# Patient Record
Sex: FEMALE | Race: WHITE | Employment: OTHER | URBAN - METROPOLITAN AREA
[De-identification: names, ages, dates, MRNs, and addresses within clinical notes are randomized per-mention and may not be internally consistent; named-entity substitution may affect disease eponyms.]

---

## 2017-12-14 ENCOUNTER — HOSPITAL ENCOUNTER (OUTPATIENT)
Age: 57
Discharge: HOME | End: 2017-12-14
Payer: MEDICARE

## 2017-12-14 ENCOUNTER — HOSPITAL ENCOUNTER (OUTPATIENT)
Dept: GENERAL RADIOLOGY | Age: 57
Discharge: HOME | End: 2017-12-14
Payer: MEDICARE

## 2017-12-14 DIAGNOSIS — J40 BRONCHITIS: ICD-10-CM

## 2017-12-14 DIAGNOSIS — R06.2 WHEEZING: ICD-10-CM

## 2017-12-14 PROCEDURE — 71020 XR CHEST STANDARD TWO VW: CPT

## 2018-04-02 ENCOUNTER — HOSPITAL ENCOUNTER (OUTPATIENT)
Dept: MRI IMAGING | Age: 58
Discharge: HOME | End: 2018-04-04
Payer: MEDICARE

## 2018-04-02 DIAGNOSIS — S83.281A OTHER TEAR OF LATERAL MENISCUS OF RIGHT KNEE, UNSPECIFIED WHETHER OLD OR CURRENT TEAR, INITIAL ENCOUNTER: ICD-10-CM

## 2018-04-02 PROCEDURE — 73721 MRI JNT OF LWR EXTRE W/O DYE: CPT

## 2018-04-20 ENCOUNTER — HOSPITAL ENCOUNTER (OUTPATIENT)
Dept: GENERAL RADIOLOGY | Age: 58
Discharge: HOME | End: 2018-04-22
Payer: MEDICARE

## 2018-04-20 ENCOUNTER — HOSPITAL ENCOUNTER (OUTPATIENT)
Age: 58
Discharge: HOME | End: 2018-04-22
Payer: MEDICARE

## 2018-04-20 ENCOUNTER — HOSPITAL ENCOUNTER (OUTPATIENT)
Age: 58
Discharge: HOME | End: 2018-04-20
Payer: MEDICARE

## 2018-04-20 DIAGNOSIS — M25.511 RIGHT SHOULDER PAIN, UNSPECIFIED CHRONICITY: ICD-10-CM

## 2018-04-20 LAB
ANION GAP SERPL CALCULATED.3IONS-SCNC: 12 MMOL/L (ref 9–17)
BUN BLDV-MCNC: 15 MG/DL (ref 6–20)
BUN/CREAT BLD: ABNORMAL (ref 9–20)
CALCIUM SERPL-MCNC: 9 MG/DL (ref 8.6–10.4)
CHLORIDE BLD-SCNC: 100 MMOL/L (ref 98–107)
CO2: 25 MMOL/L (ref 20–31)
CREAT SERPL-MCNC: 0.93 MG/DL (ref 0.5–0.9)
GFR AFRICAN AMERICAN: >60 ML/MIN
GFR NON-AFRICAN AMERICAN: >60 ML/MIN
GFR SERPL CREATININE-BSD FRML MDRD: ABNORMAL ML/MIN/{1.73_M2}
GFR SERPL CREATININE-BSD FRML MDRD: ABNORMAL ML/MIN/{1.73_M2}
GLUCOSE BLD-MCNC: 84 MG/DL (ref 70–99)
POTASSIUM SERPL-SCNC: 4 MMOL/L (ref 3.7–5.3)
SODIUM BLD-SCNC: 137 MMOL/L (ref 135–144)

## 2018-04-20 PROCEDURE — 36415 COLL VENOUS BLD VENIPUNCTURE: CPT

## 2018-04-20 PROCEDURE — 80048 BASIC METABOLIC PNL TOTAL CA: CPT

## 2018-04-20 PROCEDURE — 73030 X-RAY EXAM OF SHOULDER: CPT

## 2018-07-17 PROBLEM — M51.9 LUMBAR DISC DISEASE: Status: ACTIVE | Noted: 2018-07-17

## 2018-12-01 LAB
ANTIBODY: NORMAL
CHOLESTEROL, TOTAL: 225 MG/DL
CHOLESTEROL/HDL RATIO: 3.2
GLUCOSE FASTING: 100 MG/DL
HDLC SERPL-MCNC: 70 MG/DL (ref 35–70)
HIV AG/AB: NORMAL
LDL CHOLESTEROL CALCULATED: 131 MG/DL (ref 0–160)
TRIGL SERPL-MCNC: 122 MG/DL
VLDLC SERPL CALC-MCNC: 24 MG/DL

## 2018-12-03 DIAGNOSIS — Z13.220 SCREENING CHOLESTEROL LEVEL: ICD-10-CM

## 2018-12-03 DIAGNOSIS — Z13.1 DIABETES MELLITUS SCREENING: ICD-10-CM

## 2018-12-03 DIAGNOSIS — Z11.59 ENCOUNTER FOR HEPATITIS C SCREENING TEST FOR LOW RISK PATIENT: ICD-10-CM

## 2018-12-03 DIAGNOSIS — Z11.4 ENCOUNTER FOR SCREENING FOR HIV: ICD-10-CM

## 2018-12-03 DIAGNOSIS — R76.8 HEPATITIS C ANTIBODY POSITIVE IN BLOOD: Primary | ICD-10-CM

## 2018-12-04 ENCOUNTER — HOSPITAL ENCOUNTER (OUTPATIENT)
Age: 58
Discharge: HOME | End: 2018-12-04
Payer: MEDICARE

## 2018-12-04 DIAGNOSIS — Z79.01 ENCOUNTER FOR CURRENT LONG-TERM USE OF ANTICOAGULANTS: Primary | ICD-10-CM

## 2018-12-04 DIAGNOSIS — R76.8 HEPATITIS C ANTIBODY POSITIVE IN BLOOD: ICD-10-CM

## 2018-12-04 PROCEDURE — 87522 HEPATITIS C REVRS TRNSCRPJ: CPT

## 2018-12-04 PROCEDURE — 36415 COLL VENOUS BLD VENIPUNCTURE: CPT

## 2018-12-05 LAB
DIRECT EXAM: NORMAL
Lab: NORMAL
SPECIMEN DESCRIPTION: NORMAL
STATUS: NORMAL

## 2018-12-09 LAB
INR BLD: 2.2 (ref 0.92–1.08)
PROTHROMBIN TIME: 23.4 SEC (ref 9.4–11)

## 2018-12-12 DIAGNOSIS — Z12.39 BREAST CANCER SCREENING: ICD-10-CM

## 2018-12-13 ENCOUNTER — TELEPHONE (OUTPATIENT)
Dept: PRIMARY CARE CLINIC | Age: 58
End: 2018-12-13

## 2018-12-13 DIAGNOSIS — J44.9 CHRONIC OBSTRUCTIVE PULMONARY DISEASE, UNSPECIFIED COPD TYPE (HCC): ICD-10-CM

## 2018-12-13 DIAGNOSIS — R05.9 COUGH: Primary | ICD-10-CM

## 2018-12-13 RX ORDER — BENZONATATE 200 MG/1
200 CAPSULE ORAL 3 TIMES DAILY PRN
Qty: 30 CAPSULE | Refills: 0 | Status: SHIPPED | OUTPATIENT
Start: 2018-12-13 | End: 2018-12-23

## 2018-12-13 RX ORDER — PREDNISONE 20 MG/1
20 TABLET ORAL 2 TIMES DAILY
Qty: 10 TABLET | Refills: 0 | Status: SHIPPED | OUTPATIENT
Start: 2018-12-13 | End: 2019-08-16 | Stop reason: SDUPTHER

## 2018-12-13 NOTE — TELEPHONE ENCOUNTER
Pt calling stating she just got over the flu and now has this horrible cough. Clear phlegm, but at night can't sleep. Asking if you can send in a cough supp and Prednisone to [facility]?

## 2018-12-18 ENCOUNTER — TELEPHONE (OUTPATIENT)
Dept: PRIMARY CARE CLINIC | Age: 58
End: 2018-12-18

## 2018-12-18 DIAGNOSIS — R05.9 COUGH: Primary | ICD-10-CM

## 2018-12-18 RX ORDER — AMOXICILLIN AND CLAVULANATE POTASSIUM 875; 125 MG/1; MG/1
1 TABLET, FILM COATED ORAL 2 TIMES DAILY
Qty: 20 TABLET | Refills: 0 | Status: SHIPPED | OUTPATIENT
Start: 2018-12-18 | End: 2018-12-28

## 2019-01-14 ENCOUNTER — OFFICE VISIT (OUTPATIENT)
Dept: PRIMARY CARE CLINIC | Age: 59
End: 2019-01-14
Payer: MEDICARE

## 2019-01-14 VITALS
BODY MASS INDEX: 27.83 KG/M2 | OXYGEN SATURATION: 96 % | SYSTOLIC BLOOD PRESSURE: 100 MMHG | HEART RATE: 91 BPM | WEIGHT: 159.6 LBS | DIASTOLIC BLOOD PRESSURE: 60 MMHG

## 2019-01-14 DIAGNOSIS — M51.9 LUMBAR DISC DISEASE: ICD-10-CM

## 2019-01-14 DIAGNOSIS — Z23 NEEDS FLU SHOT: ICD-10-CM

## 2019-01-14 DIAGNOSIS — J45.20 MILD INTERMITTENT ASTHMA WITHOUT COMPLICATION: Primary | ICD-10-CM

## 2019-01-14 DIAGNOSIS — Z23 NEED FOR PROPHYLACTIC VACCINATION AGAINST STREPTOCOCCUS PNEUMONIAE (PNEUMOCOCCUS): ICD-10-CM

## 2019-01-14 DIAGNOSIS — J44.9 CHRONIC OBSTRUCTIVE PULMONARY DISEASE, UNSPECIFIED COPD TYPE (HCC): ICD-10-CM

## 2019-01-14 DIAGNOSIS — M72.0 DUPUYTREN CONTRACTURE: ICD-10-CM

## 2019-01-14 PROCEDURE — 90732 PPSV23 VACC 2 YRS+ SUBQ/IM: CPT | Performed by: FAMILY MEDICINE

## 2019-01-14 PROCEDURE — 3023F SPIROM DOC REV: CPT | Performed by: FAMILY MEDICINE

## 2019-01-14 PROCEDURE — 3017F COLORECTAL CA SCREEN DOC REV: CPT | Performed by: FAMILY MEDICINE

## 2019-01-14 PROCEDURE — G8417 CALC BMI ABV UP PARAM F/U: HCPCS | Performed by: FAMILY MEDICINE

## 2019-01-14 PROCEDURE — 99214 OFFICE O/P EST MOD 30 MIN: CPT | Performed by: FAMILY MEDICINE

## 2019-01-14 PROCEDURE — 90686 IIV4 VACC NO PRSV 0.5 ML IM: CPT | Performed by: FAMILY MEDICINE

## 2019-01-14 PROCEDURE — G8427 DOCREV CUR MEDS BY ELIG CLIN: HCPCS | Performed by: FAMILY MEDICINE

## 2019-01-14 PROCEDURE — 4004F PT TOBACCO SCREEN RCVD TLK: CPT | Performed by: FAMILY MEDICINE

## 2019-01-14 PROCEDURE — 90471 IMMUNIZATION ADMIN: CPT | Performed by: FAMILY MEDICINE

## 2019-01-14 PROCEDURE — G8482 FLU IMMUNIZE ORDER/ADMIN: HCPCS | Performed by: FAMILY MEDICINE

## 2019-01-14 PROCEDURE — 90472 IMMUNIZATION ADMIN EACH ADD: CPT | Performed by: FAMILY MEDICINE

## 2019-01-14 PROCEDURE — G8926 SPIRO NO PERF OR DOC: HCPCS | Performed by: FAMILY MEDICINE

## 2019-01-14 RX ORDER — HYDROCODONE BITARTRATE AND ACETAMINOPHEN 5; 325 MG/1; MG/1
1 TABLET ORAL EVERY 6 HOURS PRN
Qty: 90 TABLET | Refills: 0 | Status: SHIPPED | OUTPATIENT
Start: 2019-01-14 | End: 2019-05-31 | Stop reason: SDUPTHER

## 2019-01-14 RX ORDER — ONDANSETRON 4 MG/1
4 TABLET, FILM COATED ORAL EVERY 8 HOURS PRN
Qty: 30 TABLET | Refills: 0 | Status: SHIPPED | OUTPATIENT
Start: 2019-01-14 | End: 2019-05-15 | Stop reason: SDUPTHER

## 2019-01-14 ASSESSMENT — ENCOUNTER SYMPTOMS
SHORTNESS OF BREATH: 0
BACK PAIN: 1
COUGH: 0
WHEEZING: 1
DIFFICULTY BREATHING: 0

## 2019-01-14 ASSESSMENT — PATIENT HEALTH QUESTIONNAIRE - PHQ9
SUM OF ALL RESPONSES TO PHQ9 QUESTIONS 1 & 2: 0
SUM OF ALL RESPONSES TO PHQ QUESTIONS 1-9: 0
2. FEELING DOWN, DEPRESSED OR HOPELESS: 0
1. LITTLE INTEREST OR PLEASURE IN DOING THINGS: 0
SUM OF ALL RESPONSES TO PHQ QUESTIONS 1-9: 0

## 2019-01-14 ASSESSMENT — COPD QUESTIONNAIRES: COPD: 1

## 2019-01-17 DIAGNOSIS — F34.1 DYSTHYMIA: ICD-10-CM

## 2019-01-17 DIAGNOSIS — F39 MOOD DISORDER (HCC): ICD-10-CM

## 2019-01-17 RX ORDER — DIVALPROEX SODIUM 250 MG/1
TABLET, DELAYED RELEASE ORAL
Qty: 60 TABLET | Refills: 5 | Status: SHIPPED | OUTPATIENT
Start: 2019-01-17 | End: 2019-07-17 | Stop reason: SDUPTHER

## 2019-01-23 DIAGNOSIS — F43.9 STRESS AT HOME: ICD-10-CM

## 2019-01-23 DIAGNOSIS — F41.9 ANXIETY: ICD-10-CM

## 2019-01-23 RX ORDER — DULOXETIN HYDROCHLORIDE 60 MG/1
CAPSULE, DELAYED RELEASE ORAL
Qty: 30 CAPSULE | Refills: 5 | Status: SHIPPED | OUTPATIENT
Start: 2019-01-23 | End: 2019-07-19 | Stop reason: SDUPTHER

## 2019-02-19 RX ORDER — BUSPIRONE HYDROCHLORIDE 15 MG/1
TABLET ORAL
Qty: 60 TABLET | Refills: 5 | Status: SHIPPED | OUTPATIENT
Start: 2019-02-19 | End: 2019-08-17 | Stop reason: SDUPTHER

## 2019-02-22 RX ORDER — OMEPRAZOLE 20 MG/1
CAPSULE, DELAYED RELEASE ORAL
Qty: 30 CAPSULE | Refills: 4 | Status: SHIPPED | OUTPATIENT
Start: 2019-02-22 | End: 2019-07-19 | Stop reason: SDUPTHER

## 2019-04-01 DIAGNOSIS — J45.20 MILD INTERMITTENT ASTHMA WITHOUT COMPLICATION: ICD-10-CM

## 2019-04-02 ENCOUNTER — TELEPHONE (OUTPATIENT)
Dept: PRIMARY CARE CLINIC | Age: 59
End: 2019-04-02

## 2019-04-02 DIAGNOSIS — G45.9 TIA (TRANSIENT ISCHEMIC ATTACK): Primary | ICD-10-CM

## 2019-04-02 NOTE — TELEPHONE ENCOUNTER
Pt calling to ask that a standing order be faxed to Pathology labs at [phone]. Pt says that she went to do her INR today and could not because there was not a standing order at [facility].  Please advise     April [phone]

## 2019-04-15 ENCOUNTER — OFFICE VISIT (OUTPATIENT)
Dept: PRIMARY CARE CLINIC | Age: 59
End: 2019-04-15
Payer: MEDICARE

## 2019-04-15 VITALS
BODY MASS INDEX: 27.69 KG/M2 | HEART RATE: 80 BPM | OXYGEN SATURATION: 98 % | SYSTOLIC BLOOD PRESSURE: 124 MMHG | DIASTOLIC BLOOD PRESSURE: 86 MMHG | WEIGHT: 158.8 LBS

## 2019-04-15 DIAGNOSIS — J44.9 CHRONIC OBSTRUCTIVE PULMONARY DISEASE, UNSPECIFIED COPD TYPE (HCC): ICD-10-CM

## 2019-04-15 DIAGNOSIS — F41.9 ANXIETY: ICD-10-CM

## 2019-04-15 DIAGNOSIS — M19.90 ARTHRITIS: ICD-10-CM

## 2019-04-15 DIAGNOSIS — G45.9 TIA (TRANSIENT ISCHEMIC ATTACK): ICD-10-CM

## 2019-04-15 DIAGNOSIS — J45.20 MILD INTERMITTENT ASTHMA WITHOUT COMPLICATION: Primary | ICD-10-CM

## 2019-04-15 LAB
INTERNATIONAL NORMALIZATION RATIO, POC: 2.2
PROTHROMBIN TIME, POC: NORMAL

## 2019-04-15 PROCEDURE — 99214 OFFICE O/P EST MOD 30 MIN: CPT | Performed by: FAMILY MEDICINE

## 2019-04-15 PROCEDURE — G8926 SPIRO NO PERF OR DOC: HCPCS | Performed by: FAMILY MEDICINE

## 2019-04-15 PROCEDURE — 3023F SPIROM DOC REV: CPT | Performed by: FAMILY MEDICINE

## 2019-04-15 PROCEDURE — G8417 CALC BMI ABV UP PARAM F/U: HCPCS | Performed by: FAMILY MEDICINE

## 2019-04-15 PROCEDURE — 85610 PROTHROMBIN TIME: CPT | Performed by: FAMILY MEDICINE

## 2019-04-15 PROCEDURE — 4004F PT TOBACCO SCREEN RCVD TLK: CPT | Performed by: FAMILY MEDICINE

## 2019-04-15 PROCEDURE — 3017F COLORECTAL CA SCREEN DOC REV: CPT | Performed by: FAMILY MEDICINE

## 2019-04-15 PROCEDURE — G8427 DOCREV CUR MEDS BY ELIG CLIN: HCPCS | Performed by: FAMILY MEDICINE

## 2019-04-15 ASSESSMENT — COPD QUESTIONNAIRES: COPD: 1

## 2019-04-15 ASSESSMENT — ENCOUNTER SYMPTOMS
COUGH: 1
SHORTNESS OF BREATH: 1

## 2019-04-15 NOTE — PROGRESS NOTES
Negative. Respiratory: Positive for cough and shortness of breath. Musculoskeletal: Positive for arthralgias. Lower back pain was worse 2 weeks ago and had to take pain meds. Had to take pain pills for 4 days in a row. Has a physical job. Psychiatric/Behavioral: The patient is nervous/anxious. Stress   Son had URI 2-3 weeks ago   Sleeping 12 hrs sometimes during weekend she doesn't have her son  Has trouble sleeping otherwise: has trouble falling asleep, has tried Tylenol pm and it helped. Objective:     /86   Pulse 80   Wt 158 lb 12.8 oz (72 kg)   SpO2 98%   BMI 27.69 kg/m²   Physical Exam   Constitutional: She is oriented to person, place, and time. She appears well-developed and well-nourished. No distress. HENT:   Head: Normocephalic and atraumatic. Right Ear: External ear normal.   Left Ear: External ear normal.   Mouth/Throat: Oropharynx is clear and moist.   Eyes: Pupils are equal, round, and reactive to light. Conjunctivae are normal. Right eye exhibits no discharge. Left eye exhibits no discharge. No scleral icterus. Neck: No tracheal deviation present. No thyromegaly present. Cardiovascular: Normal rate, regular rhythm, normal heart sounds and intact distal pulses. No murmur heard. No carotid bruits   Pulmonary/Chest: Effort normal. No respiratory distress. She has no wheezes. Mild rhonchi ins and exp and this clears with coughing   Musculoskeletal: She exhibits no edema (no leg edema). Lymphadenopathy:     She has no cervical adenopathy. Neurological: She is alert and oriented to person, place, and time. Skin: Skin is warm. Capillary refill takes less than 2 seconds. No rash noted. Psychiatric: She has a normal mood and affect. Her behavior is normal. Thought content normal.   Nursing note and vitals reviewed. Assessment:       Diagnosis Orders   1. Mild intermittent asthma without complication     2.  Chronic obstructive pulmonary disease, unspecified COPD type ([facility]     3. TIA (transient ischemic attack)  MD COLLECTION CAPILLARY BLOOD SPECIMEN    POCT INR   4. Anxiety     5. Arthritis          Plan:      Return in about 3 months (around 7/15/2019) for depression, anxiety. asthma . Consider PM for steroid injections in spine. Do MRI of lumbar spine. Consider massage therapy and stretching. Stop smoking  Keep on same dose warfarin  Discussed her stress with family staying with her. Orders Placed This Encounter   Procedures    POCT INR    MD COLLECTION CAPILLARY BLOOD SPECIMEN     No orders of the defined types were placed in this encounter. Patient given educational materials - see patient instructions. Discussed use, benefit, and side effects of prescribed medications. All patient questions answered. Pt voiced understanding. Reviewed health maintenance. Instructed to continue current medications, diet. .  Patient agreed with treatment plan. Follow up as directed.      Electronicallysigned by Taylor Cristobal MD on 4/15/2019 at 1:31 PM

## 2019-05-15 RX ORDER — ONDANSETRON 4 MG/1
4 TABLET, FILM COATED ORAL EVERY 8 HOURS PRN
Qty: 30 TABLET | Refills: 0 | Status: SHIPPED | OUTPATIENT
Start: 2019-05-15 | End: 2019-07-15 | Stop reason: SDUPTHER

## 2019-05-22 DIAGNOSIS — G45.9 TIA (TRANSIENT ISCHEMIC ATTACK): ICD-10-CM

## 2019-05-22 RX ORDER — WARFARIN SODIUM 4 MG/1
TABLET ORAL
Qty: 45 TABLET | Refills: 1 | Status: SHIPPED | OUTPATIENT
Start: 2019-05-22 | End: 2019-07-15 | Stop reason: SDUPTHER

## 2019-05-22 NOTE — TELEPHONE ENCOUNTER
Last visit: 4/15/19  Last Med refill: 10/15/18  Does patient have enough medication for 72 hours: No,Pt has been out for 3 days      Next Visit Date:  Future Appointments   Date Time Provider Sarah Charmaine   7/15/2019  1:00 PM Emily Braun MD [facility] [address] Maintenance   Topic Date Due    Shingles Vaccine (1 of 2) 04/01/2010    Cervical cancer screen  10/19/2019    Breast cancer screen  11/29/2020    Lipid screen  12/01/2023    DTaP/Tdap/Td vaccine (2 - Td) 12/11/2024    Colon cancer screen colonoscopy  12/29/2026    Flu vaccine  Completed    Pneumococcal 0-64 years Vaccine  Completed    Hepatitis C screen  Completed    HIV screen  Completed       No results found for: LABA1C          ( goal A1C is < 7)   No results found for: LABMICR  LDL Calculated (mg/dL)   Date Value   12/01/2018 131       (goal LDL is <100)   BUN (mg/dL)   Date Value   04/20/2018 15     BP Readings from Last 3 Encounters:   04/15/19 124/86   01/14/19 100/60   10/15/18 112/70          (goal 120/80)    All Future Testing planned in [facility]  Lab Frequency Next Occurrence   MRI LUMBAR SPINE WO CONTRAST Once 06/08/2018   Protime-INR Once 06/21/2019   Protime-INR Once 07/11/2019               Patient Active Problem List:     TIA (transient ischemic attack)     Asthma     Pulmonary emphysema ([address]     Arthritis     Headache     Osteoporosis     Anxiety     COPD (chronic obstructive pulmonary disease) ([address]     Osteoarthritis     Stress at home     Lumbar disc disease

## 2019-05-31 DIAGNOSIS — M51.9 LUMBAR DISC DISEASE: ICD-10-CM

## 2019-05-31 RX ORDER — HYDROCODONE BITARTRATE AND ACETAMINOPHEN 5; 325 MG/1; MG/1
1 TABLET ORAL EVERY 6 HOURS PRN
Qty: 90 TABLET | Refills: 0 | Status: SHIPPED | OUTPATIENT
Start: 2019-05-31 | End: 2019-06-30

## 2019-06-04 DIAGNOSIS — M47.816 ARTHRITIS OF LUMBAR SPINE: ICD-10-CM

## 2019-06-04 DIAGNOSIS — M54.16 LUMBAR RADICULOPATHY, RIGHT: ICD-10-CM

## 2019-06-05 ENCOUNTER — TELEPHONE (OUTPATIENT)
Dept: PRIMARY CARE CLINIC | Age: 59
End: 2019-06-05

## 2019-06-06 DIAGNOSIS — R93.7 ABNORMAL MRI, LUMBAR SPINE: Primary | ICD-10-CM

## 2019-06-07 NOTE — TELEPHONE ENCOUNTER
Per [facility] PA was closed and marked as N/A. I left message for pt to return call. Please ask her to contact her pharmacy and see if it goes thru or if another PA needs done.  [facility]

## 2019-07-15 ENCOUNTER — OFFICE VISIT (OUTPATIENT)
Dept: PRIMARY CARE CLINIC | Age: 59
End: 2019-07-15
Payer: MEDICARE

## 2019-07-15 VITALS
OXYGEN SATURATION: 97 % | SYSTOLIC BLOOD PRESSURE: 112 MMHG | BODY MASS INDEX: 27.31 KG/M2 | DIASTOLIC BLOOD PRESSURE: 76 MMHG | HEART RATE: 85 BPM | WEIGHT: 156.6 LBS

## 2019-07-15 DIAGNOSIS — J43.9 PULMONARY EMPHYSEMA, UNSPECIFIED EMPHYSEMA TYPE (HCC): ICD-10-CM

## 2019-07-15 DIAGNOSIS — J45.20 MILD INTERMITTENT ASTHMA WITHOUT COMPLICATION: ICD-10-CM

## 2019-07-15 DIAGNOSIS — R11.0 NAUSEA: ICD-10-CM

## 2019-07-15 DIAGNOSIS — G45.9 TIA (TRANSIENT ISCHEMIC ATTACK): ICD-10-CM

## 2019-07-15 DIAGNOSIS — J44.9 CHRONIC OBSTRUCTIVE PULMONARY DISEASE, UNSPECIFIED COPD TYPE (HCC): Primary | ICD-10-CM

## 2019-07-15 LAB
INTERNATIONAL NORMALIZATION RATIO, POC: 2.5
PROTHROMBIN TIME, POC: NORMAL

## 2019-07-15 PROCEDURE — 3023F SPIROM DOC REV: CPT | Performed by: FAMILY MEDICINE

## 2019-07-15 PROCEDURE — 4004F PT TOBACCO SCREEN RCVD TLK: CPT | Performed by: FAMILY MEDICINE

## 2019-07-15 PROCEDURE — 99214 OFFICE O/P EST MOD 30 MIN: CPT | Performed by: FAMILY MEDICINE

## 2019-07-15 PROCEDURE — 3017F COLORECTAL CA SCREEN DOC REV: CPT | Performed by: FAMILY MEDICINE

## 2019-07-15 PROCEDURE — G8926 SPIRO NO PERF OR DOC: HCPCS | Performed by: FAMILY MEDICINE

## 2019-07-15 PROCEDURE — G8427 DOCREV CUR MEDS BY ELIG CLIN: HCPCS | Performed by: FAMILY MEDICINE

## 2019-07-15 PROCEDURE — G8417 CALC BMI ABV UP PARAM F/U: HCPCS | Performed by: FAMILY MEDICINE

## 2019-07-15 PROCEDURE — 85610 PROTHROMBIN TIME: CPT | Performed by: FAMILY MEDICINE

## 2019-07-15 RX ORDER — ONDANSETRON 4 MG/1
4 TABLET, FILM COATED ORAL EVERY 8 HOURS PRN
Qty: 30 TABLET | Refills: 0 | Status: SHIPPED | OUTPATIENT
Start: 2019-07-15 | End: 2019-09-27 | Stop reason: SDUPTHER

## 2019-07-15 RX ORDER — WARFARIN SODIUM 4 MG/1
TABLET ORAL
Qty: 45 TABLET | Refills: 5 | Status: SHIPPED | OUTPATIENT
Start: 2019-07-15 | End: 2019-12-23 | Stop reason: SDUPTHER

## 2019-07-15 ASSESSMENT — COPD QUESTIONNAIRES: COPD: 1

## 2019-07-15 ASSESSMENT — ENCOUNTER SYMPTOMS: SHORTNESS OF BREATH: 1
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
## 2019-07-17 DIAGNOSIS — F39 MOOD DISORDER (HCC): ICD-10-CM

## 2019-07-17 DIAGNOSIS — F34.1 DYSTHYMIA: ICD-10-CM

## 2019-07-17 RX ORDER — DIVALPROEX SODIUM 250 MG/1
TABLET, DELAYED RELEASE ORAL
Qty: 60 TABLET | Refills: 4 | Status: SHIPPED | OUTPATIENT
Start: 2019-07-17 | End: 2019-12-18 | Stop reason: SDUPTHER

## 2019-07-19 DIAGNOSIS — F43.9 STRESS AT HOME: ICD-10-CM

## 2019-07-19 DIAGNOSIS — F41.9 ANXIETY: ICD-10-CM

## 2019-07-19 RX ORDER — OMEPRAZOLE 20 MG/1
CAPSULE, DELAYED RELEASE ORAL
Qty: 30 CAPSULE | Refills: 3 | Status: SHIPPED | OUTPATIENT
Start: 2019-07-19 | End: 2019-11-20 | Stop reason: SDUPTHER

## 2019-07-19 RX ORDER — DULOXETIN HYDROCHLORIDE 60 MG/1
CAPSULE, DELAYED RELEASE ORAL
Qty: 30 CAPSULE | Refills: 4 | Status: SHIPPED | OUTPATIENT
Start: 2019-07-19 | End: 2019-12-27 | Stop reason: SDUPTHER

## 2019-07-30 ENCOUNTER — HOSPITAL ENCOUNTER (OUTPATIENT)
Dept: PHYSICAL THERAPY | Age: 59
Setting detail: THERAPIES SERIES
Discharge: HOME | End: 2019-07-30
Payer: MEDICARE

## 2019-07-30 PROCEDURE — 97161 PT EVAL LOW COMPLEX 20 MIN: CPT

## 2019-07-30 PROCEDURE — 97110 THERAPEUTIC EXERCISES: CPT

## 2019-07-30 NOTE — CONSULTS
6/10  Pain altered Tx:  [] Yes  [x] No  Action:  Symptoms: [x] Worsening [x] Same  Better:     [x] Other: changing position  Worse:   [x]Stand   [x] Bend  [x] Other: twisting, lifting  Sleep:     [x] Disturbed    Objective:   STRENGTH  ROM    Left Right Cervical    L1-2 Hip Flex 5 4 Flexion    Hip Abd 4+ 4+ Extension    L3-4 Knee Ext 5 5 Rotation L R   L4 Ankle DF 5 5 Sidebend L R   L5 EHL   Retraction    S1 Plant. Flex   Lumbar    Abdominals 4-  Flexion 72 degrees   Erector Spinae   Extension 15 degrees, radiation of symptoms with repetition      Rotation L wfl R 10% impairment   Hip ext 4+ 4 Sidebend L 42 R 30   Hip add 4+ 4+ UE/LE     Knee flex 4+ 4                                            TESTS (+/-) LEFT RIGHT Not Tested   SLR [] sit [x] supine - + []   Hamstring (SLR)   []   SKTC - - []   DKTC + + []   Slump/Dural - tight []   SI JT   []   ENIO tight - []   Joint Mobility hypo hypo []   Patrizia Tests ? Pain ?  Pain No Change Not Tested   RFIS [x] [] [] []   KRISTIAN [x] [] [] []   RFIL [x] [] [] []   REIL [] [x] [] []   Rep Prot [] [] [] []   Rep Retract [] [] [] []       OBSERVATION No Deficit Deficit Not Tested Comments   Posture       Forward Head [x] [] []    Rounded Shoulders [x] [] []    Kyphosis [x] [] []    Lordosis [] [x] [] decreased   Lateral Shift [x] [] []    Scoliosis [x] [] []    Iliac Crest [x] [] []    PSIS [] [x] [] Tender to palpation   ASIS [] [x] [] Tender to palpation   Genu Valgus [x] [] []    Genu Varus [x] [] []    Genu Recurvatum [x] [] []    Pronation [] [] [x]    Supination [] [] [x]    Leg Length Discrp [] [] [x]    Slumped Sitting [] [x] [] Patient tends to shift weight back and forth   Palpation [] [x] [] Patient with increased pain with mobilization to left-sided L3-4   Sensation [x] [] []    Edema [x] [] []    Neurological [] [] [x]          FUNCTION Normal Difficult Unable   Sitting [] [x] []   Standing [] [x] []   Ambulation [x] [] []   Groom/Dress [x] [] []   Lift/Carry []

## 2019-08-01 ENCOUNTER — HOSPITAL ENCOUNTER (OUTPATIENT)
Dept: PHYSICAL THERAPY | Age: 59
Setting detail: THERAPIES SERIES
Discharge: HOME | End: 2019-08-01
Payer: MEDICARE

## 2019-08-01 PROCEDURE — 97110 THERAPEUTIC EXERCISES: CPT

## 2019-08-01 PROCEDURE — 97140 MANUAL THERAPY 1/> REGIONS: CPT

## 2019-08-01 NOTE — FLOWSHEET NOTE
[address]   [address] [facility] [address]   Phone: [phone]   Fax: [phone]    Physical Therapy Daily Treatment Note      Date:  2019  Patient Name:  Heaven Montana    :  1960  MRN: [id number]  Physician: Dr. Alan Brooks MD                                 Insurance: [place] [employer]  Medical Diagnosis: M54.31 - Sciatica, right side; M54.5 - Low back pain                Rehab Codes: M54.5, M25.551, M62.81  Onset Date: 19 referral             Next 's appt. : -  Visit# / total visits:   Cancels/No Shows: 0/0    Subjective:    Pain:  [x] Yes  [] No Location: low back, right leg Pain Rating: (0-10 scale) 8/10  Pain altered Tx:  [] No  [] Yes  Action:  Comments: Patient reports she felt good following Tuesday's evaluation; however, she had increased pain yesterday which required her to take a pain pill. Pt reports she was feeling pretty good today until prolonged standing to check in. Arthritis low back pain rated at 4/10 and sciatic pain rated at 8/10.     Objective:  Modalities:   Precautions:  Exercises:  Exercise Reps/ Time Weight/ Level Comments   Manual Therapy 10 min  Grade I CPA mobilization to lumbar spine, Grade I unilateral PA mobilization to right-sided lower lumbar spine  Light soft tissue mobilization and myofascial relase to bilateral QL and piriformis         Prone on elbows 10x10\"      Prone press up 8x10\"   Started to have increased symptoms following 8   Prone hip flexor stretch 3x30\" ea      Prone hip IR/ER 10x ea     Prone hip extension 5x R, 10x L   Increased low back symptoms on the right after 5         Transverse abdominus activation 10 x 5\"      Piriformis stretch 2x30\" ea      LTR 5x5\"     Other:     Specific Instructions for next treatment: progress exercises depending on direction of preference, centralization of low back symptoms, progress core strengthening exercises, hip strengthening.        Treatment review. [] Demonstrates/verbalizes HEP/Ed previously given. Plan: [] Continue per plan of care.    [] Other:      Time In: 1630           Time Out: 7516    Electronically signed by:  Harpal Cantrell PT

## 2019-08-05 ENCOUNTER — HOSPITAL ENCOUNTER (OUTPATIENT)
Dept: PHYSICAL THERAPY | Age: 59
Setting detail: THERAPIES SERIES
Discharge: HOME | End: 2019-08-05
Payer: MEDICARE

## 2019-08-05 PROCEDURE — 97110 THERAPEUTIC EXERCISES: CPT

## 2019-08-05 NOTE — FLOWSHEET NOTE
understanding. [] Demonstrates understanding. [] Needs review. [] Demonstrates/verbalizes HEP/Ed previously given. Treatment Plan:  [x] Therapeutic Exercise             [x] Modalities: prn pain  [] Therapeutic Activity               [] Ultrasound                  [x] Electrical Stimulation  [] Gait Training                          []Massage         [x] Lumbar/Cervical Traction  [x] Neuromuscular Re-education            [x] Cold/hotpack  [] Iontophoresis: 4 mg/mL  [x] Instruction in HEP                                                                       Dexamethasone Sodium  [x] Manual Therapy                                                                          Phosphate 40-80 mAmin  [] Aquatic Therapy                                 [] Other:     []  Medication allergies reviewed for use of               Dexamethasone Sodium Phosphate 4mg/ml                with iontophoresis treatments. Pt is not allergic. Plan: [x] Continue per plan of care.    [] Other:       Time In: 3825   Time Out: [address]      Electronically signed by:  Paul Macias PTA

## 2019-08-08 ENCOUNTER — HOSPITAL ENCOUNTER (OUTPATIENT)
Dept: PHYSICAL THERAPY | Age: 59
Setting detail: THERAPIES SERIES
Discharge: HOME | End: 2019-08-08
Payer: MEDICARE

## 2019-08-08 PROCEDURE — 97110 THERAPEUTIC EXERCISES: CPT

## 2019-08-13 ENCOUNTER — HOSPITAL ENCOUNTER (OUTPATIENT)
Dept: PHYSICAL THERAPY | Age: 59
Setting detail: THERAPIES SERIES
Discharge: HOME | End: 2019-08-13
Payer: MEDICARE

## 2019-08-13 PROCEDURE — 97110 THERAPEUTIC EXERCISES: CPT

## 2019-08-13 NOTE — FLOWSHEET NOTE
2x 30\" ea   Towel under R low back/hip   Bridges 10x 5\" hold  Added 8/8/19   Single Knee to Chest 3x 30\" ea  Added 8/8/19  Left side caused groin pain   Double Knee to Chest 3x 30\" ea  Added 8/8/19   Lateral Hip Stretch 3x 30\" ea  Added 8/8/19   Transverse abdominus activation 10 x 5\"     LTR 10x 5\"           Other:     Specific Instructions for next treatment: progress exercises depending on direction of preference, centralization of low back symptoms, progress core strengthening exercises, hip strengthening.        Treatment Charges: Mins Units   []  Modalities     [x]  Ther Exercise 38 3   [x]  Manual Therapy 3 0   []  Ther Activities     []  Aquatics     []  Neuromuscular     []  Other     Total Treatment time 41 3       Assessment: [x] Progressing toward goals:    Patient is more aware of her posture during work and ADLs. She is reporting that she currently has no pain and is feeling much better overall. [] No change. [] Other:    Goals (as of 8/1/19)  STG: (to be met in 6 treatments)  1. ? Pain: Patient will report decreased low back pain to 4/10 following prolonged standing and twisting, bending, lifting activities at work. 2. ? ROM: Patient will increase lumbar AROM by 10 degrees in flexion and extension in order to increase tolerance to repetitive tasks. 3. ? Strength: Patient will increase bilateral hip strength to 4+ to 5/5 globally in order to increase tolerance to squatting activities. 4. ? Function: Patient will report no instances of right lower extremity giving way over 1 week duration. 5. Independent with Home Exercise Programs  LTG: (to be met in 12 treatments)  6. ? Pain: Patient will report decreased low back pain to 2/10 following prolonged standing and twisting, bending, lifting activities at work. 7. ? ROM: Patient will increase right rotation AROM to [facility] in order to increase tolerance to rotational activities for work.   8. ? Strength: Patient will increase core strength to 4+/5 in

## 2019-08-15 ENCOUNTER — HOSPITAL ENCOUNTER (OUTPATIENT)
Dept: PHYSICAL THERAPY | Age: 59
Setting detail: THERAPIES SERIES
Discharge: HOME | End: 2019-08-15
Payer: MEDICARE

## 2019-08-15 PROCEDURE — 97110 THERAPEUTIC EXERCISES: CPT

## 2019-08-15 NOTE — FLOWSHEET NOTE
[address] [facility]   [address] [facility] [address]   Phone: [phone]   Fax: [phone]    Physical Therapy Daily Treatment Note      Date:  8/15/2019  Patient Name:  Stephenie Wang    :  1960  MRN: [id number]  Physician: Dr. Flower Gomes MD                                 Insurance: [employer]  Medical Diagnosis: M54.31 - Sciatica, right side; M54.5 - Low back pain                Rehab Codes: M54.5, M25.551, M62.81  Onset Date: 19 referral             Next 's appt. : -  Visit# / total visits:  (count corrected 8/15)  Cancels/No Shows: 0/0    Subjective:   Patient reports that she had been feeling good for awhile and then had some increased pain yesterday. Pt reports no instances of her right leg giving way since beginning therapy.   Pain:  [x] Yes  [] No Location: low back  Pain Rating: (0-10 scale) 4/10  Pain altered Tx:  [x] No  [] Yes  Action:    Comments:   Objective:   Modalities:    Precautions:  Exercises: Bolded exercises performed today  Exercise Reps/ Time Weight/ Level Comments   Manual Therapy 5 min  Grade I CPA mobilization to lumbar spine, Grade I unilateral PA mobilization to right-sided lower lumbar spine  Light soft tissue mobilization and myofascial relase to bilateral QL and piriformis          Prone press up 10x10\"      Prone hip IR/ER 10x ea     Prone hip extension 10x ea            Kneeling hip flexor stretch 3x30\" ea  Blue foam on 4\" step   Standing 3-way hip 15x  Increased reps 19   Calf Stretch against wall 3x 30\" ea  Gastroc/Soleus  Added 19   Standing March 15x   Added 19         Piriformis stretch 2x 30\" ea   Towel under R low back/hip   Bridges 10x 5\" hold  Added 19   Single Knee to Chest 3x 30\" ea  Added 19  Left side caused groin pain   Double Knee to Chest 3x 30\" ea  Added 19   Lateral Hip Stretch 3x 30\" ea  Added 19   Transverse abdominus activation 10 x 5\"      + march 10x ea

## 2019-08-16 ENCOUNTER — TELEPHONE (OUTPATIENT)
Dept: PRIMARY CARE CLINIC | Age: 59
End: 2019-08-16

## 2019-08-16 ENCOUNTER — OFFICE VISIT (OUTPATIENT)
Dept: PRIMARY CARE CLINIC | Age: 59
End: 2019-08-16
Payer: MEDICARE

## 2019-08-16 VITALS
WEIGHT: 159.2 LBS | BODY MASS INDEX: 27.76 KG/M2 | DIASTOLIC BLOOD PRESSURE: 62 MMHG | SYSTOLIC BLOOD PRESSURE: 110 MMHG | HEART RATE: 86 BPM | OXYGEN SATURATION: 97 %

## 2019-08-16 DIAGNOSIS — R05.9 COUGH: ICD-10-CM

## 2019-08-16 DIAGNOSIS — R06.2 WHEEZING: ICD-10-CM

## 2019-08-16 DIAGNOSIS — J44.9 CHRONIC OBSTRUCTIVE PULMONARY DISEASE, UNSPECIFIED COPD TYPE (HCC): Primary | ICD-10-CM

## 2019-08-16 PROCEDURE — 4004F PT TOBACCO SCREEN RCVD TLK: CPT | Performed by: FAMILY MEDICINE

## 2019-08-16 PROCEDURE — 99213 OFFICE O/P EST LOW 20 MIN: CPT | Performed by: FAMILY MEDICINE

## 2019-08-16 PROCEDURE — 3017F COLORECTAL CA SCREEN DOC REV: CPT | Performed by: FAMILY MEDICINE

## 2019-08-16 PROCEDURE — G8926 SPIRO NO PERF OR DOC: HCPCS | Performed by: FAMILY MEDICINE

## 2019-08-16 PROCEDURE — 3023F SPIROM DOC REV: CPT | Performed by: FAMILY MEDICINE

## 2019-08-16 PROCEDURE — G8417 CALC BMI ABV UP PARAM F/U: HCPCS | Performed by: FAMILY MEDICINE

## 2019-08-16 PROCEDURE — G8427 DOCREV CUR MEDS BY ELIG CLIN: HCPCS | Performed by: FAMILY MEDICINE

## 2019-08-16 RX ORDER — FLUTICASONE FUROATE AND VILANTEROL 200; 25 UG/1; UG/1
1 POWDER RESPIRATORY (INHALATION) DAILY
Qty: 1 EACH | Refills: 0 | COMMUNITY
Start: 2019-08-16 | End: 2020-11-20 | Stop reason: SDUPTHER

## 2019-08-16 RX ORDER — PREDNISONE 20 MG/1
20 TABLET ORAL 2 TIMES DAILY
Qty: 10 TABLET | Refills: 0 | Status: SHIPPED | OUTPATIENT
Start: 2019-08-16 | End: 2019-09-05 | Stop reason: SDUPTHER

## 2019-08-16 ASSESSMENT — ENCOUNTER SYMPTOMS
COUGH: 1
WHEEZING: 1

## 2019-08-16 NOTE — PATIENT INSTRUCTIONS
directed. They may take hours to work, but they may shorten the attack and help you breathe better. ? Keep your quick-relief medicine with you at all times. · Talk to your doctor before using other medicines. Some medicines, such as aspirin, can cause asthma attacks in some people. · Check yourself for asthma symptoms to know which step to follow in your action plan. Watch for things like being short of breath, having chest tightness, coughing, and wheezing. Also notice if symptoms wake you up at night or if you get tired quickly when you exercise. · If you have a peak flow meter, use it to check how well you are breathing. This can help you predict when an asthma attack is going to occur. Then you can take medicine to prevent the asthma attack or make it less severe. · See your doctor regularly. These visits will help you learn more about asthma and what you can do to control it. Your doctor will monitor your treatment to make sure the medicine is helping you. · Keep track of your asthma attacks and your treatment. After you have had an attack, write down what triggered it, what helped end it, and any concerns you have about your asthma action plan. Take your diary when you see your doctor. You can then review your asthma action plan and decide if it is working. · Do not smoke or allow others to smoke around you. Avoid smoky places. Smoking makes asthma worse. If you need help quitting, talk to your doctor about stop-smoking programs and medicines. These can increase your chances of quitting for good. · Learn what triggers an asthma attack for you, and avoid the triggers when you can. Common triggers include colds, smoke, air pollution, dust, pollen, mold, pets, cockroaches, stress, and cold air. · Avoid colds and the flu. Get a pneumococcal vaccine shot. If you have had one before, ask your doctor whether you need a second dose. Get a flu vaccine every fall.  If you must be around people with colds or the

## 2019-08-19 RX ORDER — BUSPIRONE HYDROCHLORIDE 15 MG/1
TABLET ORAL
Qty: 60 TABLET | Refills: 4 | Status: SHIPPED | OUTPATIENT
Start: 2019-08-19 | End: 2020-05-20 | Stop reason: SDUPTHER

## 2019-08-20 ENCOUNTER — HOSPITAL ENCOUNTER (OUTPATIENT)
Dept: PHYSICAL THERAPY | Age: 59
Setting detail: THERAPIES SERIES
Discharge: HOME | End: 2019-08-20
Payer: MEDICARE

## 2019-08-20 PROCEDURE — 97110 THERAPEUTIC EXERCISES: CPT

## 2019-08-20 NOTE — FLOWSHEET NOTE
MET 8/15/19  5. Independent with Home Exercise Programs - MET 8/15/19  LTG: (to be met in 12 treatments)  6. ? Pain: Patient will report decreased low back pain to 2/10 following prolonged standing and twisting, bending, lifting activities at work. 7. ? ROM: Patient will increase right rotation AROM to [facility] in order to increase tolerance to rotational activities for work. 8. ? Strength: Patient will increase core strength to 4+/5 in order to improve stabilization and decrease strain placed on low back. 9. ? Function: Patient will demonstrate improved functional activity tolerance as evident by an improved score on the [place] to less than 45% impairment.     Patient goals: \"go away pain forever! \"    Pt. Education:  [] Yes  [x] No  [] Reviewed Prior HEP/Ed  Method of Education: [] Verbal  [] Demo  [] Written  Comprehension of Education:    [] Verbalizes understanding. [] Demonstrates understanding. [] Needs review. [] Demonstrates/verbalizes HEP/Ed previously given. Treatment Plan:  [x] Therapeutic Exercise             [x] Modalities: prn pain  [] Therapeutic Activity               [] Ultrasound                  [x] Electrical Stimulation  [] Gait Training                          []Massage         [x] Lumbar/Cervical Traction  [x] Neuromuscular Re-education            [x] Cold/hotpack  [] Iontophoresis: 4 mg/mL  [x] Instruction in HEP                                                                       Dexamethasone Sodium  [x] Manual Therapy                                                                          Phosphate 40-80 mAmin  [] Aquatic Therapy                                 [] Other:     []  Medication allergies reviewed for use of               Dexamethasone Sodium Phosphate 4mg/ml                with iontophoresis treatments. Pt is not allergic. Plan: [x] Continue per plan of care.    [] Other:       Time In: 1300  Time Out: 5          Electronically signed by:  Brionna Ross Christina, PT

## 2019-08-27 ENCOUNTER — HOSPITAL ENCOUNTER (OUTPATIENT)
Dept: PHYSICAL THERAPY | Age: 59
Setting detail: THERAPIES SERIES
Discharge: HOME | End: 2019-08-27
Payer: MEDICARE

## 2019-08-27 PROCEDURE — 97110 THERAPEUTIC EXERCISES: CPT

## 2019-08-28 ENCOUNTER — TELEPHONE (OUTPATIENT)
Dept: PRIMARY CARE CLINIC | Age: 59
End: 2019-08-28

## 2019-08-28 DIAGNOSIS — R93.7 ABNORMAL MRI, LUMBAR SPINE: ICD-10-CM

## 2019-08-28 DIAGNOSIS — M47.816 ARTHRITIS OF LUMBAR SPINE: ICD-10-CM

## 2019-08-28 DIAGNOSIS — M51.9 LUMBAR DISC DISEASE: ICD-10-CM

## 2019-08-28 DIAGNOSIS — M51.9 LUMBAR DISC DISEASE: Primary | ICD-10-CM

## 2019-08-28 DIAGNOSIS — M54.16 LUMBAR RADICULOPATHY, RIGHT: ICD-10-CM

## 2019-08-28 RX ORDER — HYDROCODONE BITARTRATE AND ACETAMINOPHEN 7.5; 325 MG/1; MG/1
1 TABLET ORAL EVERY 6 HOURS PRN
Qty: 120 TABLET | Refills: 0 | Status: SHIPPED | OUTPATIENT
Start: 2019-08-28 | End: 2020-01-06 | Stop reason: SDUPTHER

## 2019-08-29 ENCOUNTER — HOSPITAL ENCOUNTER (OUTPATIENT)
Dept: PHYSICAL THERAPY | Age: 59
Setting detail: THERAPIES SERIES
Discharge: HOME | End: 2019-08-29
Payer: MEDICARE

## 2019-09-04 ENCOUNTER — TELEPHONE (OUTPATIENT)
Dept: PRIMARY CARE CLINIC | Age: 59
End: 2019-09-04

## 2019-09-04 DIAGNOSIS — R06.2 WHEEZING: ICD-10-CM

## 2019-09-04 DIAGNOSIS — J44.9 CHRONIC OBSTRUCTIVE PULMONARY DISEASE, UNSPECIFIED COPD TYPE (HCC): ICD-10-CM

## 2019-09-04 DIAGNOSIS — R05.9 COUGH: ICD-10-CM

## 2019-09-05 ENCOUNTER — OFFICE VISIT (OUTPATIENT)
Dept: PRIMARY CARE CLINIC | Age: 59
End: 2019-09-05
Payer: MEDICARE

## 2019-09-05 VITALS
WEIGHT: 160.6 LBS | DIASTOLIC BLOOD PRESSURE: 66 MMHG | SYSTOLIC BLOOD PRESSURE: 112 MMHG | OXYGEN SATURATION: 95 % | TEMPERATURE: 98.8 F | HEART RATE: 88 BPM | BODY MASS INDEX: 28 KG/M2

## 2019-09-05 DIAGNOSIS — J44.9 CHRONIC OBSTRUCTIVE PULMONARY DISEASE, UNSPECIFIED COPD TYPE (HCC): ICD-10-CM

## 2019-09-05 DIAGNOSIS — J40 BRONCHITIS: Primary | ICD-10-CM

## 2019-09-05 PROCEDURE — 4004F PT TOBACCO SCREEN RCVD TLK: CPT | Performed by: FAMILY MEDICINE

## 2019-09-05 PROCEDURE — G8926 SPIRO NO PERF OR DOC: HCPCS | Performed by: FAMILY MEDICINE

## 2019-09-05 PROCEDURE — 3023F SPIROM DOC REV: CPT | Performed by: FAMILY MEDICINE

## 2019-09-05 PROCEDURE — 3017F COLORECTAL CA SCREEN DOC REV: CPT | Performed by: FAMILY MEDICINE

## 2019-09-05 PROCEDURE — 99213 OFFICE O/P EST LOW 20 MIN: CPT | Performed by: FAMILY MEDICINE

## 2019-09-05 PROCEDURE — G8417 CALC BMI ABV UP PARAM F/U: HCPCS | Performed by: FAMILY MEDICINE

## 2019-09-05 PROCEDURE — G8427 DOCREV CUR MEDS BY ELIG CLIN: HCPCS | Performed by: FAMILY MEDICINE

## 2019-09-05 RX ORDER — PREDNISONE 20 MG/1
20 TABLET ORAL 2 TIMES DAILY
Qty: 10 TABLET | Refills: 0 | Status: SHIPPED | OUTPATIENT
Start: 2019-09-05 | End: 2019-09-10

## 2019-09-05 RX ORDER — AZITHROMYCIN 250 MG/1
TABLET, FILM COATED ORAL
Qty: 1 PACKET | Refills: 0 | Status: SHIPPED | OUTPATIENT
Start: 2019-09-05 | End: 2019-09-15

## 2019-09-05 ASSESSMENT — ENCOUNTER SYMPTOMS
COUGH: 1
WHEEZING: 1

## 2019-09-05 NOTE — PATIENT INSTRUCTIONS
Patient Education        Bronchitis: Care Instructions  Your Care Instructions    Bronchitis is inflammation of the bronchial tubes, which carry air to the lungs. The tubes swell and produce mucus, or phlegm. The mucus and inflamed bronchial tubes make you cough. You may have trouble breathing. Most cases of bronchitis are caused by viruses like those that cause colds. Antibiotics usually do not help and they may be harmful. Bronchitis usually develops rapidly and lasts about 2 to 3 weeks in otherwise healthy people. Follow-up care is a key part of your treatment and safety. Be sure to make and go to all appointments, and call your doctor if you are having problems. It's also a good idea to know your test results and keep a list of the medicines you take. How can you care for yourself at home? · Take all medicines exactly as prescribed. Call your doctor if you think you are having a problem with your medicine. · Get some extra rest.  · Take an over-the-counter pain medicine, such as acetaminophen (Tylenol), ibuprofen (Advil, Motrin), or naproxen (Aleve) to reduce fever and relieve body aches. Read and follow all instructions on the label. · Do not take two or more pain medicines at the same time unless the doctor told you to. Many pain medicines have acetaminophen, which is Tylenol. Too much acetaminophen (Tylenol) can be harmful. · Take an over-the-counter cough medicine that contains dextromethorphan to help quiet a dry, hacking cough so that you can sleep. Avoid cough medicines that have more than one active ingredient. Read and follow all instructions on the label. · Breathe moist air from a humidifier, hot shower, or sink filled with hot water. The heat and moisture will thin mucus so you can cough it out. · Do not smoke. Smoking can make bronchitis worse. If you need help quitting, talk to your doctor about stop-smoking programs and medicines.  These can increase your chances of quitting for good.  When should you call for help? Call 911 anytime you think you may need emergency care. For example, call if:    · You have severe trouble breathing.    Call your doctor now or seek immediate medical care if:    · You have new or worse trouble breathing.     · You cough up dark brown or bloody mucus (sputum).     · You have a new or higher fever.     · You have a new rash.    Watch closely for changes in your health, and be sure to contact your doctor if:    · You cough more deeply or more often, especially if you notice more mucus or a change in the color of your mucus.     · You are not getting better as expected. Where can you learn more? Go to https://[employer]. org and sign in to your [employer] account. Enter H333 in the F[employer] box to learn more about \"Bronchitis: Care Instructions. \"     If you do not have an account, please click on the \"Sign Up Now\" link. Current as of: September 5, 2018  Content Version: 12.1  © 4969-9238 Healthwise, Incorporated. Care instructions adapted under license by [facility] ([facility]). If you have questions about a medical condition or this instruction, always ask your healthcare professional. [place] [address] any warranty or liability for your use of this information.

## 2019-09-10 ENCOUNTER — HOSPITAL ENCOUNTER (OUTPATIENT)
Dept: PHYSICAL THERAPY | Age: 59
Setting detail: THERAPIES SERIES
Discharge: HOME | End: 2019-09-10
Payer: MEDICARE

## 2019-09-10 PROCEDURE — 97110 THERAPEUTIC EXERCISES: CPT

## 2019-09-10 NOTE — PROGRESS NOTES
activities for work. 8. ? Strength: Patient will increase core strength to 4+/5 in order to improve stabilization and decrease strain placed on low back. 9. ? Function: Patient will demonstrate improved functional activity tolerance as evident by an improved score on the [place] to less than 45% impairment.     Patient goals: \"go away pain forever! \"     Pt. Education:  [x] Yes  [] No  [] Reviewed Prior HEP/Ed  Method of Education: [x] Verbal  [x] Demo  [] Written  Comprehension of Education:  Educated patient on: proper body mechanics / technique for: lifting, cleaning, reaching, and squatting. [x] Verbalizes understanding. [x] Demonstrates understanding. [x] Needs review. [] Demonstrates/verbalizes HEP/Ed previously given.      Treatment Plan:  [x] Therapeutic Exercise             [x] Modalities: prn pain  [] Therapeutic Activity               [] Ultrasound                  [x] Electrical Stimulation  [] Gait Training                          []Massage         [x] Lumbar/Cervical Traction  [x] Neuromuscular Re-education            [x] Cold/hotpack  [] Iontophoresis: 4 mg/mL  [x] Instruction in HEP                                                                       Dexamethasone Sodium  [x] Manual Therapy                                                                          Phosphate 40-80 mAmin  [] Aquatic Therapy                                 [] Other:     []  Medication allergies reviewed for use of               Dexamethasone Sodium Phosphate 4mg/ml                with iontophoresis treatments.              Pt is not allergic.     Plan:    [x] Continue per plan of care.               [] Other:                             Time In: 1005    Time Out: 1045         Electronically signed by Adriana Oden PT on 9/10/2019 at 10:10 AM

## 2019-09-12 ENCOUNTER — HOSPITAL ENCOUNTER (OUTPATIENT)
Dept: PHYSICAL THERAPY | Age: 59
Setting detail: THERAPIES SERIES
Discharge: HOME | End: 2019-09-12
Payer: MEDICARE

## 2019-09-12 PROCEDURE — 97110 THERAPEUTIC EXERCISES: CPT

## 2019-09-12 PROCEDURE — 97140 MANUAL THERAPY 1/> REGIONS: CPT

## 2019-09-12 NOTE — FLOWSHEET NOTE
Physical Therapy    [address] [facility] Outpatient Physical Therapy              [address] [place] [address]              Phone: [phone]              Fax: [phone]     Physical Therapy Daily Treatment Note        Date:  9/10/2019  Patient Name:  Yesenia Kidd                  :  1960                       MRN: [id number]  Physician: Dr. Mildred Bach MD                                 Insurance: [place] [employer]  Medical Diagnosis: M54.31 - Sciatica, right side; M54.5 - Low back pain                Rehab Codes: M54.5, M25.551, M62.81  Onset Date: 19 referral             Next 's appt. : -  Visit# / total visits: 10/12        Cancels/No Shows:      Subjective:  Patient reports that she had increased pain about 2 hours after leaving therapy on Tuesday, with radicular symptoms. Pt reports that she has \"arthritis\" pain in the whole back today and that it's been bothering her most of the day.  Pt reports pain of 10/10 today before taking her pain pill.      Pain:  [] Yes  [x] No          Location: N/A   Pain Rating: (0-10 scale) 0/10  Pain altered Tx:  [x] No  [] Yes  Action:     Comments:   Objective:   Modalities: NONE   Precautions:  Exercises: Bolded exercises performed today  Exercise Reps/ Time Weight/ Level Comments   Manual Therapy 8 min   Grade I CPA mobilization to lumbar spine, Grade I unilateral PA mobilization to right-sided lower lumbar spine  Light soft tissue mobilization and myofascial release to bilateral QL and piriformis             Prone press up 10x10\"       Prone hip IR/ER 10x ea       Prone hip extension 10x ea                 Kneeling hip flexor stretch 3x30\" ea   Blue foam on 4\" step   Standing 3-way hip 15x   Increased reps 19   Calf Stretch against wall 3x 30\" ea  Gastroc/Soleus   Added 19   Standing Marches 15x   Added 19             Piriformis stretch 3x 30\" ea   Towel under R low back/hip   Bridges 10x 5\" hold   Added 19   Single Knee in 12 treatments)  6. ? Pain: Patient will report decreased low back pain to 2/10 following prolonged standing and twisting, bending, lifting activities at work. 7. ? ROM: Patient will increase right rotation AROM to [facility] in order to increase tolerance to rotational activities for work. 8. ? Strength: Patient will increase core strength to 4+/5 in order to improve stabilization and decrease strain placed on low back. 9. ? Function: Patient will demonstrate improved functional activity tolerance as evident by an improved score on the [place] to less than 45% impairment.     Patient goals: \"go away pain forever! \"     Pt. Education:  [] Yes  [] No  [x] Reviewed Prior HEP/Ed  Method of Education: [] Verbal  [x] Demo  [] Written  Comprehension of Education:    [] Verbalizes understanding. [x] Demonstrates understanding. [] Needs review.      [x] Demonstrates/verbalizes HEP/Ed previously given.                                Time In: 1510    Time Out: 1550       Electronically signed by Nichole Badillo, PT on 9/12/2019

## 2019-09-17 ENCOUNTER — HOSPITAL ENCOUNTER (OUTPATIENT)
Dept: PHYSICAL THERAPY | Age: 59
Setting detail: THERAPIES SERIES
Discharge: HOME | End: 2019-09-17
Payer: MEDICARE

## 2019-09-17 PROCEDURE — 97110 THERAPEUTIC EXERCISES: CPT

## 2019-09-17 NOTE — FLOWSHEET NOTE
AROM to [facility] in order to increase tolerance to rotational activities for work. 8. ? Strength: Patient will increase core strength to 4+/5 in order to improve stabilization and decrease strain placed on low back. 9. ? Function: Patient will demonstrate improved functional activity tolerance as evident by an improved score on the [place] to less than 45% impairment.     Patient goals: \"go away pain forever! \"     Pt. Education:  [] Yes  [x] No  [] Reviewed Prior HEP/Ed  Method of Education: [] Verbal  [] Demo  [] Written  Comprehension of Education:    [] Verbalizes understanding. [] Demonstrates understanding. [] Needs review.      [] Demonstrates/verbalizes HEP/Ed previously given.                              Time In: 1034    Time Out: 1115    Total: 41 min    Electronically signed by Connor Siddiqui PTA on 9/17/2019

## 2019-09-18 ENCOUNTER — PROCEDURE VISIT (OUTPATIENT)
Dept: PRIMARY CARE CLINIC | Age: 59
End: 2019-09-18
Payer: MEDICARE

## 2019-09-18 VITALS
SYSTOLIC BLOOD PRESSURE: 114 MMHG | DIASTOLIC BLOOD PRESSURE: 70 MMHG | BODY MASS INDEX: 27.9 KG/M2 | WEIGHT: 160 LBS | OXYGEN SATURATION: 96 % | HEART RATE: 85 BPM

## 2019-09-18 DIAGNOSIS — I83.93 SPIDER VEINS OF BOTH LOWER EXTREMITIES: ICD-10-CM

## 2019-09-18 DIAGNOSIS — D22.5 MELANOCYTIC NEVUS OF TRUNK: ICD-10-CM

## 2019-09-18 DIAGNOSIS — L81.4 LENTIGINES: ICD-10-CM

## 2019-09-18 DIAGNOSIS — D48.5 NEOPLASM OF UNCERTAIN BEHAVIOR OF SKIN: ICD-10-CM

## 2019-09-18 DIAGNOSIS — L82.1 SEBORRHEIC KERATOSES: ICD-10-CM

## 2019-09-18 PROCEDURE — G8417 CALC BMI ABV UP PARAM F/U: HCPCS | Performed by: PHYSICIAN ASSISTANT

## 2019-09-18 PROCEDURE — G8428 CUR MEDS NOT DOCUMENT: HCPCS | Performed by: PHYSICIAN ASSISTANT

## 2019-09-18 PROCEDURE — 3017F COLORECTAL CA SCREEN DOC REV: CPT | Performed by: PHYSICIAN ASSISTANT

## 2019-09-18 PROCEDURE — 99214 OFFICE O/P EST MOD 30 MIN: CPT | Performed by: PHYSICIAN ASSISTANT

## 2019-09-18 PROCEDURE — 4004F PT TOBACCO SCREEN RCVD TLK: CPT | Performed by: PHYSICIAN ASSISTANT

## 2019-09-19 ENCOUNTER — HOSPITAL ENCOUNTER (OUTPATIENT)
Dept: PHYSICAL THERAPY | Age: 59
Setting detail: THERAPIES SERIES
Discharge: HOME | End: 2019-09-19
Payer: MEDICARE

## 2019-09-19 PROCEDURE — 97140 MANUAL THERAPY 1/> REGIONS: CPT

## 2019-09-19 PROCEDURE — 97110 THERAPEUTIC EXERCISES: CPT

## 2019-09-19 NOTE — FLOWSHEET NOTE
Physical Therapy    [address] [place] [address] Outpatient Physical Therapy              [address] [facility] [address]              Phone: [phone]              Fax: [phone]     Physical Therapy Daily Treatment Note      Date:  2019  Patient Name:  Radha Mesa                  :  1960                       MRN: [id number]  Physician: Dr. Arlette Bull MD                                 Insurance: [place] [employer]age  Medical Diagnosis: M54.31 - Sciatica, right side; M54.5 - Low back pain                Rehab Codes: M54.5, M25.551, M62.81  Onset Date: 19 referral             Next 's appt. : -  Visit# / total visits:         Cancels/No Shows:      Subjective:  Patient reports high pain levels of 10/10 causing her to have to take a pain level right before coming into today's treatment session. Pain located across full low back.  Pt currently not having pain while sleeping.      Pain:  [x] Yes  [] No          Location: N/A   Pain Rating: (0-10 scale) 10/10  Pain altered Tx:  [x] No  [] Yes  Action:     Comments:   Objective:   Modalities: NONE   Precautions:  Exercises: Bolded exercises performed today  Exercise Reps/ Time Weight/ Level Comments   Manual Therapy 10 min   Grade I CPA mobilization to lumbar spine, Grade I unilateral PA mobilization to right-sided lower lumbar spine  Light soft tissue mobilization and myofascial release to bilateral QL and piriformis             Prone press up 10x10\"       Prone hip IR/ER 10x ea       Prone hip extension 10x ea                 Kneeling hip flexor stretch 3x30\" ea   Blue foam on 4\" step   Standing 3-way hip 15x   Increased reps 19   Calf Stretch against wall 3x 30\" ea  Gastroc/Soleus   Added 19   Standing Marches 15x   Added 19             Piriformis stretch 3x 30\" ea   Towel under R low back/hip   Bridges 10x 5\" hold   Added 19   Single Knee to Chest 3x 30\" ea   Added 19  Left side caused groin pain   Double Knee to Chest 3x 30\" ea   Added 8/8/19   Lateral Hip Stretch 3x 30\" ea   Added 8/8/19   Supine hip flexor stretch 3x30\" ea   Added 8/20/19   Transverse abdominus activation 10 x 5\"       TrA + march 10x ea   Added 8/15/19   LTR 10x 5\"                 Clamshells 2x10   Added 8/20/19   Other:     Specific Instructions for next treatment:       Treatment Charges: Mins Units   []  Modalities       [x]  Ther Exercise 30 2   [x]  Manual Therapy 10 1   []  Ther Activities       []  Aquatics       []  Neuromuscular       []  Other: Proper Body Mechanics education/instruction     Total Treatment time 40 3         Assessment:  [x] Progressing toward goals:  Held standing exercises at this date due to patient having increased pain. No increased pain or radicular symptoms noted with table exercises at this date. Manual therapy performed for pain control and to focus on increased motion, pt tender with PA mobs to lower lumbar spine (L4-5). [] No change. [] Other:      Goals (as of 9/19/19)  STG: (to be met in 6 treatments)  1. ? Pain: Patient will report decreased low back pain to 4/10 following prolonged standing and twisting, bending, lifting activities at work. - MET 8/15/19   2. ? ROM: Patient will increase lumbar AROM by 10 degrees in flexion and extension in order to increase tolerance to repetitive tasks. - MET 9/19/19  3. ? Strength: Patient will increase bilateral hip strength to 4+ to 5/5 globally in order to increase tolerance to squatting activities. - MET 8/15/19, currently 4+ to 5/5  4. ? Function: Patient will report no instances of right lower extremity giving way over 1 week duration. - MET 8/15/19  5. Independent with Home Exercise Programs - MET 8/15/19  LTG: (to be met in 12 treatments)  6. ? Pain: Patient will report decreased low back pain to 2/10 following prolonged standing and twisting, bending, lifting activities at work.  - Progress made 9/19, not met consistently  7. ? ROM: Patient will increase right rotation AROM to [facility] in order to increase tolerance to rotational activities for work. - Progress made 9/19/19  8. ? Strength: Patient will increase core strength to 4+/5 in order to improve stabilization and decrease strain placed on low back. - Progress made 9/19/19  9. ? Function: Patient will demonstrate improved functional activity tolerance as evident by an improved score on the [place] to less than 45% impairment. - MET 9/19/19, currently 30% impairment     Patient goals: \"go away pain forever! \"     Pt. Education:  [] Yes  [] No  [x] Reviewed Prior HEP/Ed  Method of Education: [] Verbal  [x] Demo  [] Written  Comprehension of Education:    [] Verbalizes understanding. [] Demonstrates understanding. [] Needs review.      [x] Demonstrates/verbalizes HEP/Ed previously given.                              Time In: 1:30 pm   Time Out: 2:15 pm   Electronically signed by Kael Azar, PT on 9/19/2019

## 2019-09-20 ENCOUNTER — OFFICE VISIT (OUTPATIENT)
Dept: PRIMARY CARE CLINIC | Age: 59
End: 2019-09-20
Payer: MEDICARE

## 2019-09-20 VITALS
BODY MASS INDEX: 28.11 KG/M2 | OXYGEN SATURATION: 96 % | WEIGHT: 161.2 LBS | HEART RATE: 72 BPM | SYSTOLIC BLOOD PRESSURE: 102 MMHG | DIASTOLIC BLOOD PRESSURE: 70 MMHG

## 2019-09-20 DIAGNOSIS — D48.5 NEOPLASM OF UNCERTAIN BEHAVIOR OF SKIN: Primary | ICD-10-CM

## 2019-09-20 PROCEDURE — 11102 TANGNTL BX SKIN SINGLE LES: CPT | Performed by: PHYSICIAN ASSISTANT

## 2019-09-25 ENCOUNTER — TELEPHONE (OUTPATIENT)
Dept: PRIMARY CARE CLINIC | Age: 59
End: 2019-09-25

## 2019-09-27 DIAGNOSIS — R11.0 NAUSEA: ICD-10-CM

## 2019-09-27 RX ORDER — ONDANSETRON 4 MG/1
4 TABLET, FILM COATED ORAL EVERY 8 HOURS PRN
Qty: 30 TABLET | Refills: 3 | Status: SHIPPED | OUTPATIENT
Start: 2019-09-27 | End: 2020-06-24 | Stop reason: SDUPTHER

## 2019-10-03 ENCOUNTER — TELEPHONE (OUTPATIENT)
Dept: PRIMARY CARE CLINIC | Age: 59
End: 2019-10-03

## 2019-10-03 DIAGNOSIS — Z12.31 SCREENING MAMMOGRAM, ENCOUNTER FOR: Primary | ICD-10-CM

## 2019-10-04 LAB
INR BLD: 2.4 (ref 0.9–1.2)
PROTHROMBIN TIME: 26.8 SEC (ref 9.5–12.6)

## 2019-10-07 DIAGNOSIS — C44.320 SCC (SQUAMOUS CELL CARCINOMA), FACE: Primary | ICD-10-CM

## 2019-10-14 ENCOUNTER — OFFICE VISIT (OUTPATIENT)
Dept: PRIMARY CARE CLINIC | Age: 59
End: 2019-10-14
Payer: MEDICARE

## 2019-10-14 VITALS
HEART RATE: 82 BPM | BODY MASS INDEX: 28.14 KG/M2 | WEIGHT: 161.4 LBS | DIASTOLIC BLOOD PRESSURE: 60 MMHG | OXYGEN SATURATION: 95 % | SYSTOLIC BLOOD PRESSURE: 108 MMHG

## 2019-10-14 DIAGNOSIS — Z13.220 SCREENING CHOLESTEROL LEVEL: ICD-10-CM

## 2019-10-14 DIAGNOSIS — J44.9 CHRONIC OBSTRUCTIVE PULMONARY DISEASE, UNSPECIFIED COPD TYPE (HCC): Primary | ICD-10-CM

## 2019-10-14 DIAGNOSIS — R41.3 MEMORY LOSS: ICD-10-CM

## 2019-10-14 DIAGNOSIS — H53.9 VISION CHANGES: ICD-10-CM

## 2019-10-14 DIAGNOSIS — F32.A CHRONIC DEPRESSIVE DISORDER: ICD-10-CM

## 2019-10-14 DIAGNOSIS — Z23 NEEDS FLU SHOT: ICD-10-CM

## 2019-10-14 PROCEDURE — G8417 CALC BMI ABV UP PARAM F/U: HCPCS | Performed by: FAMILY MEDICINE

## 2019-10-14 PROCEDURE — 4004F PT TOBACCO SCREEN RCVD TLK: CPT | Performed by: FAMILY MEDICINE

## 2019-10-14 PROCEDURE — G8427 DOCREV CUR MEDS BY ELIG CLIN: HCPCS | Performed by: FAMILY MEDICINE

## 2019-10-14 PROCEDURE — G8926 SPIRO NO PERF OR DOC: HCPCS | Performed by: FAMILY MEDICINE

## 2019-10-14 PROCEDURE — 3017F COLORECTAL CA SCREEN DOC REV: CPT | Performed by: FAMILY MEDICINE

## 2019-10-14 PROCEDURE — 3023F SPIROM DOC REV: CPT | Performed by: FAMILY MEDICINE

## 2019-10-14 PROCEDURE — 90686 IIV4 VACC NO PRSV 0.5 ML IM: CPT | Performed by: FAMILY MEDICINE

## 2019-10-14 PROCEDURE — 90471 IMMUNIZATION ADMIN: CPT | Performed by: FAMILY MEDICINE

## 2019-10-14 PROCEDURE — G8482 FLU IMMUNIZE ORDER/ADMIN: HCPCS | Performed by: FAMILY MEDICINE

## 2019-10-14 PROCEDURE — 99214 OFFICE O/P EST MOD 30 MIN: CPT | Performed by: FAMILY MEDICINE

## 2019-10-14 ASSESSMENT — ENCOUNTER SYMPTOMS
DIFFICULTY BREATHING: 0
COUGH: 0

## 2019-10-14 ASSESSMENT — COPD QUESTIONNAIRES: COPD: 1

## 2019-10-21 LAB
ABSOLUTE BASO #: 0 X10E9/L (ref 0–0.9)
ABSOLUTE EOS #: 0.2 X10E9/L (ref 0–0.4)
ABSOLUTE LYMPH #: 3 X10E9/L (ref 1–3.5)
ABSOLUTE MONO #: 0.5 X10E9/L (ref 0–0.9)
ABSOLUTE NEUT #: 2 X10E9/L (ref 1.5–6.6)
ANION GAP SERPL CALCULATED.3IONS-SCNC: 5 MMOL/L (ref 4–12)
BASOPHILS RELATIVE PERCENT: 0.3 %
BUN BLDV-MCNC: 11 MG/DL (ref 5–23)
CALCIUM SERPL-MCNC: 9.1 MG/DL (ref 8.5–10.5)
CHLORIDE BLD-SCNC: 105 MMOL/L (ref 98–109)
CHOLESTEROL/HDL RATIO: 3.4 (ref 1–5)
CHOLESTEROL: 213 MG/DL (ref 150–200)
CO2: 30 MMOL/L (ref 22–32)
CREAT SERPL-MCNC: 0.92 MG/DL (ref 0.4–1)
EGFR AFRICAN AMERICAN: >60 ML/MIN/1.73SQ.M
EGFR IF NONAFRICAN AMERICAN: >60 ML/MIN/1.73SQ.M
EOSINOPHILS RELATIVE PERCENT: 2.9 %
GLUCOSE: 80 MG/DL (ref 65–99)
HCT VFR BLD CALC: 39.8 % (ref 35–47)
HDLC SERPL-MCNC: 62 MG/DL
HEMOGLOBIN: 13.3 G/DL (ref 11.7–16)
LDL CHOLESTEROL CALCULATED: 120 MG/DL
LDL/HDL RATIO: 1.9
LYMPHOCYTE %: 52.3 %
MCH RBC QN AUTO: 31 PG (ref 26–33.5)
MCHC RBC AUTO-ENTMCNC: 33.6 G/DL (ref 32–36)
MCV RBC AUTO: 92 FL (ref 81–100)
MONOCYTES # BLD: 9.5 %
NEUTROPHILS RELATIVE PERCENT: 35 %
PDW BLD-RTO: 15.3 % (ref 11.5–14.7)
PLATELETS: 244 X10E9/L (ref 150–450)
PMV BLD AUTO: 8.7 FL (ref 7–12)
POTASSIUM SERPL-SCNC: 4.6 MMOL/L (ref 3.5–5)
RBC: 4.31 X10E12/L (ref 3.8–5.2)
SODIUM BLD-SCNC: 140 MMOL/L (ref 134–146)
T4 FREE: 0.75 NG/DL (ref 0.61–1.6)
TRIGL SERPL-MCNC: 154 MG/DL (ref 27–150)
TSH SERPL DL<=0.05 MIU/L-ACNC: 3.91 UIU/ML (ref 0.49–4.67)
VITAMIN B-12: 433 PG/ML (ref 180–914)
VLDLC SERPL CALC-MCNC: 31 MG/DL (ref 0–30)
WBC: 5.7 X10E9/L (ref 4.8–10.8)

## 2019-10-25 ENCOUNTER — TELEPHONE (OUTPATIENT)
Dept: PRIMARY CARE CLINIC | Age: 59
End: 2019-10-25

## 2019-10-25 DIAGNOSIS — R41.3 MEMORY LOSS: ICD-10-CM

## 2019-11-12 LAB
INR BLD: 1.9 (ref 0.9–1.2)
PROTHROMBIN TIME: 21.6 SEC (ref 9.5–12.6)

## 2019-11-21 RX ORDER — OMEPRAZOLE 20 MG/1
CAPSULE, DELAYED RELEASE ORAL
Qty: 30 CAPSULE | Refills: 1 | Status: SHIPPED | OUTPATIENT
Start: 2019-11-21 | End: 2020-01-20

## 2019-12-18 DIAGNOSIS — F39 MOOD DISORDER (HCC): ICD-10-CM

## 2019-12-18 DIAGNOSIS — F34.1 DYSTHYMIA: ICD-10-CM

## 2019-12-18 RX ORDER — DIVALPROEX SODIUM 250 MG/1
TABLET, DELAYED RELEASE ORAL
Qty: 60 TABLET | Refills: 3 | Status: SHIPPED | OUTPATIENT
Start: 2019-12-18 | End: 2020-04-16

## 2019-12-23 DIAGNOSIS — G45.9 TIA (TRANSIENT ISCHEMIC ATTACK): ICD-10-CM

## 2019-12-23 RX ORDER — WARFARIN SODIUM 4 MG/1
TABLET ORAL
Qty: 45 TABLET | Refills: 5 | Status: SHIPPED | OUTPATIENT
Start: 2019-12-23 | End: 2020-01-06 | Stop reason: SDUPTHER

## 2019-12-27 DIAGNOSIS — F41.9 ANXIETY: ICD-10-CM

## 2019-12-27 DIAGNOSIS — F43.9 STRESS AT HOME: ICD-10-CM

## 2019-12-27 RX ORDER — DULOXETIN HYDROCHLORIDE 60 MG/1
60 CAPSULE, DELAYED RELEASE ORAL DAILY
Qty: 30 CAPSULE | Refills: 4 | Status: SHIPPED | OUTPATIENT
Start: 2019-12-27 | End: 2020-06-08

## 2020-01-06 ENCOUNTER — OFFICE VISIT (OUTPATIENT)
Dept: PRIMARY CARE CLINIC | Age: 60
End: 2020-01-06
Payer: MEDICARE

## 2020-01-06 VITALS
DIASTOLIC BLOOD PRESSURE: 70 MMHG | HEART RATE: 105 BPM | SYSTOLIC BLOOD PRESSURE: 130 MMHG | WEIGHT: 162.2 LBS | BODY MASS INDEX: 28.28 KG/M2 | OXYGEN SATURATION: 98 %

## 2020-01-06 LAB — INTERNATIONAL NORMALIZATION RATIO, POC: 3.3

## 2020-01-06 PROCEDURE — G8482 FLU IMMUNIZE ORDER/ADMIN: HCPCS | Performed by: FAMILY MEDICINE

## 2020-01-06 PROCEDURE — 85610 PROTHROMBIN TIME: CPT | Performed by: FAMILY MEDICINE

## 2020-01-06 PROCEDURE — G8926 SPIRO NO PERF OR DOC: HCPCS | Performed by: FAMILY MEDICINE

## 2020-01-06 PROCEDURE — 99213 OFFICE O/P EST LOW 20 MIN: CPT | Performed by: FAMILY MEDICINE

## 2020-01-06 PROCEDURE — 3017F COLORECTAL CA SCREEN DOC REV: CPT | Performed by: FAMILY MEDICINE

## 2020-01-06 PROCEDURE — 4004F PT TOBACCO SCREEN RCVD TLK: CPT | Performed by: FAMILY MEDICINE

## 2020-01-06 PROCEDURE — G8417 CALC BMI ABV UP PARAM F/U: HCPCS | Performed by: FAMILY MEDICINE

## 2020-01-06 PROCEDURE — G8427 DOCREV CUR MEDS BY ELIG CLIN: HCPCS | Performed by: FAMILY MEDICINE

## 2020-01-06 PROCEDURE — 3023F SPIROM DOC REV: CPT | Performed by: FAMILY MEDICINE

## 2020-01-06 RX ORDER — HYDROCODONE BITARTRATE AND ACETAMINOPHEN 7.5; 325 MG/1; MG/1
1 TABLET ORAL EVERY 6 HOURS PRN
Qty: 120 TABLET | Refills: 0 | Status: SHIPPED | OUTPATIENT
Start: 2020-01-06 | End: 2020-04-13 | Stop reason: SDUPTHER

## 2020-01-06 RX ORDER — LANOLIN ALCOHOL/MO/W.PET/CERES
3 CREAM (GRAM) TOPICAL DAILY
Qty: 30 TABLET | Refills: 0 | Status: SHIPPED | COMMUNITY
Start: 2020-01-06 | End: 2020-08-27 | Stop reason: ALTCHOICE

## 2020-01-06 RX ORDER — WARFARIN SODIUM 4 MG/1
TABLET ORAL
Qty: 45 TABLET | Refills: 5 | Status: SHIPPED | OUTPATIENT
Start: 2020-01-06 | End: 2020-07-13 | Stop reason: SDUPTHER

## 2020-01-06 ASSESSMENT — ENCOUNTER SYMPTOMS
BACK PAIN: 1
RESPIRATORY NEGATIVE: 1

## 2020-03-17 ENCOUNTER — TELEPHONE (OUTPATIENT)
Dept: PRIMARY CARE CLINIC | Age: 60
End: 2020-03-17

## 2020-04-13 ENCOUNTER — OFFICE VISIT (OUTPATIENT)
Dept: PRIMARY CARE CLINIC | Age: 60
End: 2020-04-13
Payer: MEDICARE

## 2020-04-13 VITALS
BODY MASS INDEX: 29.19 KG/M2 | DIASTOLIC BLOOD PRESSURE: 66 MMHG | SYSTOLIC BLOOD PRESSURE: 115 MMHG | WEIGHT: 167.4 LBS | OXYGEN SATURATION: 95 % | TEMPERATURE: 98.3 F | HEART RATE: 80 BPM

## 2020-04-13 PROBLEM — M72.0 DUPUYTREN'S CONTRACTURE OF BOTH HANDS: Status: ACTIVE | Noted: 2020-04-13

## 2020-04-13 PROBLEM — R93.7 ABNORMAL MRI, LUMBAR SPINE: Status: ACTIVE | Noted: 2020-04-13

## 2020-04-13 PROBLEM — M47.816 ARTHRITIS OF LUMBAR SPINE: Status: ACTIVE | Noted: 2020-04-13

## 2020-04-13 PROBLEM — M77.8 THUMB TENDONITIS: Status: ACTIVE | Noted: 2020-04-13

## 2020-04-13 PROBLEM — G56.01 CARPAL TUNNEL SYNDROME OF RIGHT WRIST: Status: ACTIVE | Noted: 2020-04-13

## 2020-04-13 PROBLEM — M54.16 LUMBAR RADICULOPATHY, RIGHT: Status: ACTIVE | Noted: 2020-04-13

## 2020-04-13 LAB
INTERNATIONAL NORMALIZATION RATIO, POC: 2
PROTHROMBIN TIME, POC: NORMAL

## 2020-04-13 PROCEDURE — 3017F COLORECTAL CA SCREEN DOC REV: CPT | Performed by: FAMILY MEDICINE

## 2020-04-13 PROCEDURE — 99213 OFFICE O/P EST LOW 20 MIN: CPT | Performed by: FAMILY MEDICINE

## 2020-04-13 PROCEDURE — 4004F PT TOBACCO SCREEN RCVD TLK: CPT | Performed by: FAMILY MEDICINE

## 2020-04-13 PROCEDURE — 85610 PROTHROMBIN TIME: CPT | Performed by: FAMILY MEDICINE

## 2020-04-13 PROCEDURE — G8427 DOCREV CUR MEDS BY ELIG CLIN: HCPCS | Performed by: FAMILY MEDICINE

## 2020-04-13 PROCEDURE — G8417 CALC BMI ABV UP PARAM F/U: HCPCS | Performed by: FAMILY MEDICINE

## 2020-04-13 RX ORDER — HYDROCODONE BITARTRATE AND ACETAMINOPHEN 7.5; 325 MG/1; MG/1
1 TABLET ORAL EVERY 6 HOURS PRN
Qty: 120 TABLET | Refills: 0 | Status: SHIPPED | OUTPATIENT
Start: 2020-04-13 | End: 2020-07-13 | Stop reason: SDUPTHER

## 2020-04-13 ASSESSMENT — ENCOUNTER SYMPTOMS: RESPIRATORY NEGATIVE: 1

## 2020-04-13 NOTE — PATIENT INSTRUCTIONS
for at least 15 to 30 seconds. 5. Repeat 2 to 4 times. Resisted ulnar deviation   1. Sit leaning forward with your legs slightly spread and your elbow on your thigh. 2. Grasp one end of the band with your palm down, and step on the other end with the foot opposite the hand holding the band. 3. Slowly bend your wrist sideways and away from your knee. 4. Repeat 8 to 12 times. Follow-up care is a key part of your treatment and safety. Be sure to make and go to all appointments, and call your doctor if you are having problems. It's also a good idea to know your test results and keep a list of the medicines you take. Where can you learn more? Go to https://[employer] Grouppepiceweb.TLBX.me. org and sign in to your [employer] account. Enter R911 in the [employer] box to learn more about \"De Quervain's Disease: Exercises. \"     If you do not have an account, please click on the \"Sign Up Now\" link. Current as of: June 26, 2019Content Version: 12.4  © 3311-9785 Healthwise, Incorporated. Care instructions adapted under license by [facility] ([facility]). If you have questions about a medical condition or this instruction, always ask your healthcare professional. [address] any warranty or liability for your use of this information. Patient Education        Hand Arthritis: Exercises  Introduction  Here are some examples of exercises for you to try. The exercises may be suggested for a condition or for rehabilitation. Start each exercise slowly. Ease off the exercises if you start to have pain. You will be told when to start these exercises and which ones will work best for you. How to do the exercises  Tendon glides   1. In this exercise, the steps follow one another to a make a continuous movement. 2. With your affected hand, point your fingers and thumb straight up. Your wrist should be relaxed, following the line of your fingers and thumb.   3. Curl your fingers so that the top two on a table, palm up. Put your affected hand on top of your good hand, palm up. 2. Use the thumb and fingers of your good hand to grasp below the middle joint of one finger of your affected hand. 3. Straighten the last two joints of that finger. 4. Repeat 8 to 12 times. 5. Repeat steps 1 through 4 with each finger. 6. Switch hands and repeat steps 1 through 5, even if only one hand is sore. DIP flexion   1. With your good hand, grasp one finger of your affected hand. Your thumb will be on the top side of your finger just below the joint that is closest to your fingernail. 2. Slowly bend your affected finger only at the joint closest to your fingernail. 3. Repeat 8 to 12 times. 4. Repeat steps 1 through 3 with each finger. 5. Switch hands and repeat steps 1 through 4, even if only one hand is sore. Follow-up care is a key part of your treatment and safety. Be sure to make and go to all appointments, and call your doctor if you are having problems. It's also a good idea to know your test results and keep a list of the medicines you take. Where can you learn more? Go to https://[employer].kaleo. org and sign in to your [employer] account. Enter Q248 in the [facility] box to learn more about \"Hand Arthritis: Exercises. \"     If you do not have an account, please click on the \"Sign Up Now\" link. Current as of: June 26, 2019Content Version: 12.4  © 2177-9378 Healthwise, Incorporated. Care instructions adapted under license by [facility] [address] If you have questions about a medical condition or this instruction, always ask your healthcare professional. [place] [address] any warranty or liability for your use of this information.

## 2020-04-14 ENCOUNTER — TELEPHONE (OUTPATIENT)
Dept: PRIMARY CARE CLINIC | Age: 60
End: 2020-04-14

## 2020-04-16 RX ORDER — DIVALPROEX SODIUM 250 MG/1
TABLET, DELAYED RELEASE ORAL
Qty: 60 TABLET | Refills: 2 | Status: SHIPPED | OUTPATIENT
Start: 2020-04-16 | End: 2020-07-15

## 2020-04-17 RX ORDER — OMEPRAZOLE 20 MG/1
CAPSULE, DELAYED RELEASE ORAL
Qty: 30 CAPSULE | Refills: 1 | Status: SHIPPED | OUTPATIENT
Start: 2020-04-17 | End: 2020-06-15 | Stop reason: SDUPTHER

## 2020-05-20 RX ORDER — BUSPIRONE HYDROCHLORIDE 15 MG/1
TABLET ORAL
Qty: 60 TABLET | Refills: 5 | Status: SHIPPED | OUTPATIENT
Start: 2020-05-20 | End: 2020-11-17

## 2020-06-03 ENCOUNTER — TELEPHONE (OUTPATIENT)
Dept: PRIMARY CARE CLINIC | Age: 60
End: 2020-06-03

## 2020-06-08 RX ORDER — DULOXETIN HYDROCHLORIDE 60 MG/1
CAPSULE, DELAYED RELEASE ORAL
Qty: 30 CAPSULE | Refills: 3 | Status: SHIPPED | OUTPATIENT
Start: 2020-06-08 | End: 2020-10-12

## 2020-06-15 RX ORDER — OMEPRAZOLE 20 MG/1
20 CAPSULE, DELAYED RELEASE ORAL DAILY
Qty: 30 CAPSULE | Refills: 3 | Status: SHIPPED | OUTPATIENT
Start: 2020-06-15 | End: 2020-12-16

## 2020-06-24 ENCOUNTER — TELEPHONE (OUTPATIENT)
Dept: PRIMARY CARE CLINIC | Age: 60
End: 2020-06-24

## 2020-06-24 RX ORDER — ONDANSETRON 4 MG/1
4 TABLET, FILM COATED ORAL EVERY 8 HOURS PRN
Qty: 30 TABLET | Refills: 3 | Status: SHIPPED | OUTPATIENT
Start: 2020-06-24 | End: 2020-08-27 | Stop reason: SDUPTHER

## 2020-07-13 ENCOUNTER — OFFICE VISIT (OUTPATIENT)
Dept: PRIMARY CARE CLINIC | Age: 60
End: 2020-07-13
Payer: MEDICARE

## 2020-07-13 VITALS
SYSTOLIC BLOOD PRESSURE: 102 MMHG | HEIGHT: 64 IN | BODY MASS INDEX: 27.66 KG/M2 | OXYGEN SATURATION: 94 % | TEMPERATURE: 98.2 F | DIASTOLIC BLOOD PRESSURE: 72 MMHG | HEART RATE: 104 BPM | RESPIRATION RATE: 16 BRPM | WEIGHT: 162 LBS

## 2020-07-13 PROBLEM — Z86.73 HISTORY OF TRANSIENT ISCHEMIC ATTACK (TIA): Status: ACTIVE | Noted: 2020-07-13

## 2020-07-13 LAB
INTERNATIONAL NORMALIZATION RATIO, POC: 1.5
PROTHROMBIN TIME, POC: 18.2

## 2020-07-13 PROCEDURE — 99214 OFFICE O/P EST MOD 30 MIN: CPT | Performed by: FAMILY MEDICINE

## 2020-07-13 PROCEDURE — G8926 SPIRO NO PERF OR DOC: HCPCS | Performed by: FAMILY MEDICINE

## 2020-07-13 PROCEDURE — 85610 PROTHROMBIN TIME: CPT | Performed by: FAMILY MEDICINE

## 2020-07-13 PROCEDURE — G8417 CALC BMI ABV UP PARAM F/U: HCPCS | Performed by: FAMILY MEDICINE

## 2020-07-13 PROCEDURE — 3017F COLORECTAL CA SCREEN DOC REV: CPT | Performed by: FAMILY MEDICINE

## 2020-07-13 PROCEDURE — 3023F SPIROM DOC REV: CPT | Performed by: FAMILY MEDICINE

## 2020-07-13 PROCEDURE — G8427 DOCREV CUR MEDS BY ELIG CLIN: HCPCS | Performed by: FAMILY MEDICINE

## 2020-07-13 PROCEDURE — 4004F PT TOBACCO SCREEN RCVD TLK: CPT | Performed by: FAMILY MEDICINE

## 2020-07-13 RX ORDER — HYDROCODONE BITARTRATE AND ACETAMINOPHEN 7.5; 325 MG/1; MG/1
1 TABLET ORAL EVERY 6 HOURS PRN
Qty: 120 TABLET | Refills: 0 | Status: SHIPPED | OUTPATIENT
Start: 2020-07-13 | End: 2020-10-27 | Stop reason: SDUPTHER

## 2020-07-13 RX ORDER — WARFARIN SODIUM 4 MG/1
TABLET ORAL
Qty: 45 TABLET | Refills: 5 | Status: SHIPPED | OUTPATIENT
Start: 2020-07-13 | End: 2020-12-21 | Stop reason: SDUPTHER

## 2020-07-13 ASSESSMENT — ENCOUNTER SYMPTOMS
BACK PAIN: 1
WHEEZING: 1
SORE THROAT: 0
NAUSEA: 1
SHORTNESS OF BREATH: 1

## 2020-07-13 ASSESSMENT — COPD QUESTIONNAIRES: COPD: 1

## 2020-07-13 ASSESSMENT — PATIENT HEALTH QUESTIONNAIRE - PHQ9
1. LITTLE INTEREST OR PLEASURE IN DOING THINGS: 1
SUM OF ALL RESPONSES TO PHQ9 QUESTIONS 1 & 2: 2
SUM OF ALL RESPONSES TO PHQ QUESTIONS 1-9: 2
SUM OF ALL RESPONSES TO PHQ QUESTIONS 1-9: 2
2. FEELING DOWN, DEPRESSED OR HOPELESS: 1

## 2020-07-13 NOTE — PROGRESS NOTES
Stephanie Bess a 61 y.o. female who presents today for her medical conditions/complaints as notedbelow. Chief Complaint   Patient presents with    Medication Check    Coagulation Disorder     8 mg Mon-Tues, 6 mg Sun, Wed, Th, Fri, Sat         HPI:   COPD   She complains of shortness of breath and wheezing. This is a chronic problem. The current episode started more than 1 year ago. The problem occurs intermittently. Pertinent negatives include no dyspnea on exertion or sore throat. Her symptoms are aggravated by change in weather. Her symptoms are alleviated by beta-agonist. She reports significant improvement on treatment. Her past medical history is significant for COPD. Uses combivent prn. Uses advair BID.      LDL Calculated (mg/dL)   Date Value   10/21/2019 120   2018 131       (goal LDL is <100)   BUN (mg/dL)   Date Value   10/21/2019 11     BP Readings from Last 3 Encounters:   20 102/72   20 115/66   20 130/70          (goal 120/80)    Past Medical History:   Diagnosis Date    Acid reflux     Arthritis     neck, back, knees    Asthma     Emphysema     Headache(784.0)     Osteoarthritis     Osteoporosis     TIA (transient ischemic attack)       Past Surgical History:   Procedure Laterality Date    ACROMIOPLASTY Left 2016    SHOULDER ARTHROCOPY LABRAL DEBRIDEMENT      SECTION, LOW TRANSVERSE      DILATION AND CURETTAGE OF UTERUS      OTHER SURGICAL HISTORY      uterine lining removed by laser    THROAT SURGERY      camera    TONSILLECTOMY AND ADENOIDECTOMY      TUBAL LIGATION         Family History   Problem Relation Age of Onset    Heart Disease Father     Asthma Father     Cancer Paternal Grandfather         throat    Asthma Son        Social History     Tobacco Use    Smoking status: Current Every Day Smoker     Packs/day: 0.50     Years: 46.00     Pack years: 23.00     Types: Cigarettes     Start date:     Smokeless tobacco: Never Used   Substance Use Topics    Alcohol use: Yes     Alcohol/week: 0.0 standard drinks     Comment: OCCASIONAL      Current Outpatient Medications   Medication Sig Dispense Refill    warfarin (COUMADIN) 4 MG tablet Take 8 mg M, W, Fri a and 6 mg the other days 45 tablet 5    ondansetron (ZOFRAN) 4 MG tablet Take 1 tablet by mouth every 8 hours as needed for Nausea or Vomiting 30 tablet 3    omeprazole (PRILOSEC) 20 MG delayed release capsule Take 1 capsule by mouth Daily 30 capsule 3    DULoxetine (CYMBALTA) 60 MG extended release capsule TAKE ONE CAPSULE BY MOUTH DAILY 30 capsule 3    busPIRone (BUSPAR) 15 MG tablet TAKE ONE TABLET BY MOUTH TWICE A DAY 60 tablet 5    divalproex (DEPAKOTE) 250 MG DR tablet TAKE ONE TABLET BY MOUTH TWICE A DAY 60 tablet 2    fluticasone-salmeterol (ADVAIR) 500-50 MCG/DOSE diskus inhaler Inhale 1 puff into the lungs every 12 hours 60 each 10    Fluticasone Furoate-Vilanterol (BREO ELLIPTA) 200-25 MCG/INH AEPB Inhale 1 puff into the lungs daily 1 each 0    albuterol-ipratropium (COMBIVENT RESPIMAT)  MCG/ACT AERS inhaler Inhale 1 puff into the lungs every 6 hours Increased to every 4 hrs if needed. 3 Inhaler 3    melatonin (RA MELATONIN) 3 MG TABS tablet Take 1 tablet by mouth daily 3-10 mg daily at HS (Patient not taking: Reported on 7/13/2020) 30 tablet 0     No current facility-administered medications for this visit.       Allergies   Allergen Reactions    Eggs Or Egg-Derived Products Other (See Comments)     Severe abdominal pains    Bupropion Nausea Only    Ceftin [Cefuroxime Axetil] Nausea Only     Nausea and upset stomach       Health Maintenance   Topic Date Due    Shingles Vaccine (1 of 2) 04/01/2010    Cervical cancer screen  10/19/2019    Flu vaccine (1) 09/01/2020    Breast cancer screen  12/02/2021    Lipid screen  10/21/2024    DTaP/Tdap/Td vaccine (2 - Td) 12/11/2024    Colon cancer screen colonoscopy  12/29/2026    Pneumococcal 0-64 years Vaccine  Completed    Hepatitis C screen  Completed    HIV screen  Completed    Hepatitis A vaccine  Aged Out    Hepatitis B vaccine  Aged Out    Hib vaccine  Aged Out    Meningococcal (ACWY) vaccine  Aged Out       Subjective:      Review of Systems   HENT: Negative. Negative for sore throat. Respiratory: Positive for shortness of breath and wheezing. Worse SOB with increased humidity and heat   Cardiovascular: Negative. Negative for dyspnea on exertion. Gastrointestinal: Positive for nausea (usually in am). Musculoskeletal: Positive for arthralgias and back pain. Getting the nerve test for right hand. Psychiatric/Behavioral: Positive for dysphoric mood. Has been more depressed during the lock down. Smoking still. Objective:     /72   Pulse 104   Temp 98.2 °F (36.8 °C)   Resp 16   Ht 5' 3.5\" (1.613 m)   Wt 162 lb (73.5 kg)   SpO2 94%   BMI 28.25 kg/m²   Physical Exam  Vitals signs and nursing note reviewed. Constitutional:       General: She is not in acute distress. Appearance: She is well-developed. She is not ill-appearing. HENT:      Head: Normocephalic and atraumatic. Right Ear: Tympanic membrane and ear canal normal.      Left Ear: Tympanic membrane and ear canal normal.      Mouth/Throat:      Mouth: Mucous membranes are moist.   Eyes:      General: No scleral icterus. Right eye: No discharge. Left eye: No discharge. Conjunctiva/sclera: Conjunctivae normal.      Pupils: Pupils are equal, round, and reactive to light. Neck:      Thyroid: No thyromegaly. Trachea: No tracheal deviation. Cardiovascular:      Rate and Rhythm: Normal rate and regular rhythm. Pulses: Normal pulses. Heart sounds: Normal heart sounds. Comments: No carotid bruits  Pulmonary:      Effort: Pulmonary effort is normal. No respiratory distress. Breath sounds: Normal breath sounds. No wheezing. Musculoskeletal:      Right lower leg: No edema. Left lower leg: No edema. Lymphadenopathy:      Cervical: No cervical adenopathy. Skin:     General: Skin is warm. Findings: No rash. Neurological:      Mental Status: She is alert and oriented to person, place, and time. Psychiatric:         Mood and Affect: Mood normal.         Behavior: Behavior normal.         Thought Content: Thought content normal.         Assessment:       Diagnosis Orders   1. History of transient ischemic attack (TIA)  warfarin (COUMADIN) 4 MG tablet   2. TIA (transient ischemic attack)  TN COLLECTION CAPILLARY BLOOD SPECIMEN    POCT INR    warfarin (COUMADIN) 4 MG tablet   3. Dysthymia     4. Chronic obstructive pulmonary disease, unspecified [facility] type ([facility]          Plan:      Return in about 3 months (around 10/13/2020) for COPD. Increase warfarin dose  Recheck INR in 1 month. Recheck OV in 3 months. Orders Placed This Encounter   Procedures    POCT INR    TN COLLECTION CAPILLARY BLOOD SPECIMEN     Orders Placed This Encounter   Medications    warfarin (COUMADIN) 4 MG tablet     Sig: Take 8 mg M, W, Fri a and 6 mg the other days     Dispense:  45 tablet     Refill:  5       Patient given educational materials - see patient instructions. Discussed use, benefit, and side effects of prescribed medications. All patient questions answered. Pt voiced understanding. Reviewed health maintenance. Instructed to continue current medications, diet and exercise. Patient agreed with treatment plan. Follow up as directed.      Electronicallysigned by Michael Gao MD on 7/13/2020 at 10:59 AM

## 2020-07-13 NOTE — PATIENT INSTRUCTIONS
muscle mass. · Talk with your doctor if you gain too much weight or if you lose weight without trying. Mental health  · Talk to your family, friends, or a therapist about your feelings. Some people feel frightened, angry, hopeless, helpless, and even guilty. Talking openly about bad feelings can help you cope. If these feelings last, talk to your doctor. When should you call for help? [id number] anytime you think you may need emergency care. For example, call if:  · You have severe trouble breathing. Call your doctor now or seek immediate medical care if:  · You have new or worse trouble breathing. · You cough up blood. · You have a fever. Watch closely for changes in your health, and be sure to contact your doctor if:  · You cough more deeply or more often, especially if you notice more mucus or a change in the color of your mucus. · You have new or worse swelling in your legs or belly. · You are not getting better as expected. Where can you learn more? Go to [email or web address]://[email or web address]. org and sign in to your [employer] account. Enter N145 in the Formisimo box to learn more about \"Chronic Obstructive Pulmonary Disease (COPD): Care Instructions. \"     If you do not have an account, please click on the \"Sign Up Now\" link. Current as of: February 24, 2020               Content Version: 12.5  © 5677-1068 [employer]wise, [employer]orporated. Care instructions adapted under license by [facility] [place] [facility] [facility] If you have questions about a medical condition or this instruction, always ask your healthcare professional. [place] [address] any warranty or liability for your use of this information.

## 2020-07-22 ENCOUNTER — TELEPHONE (OUTPATIENT)
Dept: PRIMARY CARE CLINIC | Age: 60
End: 2020-07-22

## 2020-07-22 NOTE — TELEPHONE ENCOUNTER
Pt would like to know if she could have a Cortizone injection for her back.  Pt states that she is in pain w/ no relief from the [address].    Please Advise

## 2020-07-23 NOTE — TELEPHONE ENCOUNTER
I don't inject the spine, if she has an area of spasm, sometimes injecting the muscle with steroid may help. She can also try cool packs to the area.    If it's the spine she needs injection for she will need to see Pain management

## 2020-07-24 ENCOUNTER — HOSPITAL ENCOUNTER (OUTPATIENT)
Dept: GENERAL RADIOLOGY | Age: 60
Discharge: HOME | End: 2020-07-26
Payer: MEDICARE

## 2020-07-24 ENCOUNTER — HOSPITAL ENCOUNTER (OUTPATIENT)
Age: 60
Discharge: HOME | End: 2020-07-24
Payer: MEDICARE

## 2020-07-24 PROCEDURE — 72072 X-RAY EXAM THORAC SPINE 3VWS: CPT

## 2020-07-24 PROCEDURE — 72040 X-RAY EXAM NECK SPINE 2-3 VW: CPT

## 2020-07-24 RX ORDER — BACLOFEN 10 MG/1
10 TABLET ORAL 3 TIMES DAILY
Qty: 90 TABLET | Refills: 0 | Status: SHIPPED | OUTPATIENT
Start: 2020-07-24 | End: 2020-08-19

## 2020-07-24 NOTE — TELEPHONE ENCOUNTER
I will order neck and thoracic spine xrays. I suggest she try muscle relaxer. I will send in to pharmacy.

## 2020-07-24 NOTE — TELEPHONE ENCOUNTER
Patient notified- She states that the pain is in her shoulders down through her whole back down into her hips and even goes into her ribs. She states that it feels like it is a lightning bolt of sharp pain so bad that it makes her tear up at bed time even with pain medication which she states that she does not want to take pain medication all the time. She states she got a new mattress and seen a chiropractor all with no relief. Please advise.

## 2020-08-27 ENCOUNTER — OFFICE VISIT (OUTPATIENT)
Dept: PRIMARY CARE CLINIC | Age: 60
End: 2020-08-27
Payer: MEDICARE

## 2020-08-27 ENCOUNTER — HOSPITAL ENCOUNTER (OUTPATIENT)
Age: 60
Discharge: HOME | End: 2020-08-29
Payer: MEDICARE

## 2020-08-27 ENCOUNTER — HOSPITAL ENCOUNTER (OUTPATIENT)
Dept: GENERAL RADIOLOGY | Age: 60
Discharge: HOME | End: 2020-08-29
Payer: MEDICARE

## 2020-08-27 VITALS
BODY MASS INDEX: 27.93 KG/M2 | DIASTOLIC BLOOD PRESSURE: 64 MMHG | SYSTOLIC BLOOD PRESSURE: 98 MMHG | WEIGHT: 160.2 LBS | TEMPERATURE: 97.7 F | OXYGEN SATURATION: 97 % | HEART RATE: 96 BPM

## 2020-08-27 PROCEDURE — 99213 OFFICE O/P EST LOW 20 MIN: CPT | Performed by: PHYSICIAN ASSISTANT

## 2020-08-27 PROCEDURE — 4004F PT TOBACCO SCREEN RCVD TLK: CPT | Performed by: PHYSICIAN ASSISTANT

## 2020-08-27 PROCEDURE — G8427 DOCREV CUR MEDS BY ELIG CLIN: HCPCS | Performed by: PHYSICIAN ASSISTANT

## 2020-08-27 PROCEDURE — 3017F COLORECTAL CA SCREEN DOC REV: CPT | Performed by: PHYSICIAN ASSISTANT

## 2020-08-27 PROCEDURE — 73630 X-RAY EXAM OF FOOT: CPT

## 2020-08-27 PROCEDURE — G8417 CALC BMI ABV UP PARAM F/U: HCPCS | Performed by: PHYSICIAN ASSISTANT

## 2020-08-27 RX ORDER — ONDANSETRON 4 MG/1
4 TABLET, FILM COATED ORAL EVERY 8 HOURS PRN
Qty: 30 TABLET | Refills: 3 | Status: SHIPPED | OUTPATIENT
Start: 2020-08-27 | End: 2021-07-02

## 2020-08-27 NOTE — PROGRESS NOTES
[facility]  [address] [address]  Dept: [phone]    April Ayleen Villeda is a 61 y.o. female who presents today for her medical conditions/complaintsas noted below. Chief Complaint   Patient presents with    Ankle Injury     States that she fell down the last step of the basement steps. she states that her ankle is swollen and  has tried icing with no relief.  Flu Vaccine     Pt declined influenza vaccine yet, she will get in October     Immunizations     Shingles- States that she has to go to the health department to get this     Medication Refill     Zofran Refill Pended        HPI:     HPI   Last night  at her house, pt stepped through a missing piece of wood at the bottom of her steps. Says her left foot went through the hole and twisted. States that she can't bear weight on her left foot. No pain when not weight bearing. Swollen, but icing is helping. States there is bruising. Has not tried an ACE wrap yet. Took a Norco that Dr. Zayda Hanna prescribed last night, but it did not help her left ankle/foot pain. Pt says she has chronic morning nausea, but she accidentally dumped her zofran in a dog dish last week, so she needs a refill.         LDL Calculated (mg/dL)   Date Value   10/21/2019 120   2018 131       (goal LDL is <100)   BUN (mg/dL)   Date Value   10/21/2019 11     BP Readings from Last 3 Encounters:   20 98/64   20 102/72   20 115/66          (goal 120/80)    Past Medical History:   Diagnosis Date    Acid reflux     Arthritis     neck, back, knees    Asthma     Emphysema     Headache(784.0)     Osteoarthritis     Osteoporosis     TIA (transient ischemic attack)       Past Surgical History:   Procedure Laterality Date    ACROMIOPLASTY Left 2016    SHOULDER ARTHROCOPY LABRAL DEBRIDEMENT      SECTION, LOW TRANSVERSE      DILATION AND CURETTAGE OF UTERUS      OTHER SURGICAL HISTORY      uterine lining removed by laser    THROAT SURGERY      camera    TONSILLECTOMY AND ADENOIDECTOMY      TUBAL LIGATION         Family History   Problem Relation Age of Onset    Heart Disease Father     Asthma Father     Cancer Paternal Grandfather         throat    Asthma Son        Social History     Tobacco Use    Smoking status: Current Every Day Smoker     Packs/day: 0.50     Years: 46.00     Pack years: 23.00     Types: Cigarettes     Start date: 1974    Smokeless tobacco: Never Used   Substance Use Topics    Alcohol use: Yes     Alcohol/week: 0.0 standard drinks     Comment: OCCASIONAL      Current Outpatient Medications   Medication Sig Dispense Refill    ondansetron (ZOFRAN) 4 MG tablet Take 1 tablet by mouth every 8 hours as needed for Nausea or Vomiting 30 tablet 3    baclofen (LIORESAL) 10 MG tablet Take 1 tablet by mouth 3 times daily as needed (spasm) 90 tablet 1    divalproex (DEPAKOTE) 250 MG DR tablet TAKE ONE TABLET BY MOUTH TWICE A DAY 60 tablet 5    warfarin (COUMADIN) 4 MG tablet Take 8 mg M, W, Fri a and 6 mg the other days 45 tablet 5    omeprazole (PRILOSEC) 20 MG delayed release capsule Take 1 capsule by mouth Daily 30 capsule 3    DULoxetine (CYMBALTA) 60 MG extended release capsule TAKE ONE CAPSULE BY MOUTH DAILY 30 capsule 3    busPIRone (BUSPAR) 15 MG tablet TAKE ONE TABLET BY MOUTH TWICE A DAY 60 tablet 5    fluticasone-salmeterol (ADVAIR) 500-50 MCG/DOSE diskus inhaler Inhale 1 puff into the lungs every 12 hours 60 each 10    albuterol-ipratropium (COMBIVENT RESPIMAT)  MCG/ACT AERS inhaler Inhale 1 puff into the lungs every 6 hours Increased to every 4 hrs if needed. 3 Inhaler 3    Fluticasone Furoate-Vilanterol (BREO ELLIPTA) 200-25 MCG/INH AEPB Inhale 1 puff into the lungs daily 1 each 0     No current facility-administered medications for this visit.       Allergies   Allergen Reactions    Eggs Or Egg-Derived Products Other (See Comments) Severe abdominal pains    Bupropion Nausea Only    Ceftin [Cefuroxime Axetil] Nausea Only     Nausea and upset stomach       Health Maintenance   Topic Date Due    Cervical cancer screen  10/19/2019    Flu vaccine (1) 09/01/2020    Shingles Vaccine (1 of 2) 08/27/2021 (Originally 4/1/2010)    Breast cancer screen  12/02/2021    Lipid screen  10/21/2024    DTaP/Tdap/Td vaccine (2 - Td) 12/11/2024    Colon cancer screen colonoscopy  12/29/2026    Pneumococcal 0-64 years Vaccine  Completed    Hepatitis C screen  Completed    HIV screen  Completed    Hepatitis A vaccine  Aged Out    Hepatitis B vaccine  Aged Out    Hib vaccine  Aged Out    Meningococcal (ACWY) vaccine  Aged Out       Subjective:      Review of Systems   Musculoskeletal: Positive for arthralgias (left foot/ankle) and joint swelling. Objective:     BP 98/64 (Site: Left Upper Arm, Position: Sitting, Cuff Size: Medium Adult)   Pulse 96   Temp 97.7 °F (36.5 °C)   Wt 160 lb 3.2 oz (72.7 kg)   SpO2 97%   BMI 27.93 kg/m²   Physical Exam  Vitals signs and nursing note reviewed. Constitutional:       Appearance: Normal appearance. HENT:      Head: Normocephalic and atraumatic. Cardiovascular:      Rate and Rhythm: Normal rate and regular rhythm. Pulmonary:      Effort: Pulmonary effort is normal.      Breath sounds: Normal breath sounds. Musculoskeletal:      Left ankle: She exhibits normal range of motion. Tenderness (ATFL). No lateral malleolus, no medial malleolus, no CF ligament and no posterior TFL tenderness found. Left foot: Bony tenderness (5th metatarsal) present. Feet:       Comments: +Limping   Neurological:      Mental Status: She is alert. Assessment:       Diagnosis Orders   1.  Left foot pain  XR FOOT LEFT (MIN 3 VIEWS)   2. Sprain of anterior talofibular ligament of left ankle, initial encounter          Plan:     -Pt did not have bony tenderness on the ankle, but she did have bony tenderness on the foot, so I ordered a left foot xray. -Advised Tylenol for pain since pt takes warfarin.   -Elevate left leg, ice, and use ACE wrap.   -Thinks she has crutches at home if she needs them. Pt said she has a standing order for INR at a lab and would rather get it drawn there on Saturday morning than in office today. Denies any bleeding. Return if symptoms worsen or fail to improve. Orders Placed This Encounter   Procedures    XR FOOT LEFT (MIN 3 VIEWS)     Standing Status:   Future     Number of Occurrences:   1     Standing Expiration Date:   8/27/2021     Order Specific Question:   Reason for exam:     Answer:   put foot through wooden plank last night 8/26, pain on lateral foot     Orders Placed This Encounter   Medications    ondansetron (ZOFRAN) 4 MG tablet     Sig: Take 1 tablet by mouth every 8 hours as needed for Nausea or Vomiting     Dispense:  30 tablet     Refill:  3       Patient given educationalmaterials - see patient instructions. Discussed use, benefit, and side effectsof prescribed medications. All patient questions answered. Pt voiced understanding. Reviewed health maintenance. Instructed to continue current medications, diet andexercise. Patient agreed with treatment plan. Follow up as directed.      Electronicallysigned by Tejas Puente PA-C on 9/2/2020 at 10:16 PM

## 2020-09-09 LAB
INR BLD: 2.7 (ref 0.9–1.2)
PROTHROMBIN TIME: 29.9 SEC (ref 9.5–12.6)

## 2020-10-12 RX ORDER — DULOXETIN HYDROCHLORIDE 60 MG/1
CAPSULE, DELAYED RELEASE ORAL
Qty: 30 CAPSULE | Refills: 2 | Status: SHIPPED | OUTPATIENT
Start: 2020-10-12 | End: 2021-01-13

## 2020-10-15 RX ORDER — BACLOFEN 10 MG/1
TABLET ORAL
Qty: 90 TABLET | Refills: 0 | Status: SHIPPED | OUTPATIENT
Start: 2020-10-15 | End: 2020-11-16

## 2020-10-27 ENCOUNTER — OFFICE VISIT (OUTPATIENT)
Dept: PRIMARY CARE CLINIC | Age: 60
End: 2020-10-27
Payer: MEDICARE

## 2020-10-27 VITALS
OXYGEN SATURATION: 98 % | BODY MASS INDEX: 27.71 KG/M2 | DIASTOLIC BLOOD PRESSURE: 60 MMHG | SYSTOLIC BLOOD PRESSURE: 98 MMHG | HEART RATE: 83 BPM | WEIGHT: 158.9 LBS | TEMPERATURE: 98.2 F

## 2020-10-27 PROBLEM — M46.96 UNSPECIFIED INFLAMMATORY SPONDYLOPATHY, LUMBAR REGION (HCC): Status: ACTIVE | Noted: 2020-10-27

## 2020-10-27 PROBLEM — N94.10 DYSPAREUNIA IN FEMALE: Status: ACTIVE | Noted: 2020-10-27

## 2020-10-27 LAB
INTERNATIONAL NORMALIZATION RATIO, POC: 2.9
PROTHROMBIN TIME, POC: NORMAL

## 2020-10-27 PROCEDURE — 85610 PROTHROMBIN TIME: CPT | Performed by: FAMILY MEDICINE

## 2020-10-27 PROCEDURE — G8417 CALC BMI ABV UP PARAM F/U: HCPCS | Performed by: FAMILY MEDICINE

## 2020-10-27 PROCEDURE — 90688 IIV4 VACCINE SPLT 0.5 ML IM: CPT | Performed by: FAMILY MEDICINE

## 2020-10-27 PROCEDURE — 3017F COLORECTAL CA SCREEN DOC REV: CPT | Performed by: FAMILY MEDICINE

## 2020-10-27 PROCEDURE — 99214 OFFICE O/P EST MOD 30 MIN: CPT | Performed by: FAMILY MEDICINE

## 2020-10-27 PROCEDURE — 4004F PT TOBACCO SCREEN RCVD TLK: CPT | Performed by: FAMILY MEDICINE

## 2020-10-27 PROCEDURE — 90471 IMMUNIZATION ADMIN: CPT | Performed by: FAMILY MEDICINE

## 2020-10-27 PROCEDURE — G8482 FLU IMMUNIZE ORDER/ADMIN: HCPCS | Performed by: FAMILY MEDICINE

## 2020-10-27 PROCEDURE — G8427 DOCREV CUR MEDS BY ELIG CLIN: HCPCS | Performed by: FAMILY MEDICINE

## 2020-10-27 RX ORDER — MONTELUKAST SODIUM 10 MG/1
10 TABLET ORAL DAILY
Qty: 30 TABLET | Refills: 3 | Status: SHIPPED
Start: 2020-10-27 | End: 2021-01-25

## 2020-10-27 RX ORDER — HYDROCODONE BITARTRATE AND ACETAMINOPHEN 7.5; 325 MG/1; MG/1
1 TABLET ORAL EVERY 6 HOURS PRN
Qty: 120 TABLET | Refills: 0 | Status: SHIPPED | OUTPATIENT
Start: 2020-10-27 | End: 2021-01-05 | Stop reason: SDUPTHER

## 2020-10-27 ASSESSMENT — ENCOUNTER SYMPTOMS: BACK PAIN: 1

## 2020-10-27 NOTE — PROGRESS NOTES
Stephanie Bess a 61 y.o. female who presents today for her medical conditions/complaints as notedbelow. Chief Complaint   Patient presents with    Medication Check    COPD     f/u    Coagulation Disorder     Check here at office.  Injections     Flu vaccine         HPI:   HPI    Asthma: more wheezing and SOB lately x 6-8 weeks. Having to use albuterol every other day. Usually in the am.   Taking breo ellipta BID. itchy eyes also. Back and joint pains daily. Uses pain pills ( norco) prn and takes muscle relaxer at nigh: helps somewhat. Mood under control with meds    She's been dating but hasn't had sex for awhile. She had too much pain even with touching the labia.      LDL Calculated (mg/dL)   Date Value   10/21/2019 120   2018 131       (goal LDL is <100)   BUN (mg/dL)   Date Value   10/21/2019 11     BP Readings from Last 3 Encounters:   10/27/20 98/60   20 98/64   20 102/72          (goal 120/80)    Past Medical History:   Diagnosis Date    Acid reflux     Arthritis     neck, back, knees    Asthma     Emphysema     Headache(784.0)     Osteoarthritis     Osteoporosis     TIA (transient ischemic attack)       Past Surgical History:   Procedure Laterality Date    ACROMIOPLASTY Left 2016    SHOULDER ARTHROCOPY LABRAL DEBRIDEMENT      SECTION, LOW TRANSVERSE      DILATION AND CURETTAGE OF UTERUS      OTHER SURGICAL HISTORY      uterine lining removed by laser    THROAT SURGERY      camera    TONSILLECTOMY AND ADENOIDECTOMY      TUBAL LIGATION         Family History   Problem Relation Age of Onset    Heart Disease Father     Asthma Father     Cancer Paternal Grandfather         throat    Asthma Son        Social History     Tobacco Use    Smoking status: Current Every Day Smoker     Packs/day: 0.50     Years: 46.00     Pack years: 23.00     Types: Cigarettes     Start date:     Smokeless tobacco: Never Used   Substance Use Topics    Alcohol use: Yes     Alcohol/week: 0.0 standard drinks     Comment: OCCASIONAL      Current Outpatient Medications   Medication Sig Dispense Refill    montelukast (SINGULAIR) 10 MG tablet Take 1 tablet by mouth daily 30 tablet 3    HYDROcodone-acetaminophen (NORCO) 7.5-325 MG per tablet Take 1 tablet by mouth every 6 hours as needed for Pain for up to 30 days. Intended supply: 30 days 120 tablet 0    baclofen (LIORESAL) 10 MG tablet TAKE ONE TABLET BY MOUTH THREE TIMES A DAY AS NEEDED FOR SPASM 90 tablet 0    DULoxetine (CYMBALTA) 60 MG extended release capsule TAKE ONE CAPSULE BY MOUTH DAILY 30 capsule 2    ondansetron (ZOFRAN) 4 MG tablet Take 1 tablet by mouth every 8 hours as needed for Nausea or Vomiting 30 tablet 3    divalproex (DEPAKOTE) 250 MG DR tablet TAKE ONE TABLET BY MOUTH TWICE A DAY 60 tablet 5    warfarin (COUMADIN) 4 MG tablet Take 8 mg M, W, Fri a and 6 mg the other days 45 tablet 5    omeprazole (PRILOSEC) 20 MG delayed release capsule Take 1 capsule by mouth Daily 30 capsule 3    busPIRone (BUSPAR) 15 MG tablet TAKE ONE TABLET BY MOUTH TWICE A DAY 60 tablet 5    Fluticasone Furoate-Vilanterol (BREO ELLIPTA) 200-25 MCG/INH AEPB Inhale 1 puff into the lungs daily 1 each 0    albuterol-ipratropium (COMBIVENT RESPIMAT)  MCG/ACT AERS inhaler Inhale 1 puff into the lungs every 6 hours Increased to every 4 hrs if needed. 3 Inhaler 3     No current facility-administered medications for this visit.       Allergies   Allergen Reactions    Eggs Or Egg-Derived Products Other (See Comments)     Severe abdominal pains    Bupropion Nausea Only    Ceftin [Cefuroxime Axetil] Nausea Only     Nausea and upset stomach       Health Maintenance   Topic Date Due    Cervical cancer screen  10/19/2019    Flu vaccine (1) 09/01/2020    Shingles Vaccine (1 of 2) 08/27/2021 (Originally 4/1/2010)    Breast cancer screen  12/02/2021    Lipid screen  10/21/2024    DTaP/Tdap/Td vaccine (2 - Td) 12/11/2024    Colon cancer screen colonoscopy  12/29/2026    Pneumococcal 0-64 years Vaccine  Completed    Hepatitis C screen  Completed    HIV screen  Completed    Hepatitis A vaccine  Aged Out    Hepatitis B vaccine  Aged Out    Hib vaccine  Aged Out    Meningococcal (ACWY) vaccine  Aged Out       Subjective:      Review of Systems   Constitutional: Negative. Musculoskeletal: Positive for back pain. outer pelvic pain with even attempting penetration during coitus    Objective:     BP 98/60   Pulse 83   Temp 98.2 °F (36.8 °C)   Wt 158 lb 14.4 oz (72.1 kg)   SpO2 98%   BMI 27.71 kg/m²   Physical Exam  Vitals signs and nursing note reviewed. Constitutional:       General: She is not in acute distress. Appearance: Normal appearance. She is well-developed. She is not ill-appearing. HENT:      Head: Normocephalic and atraumatic. Right Ear: External ear normal.      Left Ear: External ear normal.   Eyes:      General: No scleral icterus. Right eye: No discharge. Left eye: No discharge. Conjunctiva/sclera: Conjunctivae normal.      Pupils: Pupils are equal, round, and reactive to light. Neck:      Thyroid: No thyromegaly. Trachea: No tracheal deviation. Cardiovascular:      Rate and Rhythm: Normal rate and regular rhythm. Heart sounds: Normal heart sounds. Pulmonary:      Effort: Pulmonary effort is normal. No respiratory distress. Breath sounds: Normal breath sounds. No wheezing. Lymphadenopathy:      Cervical: No cervical adenopathy. Skin:     General: Skin is warm. Findings: No rash. Neurological:      Mental Status: She is alert and oriented to person, place, and time. Psychiatric:         Mood and Affect: Mood normal.         Behavior: Behavior normal.         Thought Content: Thought content normal.         Assessment:       Diagnosis Orders   1.  Mild intermittent asthma without complication  montelukast (SINGULAIR) 10 MG tablet 2. History of transient ischemic attack (TIA)  [place] COLLECTION CAPILLARY BLOOD SPECIMEN    POCT INR    CBC    Basic Metabolic Panel   3. Unspecified inflammatory spondylopathy, lumbar region [facility])  CBC    Basic Metabolic Panel   4. Immunization due  INFLUENZA, QUADV, 3 YRS AND OLDER, IM, MDV, 0.5ML ([facility])   5. Lumbar disc disease  HYDROcodone-acetaminophen (NORCO) 7.5-325 MG per tablet   6. Arthritis of lumbar spine  HYDROcodone-acetaminophen (NORCO) 7.5-325 MG per tablet   7. Dyspareunia in female          Plan:      Return in about 3 months (around 1/27/2021). Come in anytime for pelvic exam, pap and dyspareunia. Orders Placed This Encounter   Procedures    INFLUENZA, QUADV, 3 YRS AND OLDER, IM, MDV, 0.5ML (AFLURIA QUADV)    CBC     Standing Status:   Future     Standing Expiration Date:   10/27/2021    Basic Metabolic Panel     Standing Status:   Future     Standing Expiration Date:   10/27/2021    POCT INR    NV COLLECTION CAPILLARY BLOOD SPECIMEN     Orders Placed This Encounter   Medications    montelukast (SINGULAIR) 10 MG tablet     Sig: Take 1 tablet by mouth daily     Dispense:  30 tablet     Refill:  3    HYDROcodone-acetaminophen (NORCO) 7.5-325 MG per tablet     Sig: Take 1 tablet by mouth every 6 hours as needed for Pain for up to 30 days. Intended supply: 30 days     Dispense:  120 tablet     Refill:  0     Reduce doses taken as pain becomes manageable       Patient given educational materials - see patient instructions. Discussed use, benefit, and side effects of prescribed medications. All patient questions answered. Pt voiced understanding. Reviewed health maintenance. Instructed to continue current medications, diet. Patient agreed with treatment plan. Follow up as directed.      Electronicallysigned by Noe Rosenberg MD on 10/27/2020 at 4:09 PM

## 2020-10-30 LAB
ABSOLUTE BASO #: 0 X10E9/L (ref 0–0.2)
ABSOLUTE EOS #: 0.1 X10E9/L (ref 0–0.4)
ABSOLUTE LYMPH #: 2.1 X10E9/L (ref 1–3.5)
ABSOLUTE MONO #: 0.7 X10E9/L (ref 0–0.9)
ABSOLUTE NEUT #: 4.5 X10E9/L (ref 1.5–6.6)
ANION GAP SERPL CALCULATED.3IONS-SCNC: 9 MMOL/L (ref 5–15)
BASOPHILS RELATIVE PERCENT: 0.5 %
BUN BLDV-MCNC: 11 MG/DL (ref 5–23)
CALCIUM SERPL-MCNC: 9 MG/DL (ref 8.5–10.5)
CHLORIDE BLD-SCNC: 103 MMOL/L (ref 98–109)
CO2: 26 MMOL/L (ref 22–32)
CREAT SERPL-MCNC: 1.09 MG/DL (ref 0.4–1)
EGFR AFRICAN AMERICAN: >60 ML/MIN/1.73SQ.M
EGFR IF NONAFRICAN AMERICAN: 51 ML/MIN/1.73SQ.M
EOSINOPHILS RELATIVE PERCENT: 1.3 %
GLUCOSE: 98 MG/DL (ref 65–99)
HCT VFR BLD CALC: 38.6 % (ref 35–47)
HEMOGLOBIN: 13.1 G/DL (ref 11.7–15.5)
LYMPHOCYTE %: 28 %
MCH RBC QN AUTO: 30.9 PG (ref 27–34)
MCHC RBC AUTO-ENTMCNC: 33.8 G/DL (ref 32–36)
MCV RBC AUTO: 92 FL (ref 80–100)
MONOCYTES # BLD: 9.5 %
NEUTROPHILS RELATIVE PERCENT: 60.7 %
PDW BLD-RTO: 14.5 % (ref 11.5–15)
PLATELETS: 227 X10E9/L (ref 150–450)
PMV BLD AUTO: 8.6 FL (ref 7–12)
POTASSIUM SERPL-SCNC: 4 MMOL/L (ref 3.5–5)
RBC: 4.22 X10E12/L (ref 3.8–5.2)
SODIUM BLD-SCNC: 138 MMOL/L (ref 134–146)
WBC: 7.4 X10E9/L (ref 4–11)

## 2020-11-03 ENCOUNTER — OFFICE VISIT (OUTPATIENT)
Dept: PRIMARY CARE CLINIC | Age: 60
End: 2020-11-03
Payer: MEDICARE

## 2020-11-03 ENCOUNTER — HOSPITAL ENCOUNTER (OUTPATIENT)
Age: 60
Setting detail: SPECIMEN
Discharge: HOME | End: 2020-11-03
Payer: MEDICARE

## 2020-11-03 VITALS
OXYGEN SATURATION: 98 % | DIASTOLIC BLOOD PRESSURE: 86 MMHG | BODY MASS INDEX: 28.18 KG/M2 | HEART RATE: 79 BPM | WEIGHT: 161.6 LBS | TEMPERATURE: 98.4 F | SYSTOLIC BLOOD PRESSURE: 130 MMHG

## 2020-11-03 PROCEDURE — 99396 PREV VISIT EST AGE 40-64: CPT | Performed by: FAMILY MEDICINE

## 2020-11-03 NOTE — PROGRESS NOTES
[facility]  [address] [place] [id number]  Dept: [phone]  Dept Fax: [phone]    April Maria Alejandra a 61 y.o. female who presents today  for her physical.  Sonny Ang is c/o of   Chief Complaint   Patient presents with    Gynecologic Exam       HPI:     HPI    Contraception: none  Menstrual history: menopause  LMP about 18 years ago, after she had an ablation. OB History    Para Term  AB Living   3 2     1     SAB TAB Ectopic Molar Multiple Live Births   1                # Outcome Date GA Lbr Francisco/2nd Weight Sex Delivery Anes PTL Lv   3 Para 12    M CS-LTranv         Birth Comments: fetal distress   2 SAB            1 Para 1979    F Vag-Spont          LDL Calculated (mg/dL)   Date Value   10/21/2019 120   2018 131       (goal LDL is <100)   BUN (mg/dL)   Date Value   10/29/2020 11       Past Medical History:   Diagnosis Date    Acid reflux     Arthritis     neck, back, knees    Asthma     Emphysema     Headache(784.0)     Osteoarthritis     Osteoporosis     TIA (transient ischemic attack)      Past Surgical History:   Procedure Laterality Date    ACROMIOPLASTY Left 2016    SHOULDER ARTHROCOPY LABRAL DEBRIDEMENT      SECTION, LOW TRANSVERSE      DILATION AND CURETTAGE OF UTERUS      OTHER SURGICAL HISTORY      uterine lining removed by laser    THROAT SURGERY      camera    TONSILLECTOMY AND ADENOIDECTOMY      TUBAL LIGATION         Family History   Problem Relation Age of Onset    Heart Disease Father     Asthma Father     Cancer Paternal Grandfather         throat    Asthma Son        Social History     Tobacco Use    Smoking status: Current Every Day Smoker     Packs/day: 0.50     Years: 46.00     Pack years: 23.00     Types: Cigarettes     Start date:     Smokeless tobacco: Never Used   Substance Use Topics    Alcohol use:  Yes     Alcohol/week: 0.0 standard drinks Comment: OCCASIONAL      Current Outpatient Medications   Medication Sig Dispense Refill    montelukast (SINGULAIR) 10 MG tablet Take 1 tablet by mouth daily 30 tablet 3    HYDROcodone-acetaminophen (NORCO) 7.5-325 MG per tablet Take 1 tablet by mouth every 6 hours as needed for Pain for up to 30 days. Intended supply: 30 days 120 tablet 0    baclofen (LIORESAL) 10 MG tablet TAKE ONE TABLET BY MOUTH THREE TIMES A DAY AS NEEDED FOR SPASM 90 tablet 0    DULoxetine (CYMBALTA) 60 MG extended release capsule TAKE ONE CAPSULE BY MOUTH DAILY 30 capsule 2    ondansetron (ZOFRAN) 4 MG tablet Take 1 tablet by mouth every 8 hours as needed for Nausea or Vomiting 30 tablet 3    divalproex (DEPAKOTE) 250 MG DR tablet TAKE ONE TABLET BY MOUTH TWICE A DAY 60 tablet 5    warfarin (COUMADIN) 4 MG tablet Take 8 mg M, W, Fri a and 6 mg the other days 45 tablet 5    omeprazole (PRILOSEC) 20 MG delayed release capsule Take 1 capsule by mouth Daily 30 capsule 3    busPIRone (BUSPAR) 15 MG tablet TAKE ONE TABLET BY MOUTH TWICE A DAY 60 tablet 5    Fluticasone Furoate-Vilanterol (BREO ELLIPTA) 200-25 MCG/INH AEPB Inhale 1 puff into the lungs daily 1 each 0    albuterol-ipratropium (COMBIVENT RESPIMAT)  MCG/ACT AERS inhaler Inhale 1 puff into the lungs every 6 hours Increased to every 4 hrs if needed. 3 Inhaler 3     No current facility-administered medications for this visit.       Allergies   Allergen Reactions    Eggs Or Egg-Derived Products Other (See Comments)     Severe abdominal pains    Bupropion Nausea Only    Ceftin [Cefuroxime Axetil] Nausea Only     Nausea and upset stomach       Health Maintenance   Topic Date Due    Cervical cancer screen  10/19/2019    Shingles Vaccine (1 of 2) 08/27/2021 (Originally 4/1/2010)    Breast cancer screen  12/02/2021    Lipid screen  10/21/2024    DTaP/Tdap/Td vaccine (2 - Td) 12/11/2024    Colon cancer screen colonoscopy  12/29/2026    Flu vaccine  Completed    Pneumococcal 0-64 years Vaccine  Completed    Hepatitis C screen  Completed    HIV screen  Completed    Hepatitis A vaccine  Aged Out    Hepatitis B vaccine  Aged Out    Hib vaccine  Aged Out    Meningococcal (ACWY) vaccine  Aged Out       Subjective:     Review of Systems   Constitutional: Negative. Objective:     /86   Pulse 79   Temp 98.4 °F (36.9 °C)   Wt 161 lb 9.6 oz (73.3 kg)   SpO2 98%   BMI 28.18 kg/m²   Physical Exam  Vitals signs and nursing note reviewed. Exam conducted with a chaperone present. Constitutional:       General: She is not in acute distress. Appearance: Normal appearance. She is well-developed. HENT:      Head: Normocephalic and atraumatic. Right Ear: External ear normal.      Left Ear: External ear normal.   Eyes:      Conjunctiva/sclera: Conjunctivae normal.   Cardiovascular:      Rate and Rhythm: Normal rate and regular rhythm. Heart sounds: Normal heart sounds. Pulmonary:      Effort: Pulmonary effort is normal. No respiratory distress. Breath sounds: Normal breath sounds. Chest:      Breasts:         Right: No inverted nipple, mass or nipple discharge. Left: No inverted nipple, mass or nipple discharge. Abdominal:      General: Bowel sounds are normal.      Palpations: Abdomen is soft. There is no mass. Tenderness: There is no abdominal tenderness. Genitourinary:     General: Normal vulva. Labia:         Right: No rash, tenderness or lesion. Left: No rash, tenderness or lesion. Vagina: Normal. No vaginal discharge, erythema, tenderness or bleeding. Cervix: Normal.      Uterus: Normal.       Adnexa: Right adnexa normal and left adnexa normal.        Right: No mass. Left: No mass. Comments: Hypersensitive and painful with touch with a Q-tip around the vaginal entrance inferiorly and superiorly at least in 4 spots.   No pain on bimanual exam.  Slight pain during speculum exam.  Pap Future     Standing Expiration Date:   11/3/2021     Order Specific Question:   Collection Type     Answer: Thin Prep     Order Specific Question:   Prior Abnormal Pap Test     Answer:   No     Order Specific Question:   Screening or Diagnostic     Answer:   Screening     Order Specific Question:   HPV Requested? Answer:   Yes -  If ASCUS Reflex HPV     Order Specific Question:   High Risk Patient     Answer:   N/A     No orders of the defined types were placed in this encounter. Patient given educational materials - see patient instructions. Discussed use, benefit, and side effects of prescribed medications. All patient questions answered. Pt voiced understanding. Reviewed health maintenance. Instructed to continue current medications, diet and exercise. Patient agreed with treatment plan. Follow up as directed.      Electronicallysigned by Stacie Young MD on 11/3/2020 at 1:23 PM

## 2020-11-03 NOTE — PATIENT INSTRUCTIONS
Patient Education        Well Visit, Women 48 to 72: Care Instructions  Your Care Instructions     Physical exams can help you stay healthy. Your doctor has checked your overall health and may have suggested ways to take good care of yourself. He or she also may have recommended tests. At home, you can help prevent illness with healthy eating, regular exercise, and other steps. Follow-up care is a key part of your treatment and safety. Be sure to make and go to all appointments, and call your doctor if you are having problems. It's also a good idea to know your test results and keep a list of the medicines you take. How can you care for yourself at home? · Reach and stay at a healthy weight. This will lower your risk for many problems, such as obesity, diabetes, heart disease, and high blood pressure. · Get at least 30 minutes of exercise on most days of the week. Walking is a good choice. You also may want to do other activities, such as running, swimming, cycling, or playing tennis or team sports. · Do not smoke. Smoking can make health problems worse. If you need help quitting, talk to your doctor about stop-smoking programs and medicines. These can increase your chances of quitting for good. · Protect your skin from too much sun. When you're outdoors from 10 a.m. to 4 p.m., stay in the shade or cover up with clothing and a hat with a wide brim. Wear sunglasses that block UV rays. Even when it's cloudy, put broad-spectrum sunscreen (SPF 30 or higher) on any exposed skin. · See a dentist one or two times a year for checkups and to have your teeth cleaned. · Wear a seat belt in the car. Follow your doctor's advice about when to have certain tests. These tests can spot problems early. · Cholesterol. Your doctor will tell you how often to have this done based on your age, family history, or other things that can increase your risk for heart attack and stroke. · Blood pressure.  Have your blood pressure checked during a routine doctor visit. Your doctor will tell you how often to check your blood pressure based on your age, your blood pressure results, and other factors. · Mammogram. Ask your doctor how often you should have a mammogram, which is an X-ray of your breasts. A mammogram can spot breast cancer before it can be felt and when it is easiest to treat. · Pap test and pelvic exam. Ask your doctor how often you should have a Pap test. You may not need to have a Pap test as often as you used to. · Vision. Have your eyes checked every year or two or as often as your doctor suggests. Some experts recommend that you have yearly exams for glaucoma and other age-related eye problems starting at age 48. · Hearing. Tell your doctor if you notice any change in your hearing. You can have tests to find out how well you hear. · Diabetes. Ask your doctor whether you should have tests for diabetes. · Colorectal cancer. Your risk for colorectal cancer gets higher as you get older. Some experts say that adults should start regular screening at age 48 and stop at age 76. Others say to start before age 48 or continue after age 76. Talk with your doctor about your risk and when to start and stop screening. · Thyroid disease. Talk to your doctor about whether to have your thyroid checked as part of a regular physical exam. Women have an increased chance of a thyroid problem. · Osteoporosis. You should begin tests for bone density at age 72. If you are younger than 72, ask your doctor whether you have factors that may increase your risk for this disease. You may want to have this test before age 72. · Heart attack and stroke risk. At least every 4 to 6 years, you should have your risk for heart attack and stroke assessed. Your doctor uses factors such as your age, blood pressure, cholesterol, and whether you smoke or have diabetes to show what your risk for a heart attack or stroke is over the next 10 years.   When should you call for help? Watch closely for changes in your health, and be sure to contact your doctor if you have any problems or symptoms that concern you. Where can you learn more? Go to https://nakul.[facility]. org and sign in to your [employer] account. Enter F631 in the "[employer] box to learn more about \"Well Visit, Women 50 to 72: Care Instructions. \"     If you do not have an account, please click on the \"Sign Up Now\" link. Current as of: May 27, 2020               Content Version: 12.6  © 7530-8934 [employer]. Care instructions adapted under license by [facility] ([facility]). If you have questions about a medical condition or this instruction, always ask your healthcare professional. [address] any warranty or liability for your use of this information. Patient Education        Painful Sex: Care Instructions  Your Care Instructions     Painful sex can be caused by many things. You may have an injury, an infection, or a growth in your vagina. Or maybe you have muscle spasms. In some cases, the pain is caused by another medical condition, such as a spinal problem. Some medicines can cause dryness in the vagina. And as a woman gets older, her vagina gets drier. It may also narrow, shorten, and get stiffer. This dryness can make sex painful. Talk to your doctor about what might be causing your painful sex. Treatment may help. Follow-up care is a key part of your treatment and safety. Be sure to make and go to all appointments, and call your doctor if you are having problems. It's also a good idea to know your test results and keep a list of the medicines you take. How can you care for yourself at home? · Use a vaginal lubricant during sex. Examples are Astroglide, K-Y Jelly, and Wet Gel Lubricant. · Increase the time you and your partner spend touching each other before sex. This is called foreplay.   · Try different positions for sex to find the

## 2020-11-11 LAB — CYTOLOGY REPORT: NORMAL

## 2020-11-16 RX ORDER — BACLOFEN 10 MG/1
TABLET ORAL
Qty: 90 TABLET | Refills: 0 | Status: SHIPPED | OUTPATIENT
Start: 2020-11-16 | End: 2020-12-14

## 2020-11-17 RX ORDER — BUSPIRONE HYDROCHLORIDE 15 MG/1
TABLET ORAL
Qty: 60 TABLET | Refills: 4 | Status: SHIPPED | OUTPATIENT
Start: 2020-11-17 | End: 2021-04-22

## 2020-12-14 RX ORDER — BACLOFEN 10 MG/1
TABLET ORAL
Qty: 90 TABLET | Refills: 0 | Status: SHIPPED | OUTPATIENT
Start: 2020-12-14 | End: 2021-01-13

## 2020-12-16 RX ORDER — OMEPRAZOLE 20 MG/1
CAPSULE, DELAYED RELEASE ORAL
Qty: 30 CAPSULE | Refills: 2 | Status: SHIPPED | OUTPATIENT
Start: 2020-12-16 | End: 2021-03-15

## 2020-12-21 RX ORDER — WARFARIN SODIUM 4 MG/1
TABLET ORAL
Qty: 45 TABLET | Refills: 5 | Status: SHIPPED | OUTPATIENT
Start: 2020-12-21 | End: 2021-07-06 | Stop reason: SDUPTHER

## 2021-01-05 DIAGNOSIS — M51.9 LUMBAR DISC DISEASE: ICD-10-CM

## 2021-01-05 DIAGNOSIS — M47.816 ARTHRITIS OF LUMBAR SPINE: ICD-10-CM

## 2021-01-05 RX ORDER — HYDROCODONE BITARTRATE AND ACETAMINOPHEN 7.5; 325 MG/1; MG/1
1 TABLET ORAL EVERY 6 HOURS PRN
Qty: 120 TABLET | Refills: 0 | Status: SHIPPED | OUTPATIENT
Start: 2021-01-05 | End: 2021-05-04 | Stop reason: SDUPTHER

## 2021-01-13 DIAGNOSIS — M54.6 CHRONIC BILATERAL THORACIC BACK PAIN: ICD-10-CM

## 2021-01-13 DIAGNOSIS — G89.29 CHRONIC BILATERAL THORACIC BACK PAIN: ICD-10-CM

## 2021-01-13 DIAGNOSIS — R25.2 SPASM: ICD-10-CM

## 2021-01-13 DIAGNOSIS — F41.9 ANXIETY: ICD-10-CM

## 2021-01-13 DIAGNOSIS — M54.2 NECK PAIN: ICD-10-CM

## 2021-01-13 DIAGNOSIS — F43.9 STRESS AT HOME: ICD-10-CM

## 2021-01-13 RX ORDER — DULOXETIN HYDROCHLORIDE 60 MG/1
CAPSULE, DELAYED RELEASE ORAL
Qty: 30 CAPSULE | Refills: 1 | Status: SHIPPED | OUTPATIENT
Start: 2021-01-13 | End: 2021-03-15

## 2021-01-13 RX ORDER — BACLOFEN 10 MG/1
TABLET ORAL
Qty: 90 TABLET | Refills: 0 | Status: SHIPPED | OUTPATIENT
Start: 2021-01-13 | End: 2021-02-10

## 2021-01-16 DIAGNOSIS — F39 MOOD DISORDER (HCC): ICD-10-CM

## 2021-01-16 DIAGNOSIS — F34.1 DYSTHYMIA: ICD-10-CM

## 2021-01-18 RX ORDER — DIVALPROEX SODIUM 250 MG/1
TABLET, DELAYED RELEASE ORAL
Qty: 60 TABLET | Refills: 4 | Status: SHIPPED | OUTPATIENT
Start: 2021-01-18 | End: 2021-06-18

## 2021-01-25 ENCOUNTER — OFFICE VISIT (OUTPATIENT)
Dept: PRIMARY CARE CLINIC | Age: 61
End: 2021-01-25
Payer: MEDICARE

## 2021-01-25 VITALS
BODY MASS INDEX: 28.32 KG/M2 | TEMPERATURE: 98.1 F | SYSTOLIC BLOOD PRESSURE: 120 MMHG | DIASTOLIC BLOOD PRESSURE: 68 MMHG | OXYGEN SATURATION: 100 % | HEART RATE: 96 BPM | WEIGHT: 162.4 LBS

## 2021-01-25 DIAGNOSIS — M46.96 UNSPECIFIED INFLAMMATORY SPONDYLOPATHY, LUMBAR REGION (HCC): ICD-10-CM

## 2021-01-25 DIAGNOSIS — R53.83 FATIGUE, UNSPECIFIED TYPE: Primary | ICD-10-CM

## 2021-01-25 DIAGNOSIS — J44.9 CHRONIC OBSTRUCTIVE PULMONARY DISEASE, UNSPECIFIED COPD TYPE (HCC): ICD-10-CM

## 2021-01-25 DIAGNOSIS — F39 MOOD DISORDER (HCC): ICD-10-CM

## 2021-01-25 DIAGNOSIS — Z79.891 CHRONIC USE OF OPIATE DRUG FOR THERAPEUTIC PURPOSE: ICD-10-CM

## 2021-01-25 PROCEDURE — 3023F SPIROM DOC REV: CPT | Performed by: FAMILY MEDICINE

## 2021-01-25 PROCEDURE — G8926 SPIRO NO PERF OR DOC: HCPCS | Performed by: FAMILY MEDICINE

## 2021-01-25 PROCEDURE — 99214 OFFICE O/P EST MOD 30 MIN: CPT | Performed by: FAMILY MEDICINE

## 2021-01-25 PROCEDURE — G8482 FLU IMMUNIZE ORDER/ADMIN: HCPCS | Performed by: FAMILY MEDICINE

## 2021-01-25 PROCEDURE — G8427 DOCREV CUR MEDS BY ELIG CLIN: HCPCS | Performed by: FAMILY MEDICINE

## 2021-01-25 PROCEDURE — G8417 CALC BMI ABV UP PARAM F/U: HCPCS | Performed by: FAMILY MEDICINE

## 2021-01-25 PROCEDURE — 4004F PT TOBACCO SCREEN RCVD TLK: CPT | Performed by: FAMILY MEDICINE

## 2021-01-25 PROCEDURE — 80305 DRUG TEST PRSMV DIR OPT OBS: CPT | Performed by: FAMILY MEDICINE

## 2021-01-25 PROCEDURE — 3017F COLORECTAL CA SCREEN DOC REV: CPT | Performed by: FAMILY MEDICINE

## 2021-01-25 NOTE — PROGRESS NOTES
[facility] PRIMARY CARE  [address] [place] [address]  Dept: [phone]    Stephanie Masters is a 61 y.o. female Established patient, who presents today for her medical conditions/complaintsas noted below. Chief Complaint   Patient presents with    Medication Check     3mth [facility] ck    Office Visit for Anticoagulation Management     INR check       HPI:     HPI    No energy. Doesn't want to do anything x 1 year and getting worse  Hasn't taken her tree down. Waking up ok. Takes her pain pill at HS to help her sleep. And it stopped helping her sleep. Tylenol PM helping her sleep  Baclofen didn't help pain in back. Muscle relaxer didn't help.    Reviewed prior notes None  Reviewed previous Labs    LDL Calculated (mg/dL)   Date Value   10/21/2019 120   2018 131       (goal LDL is <100)   BUN (mg/dL)   Date Value   10/29/2020 11     TSH (uIU/mL)   Date Value   10/21/2019 3.91     BP Readings from Last 3 Encounters:   21 120/68   20 130/86   10/27/20 98/60          (goal 120/80)    Past Medical History:   Diagnosis Date    Acid reflux     Arthritis     neck, back, knees    Asthma     Emphysema     Headache(784.0)     Osteoarthritis     Osteoporosis     TIA (transient ischemic attack)       Past Surgical History:   Procedure Laterality Date    ACROMIOPLASTY Left 2016    SHOULDER ARTHROCOPY LABRAL DEBRIDEMENT      SECTION, LOW TRANSVERSE      DILATION AND CURETTAGE OF UTERUS      OTHER SURGICAL HISTORY      uterine lining removed by laser    THROAT SURGERY      camera    TONSILLECTOMY AND ADENOIDECTOMY      TUBAL LIGATION         Family History   Problem Relation Age of Onset    Heart Disease Father     Asthma Father     Cancer Paternal Grandfather         throat    Asthma Son        Social History     Tobacco Use    Smoking status: Current Every Day Smoker     Packs/day: 0.50     Years: 46.00     Pack years: 23.00     Types: Cigarettes     Start date: 65    Smokeless tobacco: Never Used   Substance Use Topics    Alcohol use: Yes     Alcohol/week: 0.0 standard drinks     Comment: OCCASIONAL      Current Outpatient Medications   Medication Sig Dispense Refill    divalproex (DEPAKOTE) 250 MG DR tablet TAKE ONE TABLET BY MOUTH TWICE A DAY 60 tablet 4    baclofen (LIORESAL) 10 MG tablet TAKE ONE TABLET BY MOUTH THREE TIMES A DAY AS NEEDED FOR SPASMS 90 tablet 0    DULoxetine (CYMBALTA) 60 MG extended release capsule TAKE ONE CAPSULE BY MOUTH DAILY 30 capsule 1    HYDROcodone-acetaminophen (NORCO) 7.5-325 MG per tablet Take 1 tablet by mouth every 6 hours as needed for Pain for up to 30 days. Intended supply: 30 days 120 tablet 0    warfarin (COUMADIN) 4 MG tablet Take 8 mg M, W, Fri a and 6 mg the other days 45 tablet 5    omeprazole (PRILOSEC) 20 MG delayed release capsule TAKE ONE CAPSULE BY MOUTH DAILY 30 capsule 2    Fluticasone furoate-vilanterol (BREO ELLIPTA) 200-25 MCG/INH AEPB inhaler Inhale 1 puff into the lungs daily 1 each 5    busPIRone (BUSPAR) 15 MG tablet TAKE ONE TABLET BY MOUTH TWICE A DAY 60 tablet 4    ondansetron (ZOFRAN) 4 MG tablet Take 1 tablet by mouth every 8 hours as needed for Nausea or Vomiting 30 tablet 3    albuterol-ipratropium (COMBIVENT RESPIMAT)  MCG/ACT AERS inhaler Inhale 1 puff into the lungs every 6 hours Increased to every 4 hrs if needed. 3 Inhaler 3     No current facility-administered medications for this visit.       Allergies   Allergen Reactions    Eggs Or Egg-Derived Products Other (See Comments)     Severe abdominal pains    Bupropion Nausea Only    Ceftin [Cefuroxime Axetil] Nausea Only     Nausea and upset stomach       Health Maintenance   Topic Date Due    Shingles Vaccine (1 of 2) 08/27/2021 (Originally 4/1/2010)    Breast cancer screen  12/02/2021    Cervical cancer screen  11/03/2023    Lipid screen  10/21/2024    DTaP/Tdap/Td vaccine (2 - Td) 2024    Colon cancer screen colonoscopy  2026    Flu vaccine  Completed    Pneumococcal 0-64 years Vaccine  Completed    Hepatitis C screen  Completed    HIV screen  Completed    Hepatitis A vaccine  Aged Out    Hepatitis B vaccine  Aged Out    Hib vaccine  Aged Out    Meningococcal (ACWY) vaccine  Aged Out       Subjective:      Review of Systems  Takes her pain pills only when she has a heavy work day, not taking them at [facility] anymore since she is taking TYl PM  Her son's best friend  over [place] ( her son with autism)   Cleans houses for living. Objective:     /68   Pulse 96   Temp 98.1 °F (36.7 °C)   Wt 162 lb 6.4 oz (73.7 kg)   SpO2 100%   BMI 28.32 kg/m²   Physical Exam  Vitals signs and nursing note reviewed. Constitutional:       General: She is not in acute distress. Appearance: She is well-developed. She is not ill-appearing. HENT:      Head: Normocephalic and atraumatic. Right Ear: External ear normal.      Left Ear: External ear normal.   Eyes:      General: No scleral icterus. Right eye: No discharge. Left eye: No discharge. Conjunctiva/sclera: Conjunctivae normal.      Pupils: Pupils are equal, round, and reactive to light. Neck:      Thyroid: No thyromegaly. Trachea: No tracheal deviation. Cardiovascular:      Rate and Rhythm: Normal rate and regular rhythm. Heart sounds: Normal heart sounds. Pulmonary:      Effort: Pulmonary effort is normal. No respiratory distress. Breath sounds: Wheezing present. Comments: One wheeze left ant lobe which cleared  Lymphadenopathy:      Cervical: No cervical adenopathy. Skin:     General: Skin is warm. Findings: No rash. Neurological:      Mental Status: She is alert and oriented to person, place, and time. Psychiatric:         Mood and Affect: Mood normal.         Behavior: Behavior normal.         Thought Content:  Thought content normal.         Assessment: Diagnosis Orders   1. Fatigue, unspecified type  TSH With Reflex Ft4   2. Unspecified inflammatory spondylopathy, lumbar region [facility])  POCT Rapid Drug Screen   3. Chronic obstructive pulmonary disease, unspecified COPD type ([facility] [address]     4. Mood disorder ([facility] [address]     5. Chronic use of opiate drug for therapeutic purpose  POCT Rapid Drug Screen        Plan:      No follow-ups on file. Sleep questionnaires  INR [place]  Orders Placed This Encounter   Procedures    TSH With Reflex Ft4     Standing Status:   Future     Standing Expiration Date:   1/25/2022    POCT Rapid Drug Screen     No orders of the defined types were placed in this encounter. Patient given educationalmaterials - see patient instructions. Discussed use, benefit, and side effectsof prescribed medications. All patient questions answered. Pt voiced understanding. Reviewed health maintenance. Instructed to continue current medications, diet  Patient agreed with treatment plan. Follow up as directed.      Electronicallysigned by Oksana Quintana MD on 1/25/2021 at 1:51 PM

## 2021-02-10 ENCOUNTER — TELEPHONE (OUTPATIENT)
Dept: PRIMARY CARE CLINIC | Age: 61
End: 2021-02-10

## 2021-02-10 DIAGNOSIS — M54.2 NECK PAIN: ICD-10-CM

## 2021-02-10 DIAGNOSIS — M54.6 CHRONIC BILATERAL THORACIC BACK PAIN: ICD-10-CM

## 2021-02-10 DIAGNOSIS — R25.2 SPASM: ICD-10-CM

## 2021-02-10 DIAGNOSIS — G89.29 CHRONIC BILATERAL THORACIC BACK PAIN: ICD-10-CM

## 2021-02-10 RX ORDER — BACLOFEN 10 MG/1
TABLET ORAL
Qty: 90 TABLET | Refills: 0 | Status: SHIPPED | OUTPATIENT
Start: 2021-02-10 | End: 2021-03-15

## 2021-02-10 NOTE — TELEPHONE ENCOUNTER
Pt calling and states that she has been taking tylenol Pms at night due to her back pain so t hat she can sleep and they are no longer working she is asking if she can take two tablets at night with out being drowsy in the AM?   Please advise.

## 2021-02-10 NOTE — TELEPHONE ENCOUNTER
I would suggest she take one Tylenol PM along with a benadryl 25 mg and see how she feels, I don't know if it will make her overly drowsy.

## 2021-04-19 LAB
INR BLD: 2 (ref 0.8–1.1)
PROTHROMBIN TIME: 23.2 SEC (ref 9.8–13.2)
TSH SERPL DL<=0.05 MIU/L-ACNC: 2.75 UIU/ML (ref 0.49–4.67)

## 2021-04-22 RX ORDER — BUSPIRONE HYDROCHLORIDE 15 MG/1
TABLET ORAL
Qty: 60 TABLET | Refills: 3 | Status: SHIPPED | OUTPATIENT
Start: 2021-04-22 | End: 2021-07-29

## 2021-05-04 ENCOUNTER — OFFICE VISIT (OUTPATIENT)
Dept: PRIMARY CARE CLINIC | Age: 61
End: 2021-05-04
Payer: MEDICARE

## 2021-05-04 VITALS
HEIGHT: 64 IN | WEIGHT: 169.2 LBS | OXYGEN SATURATION: 98 % | TEMPERATURE: 98.2 F | HEART RATE: 95 BPM | DIASTOLIC BLOOD PRESSURE: 64 MMHG | SYSTOLIC BLOOD PRESSURE: 118 MMHG | BODY MASS INDEX: 28.89 KG/M2

## 2021-05-04 DIAGNOSIS — G45.9 TIA (TRANSIENT ISCHEMIC ATTACK): ICD-10-CM

## 2021-05-04 DIAGNOSIS — M47.816 ARTHRITIS OF LUMBAR SPINE: ICD-10-CM

## 2021-05-04 DIAGNOSIS — G89.29 CHRONIC PAIN OF RIGHT KNEE: ICD-10-CM

## 2021-05-04 DIAGNOSIS — M51.9 LUMBAR DISC DISEASE: ICD-10-CM

## 2021-05-04 DIAGNOSIS — M25.561 CHRONIC PAIN OF RIGHT KNEE: ICD-10-CM

## 2021-05-04 DIAGNOSIS — J44.9 CHRONIC OBSTRUCTIVE PULMONARY DISEASE, UNSPECIFIED COPD TYPE (HCC): Primary | ICD-10-CM

## 2021-05-04 DIAGNOSIS — F32.A CHRONIC DEPRESSIVE DISORDER: ICD-10-CM

## 2021-05-04 PROCEDURE — 3023F SPIROM DOC REV: CPT | Performed by: FAMILY MEDICINE

## 2021-05-04 PROCEDURE — G8427 DOCREV CUR MEDS BY ELIG CLIN: HCPCS | Performed by: FAMILY MEDICINE

## 2021-05-04 PROCEDURE — 3017F COLORECTAL CA SCREEN DOC REV: CPT | Performed by: FAMILY MEDICINE

## 2021-05-04 PROCEDURE — 99214 OFFICE O/P EST MOD 30 MIN: CPT | Performed by: FAMILY MEDICINE

## 2021-05-04 PROCEDURE — 4004F PT TOBACCO SCREEN RCVD TLK: CPT | Performed by: FAMILY MEDICINE

## 2021-05-04 PROCEDURE — G8926 SPIRO NO PERF OR DOC: HCPCS | Performed by: FAMILY MEDICINE

## 2021-05-04 PROCEDURE — G8417 CALC BMI ABV UP PARAM F/U: HCPCS | Performed by: FAMILY MEDICINE

## 2021-05-04 RX ORDER — HYDROCODONE BITARTRATE AND ACETAMINOPHEN 7.5; 325 MG/1; MG/1
1 TABLET ORAL EVERY 6 HOURS PRN
Qty: 120 TABLET | Refills: 0 | Status: SHIPPED | OUTPATIENT
Start: 2021-05-04 | End: 2021-06-14 | Stop reason: SDUPTHER

## 2021-05-04 SDOH — ECONOMIC STABILITY: TRANSPORTATION INSECURITY
IN THE PAST 12 MONTHS, HAS THE LACK OF TRANSPORTATION KEPT YOU FROM MEDICAL APPOINTMENTS OR FROM GETTING MEDICATIONS?: NO

## 2021-05-04 SDOH — ECONOMIC STABILITY: FOOD INSECURITY: WITHIN THE PAST 12 MONTHS, THE FOOD YOU BOUGHT JUST DIDN'T LAST AND YOU DIDN'T HAVE MONEY TO GET MORE.: NEVER TRUE

## 2021-05-04 SDOH — ECONOMIC STABILITY: INCOME INSECURITY: HOW HARD IS IT FOR YOU TO PAY FOR THE VERY BASICS LIKE FOOD, HOUSING, MEDICAL CARE, AND HEATING?: NOT VERY HARD

## 2021-05-04 ASSESSMENT — PATIENT HEALTH QUESTIONNAIRE - PHQ9
SUM OF ALL RESPONSES TO PHQ QUESTIONS 1-9: 0
SUM OF ALL RESPONSES TO PHQ QUESTIONS 1-9: 0
2. FEELING DOWN, DEPRESSED OR HOPELESS: 0

## 2021-05-04 ASSESSMENT — ENCOUNTER SYMPTOMS: BACK PAIN: 1

## 2021-05-04 NOTE — PROGRESS NOTES
[address] [place] [address] CARE  [address] [place] [address]  Dept: [phone]    April Kristen Adler is a 64 y.o. female Established patient, who presents today for her medical conditions/complaintsas noted below. Chief Complaint   Patient presents with    Medication Check     3mth f/u, Medication contract up to date       HPI:     HPI    Back pain is getting worse  Uses pain pills prn, usually with her bigger jobs. Reviewed prior notes None  Reviewed previous Labs    Tylenol pm and benadryl for sleep due to pain    LDL Calculated (mg/dL)   Date Value   10/21/2019 120   2018 131       (goal LDL is <100)   BUN (mg/dL)   Date Value   10/29/2020 11     TSH (uIU/mL)   Date Value   2021 2.75     BP Readings from Last 3 Encounters:   21 118/64   21 120/68   20 130/86          (goal 120/80)    Past Medical History:   Diagnosis Date    Acid reflux     Arthritis     neck, back, knees    Asthma     Emphysema     Headache(784.0)     Osteoarthritis     Osteoporosis     TIA (transient ischemic attack)       Past Surgical History:   Procedure Laterality Date    ACROMIOPLASTY Left 2016    SHOULDER ARTHROCOPY LABRAL DEBRIDEMENT      SECTION, LOW TRANSVERSE      DILATION AND CURETTAGE OF UTERUS      OTHER SURGICAL HISTORY      uterine lining removed by laser    THROAT SURGERY      camera    TONSILLECTOMY AND ADENOIDECTOMY      TUBAL LIGATION         Family History   Problem Relation Age of Onset    Heart Disease Father     Asthma Father     Cancer Paternal Grandfather         throat    Asthma Son        Social History     Tobacco Use    Smoking status: Current Every Day Smoker     Packs/day: 0.50     Years: 46.00     Pack years: 23.00     Types: Cigarettes     Start date:     Smokeless tobacco: Never Used   Substance Use Topics    Alcohol use:  Yes     Alcohol/week: 0.0 standard drinks     Comment: OCCASIONAL vaccine  Aged Out    Meningococcal (ACWY) vaccine  Aged Out       Subjective:      Review of Systems   Musculoskeletal: Positive for arthralgias and back pain. Knee pains       Objective:     /64   Pulse 95   Temp 98.2 °F (36.8 °C)   Ht 5' 3.5\" (1.613 m)   Wt 169 lb 3.2 oz (76.7 kg)   SpO2 98%   BMI 29.50 kg/m²   Physical Exam  Vitals signs and nursing note reviewed. Constitutional:       General: She is not in acute distress. Appearance: She is well-developed. She is not ill-appearing. HENT:      Head: Normocephalic and atraumatic. Right Ear: External ear normal.      Left Ear: External ear normal.   Eyes:      General: No scleral icterus. Right eye: No discharge. Left eye: No discharge. Conjunctiva/sclera: Conjunctivae normal.      Pupils: Pupils are equal, round, and reactive to light. Neck:      Thyroid: No thyromegaly. Trachea: No tracheal deviation. Cardiovascular:      Rate and Rhythm: Normal rate and regular rhythm. Heart sounds: Normal heart sounds. Pulmonary:      Effort: Pulmonary effort is normal. No respiratory distress. Breath sounds: Normal breath sounds. No wheezing. Lymphadenopathy:      Cervical: No cervical adenopathy. Skin:     General: Skin is warm. Findings: No rash. Neurological:      Mental Status: She is alert and oriented to person, place, and time. Psychiatric:         Mood and Affect: Mood normal.         Behavior: Behavior normal.         Thought Content: Thought content normal.         Assessment:       Diagnosis Orders   1. Chronic obstructive pulmonary disease, unspecified COPD type ([facility] [address]     2. TIA (transient ischemic attack)     3. Chronic depressive disorder     4. Lumbar disc disease  HYDROcodone-acetaminophen (NORCO) 7.5-325 MG per tablet   5. Arthritis of lumbar spine  HYDROcodone-acetaminophen (NORCO) 7.5-325 MG per tablet   6.  Chronic pain of right knee  XR KNEE RIGHT (3 VIEWS)        Plan: No follow-ups on file. May need to see ortho, she could consider knee joint injections. Orders Placed This Encounter   Procedures    XR KNEE RIGHT (3 VIEWS)     Standing Status:   Future     Standing Expiration Date:   5/4/2022     Order Specific Question:   Reason for exam:     Answer:   include standing view     Orders Placed This Encounter   Medications    HYDROcodone-acetaminophen (NORCO) 7.5-325 MG per tablet     Sig: Take 1 tablet by mouth every 6 hours as needed for Pain for up to 30 days. Intended supply: 30 days     Dispense:  120 tablet     Refill:  0     Reduce doses taken as pain becomes manageable    fluticasone-salmeterol (ADVAIR DISKUS) 250-50 MCG/DOSE AEPB     Sig: Inhale 1 puff into the lungs every 12 hours     Dispense:  60 each     Refill:  5       Patient given educationalmaterials - see patient instructions. Discussed use, benefit, and side effectsof prescribed medications. All patient questions answered. Pt voiced understanding. Reviewed health maintenance. Instructed to continue current medications, diet. Patient agreed with treatment plan. Follow up as directed.      Electronicallysigned by Tarun Kraft MD on 5/4/2021 at 1:51 PM

## 2021-05-12 ENCOUNTER — HOSPITAL ENCOUNTER (OUTPATIENT)
Age: 61
Discharge: HOME | End: 2021-05-14
Payer: MEDICARE

## 2021-05-12 ENCOUNTER — HOSPITAL ENCOUNTER (OUTPATIENT)
Dept: GENERAL RADIOLOGY | Age: 61
Discharge: HOME | End: 2021-05-14
Payer: MEDICARE

## 2021-05-12 DIAGNOSIS — G89.29 CHRONIC PAIN OF RIGHT KNEE: ICD-10-CM

## 2021-05-12 DIAGNOSIS — M25.561 CHRONIC PAIN OF RIGHT KNEE: ICD-10-CM

## 2021-05-12 PROCEDURE — 73562 X-RAY EXAM OF KNEE 3: CPT

## 2021-06-14 DIAGNOSIS — M47.816 ARTHRITIS OF LUMBAR SPINE: ICD-10-CM

## 2021-06-14 DIAGNOSIS — M51.9 LUMBAR DISC DISEASE: ICD-10-CM

## 2021-06-14 RX ORDER — HYDROCODONE BITARTRATE AND ACETAMINOPHEN 7.5; 325 MG/1; MG/1
1 TABLET ORAL EVERY 6 HOURS PRN
Qty: 120 TABLET | Refills: 0 | Status: SHIPPED | OUTPATIENT
Start: 2021-06-14 | End: 2021-07-14

## 2021-06-18 DIAGNOSIS — F39 MOOD DISORDER (HCC): ICD-10-CM

## 2021-06-18 DIAGNOSIS — F34.1 DYSTHYMIA: ICD-10-CM

## 2021-06-18 RX ORDER — DIVALPROEX SODIUM 250 MG/1
TABLET, DELAYED RELEASE ORAL
Qty: 60 TABLET | Refills: 3 | Status: SHIPPED | OUTPATIENT
Start: 2021-06-18 | End: 2021-07-29

## 2021-07-02 RX ORDER — ONDANSETRON 4 MG/1
TABLET, FILM COATED ORAL
Qty: 30 TABLET | Refills: 2 | Status: SHIPPED | OUTPATIENT
Start: 2021-07-02 | End: 2021-09-20

## 2021-07-06 DIAGNOSIS — G45.9 TIA (TRANSIENT ISCHEMIC ATTACK): ICD-10-CM

## 2021-07-06 DIAGNOSIS — Z86.73 HISTORY OF TRANSIENT ISCHEMIC ATTACK (TIA): ICD-10-CM

## 2021-07-06 RX ORDER — WARFARIN SODIUM 4 MG/1
TABLET ORAL
Qty: 45 TABLET | Refills: 5 | Status: SHIPPED | OUTPATIENT
Start: 2021-07-06 | End: 2021-11-02 | Stop reason: DRUGHIGH

## 2021-07-06 NOTE — TELEPHONE ENCOUNTER
----- Message from Preet Kenney sent at 7/6/2021  8:13 AM EDT -----  Subject: Refill Request    QUESTIONS  Name of Medication? warfarin (COUMADIN) 4 MG tablet  Patient-reported dosage and instructions? Take 8 mg M, W, Fri a and 6 mg   the other days  How many days do you have left? 0  Preferred Pharmacy? [facility]  Pharmacy phone number (if available)? [phone]  ---------------------------------------------------------------------------  --------------  CALL BACK INFO  What is the best way for the office to contact you? OK to leave message on   voicemail  Preferred Call Back Phone Number?  [phone]

## 2021-07-13 DIAGNOSIS — M54.2 NECK PAIN: ICD-10-CM

## 2021-07-13 DIAGNOSIS — G89.29 CHRONIC BILATERAL THORACIC BACK PAIN: ICD-10-CM

## 2021-07-13 DIAGNOSIS — R25.2 SPASM: ICD-10-CM

## 2021-07-13 DIAGNOSIS — M54.6 CHRONIC BILATERAL THORACIC BACK PAIN: ICD-10-CM

## 2021-07-13 RX ORDER — BACLOFEN 10 MG/1
TABLET ORAL
Qty: 45 TABLET | Refills: 0 | Status: SHIPPED | OUTPATIENT
Start: 2021-07-13 | End: 2021-08-26

## 2021-07-28 DIAGNOSIS — F39 MOOD DISORDER (HCC): ICD-10-CM

## 2021-07-28 DIAGNOSIS — F34.1 DYSTHYMIA: ICD-10-CM

## 2021-07-29 RX ORDER — BUSPIRONE HYDROCHLORIDE 15 MG/1
TABLET ORAL
Qty: 60 TABLET | Refills: 3 | Status: SHIPPED | OUTPATIENT
Start: 2021-07-29 | End: 2022-03-18 | Stop reason: SDUPTHER

## 2021-07-29 RX ORDER — DIVALPROEX SODIUM 250 MG/1
TABLET, DELAYED RELEASE ORAL
Qty: 60 TABLET | Refills: 3 | Status: SHIPPED | OUTPATIENT
Start: 2021-07-29 | End: 2022-06-10 | Stop reason: SDUPTHER

## 2021-08-02 ENCOUNTER — OFFICE VISIT (OUTPATIENT)
Dept: PRIMARY CARE CLINIC | Age: 61
End: 2021-08-02
Payer: MEDICARE

## 2021-08-02 VITALS
WEIGHT: 165.4 LBS | BODY MASS INDEX: 28.84 KG/M2 | SYSTOLIC BLOOD PRESSURE: 102 MMHG | OXYGEN SATURATION: 95 % | HEART RATE: 90 BPM | DIASTOLIC BLOOD PRESSURE: 66 MMHG

## 2021-08-02 DIAGNOSIS — G45.9 TIA (TRANSIENT ISCHEMIC ATTACK): Primary | ICD-10-CM

## 2021-08-02 DIAGNOSIS — M51.9 LUMBAR DISC DISEASE: ICD-10-CM

## 2021-08-02 DIAGNOSIS — M48.061 SPINAL STENOSIS OF LUMBAR REGION WITHOUT NEUROGENIC CLAUDICATION: ICD-10-CM

## 2021-08-02 DIAGNOSIS — J45.20 MILD INTERMITTENT ASTHMA WITHOUT COMPLICATION: ICD-10-CM

## 2021-08-02 LAB
INTERNATIONAL NORMALIZATION RATIO, POC: 1.6
PROTHROMBIN TIME, POC: NORMAL

## 2021-08-02 PROCEDURE — 4004F PT TOBACCO SCREEN RCVD TLK: CPT | Performed by: FAMILY MEDICINE

## 2021-08-02 PROCEDURE — 85610 PROTHROMBIN TIME: CPT | Performed by: FAMILY MEDICINE

## 2021-08-02 PROCEDURE — G8427 DOCREV CUR MEDS BY ELIG CLIN: HCPCS | Performed by: FAMILY MEDICINE

## 2021-08-02 PROCEDURE — 3017F COLORECTAL CA SCREEN DOC REV: CPT | Performed by: FAMILY MEDICINE

## 2021-08-02 PROCEDURE — 99213 OFFICE O/P EST LOW 20 MIN: CPT | Performed by: FAMILY MEDICINE

## 2021-08-02 PROCEDURE — G8417 CALC BMI ABV UP PARAM F/U: HCPCS | Performed by: FAMILY MEDICINE

## 2021-08-02 ASSESSMENT — ENCOUNTER SYMPTOMS: BACK PAIN: 1

## 2021-08-02 NOTE — PROGRESS NOTES
[facility] PRIMARY CARE  [address] [facility] [address] [address]  Dept: [phone]    April Lizeth Lang is a 64 y.o. female Established patient, who presents today for her medical conditions/complaintsas noted below. 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 She cleans houses    LDL Calculated (mg/dL)   Date Value   10/21/2019 120   2018 131       (goal LDL is <100)   BUN (mg/dL)   Date Value   10/29/2020 11     TSH (uIU/mL)   Date Value   2021 2.75     BP Readings from Last 3 Encounters:   21 102/66   21 118/64   21 120/68          (goal 120/80)    Past Medical History:   Diagnosis Date    Acid reflux     Arthritis     neck, back, knees    Asthma     Emphysema     Headache(784.0)     Osteoarthritis     Osteoporosis     TIA (transient ischemic attack)       Past Surgical History:   Procedure Laterality Date    ACROMIOPLASTY Left 2016    SHOULDER ARTHROCOPY LABRAL DEBRIDEMENT      SECTION, LOW TRANSVERSE      DILATION AND CURETTAGE OF UTERUS      OTHER SURGICAL HISTORY      uterine lining removed by laser    THROAT SURGERY      camera    TONSILLECTOMY AND ADENOIDECTOMY      TUBAL LIGATION         Family History   Problem Relation Age of Onset    Heart Disease Father     Asthma Father     Cancer Paternal Grandfather         throat    Asthma Son        Social History     Tobacco Use    Smoking status: Current Every Day Smoker     Packs/day: 0.50     Years: 46.00     Pack years: 23.00     Types: Cigarettes     Start date:     Smokeless tobacco: Never Used   Substance Use Topics    Alcohol use:  Yes     Alcohol/week: 0.0 standard drinks Comment: OCCASIONAL      Current Outpatient Medications   Medication Sig Dispense Refill    divalproex (DEPAKOTE) 250 MG DR tablet TAKE ONE TABLET BY MOUTH TWICE A DAY 60 tablet 3    busPIRone (BUSPAR) 15 MG tablet TAKE ONE TABLET BY MOUTH TWICE A DAY 60 tablet 3    baclofen (LIORESAL) 10 MG tablet TAKE ONE TABLET BY MOUTH THREE TIMES A DAY AS NEEDED FOR SPASMS 45 tablet 0    warfarin (COUMADIN) 4 MG tablet Take 8 mg M, W, Fri a and 6 mg the other days 45 tablet 5    ondansetron (ZOFRAN) 4 MG tablet TAKE ONE TABLET BY MOUTH EVERY 8 HOURS AS NEEDED FOR NAUSEA OR VOMITING 30 tablet 2    fluticasone-salmeterol (ADVAIR DISKUS) 250-50 MCG/DOSE AEPB Inhale 1 puff into the lungs every 12 hours 60 each 5    DULoxetine (CYMBALTA) 60 MG extended release capsule TAKE ONE CAPSULE BY MOUTH DAILY 30 capsule 5    omeprazole (PRILOSEC) 20 MG delayed release capsule TAKE ONE CAPSULE BY MOUTH DAILY 30 capsule 5    albuterol-ipratropium (COMBIVENT RESPIMAT)  MCG/ACT AERS inhaler Inhale 1 puff into the lungs every 6 hours Increased to every 4 hrs if needed. 3 Inhaler 3     No current facility-administered medications for this visit.      Allergies   Allergen Reactions    Eggs Or Egg-Derived Products Other (See Comments)     Severe abdominal pains    Bupropion Nausea Only    Ceftin [Cefuroxime Axetil] Nausea Only     Nausea and upset stomach       Health Maintenance   Topic Date Due    Low dose CT lung screening  Never done    Shingles Vaccine (1 of 2) 08/27/2021 (Originally 4/1/2010)    COVID-19 Vaccine (2 - Pfizer 2-dose series) 08/23/2021    Flu vaccine (1) 09/01/2021    Breast cancer screen  12/02/2021    Cervical cancer screen  11/03/2023    Lipid screen  10/21/2024    DTaP/Tdap/Td vaccine (2 - Td or Tdap) 12/11/2024    Pneumococcal 0-64 years Vaccine (2 of 2 - PPSV23) 04/01/2025    Colon cancer screen colonoscopy  12/29/2026    Hepatitis C screen  Completed    HIV screen  Completed    Hepatitis A injections if needed ( 5-7). Orders Placed This Encounter   Procedures   Perry Olsen MD, Pain Management, [facility]     Referral Priority:   Routine     Referral Type:   Eval and Treat     Referral Reason:   Specialty Services Required     Referred to Provider:   Ketan Clark MD     Requested Specialty:   Pain Management     Number of Visits Requested:   1    POCT INR     No orders of the defined types were placed in this encounter. Patient given educationalmaterials - see patient instructions. Discussed use, benefit, and side effectsof prescribed medications. All patient questions answered. Pt voiced understanding. Reviewed health maintenance. Instructed to continue current medications. Patient agreed with treatment plan. Follow up as directed.      Electronicallysigned by Eduardo Salinas MD on 8/2/2021 at 2:52 PM

## 2021-08-26 RX ORDER — HYDROCODONE BITARTRATE AND ACETAMINOPHEN 7.5; 325 MG/1; MG/1
1 TABLET ORAL EVERY 6 HOURS PRN
COMMUNITY
End: 2021-10-25 | Stop reason: SDUPTHER

## 2021-08-26 ASSESSMENT — PAIN DESCRIPTION - PROGRESSION: CLINICAL_PROGRESSION: GRADUALLY WORSENING

## 2021-08-26 ASSESSMENT — ENCOUNTER SYMPTOMS
SHORTNESS OF BREATH: 1
GASTROINTESTINAL NEGATIVE: 1
WHEEZING: 1
EYES NEGATIVE: 1
BACK PAIN: 1
ALLERGIC/IMMUNOLOGIC NEGATIVE: 1

## 2021-08-26 ASSESSMENT — PAIN DESCRIPTION - DESCRIPTORS: DESCRIPTORS: ACHING;CONSTANT;DULL;JABBING;NAGGING;SHARP

## 2021-08-26 ASSESSMENT — PAIN DESCRIPTION - ONSET: ONSET: ON-GOING

## 2021-08-26 ASSESSMENT — PAIN DESCRIPTION - ORIENTATION: ORIENTATION: RIGHT

## 2021-08-26 ASSESSMENT — PAIN DESCRIPTION - FREQUENCY: FREQUENCY: CONTINUOUS

## 2021-08-26 ASSESSMENT — PAIN DESCRIPTION - LOCATION: LOCATION: BACK;LEG

## 2021-08-26 ASSESSMENT — PAIN DESCRIPTION - PAIN TYPE: TYPE: CHRONIC PAIN

## 2021-08-26 ASSESSMENT — PAIN DESCRIPTION - DIRECTION: RADIATING_TOWARDS: RIGHT LEG

## 2021-08-26 ASSESSMENT — PAIN SCALES - GENERAL: PAINLEVEL_OUTOF10: 6

## 2021-08-26 NOTE — PROGRESS NOTES
[address] Pain Management  Patient Pain Assessment  Consultation - Dr. Adriana Mcrae    Primary Care Physician: Lois Menjivar MD    Chief complaint:   Chief Complaint   Patient presents with    Lower Back Pain   . Stephanie Boggs is a 64 y.o. female evaluated on 9/1/2021. Patient is referred by Matias Kumar MD      Patient identification was verified at the start of the visit: Yes    Chief complaint: Stephanie Boggs is 64 y.o.,  female, with  Lower Back Pain  . HISTORY OF PRESENT ILLNESS:  Stephanie Boggs is 64 y.o. female with    Referring diagnosis  M51.9 (ICD-10-CM) - Lumbar disc disease  M48.061 (ICD-10-CM) - Spinal stenosis of lumbar region without neurogenic claudication    Patient is a 80-year-old female referred to the pain clinic in consultation for pain involving the low back area. 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 She reports she at times is not for positions to clean and developed the pain. She apparently went to a pain management center on [address] and was recommended to try acupuncture was given an option for pain medications. Patient pain is decreased with rest ice heat and the use of TENS unit. Back Pain  This is a chronic problem. The current episode started more than 1 year ago. The problem occurs constantly. The problem is unchanged. The pain is present in the lumbar spine and sacro-iliac. The quality of the pain is described as aching (sharp, Nagging). The pain radiates to the right thigh. The pain is at a severity of 6/10 (2-10). The pain is moderate. The pain is worse during the day. The symptoms are aggravated by standing, position, twisting and bending (walking, lifting, ADLs). Pertinent negatives include no numbness, tingling or weakness. Risk factors include sedentary lifestyle and lack of exercise (Smoking).      RX Monitoring 9/1/2021   Attestation -   Periodic Controlled Substance Monitoring No signs of potential drug abuse or diversion identified. ;Assessed functional status. Chronic Pain > 80 MEDD -     Current Pain Assessment  Pain Assessment  Pain Assessment: 0-10  Pain Level: 6  Patient's Stated Pain Goal: 2  Pain Type: Chronic pain  Pain Location: Back, Leg  Pain Orientation: Right  Pain Radiating Towards: right leg  Pain Descriptors: Aching, Constant, Dull, Jabbing, Nagging, Sharp  Pain Frequency: Continuous  Pain Onset: On-going  Clinical Progression: Gradually worsening         ADVERSE MEDICATION EFFECTS:   Constipation: no  Bowel Regimen: No:   Diet: common adult  Appetite: ok  Sedation: no  Urinary Retention: no    FOCUSED PAIN SCALE:  Highest: 10  Lowest:2  Average: Range- 2  When and What was your last procedure:       Was your procedure effective: no    ACTIVITY/SOCIAL/EMOTIONAL:  Sleep Pattern: 6 hours per night. difficulty falling asleep and generally restful sleep  Energy Level: Tired/Fatigued  Currently attending Physical Therapy: No  Home Exercises: daily housecleaning  Mobility: no current mobility problem   Do you use assistive devices? No  Have you fallen in the last 30 days? No  Currently seeing a Psychiatrist or Psychologist: No  Emotional Issues: normal   Mood: appropriate     ABERRANT BEHAVIORS SINCE LAST VISIT:  Have you ever been treated in another Pain Clinic. [address] [place] [address] for prescriptions appropriate: not applicable  Lost Rx/pills: not applicable  Taking more medication than prescribed: not applicable  Are you receiving PAIN medications from other doctors: not applicable  Last Urine/Serum Drug Screen:   Was Serum/UDS as anticipated? Will collect today  Brought pill bottles in:not applicable   Was Pill count appropriate? :not applicable   Are currently pregnant? no  Recent ER visits: No  Have you had a COVID 19 Vaccine? yes August 23  Total SOAP points: 5    /76   Pulse 80   Temp 97.8 °F (36.6 °C) (Infrared)   Resp 20   Ht 5' 3.5\" (1.613 m)   Wt 161 lb (73 kg)   BMI 28.07 kg/m²     Past Medical History      Diagnosis Date    Acid reflux     Arthritis     neck, back, knees    Asthma     Emphysema     Headache(784.0)     Osteoarthritis     Osteoporosis     TIA (transient ischemic attack)        Surgical History  Past Surgical History:   Procedure Laterality Date    ACROMIOPLASTY Left 2016    SHOULDER ARTHROCOPY LABRAL DEBRIDEMENT      SECTION, LOW TRANSVERSE      DILATION AND CURETTAGE OF UTERUS      OTHER SURGICAL HISTORY      uterine lining removed by laser    SHOULDER SURGERY      THROAT SURGERY      camera    TONSILLECTOMY AND ADENOIDECTOMY      TUBAL LIGATION         Medications  Current Outpatient Medications   Medication Sig Dispense Refill    HYDROcodone-acetaminophen (NORCO) 7.5-325 MG per tablet Take 1 tablet by mouth every 6 hours as needed for Pain.  DULoxetine (CYMBALTA) 60 MG extended release capsule TAKE ONE CAPSULE BY MOUTH DAILY 30 capsule 2    omeprazole (PRILOSEC) 20 MG delayed release capsule TAKE ONE CAPSULE BY MOUTH DAILY 30 capsule 3    divalproex (DEPAKOTE) 250 MG DR tablet TAKE ONE TABLET BY MOUTH TWICE A DAY 60 tablet 3    busPIRone (BUSPAR) 15 MG tablet TAKE ONE TABLET BY MOUTH TWICE A DAY 60 tablet 3    warfarin (COUMADIN) 4 MG tablet Take 8 mg , , Fri a and 6 mg the other days 45 tablet 5    ondansetron (ZOFRAN) 4 MG tablet TAKE ONE TABLET BY MOUTH EVERY 8 HOURS AS NEEDED FOR NAUSEA OR VOMITING 30 tablet 2    fluticasone-salmeterol (ADVAIR DISKUS) 250-50 MCG/DOSE AEPB Inhale 1 puff into the lungs every 12 hours 60 each 5    albuterol-ipratropium (COMBIVENT RESPIMAT)  MCG/ACT AERS inhaler Inhale 1 puff into the lungs every 6 hours Increased to every 4 hrs if needed. 3 Inhaler 3     No current facility-administered medications for this encounter.        Allergies  Eggs or egg-derived products, Bupropion, and Ceftin [cefuroxime axetil]    Family History  family history includes Asthma in her father and son; Cancer in her paternal grandfather; Heart Disease in her father. Social History  Social History     Socioeconomic History    Marital status:      Spouse name: None    Number of children: None    Years of education: None    Highest education level: None   Occupational History    None   Tobacco Use    Smoking status: Current Every Day Smoker     Packs/day: 0.50     Years: 46.00     Pack years: 23.00     Types: Cigarettes     Start date: 1974    Smokeless tobacco: Never Used   Vaping Use    Vaping Use: Every day   Substance and Sexual Activity    Alcohol use: Yes     Alcohol/week: 0.0 standard drinks     Comment: OCCASIONAL    Drug use: No    Sexual activity: None   Other Topics Concern    None   Social History Narrative    None     Social Determinants of Health     Financial Resource Strain: Low Risk     Difficulty of Paying Living Expenses: Not very hard   Food Insecurity: No Food Insecurity    Worried About Running Out of Food in the Last Year: Never true    Al of Food in the Last Year: Never true   Transportation Needs: No Transportation Needs    Lack of Transportation (Medical): No    Lack of Transportation (Non-Medical): No   Physical Activity:     Days of Exercise per Week:     Minutes of Exercise per Session:    Stress:     Feeling of Stress :    Social Connections:     Frequency of Communication with Friends and Family:     Frequency of Social Gatherings with Friends and Family:     Attends [employer] Services:     Active Member of Clubs or Organizations:     Attends Club or Organization Meetings:     Marital Status:    Intimate Partner Violence:     Fear of Current or Ex-Partner:     Emotionally Abused:     Physically Abused:     Sexually Abused:       reports no history of drug use. REVIEW OF SYSTEMS:      Review of Systems   Constitutional: Negative.     HENT: Positive for sneezing. Eyes: Negative. Respiratory: Positive for shortness of breath and wheezing. Cardiovascular: Negative. Gastrointestinal: Negative. Endocrine: Negative. Genitourinary: Negative. Musculoskeletal: Positive for back pain. Skin: Negative. Allergic/Immunologic: Negative. Neurological: Negative. Negative for tingling, weakness and numbness. Hematological: Bruises/bleeds easily. Psychiatric/Behavioral: Negative. GENERAL PHYSICAL EXAM:  Vitals: /76   Pulse 80   Temp 97.8 °F (36.6 °C) (Infrared)   Resp 20   Ht 5' 3.5\" (1.613 m)   Wt 161 lb (73 kg)   BMI 28.07 kg/m² , Body mass index is 28.07 kg/m². Physical Exam  Constitutional:       Appearance: Normal appearance. HENT:      Head: Normocephalic. Mouth/Throat:      Mouth: Mucous membranes are moist.   Eyes:      Conjunctiva/sclera: Conjunctivae normal.      Pupils: Pupils are equal, round, and reactive to light. Cardiovascular:      Rate and Rhythm: Normal rate and regular rhythm. Pulses: Normal pulses. Pulmonary:      Effort: Pulmonary effort is normal. No respiratory distress. Breath sounds: No wheezing. Abdominal:      General: Abdomen is flat. Bowel sounds are normal.      Palpations: There is no mass. Tenderness: There is no abdominal tenderness. Musculoskeletal:      Lumbar back: Positive right straight leg raise test. Negative left straight leg raise test.      Right lower leg: No edema. Left lower leg: No edema. Skin:     Findings: No rash. Neurological:      Mental Status: She is alert and oriented to person, place, and time. Cranial Nerves: Cranial nerves are intact. No cranial nerve deficit. Sensory: Sensation is intact. No sensory deficit. Motor: Motor function is intact. No weakness, atrophy or abnormal muscle tone. Coordination: Coordination is intact.       Deep Tendon Reflexes:      Reflex Scores:       Patellar reflexes are 1+ on the right side and 1+ on the left side. Achilles reflexes are 1+ on the right side and 1+ on the left side. Psychiatric:         Mood and Affect: Mood normal.         Speech: Speech normal.         Behavior: Behavior normal.         Thought Content: Thought content normal.         Cognition and Memory: Cognition and memory normal.         Judgment: Judgment normal.      Right Ankle Exam     Muscle Strength   The patient has normal right ankle strength. Left Ankle Exam     Muscle Strength   The patient has normal left ankle strength. Right Knee Exam     Muscle Strength   The patient has normal right knee strength. Left Knee Exam     Muscle Strength   The patient has normal left knee strength. Right Hip Exam     Muscle Strength   The patient has normal right hip strength. Left Hip Exam     Muscle Strength   The patient has normal left hip strength. Back Exam     Tenderness   The patient is experiencing tenderness in the lumbar and sacroiliac. Range of Motion   Back extension: Limited and painful. Back flexion: Limited and painful. Back lateral bend right: Limited and painful. Back lateral bend left: Limited and painful. Back rotation right: Limited and painful. Back rotation left: Limited and painful.      Tests   Straight leg raise right: positive  Straight leg raise left: negative    Other   Sensation: normal  Gait: antalgic   Scars: absent    Comments:  Skin --normal appearance  Surgical Scar --Absent   kyphosis absent and scoliosis absent  Alignment of her shoulders, scapulae and iliac crests--symmetric  Paraspinal tenderness:Present  Lumbar lordosis-----------Decreased  Movements of the lumbar spine indicated above are diminished range with pain   Facet loading---  : Right side--    Pain-Moderate                                   Left side----   Pain  Moderate  Perry's test -------------- Right side---positive resulting in curvature of the lumbar spine convex to the right.  There is no intradural mass.  The conus appears within normal limits in morphology and position.  Multiple fluid signal renal lesions are incompletely imaged,   nonspecific, and likely represent cysts. The T12-L1 disc appears within normal limits in morphology and signal characteristics without posterior disc herniation, spinal stenosis, nor neural foraminal narrowing. At the L1-L2 level there is some minimal disc desiccation, yet no focal posterior disc herniation, spinal stenosis, neural foraminal narrowing. At the L2-L3 level there are changes of degenerative disc disease with disc desiccation and loss of disc space height asymmetric on the left side.  There are left-sided degenerative endplate changes.  There is posterior broad-based disc bulging without focal posterior disc herniation.  There are   some mild degenerative changes of the left-sided facet joints with asymmetric redundancy of ligamentum flavum on the left side resulting in mild left-sided lateral recess stenosis, however there does not appear to be compression upon the left L3 nerve as it courses through.  No high-grade spinal   stenosis.  No high-grade neural foraminal stenosis. At the L3-L4 level there is disc desiccation and posterior broad-based disc bulging.  There is mild bilateral lateral recess stenosis without spinal stenosis.  Moderate degenerative changes of the facet joints are present.  No neural foraminal stenosis.      At the L4-L5 level there is minimal posterior broad-based disc bulging.  There are advanced degenerative changes of the facet joints with prominent osteophyte formation.  Large posterior right-sided synovial cyst is present and there is edema of the adjacent facet joint and posterior margin of the   pedicle of L5 on the right side.  Mild bilateral lateral recess stenosis is present, right greater than left and osteophytes and some rostrocaudal subluxation of the facets results in moderate right-sided neural foraminal stenosis.  No high-grade left-sided neural foraminal stenosis. At the L5-S1 level there are advanced degenerative changes of the facet joints particularly on the right side.  There is marrow edema on both sides of the facet joints as well as the posterior margin of the right-sided pedicle.  There is mild right-sided neural foraminal stenosis.  Left neural   foramen is patent.  There is no focal posterior disc herniation nor spinal stenosis. IMPRESSION:     1.  Mild left-sided lateral recess stenosis at L2-L3. 2.  Mild bilateral lateral recess stenosis at L3-L4. 3.  Advanced degenerative facet arthropathy particularly on the right side at L4-L5 with adjacent marrow edema and large posterior synovial cyst with moderate right-sided neural foraminal stenosis. 4.  Mild right-sided neural foraminal stenosis at L5-S1. Workstation:[id number]     Finalized by Sameera Dickerson MD on 6/3/2019 10         Cervical spine, four views:       Three view study plus swimmer's lateral view are submitted.  Cervical   vertebral alignment is maintained.  No acute osseous findings.  Straightened   cervical lordotic curvature.  Moderate degenerative disc disease at C2-3 and   C4-5 through C6-7.  Multilevel facet arthropathy is also suggested. Prevertebral soft tissues and open-mouth odontoid view are unremarkable.       Thoracic spine, three views:       Bone density is maintained with no acute fracture.  Mild osteoarthritic   spurring in the mid and lower thoracic spine.  There is a mild S-shaped   scoliotic curvature.  Paravertebral soft tissues are unremarkable.           Impression   No acute osseous abnormality of the cervical spine.  Multilevel degenerative   disc disease and facet arthropathy.       No acute osseous abnormality of cervical spine.  Mild osteoarthritic changes.          Patient Active Problem List   Diagnosis    TIA (transient ischemic attack)    Asthma    Pulmonary emphysema (HCC)    Arthritis    Headache    Osteoporosis    Anxiety    COPD (chronic obstructive pulmonary disease) (HCC)    Stress at home    Lumbar disc disease    Seborrheic keratoses    Neoplasm of uncertain behavior of skin    Melanocytic nevus of trunk    Spider veins of both lower extremities    Lentigines    Chronic depressive disorder    Thumb tendonitis    Carpal tunnel syndrome of right wrist    Dupuytren's contracture of both hands    Arthritis of lumbar spine    Lumbar radiculopathy, right    Abnormal MRI, lumbar spine    History of transient ischemic attack (TIA)    Unspecified inflammatory spondylopathy, lumbar region ([address]    Dyspareunia in female    Mood disorder [facility])        ASSESSMENT    April Jerrell Diaz is a 64 y.o. female with     1. Lumbar radiculopathy    2. Lumbar degenerative disc disease    3. Lumbar spondylosis    4. Arthropathy of lumbar facet joint           PLAN  Patient's   [] x-ray    [] CT scan    [x] MRI  Were/was  Reviewed. These findings are consistent with the patient's symptoms and physical examination. [x] Patient's findings on the x-ray were explained to the patient using a bone modal.    Other reports reviewed include    [] Bone scan   [] EMG and nerve conduction studies   [x] Referral reports-  I also discussed with him the following treatment options Including advantages and disadvantages of each:    [x] Physical therapy    [x] Interventional pain treatment    [] Medication management    [] Surgical options    Patient's OARRS were reviewed. It is acceptable and appears patient is not receiving prescriptions from multiple prescribers. Patient is  forthcoming regarding prescriptions for pain medication in the past  Controlled Substances Monitoring: Periodic Controlled Substance Monitoring: No signs of potential drug abuse or diversion identified. , Assessed functional status. (Nic Callaway Michael Ag MD)    The following screens were also reviewed  SOAPP- the score is 5. (Values:cates patient is  <4minimal potential  4-7 Moderate potential  >7 High potential  for drug addiction  Counselling/Preventive measures for pain  Control:    [x]  Spine strengthening exercises are discussed with patient in detail. Patient is counseled on importance of exercise and,core strengthening. Some  specific exercises to strengthen the abdominal muscles and low back muscles Were discussed. Also aquatic (water) physical therapy and benefits were explained to patient. including \" Water supports the body and minimizes the effect of gravity, making it easier for patients to start an exercise program.\"   The following important principles were discussed with patient:  1. Limit Bed Rest--Studies show that people with short-term low-back pain who rest feel more pain and have a harder time with daily tasks than those who stay active. 2. Keep Exercising-patient is advised to stay away from strenuous activities like gardening and avoid whatever motion caused the pain in the first place.   3. Maintain Good Posture-Exercises  to maintain good posture were shown to patient. 4. To do exercises learned in PT regularly. [x]Information (handout) on exercise was  given to patient. [x]  Patient is counseled about  ill effects of smoking for 10 minutes -Patient is strongly urged to quit smoking   Following health benefits were discussed:  People who stop smoking greatly reduce their risk for disease and premature death. Lowered risk for lung cancer and many other types of cancer. Reduced risk for coronary heart disease, stroke, and peripheral vascular disease. Reduced coronary heart disease risk within 1 to 2 years of quitting. Reduced respiratory symptoms, such as coughing, wheezing, and shortness of breath.  The rate of decline in lung function is slower among people who quit smoking than among those who continue to smoke.  Reduced risk of developing chronic obstructive pulmonary disease (COPD), one of the leading causes of death in the [place]. Reduced risk for infertility in women of reproductive age. Women who stop smoking during pregnancy also reduce their risk of having a low birth weight baby. Reduced risk of bone and joint damage. Methods to Quit Smoking  The majority of cigarette smokers quit without using evidence-based   Counseling and medication are both effective for treating tobacco dependence, and using them together is more effective than using either one alone. Patient is advised to follow up with his physician for further management. The following treatment plan was developed after discussion with patient:    We discussed Lumbar Epidural steroid Injections x 1  at L4 - L5. Patient tried and failed NSAIDS,Home exercises, Physical Therapy, Chiropractic manipulations without relief. Patient exhibited signs of radiculopathy with positive straight leg raising test on right    Patient has not had prior Lumbar Surgery. We will see the patient in 2 weeks after the procedures and re-evaluate symptoms. Orders Placed This Encounter   Procedures    Lumbar Epidural Steroid Injection/Caudal     Standing Status:   Future     Standing Expiration Date:   9/1/2022    FLUORO FOR SURGICAL PROCEDURES     Standing Status:   Future     Standing Expiration Date:   9/1/2022    DRUG SCREEN, PAIN     Standing Status:   Standing     Number of Occurrences:   1    Saline lock IV     Standing Status:   Future     Standing Expiration Date:   3/1/2023       Decision Making Process : Patient's health history and referral records thoroughly reviewed before focused physical examination and discussion with patient. Over 50% of today's visit is spent on examining the patient and counseling. Level of complexity of date to be reviewed is Moderate. The chart date reviewed include the following: Imaging Reports. Summary of Care. Time spent reviewing with patient the below reports:   Medication safety, Treatment options. Level of diagnosis and management options of this case is multiple: involving the following management options: Interventions as needed, medication management as appropriate, future visits, activity modification, heat/ice as needed, Urine drug screen as required. [x]The patient's questions were answered to the best of my abilities. Documentation:  I communicated with the patient and/or health care decision maker about plan of care  Details of this discussion including any medical advice provided: Total Time: 45-55 minutes    This note was created using voice recognition software. There may be inaccuracies of transcription  that are inadvertently overlooked prior to the signature. There is any questions about the transcription please contact me.     Electronically signed by Leeanna Colon MD on 9/1/2021 at 9:02 PM

## 2021-09-01 ENCOUNTER — HOSPITAL ENCOUNTER (OUTPATIENT)
Dept: PAIN MANAGEMENT | Age: 61
Discharge: HOME | End: 2021-09-01
Payer: MEDICARE

## 2021-09-01 VITALS
SYSTOLIC BLOOD PRESSURE: 118 MMHG | HEART RATE: 80 BPM | DIASTOLIC BLOOD PRESSURE: 76 MMHG | HEIGHT: 64 IN | WEIGHT: 161 LBS | RESPIRATION RATE: 20 BRPM | BODY MASS INDEX: 27.49 KG/M2 | TEMPERATURE: 97.8 F

## 2021-09-01 DIAGNOSIS — M51.36 LUMBAR DEGENERATIVE DISC DISEASE: ICD-10-CM

## 2021-09-01 DIAGNOSIS — M54.16 LUMBAR RADICULOPATHY: Primary | ICD-10-CM

## 2021-09-01 DIAGNOSIS — M47.816 ARTHROPATHY OF LUMBAR FACET JOINT: ICD-10-CM

## 2021-09-01 DIAGNOSIS — M47.816 LUMBAR SPONDYLOSIS: ICD-10-CM

## 2021-09-01 PROCEDURE — 99244 OFF/OP CNSLTJ NEW/EST MOD 40: CPT | Performed by: PAIN MEDICINE

## 2021-09-01 PROCEDURE — 80307 DRUG TEST PRSMV CHEM ANLYZR: CPT

## 2021-09-01 PROCEDURE — 99204 OFFICE O/P NEW MOD 45 MIN: CPT

## 2021-09-02 ENCOUNTER — TELEPHONE (OUTPATIENT)
Dept: PAIN MANAGEMENT | Age: 61
End: 2021-09-02

## 2021-09-06 LAB
6-ACETYLMORPHINE, UR: NOT DETECTED
7-AMINOCLONAZEPAM, URINE: NOT DETECTED
ALPHA-OH-ALPRAZ, URINE: NOT DETECTED
ALPHA-OH-MIDAZOLAM, URINE: NOT DETECTED
ALPRAZOLAM, URINE: NOT DETECTED
AMPHETAMINES, URINE: NOT DETECTED
BARBITURATES, URINE: NOT DETECTED
BENZOYLECGONINE, UR: NOT DETECTED
BUPRENORPHINE URINE: NOT DETECTED
CARISOPRODOL, UR: NOT DETECTED
CLONAZEPAM, URINE: NOT DETECTED
CODEINE, URINE: NOT DETECTED
CREATININE URINE: 40.6 MG/DL (ref 20–400)
DIAZEPAM, URINE: NOT DETECTED
DRUGS EXPECTED, UR: NORMAL
EER HI RES INTERP UR: NORMAL
ETHYL GLUCURONIDE UR: NOT DETECTED
FENTANYL URINE: NOT DETECTED
GABAPENTIN: NOT DETECTED
HYDROCODONE, URINE: NOT DETECTED
HYDROMORPHONE, URINE: NOT DETECTED
LORAZEPAM, URINE: NOT DETECTED
MARIJUANA METAB, UR: NOT DETECTED
MDA, UR: NOT DETECTED
MDEA, EVE, UR: NOT DETECTED
MDMA URINE: NOT DETECTED
MEPERIDINE METAB, UR: NOT DETECTED
METHADONE, URINE: NOT DETECTED
METHAMPHETAMINE, URINE: NOT DETECTED
METHYLPHENIDATE: NOT DETECTED
MIDAZOLAM, URINE: NOT DETECTED
MORPHINE URINE: NOT DETECTED
NALOXONE URINE: NOT DETECTED
NORBUPRENORPHINE, URINE: NOT DETECTED
NORDIAZEPAM, URINE: NOT DETECTED
NORFENTANYL, URINE: NOT DETECTED
NORHYDROCODONE, URINE: PRESENT
NOROXYCODONE, URINE: NOT DETECTED
NOROXYMORPHONE, URINE: NOT DETECTED
OXAZEPAM, URINE: NOT DETECTED
OXYCODONE URINE: NOT DETECTED
OXYMORPHONE, URINE: NOT DETECTED
PAIN MANAGEMENT DRUG PANEL INTERP, URINE: NORMAL
PAIN MGT DRUG PANEL, HI RES, UR: NORMAL
PCP,URINE: NOT DETECTED
PHENTERMINE, UR: NOT DETECTED
PREGABALIN: NOT DETECTED
TAPENTADOL, URINE: NOT DETECTED
TAPENTADOL-O-SULFATE, URINE: NOT DETECTED
TEMAZEPAM, URINE: NOT DETECTED
TRAMADOL, URINE: NOT DETECTED
ZOLPIDEM METABOLITE (ZCA), URINE: NOT DETECTED
ZOLPIDEM, URINE: NOT DETECTED

## 2021-09-20 ENCOUNTER — HOSPITAL ENCOUNTER (OUTPATIENT)
Dept: PAIN MANAGEMENT | Age: 61
Discharge: HOME | End: 2021-09-20
Payer: MEDICARE

## 2021-09-20 ENCOUNTER — HOSPITAL ENCOUNTER (OUTPATIENT)
Age: 61
End: 2021-09-20
Payer: MEDICARE

## 2021-09-20 ENCOUNTER — HOSPITAL ENCOUNTER (OUTPATIENT)
Dept: GENERAL RADIOLOGY | Age: 61
Discharge: HOME | End: 2021-09-22
Payer: MEDICARE

## 2021-09-20 VITALS
HEIGHT: 64 IN | SYSTOLIC BLOOD PRESSURE: 145 MMHG | HEART RATE: 78 BPM | BODY MASS INDEX: 26.98 KG/M2 | OXYGEN SATURATION: 98 % | TEMPERATURE: 97.8 F | WEIGHT: 158 LBS | DIASTOLIC BLOOD PRESSURE: 82 MMHG | RESPIRATION RATE: 12 BRPM

## 2021-09-20 DIAGNOSIS — M54.16 LUMBAR RADICULOPATHY: ICD-10-CM

## 2021-09-20 DIAGNOSIS — Z51.81 ENCOUNTER FOR THERAPEUTIC DRUG MONITORING: ICD-10-CM

## 2021-09-20 DIAGNOSIS — M47.816 ARTHROPATHY OF LUMBAR FACET JOINT: ICD-10-CM

## 2021-09-20 DIAGNOSIS — M47.816 LUMBAR SPONDYLOSIS: ICD-10-CM

## 2021-09-20 DIAGNOSIS — M54.16 LUMBAR RADICULOPATHY: Primary | ICD-10-CM

## 2021-09-20 DIAGNOSIS — M51.36 LUMBAR DEGENERATIVE DISC DISEASE: ICD-10-CM

## 2021-09-20 LAB
INR BLD: 1
PROTHROMBIN TIME: 13.1 SEC (ref 11.8–14.6)

## 2021-09-20 PROCEDURE — 36415 COLL VENOUS BLD VENIPUNCTURE: CPT

## 2021-09-20 PROCEDURE — 85610 PROTHROMBIN TIME: CPT

## 2021-09-20 PROCEDURE — 6360000004 HC RX CONTRAST MEDICATION: Performed by: PAIN MEDICINE

## 2021-09-20 PROCEDURE — 6360000002 HC RX W HCPCS: Performed by: PAIN MEDICINE

## 2021-09-20 PROCEDURE — 62323 NJX INTERLAMINAR LMBR/SAC: CPT | Performed by: PAIN MEDICINE

## 2021-09-20 PROCEDURE — 62323 NJX INTERLAMINAR LMBR/SAC: CPT

## 2021-09-20 PROCEDURE — 3209999900 FLUORO FOR SURGICAL PROCEDURES

## 2021-09-20 RX ORDER — ONDANSETRON 4 MG/1
TABLET, FILM COATED ORAL
Qty: 30 TABLET | Refills: 2 | Status: SHIPPED | OUTPATIENT
Start: 2021-09-20 | End: 2022-03-14 | Stop reason: SDUPTHER

## 2021-09-20 RX ORDER — TRIAMCINOLONE ACETONIDE 40 MG/ML
INJECTION, SUSPENSION INTRA-ARTICULAR; INTRAMUSCULAR
Status: COMPLETED | OUTPATIENT
Start: 2021-09-20 | End: 2021-09-20

## 2021-09-20 RX ADMIN — IOHEXOL 2 ML: 180 INJECTION INTRAVENOUS at 09:25

## 2021-09-20 RX ADMIN — TRIAMCINOLONE ACETONIDE 80 MG: 40 INJECTION, SUSPENSION INTRA-ARTICULAR; INTRAMUSCULAR at 09:25

## 2021-09-20 ASSESSMENT — PAIN DESCRIPTION - LOCATION: LOCATION: BACK;LEG

## 2021-09-20 ASSESSMENT — PAIN DESCRIPTION - DESCRIPTORS: DESCRIPTORS: ACHING;CONSTANT;DULL;JABBING;NAGGING;SHARP

## 2021-09-20 ASSESSMENT — PAIN DESCRIPTION - FREQUENCY: FREQUENCY: CONTINUOUS

## 2021-09-20 ASSESSMENT — PAIN DESCRIPTION - PAIN TYPE: TYPE: CHRONIC PAIN

## 2021-09-20 ASSESSMENT — PAIN - FUNCTIONAL ASSESSMENT
PAIN_FUNCTIONAL_ASSESSMENT: PREVENTS OR INTERFERES SOME ACTIVE ACTIVITIES AND ADLS
PAIN_FUNCTIONAL_ASSESSMENT: 0-10

## 2021-09-20 ASSESSMENT — PAIN DESCRIPTION - ORIENTATION: ORIENTATION: RIGHT;LEFT

## 2021-09-20 ASSESSMENT — PAIN DESCRIPTION - DIRECTION: RADIATING_TOWARDS: RIGHT LEG

## 2021-09-20 ASSESSMENT — PAIN SCALES - GENERAL: PAINLEVEL_OUTOF10: 5

## 2021-09-20 ASSESSMENT — PAIN DESCRIPTION - PROGRESSION: CLINICAL_PROGRESSION: GRADUALLY WORSENING

## 2021-09-20 ASSESSMENT — PAIN DESCRIPTION - ONSET: ONSET: ON-GOING

## 2021-09-20 NOTE — PROGRESS NOTES
Discharge criteria met. Patient alert and oriented x3  Post procedure dressing dry and intact. Sensory and motor function intact as per pre-procedure. Instructions and follow up reviewed with pt at patient at discharge.   Patient discharged wheelchair @ 1103

## 2021-09-20 NOTE — PROCEDURES
Pre-Procedure Note    Patient Name: Constantin Terry   YOB: 1960  Room/Bed: Room/bed info not found  Medical Record Number: [id number]  Date: 2021       Indication:    1. Lumbar radiculopathy    2. Lumbar degenerative disc disease    3. Lumbar spondylosis    4. Arthropathy of lumbar facet joint    5. Encounter for therapeutic drug monitoring        Consent: On file. Vital Signs:   Vitals:    21 0926   BP: (!) 138/111   Pulse: 88   Resp: 16   Temp:    SpO2: 96%       Past Medical History:   has a past medical history of Acid reflux, Arthritis, Asthma, Emphysema, Headache(784.0), Osteoarthritis, Osteoporosis, and TIA (transient ischemic attack). Past Surgical History:   has a past surgical history that includes  section, low transverse; Dilation and curettage of uterus; Tonsillectomy and adenoidectomy; Tubal ligation; Throat surgery; other surgical history; Acromioplasty (Left, 2016); and shoulder surgery. Pre-Sedation Documentation and Exam:   Vital signs have been reviewed (see flow sheet for vitals). Mallampati Airway Assessment:  normal    ASA Classification:  Class 3 - A patient with severe systemic disease that limits activity but is not incapacitating    Sedation/ Anesthesia Plan:   intravenous sedation   as needed. Medications Planned:   midazolam (Versed) / Fentanyl  Intravenously  as needed. Patient is an appropriate candidate for plan of sedation: yes  Patient's History and Physical examination was reviewed and there is no change. Electronically signed by Jory Domínguez MD on 2021 at 9:37 AM        Preoperative Diagnosis:    1. Lumbar radiculopathy    2. Lumbar degenerative disc disease    3. Lumbar spondylosis    4. Arthropathy of lumbar facet joint    5. Encounter for therapeutic drug monitoring        Postoperative Diagnosis:    1. Lumbar radiculopathy    2. Lumbar degenerative disc disease    3. Lumbar spondylosis    4.  Arthropathy of lumbar facet joint    5. Encounter for therapeutic drug monitoring        Procedure Performed:  Lumbar epidural steroid injection under fluoroscopy guidance without IV sedation. Indication for the Procedure: The patient failed conservative management  for pain in the low back radiating to lower extremities. As the patient is not responding to conservative management and it is interfering with activities of daily living we decided to proceed with lumbar epidural steroid injection. The procedure and risks were discussed with the patient and an informed consent was obtained  Current Pain Assessment  Pain Assessment  Pain Assessment: 0-10  Pain Level: 5  Patient's Stated Pain Goal: 2  Pain Type: Chronic pain  Pain Location: Back, Leg  Pain Orientation: Right, Left  Pain Radiating Towards: right leg  Pain Descriptors: Aching, Constant, Dull, Jabbing, Nagging, Sharp  Pain Frequency: Continuous  Pain Onset: On-going  Clinical Progression: Gradually worsening  Functional Pain Assessment: Prevents or interferes some active activities and ADLs     Procedure:    . Patient's vital signs including BP, EKG and SaO2 were monitored by the RN and they remained stable during the procedure. A meaningful communication was kept up with the patient throughout  the procedure. The patient is placed in prone position. Skin over the back was prepped and draped in sterile manner. Then using fluoroscopy the L4, L5 interspace was observed and the skin and deep tissues in the right paramedian area were infiltrated with 4 ml of 1% lidocaine. The #20-gauge, 3-1/2 inch Tuohy needle was inserted through the skin wheal and the epidural space was identified using loss of resistance technique with normal saline. This was confirmed with AP and lateral views using fluoroscopy after injecting about 2 ml of Omnipaque-180 and observing the spread of the contrast in the epidural space.    Then after negative aspiration a total of 80 mg of triamcinolone with 8 ml of normal saline was injected into the epidural space. The needle is removed and a Band-Aid was placed over the needle insertion site. Patient's vital signs remained stable and the patient tolerated the procedure well. The patient was discharged home in stable condition and will be followed in the pain clinic in the next few weeks for further planning.     Electronically signed by Bettye Colorado MD on 9/20/2021 at 9:37 AM

## 2021-09-21 ENCOUNTER — TELEPHONE (OUTPATIENT)
Dept: PAIN MANAGEMENT | Age: 61
End: 2021-09-21

## 2021-09-21 NOTE — TELEPHONE ENCOUNTER
Spoke to pt feeling good yesterday so she carried some bags and now her back hurts again.   Now understands why to take it easy,

## 2021-10-04 ENCOUNTER — HOSPITAL ENCOUNTER (OUTPATIENT)
Dept: PAIN MANAGEMENT | Age: 61
Discharge: HOME | End: 2021-10-04
Payer: MEDICARE

## 2021-10-04 DIAGNOSIS — M47.816 ARTHRITIS OF LUMBAR SPINE: ICD-10-CM

## 2021-10-04 DIAGNOSIS — M51.9 LUMBAR DISC DISEASE: ICD-10-CM

## 2021-10-04 DIAGNOSIS — M54.16 LUMBAR RADICULOPATHY: Primary | ICD-10-CM

## 2021-10-04 PROCEDURE — 99212 OFFICE O/P EST SF 10 MIN: CPT | Performed by: NURSE PRACTITIONER

## 2021-10-04 PROCEDURE — 99213 OFFICE O/P EST LOW 20 MIN: CPT

## 2021-10-04 ASSESSMENT — ENCOUNTER SYMPTOMS
CONSTIPATION: 0
SHORTNESS OF BREATH: 0
COUGH: 0
BACK PAIN: 0

## 2021-10-04 NOTE — PROGRESS NOTES
Patient completed a telephone visit today for follow up after procedure    Chief Complaint: back pain    [facility]  Patient complains of back pain. MRI lumbar with advanced degenerative arthropathy at L4-5. She had lumbar epidural steroid injection on 21 and reports 80% relief she has only had to take two pain pills this last week. Back Pain  This is a chronic problem. The current episode started more than 1 year ago. The problem occurs intermittently. The pain is present in the lumbar spine. The pain is at a severity of 0/10. The patient is experiencing no pain. The symptoms are aggravated by position and bending. Pertinent negatives include no chest pain or fever. Treatments tried: LESI. The treatment provided significant relief. Patient denies any new neurological symptoms. No bowel or bladder incontinence, no weakness, and no falling.       Past Medical History:   Diagnosis Date    Acid reflux     Arthritis     neck, back, knees    Asthma     Emphysema     Headache(784.0)     Osteoarthritis     Osteoporosis     TIA (transient ischemic attack)        Past Surgical History:   Procedure Laterality Date    ACROMIOPLASTY Left 2016    SHOULDER ARTHROCOPY LABRAL DEBRIDEMENT      SECTION, LOW TRANSVERSE      DILATION AND CURETTAGE OF UTERUS      OTHER SURGICAL HISTORY      uterine lining removed by laser    SHOULDER SURGERY      THROAT SURGERY      camera    TONSILLECTOMY AND ADENOIDECTOMY      TUBAL LIGATION         Allergies   Allergen Reactions    Eggs Or Egg-Derived Products Other (See Comments)     Severe abdominal pains    Bupropion Nausea Only    Ceftin [Cefuroxime Axetil] Nausea Only     Nausea and upset stomach         Current Outpatient Medications:     ondansetron (ZOFRAN) 4 MG tablet, TAKE ONE TABLET BY MOUTH EVERY 8 HOURS AS NEEDED FOR NAUSEA OR VOMITING, Disp: 30 tablet, Rfl: 2    DULoxetine (CYMBALTA) 60 MG extended release capsule, TAKE ONE CAPSULE BY MOUTH DAILY, Disp: 30 capsule, Rfl: 2    omeprazole (PRILOSEC) 20 MG delayed release capsule, TAKE ONE CAPSULE BY MOUTH DAILY, Disp: 30 capsule, Rfl: 3    HYDROcodone-acetaminophen (NORCO) 7.5-325 MG per tablet, Take 1 tablet by mouth every 6 hours as needed for Pain., Disp: , Rfl:     divalproex (DEPAKOTE) 250 MG DR tablet, TAKE ONE TABLET BY MOUTH TWICE A DAY, Disp: 60 tablet, Rfl: 3    busPIRone (BUSPAR) 15 MG tablet, TAKE ONE TABLET BY MOUTH TWICE A DAY, Disp: 60 tablet, Rfl: 3    warfarin (COUMADIN) 4 MG tablet, Take 8 mg M, W, Fri a and 6 mg the other days, Disp: 45 tablet, Rfl: 5    fluticasone-salmeterol (ADVAIR DISKUS) 250-50 MCG/DOSE AEPB, Inhale 1 puff into the lungs every 12 hours, Disp: 60 each, Rfl: 5    albuterol-ipratropium (COMBIVENT RESPIMAT)  MCG/ACT AERS inhaler, Inhale 1 puff into the lungs every 6 hours Increased to every 4 hrs if needed. , Disp: 3 Inhaler, Rfl: 3    Family History   Problem Relation Age of Onset    Heart Disease Father     Asthma Father     Cancer Paternal Grandfather         throat    Asthma Son        Social History     Socioeconomic History    Marital status:      Spouse name: Not on file    Number of children: Not on file    Years of education: Not on file    Highest education level: Not on file   Occupational History    Not on file   Tobacco Use    Smoking status: Current Every Day Smoker     Packs/day: 0.50     Years: 46.00     Pack years: 23.00     Types: Cigarettes     Start date: 1974    Smokeless tobacco: Never Used   Vaping Use    Vaping Use: Every day   Substance and Sexual Activity    Alcohol use:  Yes     Alcohol/week: 0.0 standard drinks     Comment: OCCASIONAL    Drug use: No    Sexual activity: Not on file   Other Topics Concern    Not on file   Social History Narrative    Not on file     Social Determinants of Health     Financial Resource Strain: Low Risk     Difficulty of Paying Living Expenses: Not very hard   Food Insecurity: No Food Insecurity    Worried About Running Out of Food in the Last Year: Never true    Ran Out of Food in the Last Year: Never true   Transportation Needs: No Transportation Needs    Lack of Transportation (Medical): No    Lack of Transportation (Non-Medical): No   Physical Activity:     Days of Exercise per Week:     Minutes of Exercise per Session:    Stress:     Feeling of Stress :    Social Connections:     Frequency of Communication with Friends and Family:     Frequency of Social Gatherings with Friends and Family:     Attends Mandaeism Services:     Active Member of Clubs or Organizations:     Attends Club or Organization Meetings:     Marital Status:    Intimate Partner Violence:     Fear of Current or Ex-Partner:     Emotionally Abused:     Physically Abused:     Sexually Abused:        Review of Systems:  Review of Systems   Constitutional: Negative for chills and fever. Cardiovascular: Negative for chest pain and palpitations. Respiratory: Negative for cough and shortness of breath. Musculoskeletal: Negative for back pain. Gastrointestinal: Negative for constipation. Neurological: Negative for disturbances in coordination and loss of balance. Physical Exam:  There were no vitals taken for this visit. Physical Exam  Neurological:      Mental Status: She is alert. Psychiatric:         Mood and Affect: Mood normal.         Record/Diagnostics Review:    As reviewed in [address]    Assessment:  Problem List Items Addressed This Visit     Lumbar disc disease    Arthritis of lumbar spine    Lumbar radiculopathy - Primary             Treatment Plan:  Significant relief following LESI  Follow up as needed    Stephanie Connelly was evaluated through a synchronous (real-time) audio-video encounter. The patient (or guardian if applicable) is aware that this is a billable service. Verbal consent to proceed has been obtained within the past 12 months.  The visit was conducted pursuant to the emergency declaration under the [facility], [facility] authority and the [employer] General Act. Patient identification was verified, and a caregiver was present when appropriate. The patient was located in a state where the provider was credentialed to provide care. Total time spent for this encounter: 15 minutes    --NOHEMY Mason CNP on 10/4/2021 at 3:20 PM    An electronic signature was used to authenticate this note.

## 2021-10-25 DIAGNOSIS — M51.9 LUMBAR DISC DISEASE: Primary | ICD-10-CM

## 2021-10-25 DIAGNOSIS — F34.1 DYSTHYMIA: ICD-10-CM

## 2021-10-25 DIAGNOSIS — M48.061 SPINAL STENOSIS OF LUMBAR REGION WITHOUT NEUROGENIC CLAUDICATION: ICD-10-CM

## 2021-10-25 RX ORDER — HYDROCODONE BITARTRATE AND ACETAMINOPHEN 7.5; 325 MG/1; MG/1
1 TABLET ORAL EVERY 6 HOURS PRN
Qty: 120 TABLET | Refills: 0 | Status: SHIPPED | OUTPATIENT
Start: 2021-10-25 | End: 2022-01-28 | Stop reason: SDUPTHER

## 2021-11-02 ENCOUNTER — OFFICE VISIT (OUTPATIENT)
Dept: PRIMARY CARE CLINIC | Age: 61
End: 2021-11-02
Payer: MEDICARE

## 2021-11-02 VITALS
HEART RATE: 89 BPM | BODY MASS INDEX: 27.79 KG/M2 | SYSTOLIC BLOOD PRESSURE: 132 MMHG | WEIGHT: 162.8 LBS | DIASTOLIC BLOOD PRESSURE: 82 MMHG | HEIGHT: 64 IN | OXYGEN SATURATION: 97 %

## 2021-11-02 DIAGNOSIS — M51.9 LUMBAR DISC DISEASE: ICD-10-CM

## 2021-11-02 DIAGNOSIS — J44.9 CHRONIC OBSTRUCTIVE PULMONARY DISEASE, UNSPECIFIED COPD TYPE (HCC): Primary | ICD-10-CM

## 2021-11-02 DIAGNOSIS — Z86.73 HISTORY OF TRANSIENT ISCHEMIC ATTACK (TIA): ICD-10-CM

## 2021-11-02 DIAGNOSIS — G45.9 TIA (TRANSIENT ISCHEMIC ATTACK): ICD-10-CM

## 2021-11-02 DIAGNOSIS — Z23 NEED FOR VACCINATION: ICD-10-CM

## 2021-11-02 LAB
INTERNATIONAL NORMALIZATION RATIO, POC: 1.5
PROTHROMBIN TIME, POC: ABNORMAL

## 2021-11-02 PROCEDURE — 90674 CCIIV4 VAC NO PRSV 0.5 ML IM: CPT | Performed by: FAMILY MEDICINE

## 2021-11-02 PROCEDURE — 3017F COLORECTAL CA SCREEN DOC REV: CPT | Performed by: FAMILY MEDICINE

## 2021-11-02 PROCEDURE — G8482 FLU IMMUNIZE ORDER/ADMIN: HCPCS | Performed by: FAMILY MEDICINE

## 2021-11-02 PROCEDURE — 3023F SPIROM DOC REV: CPT | Performed by: FAMILY MEDICINE

## 2021-11-02 PROCEDURE — G8926 SPIRO NO PERF OR DOC: HCPCS | Performed by: FAMILY MEDICINE

## 2021-11-02 PROCEDURE — 4004F PT TOBACCO SCREEN RCVD TLK: CPT | Performed by: FAMILY MEDICINE

## 2021-11-02 PROCEDURE — G8417 CALC BMI ABV UP PARAM F/U: HCPCS | Performed by: FAMILY MEDICINE

## 2021-11-02 PROCEDURE — 90471 IMMUNIZATION ADMIN: CPT | Performed by: FAMILY MEDICINE

## 2021-11-02 PROCEDURE — G8427 DOCREV CUR MEDS BY ELIG CLIN: HCPCS | Performed by: FAMILY MEDICINE

## 2021-11-02 PROCEDURE — 99214 OFFICE O/P EST MOD 30 MIN: CPT | Performed by: FAMILY MEDICINE

## 2021-11-02 PROCEDURE — 85610 PROTHROMBIN TIME: CPT | Performed by: FAMILY MEDICINE

## 2021-11-02 RX ORDER — WARFARIN SODIUM 4 MG/1
TABLET ORAL
Qty: 50 TABLET | Refills: 5 | Status: SHIPPED | OUTPATIENT
Start: 2021-11-02 | End: 2022-01-03

## 2021-11-02 ASSESSMENT — ENCOUNTER SYMPTOMS: RESPIRATORY NEGATIVE: 1

## 2021-11-02 NOTE — PROGRESS NOTES
[facility] PR[facility]  [address] [place] [id number]  Dept: [phone]    April Karina Sin is a 64 y.o. female Established patient, who presents today for her medical conditions/complaintsas noted below. Chief Complaint   Patient presents with    Flu Vaccine    Other     INR    3 Month Follow-Up       HPI:     HPI    Reviewed prior notes None  Reviewed previous Labs    LDL Calculated (mg/dL)   Date Value   10/21/2019 120   2018 131       (goal LDL is <100)   BUN (mg/dL)   Date Value   10/29/2020 11     TSH (uIU/mL)   Date Value   2021 2.75     BP Readings from Last 3 Encounters:   21 132/82   21 (!) 145/82   21 118/76          (goal 120/80)    Past Medical History:   Diagnosis Date    Acid reflux     Arthritis     neck, back, knees    Asthma     Emphysema     Headache(784.0)     Osteoarthritis     Osteoporosis     TIA (transient ischemic attack)       Past Surgical History:   Procedure Laterality Date    ACROMIOPLASTY Left 2016    SHOULDER ARTHROCOPY LABRAL DEBRIDEMENT      SECTION, LOW TRANSVERSE      DILATION AND CURETTAGE OF UTERUS      OTHER SURGICAL HISTORY      uterine lining removed by laser    SHOULDER SURGERY      THROAT SURGERY      camera    TONSILLECTOMY AND ADENOIDECTOMY      TUBAL LIGATION         Family History   Problem Relation Age of Onset    Heart Disease Father     Asthma Father     Cancer Paternal Grandfather         throat    Asthma Son        Social History     Tobacco Use    Smoking status: Current Every Day Smoker     Packs/day: 0.50     Years: 46.00     Pack years: 23.00     Types: Cigarettes     Start date:     Smokeless tobacco: Never Used   Substance Use Topics    Alcohol use:  Yes     Alcohol/week: 0.0 standard drinks     Comment: OCCASIONAL      Current Outpatient Medications   Medication Sig Dispense Refill    warfarin (COUMADIN) 4 MG tablet Take 8 mg M,Tues W, Thursday,  a and 6 mg Fri Sat Sunda 50 tablet 5    fluticasone-salmeterol (ADVAIR) 250-50 MCG/DOSE AEPB INHALE ONE PUFF BY MOUTH EVERY 12 HOURS 60 each 5    HYDROcodone-acetaminophen (NORCO) 7.5-325 MG per tablet Take 1 tablet by mouth every 6 hours as needed for Pain for up to 30 days. 120 tablet 0    ondansetron (ZOFRAN) 4 MG tablet TAKE ONE TABLET BY MOUTH EVERY 8 HOURS AS NEEDED FOR NAUSEA OR VOMITING 30 tablet 2    DULoxetine (CYMBALTA) 60 MG extended release capsule TAKE ONE CAPSULE BY MOUTH DAILY 30 capsule 2    omeprazole (PRILOSEC) 20 MG delayed release capsule TAKE ONE CAPSULE BY MOUTH DAILY 30 capsule 3    divalproex (DEPAKOTE) 250 MG DR tablet TAKE ONE TABLET BY MOUTH TWICE A DAY 60 tablet 3    busPIRone (BUSPAR) 15 MG tablet TAKE ONE TABLET BY MOUTH TWICE A DAY 60 tablet 3    albuterol-ipratropium (COMBIVENT RESPIMAT)  MCG/ACT AERS inhaler Inhale 1 puff into the lungs every 6 hours Increased to every 4 hrs if needed. 3 Inhaler 3     No current facility-administered medications for this visit.      Allergies   Allergen Reactions    Eggs Or Egg-Derived Products Other (See Comments)     Severe abdominal pains    Bupropion Nausea Only    Ceftin [Cefuroxime Axetil] Nausea Only     Nausea and upset stomach       Health Maintenance   Topic Date Due    Shingles Vaccine (1 of 2) Never done    Low dose CT lung screening  Never done    Flu vaccine (1) 09/01/2021    Breast cancer screen  12/02/2021    COVID-19 Vaccine (3 - Pfizer booster) 02/23/2022    Cervical cancer screen  11/03/2023    Lipid screen  10/21/2024    DTaP/Tdap/Td vaccine (2 - Td or Tdap) 12/11/2024    Pneumococcal 0-64 years Vaccine (2 of 2 - PPSV23) 04/01/2025    Colon cancer screen colonoscopy  12/29/2026    Hepatitis C screen  Completed    HIV screen  Completed    Hepatitis A vaccine  Aged Out    Hepatitis B vaccine  Aged Out    Hib vaccine  Aged Out    Meningococcal (ACWY) vaccine  Aged Out disc disease     5. TIA (transient ischemic attack)  warfarin (COUMADIN) 4 MG tablet        Plan:      Return in about 3 months (around 2/2/2022) for med check. Adjust Warfarin  Recheck IR 2 weeks    Orders Placed This Encounter   Procedures    INFLUENZA, MDCK QUADV, 2 YRS AND OLDER, IM, PF, PREFILL SYR OR SDV, 0.5ML (FLUCELVAX QUADV, PF)    POCT INR     Orders Placed This Encounter   Medications    warfarin (COUMADIN) 4 MG tablet     Sig: Take 8 mg M,Tues W, Thursday,  a and 6 mg Fri Sat Sunda     Dispense:  50 tablet     Refill:  5       Patient given educationalmaterials - see patient instructions. Discussed use, benefit, and side effectsof prescribed medications. All patient questions answered. Pt voiced understanding. Reviewed health maintenance. Instructed to continue current medications, diet andexercise. Patient agreed with treatment plan. Follow up as directed.      Electronicallysigned by George Sparrow MD on 11/2/2021 at 4:03 PM

## 2021-11-02 NOTE — PATIENT INSTRUCTIONS
Patient Education        A Healthy Lifestyle: Care Instructions  Your Care Instructions     A healthy lifestyle can help you feel good, stay at a healthy weight, and have plenty of energy for both work and play. A healthy lifestyle is something you can share with your whole family. A healthy lifestyle also can lower your risk for serious health problems, such as high blood pressure, heart disease, and diabetes. You can follow a few steps listed below to improve your health and the health of your family. Follow-up care is a key part of your treatment and safety. Be sure to make and go to all appointments, and call your doctor if you are having problems. It's also a good idea to know your test results and keep a list of the medicines you take. How can you care for yourself at home? · Do not eat too much sugar, fat, or fast foods. You can still have dessert and treats now and then. The goal is moderation. · Start small to improve your eating habits. Pay attention to portion sizes, drink less juice and soda pop, and eat more fruits and vegetables. ? Eat a healthy amount of food. A 3-ounce serving of meat, for example, is about the size of a deck of cards. Fill the rest of your plate with vegetables and whole grains. ? Limit the amount of soda and sports drinks you have every day. Drink more water when you are thirsty. ? Eat plenty of fruits and vegetables every day. Have an apple or some carrot sticks as an afternoon snack instead of a candy bar. Try to have fruits and/or vegetables at every meal.  · Make exercise part of your daily routine. You may want to start with simple activities, such as walking, bicycling, or slow swimming. Try to be active 30 to 60 minutes every day. You do not need to do all 30 to 60 minutes all at once. For example, you can exercise 3 times a day for 10 or 20 minutes.  Moderate exercise is safe for most people, but it is always a good idea to talk to your doctor before starting an exercise program.  · Keep moving. Cari Proctors the lawn, work in the garden, or [employer]. Take the stairs instead of the elevator at work. · If you smoke, quit. People who smoke have an increased risk for heart attack, stroke, cancer, and other lung illnesses. Quitting is hard, but there are ways to boost your chance of quitting tobacco for good. ? Use nicotine gum, patches, or lozenges. ? Ask your doctor about stop-smoking programs and medicines. ? Keep trying. In addition to reducing your risk of diseases in the future, you will notice some benefits soon after you stop using tobacco. If you have shortness of breath or asthma symptoms, they will likely get better within a few weeks after you quit. · Limit how much alcohol you drink. Moderate amounts of alcohol (up to 2 drinks a day for men, 1 drink a day for women) are okay. But drinking too much can lead to liver problems, high blood pressure, and other health problems. Family health  If you have a family, there are many things you can do together to improve your health. · Eat meals together as a family as often as possible. · Eat healthy foods. This includes fruits, vegetables, lean meats and dairy, and whole grains. · Include your family in your fitness plan. Most people think of activities such as jogging or tennis as the way to fitness, but there are many ways you and your family can be more active. Anything that makes you breathe hard and gets your heart pumping is exercise. Here are some tips:  ? Walk to do errands or to take your child to school or the bus.  ? Go for a family bike ride after dinner instead of watching TV. Where can you learn more? Go to https://[employer].healthItsOn. org and sign in to your [employer] account. Enter M160 in the [employer] box to learn more about \"A Healthy Lifestyle: Care Instructions. \"     If you do not have an account, please click on the \"Sign Up Now\" link.   Current as of: June 16, 2021               Content Version: 13.0  © 5304-3277 Healthwise, Incorporated. Care instructions adapted under license by [facility] ([facility]). If you have questions about a medical condition or this instruction, always ask your healthcare professional. [address] any warranty or liability for your use of this information.

## 2021-11-18 ENCOUNTER — NURSE ONLY (OUTPATIENT)
Dept: PRIMARY CARE CLINIC | Age: 61
End: 2021-11-18
Payer: MEDICARE

## 2021-11-18 VITALS — SYSTOLIC BLOOD PRESSURE: 132 MMHG | HEART RATE: 89 BPM | DIASTOLIC BLOOD PRESSURE: 82 MMHG | OXYGEN SATURATION: 97 %

## 2021-11-18 DIAGNOSIS — G45.9 TIA (TRANSIENT ISCHEMIC ATTACK): ICD-10-CM

## 2021-11-18 LAB — INTERNATIONAL NORMALIZATION RATIO, POC: 2.2

## 2021-11-18 PROCEDURE — 85610 PROTHROMBIN TIME: CPT | Performed by: FAMILY MEDICINE

## 2021-11-18 PROCEDURE — 93793 ANTICOAG MGMT PT WARFARIN: CPT | Performed by: FAMILY MEDICINE

## 2021-11-18 NOTE — PROGRESS NOTES
Patient here for INR. has not had signs of bleeding or excessive bruising. Current Warfarin dose:  Monday, Tuesday, Wednesday and Thursday patient takes 8 mg. Friday, Saturday and Sunday patient takes 6 mg. Previous INR results:    Lab Results   Component Value Date    INR 1.5 11/02/2021    INR 1.0 09/20/2021    INR 1.6 08/02/2021    PROTIME 13.1 09/20/2021    PROTIME 23.2 (H) 04/19/2021    PROTIME 29.9 (H) 09/09/2020       Vitals:    11/18/21 1509   BP: 132/82   Pulse: 89   SpO2: 97%       Today's INR:  2.2    Case discussed with Dr. Be Escalante. Patient advised to continue same dose  Reviewed   Repeat next Protime 1  month(s).

## 2021-11-24 DIAGNOSIS — F41.9 ANXIETY: ICD-10-CM

## 2021-11-24 DIAGNOSIS — F43.9 STRESS AT HOME: ICD-10-CM

## 2021-11-25 RX ORDER — DULOXETIN HYDROCHLORIDE 60 MG/1
CAPSULE, DELAYED RELEASE ORAL
Qty: 30 CAPSULE | Refills: 5 | Status: SHIPPED | OUTPATIENT
Start: 2021-11-25 | End: 2022-09-19

## 2021-12-21 ENCOUNTER — NURSE ONLY (OUTPATIENT)
Dept: PRIMARY CARE CLINIC | Age: 61
End: 2021-12-21
Payer: MEDICARE

## 2021-12-21 VITALS
DIASTOLIC BLOOD PRESSURE: 80 MMHG | HEART RATE: 78 BPM | BODY MASS INDEX: 28.94 KG/M2 | OXYGEN SATURATION: 97 % | WEIGHT: 166 LBS | SYSTOLIC BLOOD PRESSURE: 138 MMHG

## 2021-12-21 DIAGNOSIS — Z86.73 HISTORY OF TRANSIENT ISCHEMIC ATTACK (TIA): ICD-10-CM

## 2021-12-21 LAB — INTERNATIONAL NORMALIZATION RATIO, POC: 2

## 2021-12-21 PROCEDURE — 93793 ANTICOAG MGMT PT WARFARIN: CPT | Performed by: FAMILY MEDICINE

## 2021-12-21 PROCEDURE — 85610 PROTHROMBIN TIME: CPT | Performed by: FAMILY MEDICINE

## 2021-12-21 NOTE — PROGRESS NOTES
Patient here for INR. has not had signs of bleeding or excessive bruising. Current Warfarin dose:  8 mg on Monday, Tuesday, Wednesday and Thursday. 6 mg on Friday, Saturday and Sunday            Previous INR results:    Lab Results   Component Value Date    INR 2.2 11/18/2021    INR 1.5 11/02/2021    INR 1.0 09/20/2021    PROTIME 13.1 09/20/2021    PROTIME 23.2 (H) 04/19/2021    PROTIME 29.9 (H) 09/09/2020       Vitals:    12/21/21 1532   BP: 138/80   Pulse: 78   SpO2: 97%       Today's INR:  2.0  Reviewed    Case discussed with Dr. Jonathan Garcia. Patient advised to continue same dose    Repeat next Protime 1  month(s).

## 2021-12-30 DIAGNOSIS — Z86.73 HISTORY OF TRANSIENT ISCHEMIC ATTACK (TIA): ICD-10-CM

## 2021-12-30 DIAGNOSIS — G45.9 TIA (TRANSIENT ISCHEMIC ATTACK): ICD-10-CM

## 2022-01-03 RX ORDER — WARFARIN SODIUM 4 MG/1
TABLET ORAL
Qty: 36 TABLET | Refills: 1 | Status: SHIPPED | OUTPATIENT
Start: 2022-01-03 | End: 2022-02-24 | Stop reason: SDUPTHER

## 2022-01-04 ENCOUNTER — TELEPHONE (OUTPATIENT)
Dept: PRIMARY CARE CLINIC | Age: 62
End: 2022-01-04

## 2022-01-04 NOTE — TELEPHONE ENCOUNTER
Patient called about Warfarin RX - I informed her it shows it was sent to the pharmacy yesterday. FYI: she states she has been without for 5 days.

## 2022-01-28 DIAGNOSIS — M48.061 SPINAL STENOSIS OF LUMBAR REGION WITHOUT NEUROGENIC CLAUDICATION: ICD-10-CM

## 2022-01-28 DIAGNOSIS — M51.9 LUMBAR DISC DISEASE: ICD-10-CM

## 2022-01-28 RX ORDER — HYDROCODONE BITARTRATE AND ACETAMINOPHEN 7.5; 325 MG/1; MG/1
1 TABLET ORAL EVERY 6 HOURS PRN
Qty: 120 TABLET | Refills: 0 | Status: SHIPPED | OUTPATIENT
Start: 2022-01-28 | End: 2022-03-07 | Stop reason: SDUPTHER

## 2022-02-18 ENCOUNTER — OFFICE VISIT (OUTPATIENT)
Dept: PRIMARY CARE CLINIC | Age: 62
End: 2022-02-18
Payer: MEDICARE

## 2022-02-18 VITALS
SYSTOLIC BLOOD PRESSURE: 132 MMHG | BODY MASS INDEX: 28.24 KG/M2 | OXYGEN SATURATION: 96 % | HEIGHT: 64 IN | HEART RATE: 83 BPM | WEIGHT: 165.4 LBS | DIASTOLIC BLOOD PRESSURE: 84 MMHG

## 2022-02-18 DIAGNOSIS — Z86.73 HISTORY OF TRANSIENT ISCHEMIC ATTACK (TIA): ICD-10-CM

## 2022-02-18 DIAGNOSIS — F41.9 ANXIETY: ICD-10-CM

## 2022-02-18 DIAGNOSIS — M51.9 LUMBAR DISC DISEASE: Primary | ICD-10-CM

## 2022-02-18 DIAGNOSIS — Z12.31 BREAST CANCER SCREENING BY MAMMOGRAM: ICD-10-CM

## 2022-02-18 DIAGNOSIS — M48.061 SPINAL STENOSIS OF LUMBAR REGION WITHOUT NEUROGENIC CLAUDICATION: ICD-10-CM

## 2022-02-18 LAB — INTERNATIONAL NORMALIZATION RATIO, POC: 2.4

## 2022-02-18 PROCEDURE — 3017F COLORECTAL CA SCREEN DOC REV: CPT | Performed by: FAMILY MEDICINE

## 2022-02-18 PROCEDURE — G8417 CALC BMI ABV UP PARAM F/U: HCPCS | Performed by: FAMILY MEDICINE

## 2022-02-18 PROCEDURE — 4004F PT TOBACCO SCREEN RCVD TLK: CPT | Performed by: FAMILY MEDICINE

## 2022-02-18 PROCEDURE — G8427 DOCREV CUR MEDS BY ELIG CLIN: HCPCS | Performed by: FAMILY MEDICINE

## 2022-02-18 PROCEDURE — G8482 FLU IMMUNIZE ORDER/ADMIN: HCPCS | Performed by: FAMILY MEDICINE

## 2022-02-18 PROCEDURE — 85610 PROTHROMBIN TIME: CPT | Performed by: FAMILY MEDICINE

## 2022-02-18 PROCEDURE — 99214 OFFICE O/P EST MOD 30 MIN: CPT | Performed by: FAMILY MEDICINE

## 2022-02-18 NOTE — PROGRESS NOTES
[facility] PRIMARY CARE  [address] [place] [id number]  Dept: [phone]    April Ayden Pepe is a 64 y.o. female Established patient, who presents today for her medical conditions/complaintsas noted below. 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 LDL Calculated (mg/dL)   Date Value   10/21/2019 120   2018 131       (goal LDL is <100)   BUN (mg/dL)   Date Value   10/29/2020 11     TSH (uIU/mL)   Date Value   2021 2.75     BP Readings from Last 3 Encounters:   22 132/84   21 138/80   21 132/82          (goal 120/80)    Past Medical History:   Diagnosis Date    Acid reflux     Arthritis     neck, back, knees    Asthma     Emphysema     Headache(784.0)     Osteoarthritis     Osteoporosis     TIA (transient ischemic attack)       Past Surgical History:   Procedure Laterality Date    ACROMIOPLASTY Left 2016    SHOULDER ARTHROCOPY LABRAL DEBRIDEMENT      SECTION, LOW TRANSVERSE      DILATION AND CURETTAGE OF UTERUS      OTHER SURGICAL HISTORY      uterine lining removed by laser    SHOULDER SURGERY      THROAT SURGERY      camera    TONSILLECTOMY AND ADENOIDECTOMY      TUBAL LIGATION         Family History   Problem Relation Age of Onset    Heart Disease Father     Asthma Father     Cancer Paternal Grandfather         throat    Asthma Son        Social History     Tobacco Use    Smoking status: Current Every Day Smoker     Packs/day: 0.50     Years: 46.00     Pack years: 23.00     Types: Cigarettes     Start date:     Smokeless tobacco: Never Used   Substance Use Topics    Alcohol use:  Yes Alcohol/week: 0.0 standard drinks     Comment: OCCASIONAL      Current Outpatient Medications   Medication Sig Dispense Refill    HYDROcodone-acetaminophen (NORCO) 7.5-325 MG per tablet Take 1 tablet by mouth every 6 hours as needed for Pain for up to 30 days. 120 tablet 0    warfarin (COUMADIN) 4 MG tablet TAKE TWO TABLETS BY MOUTH DAILY ON MONDAY, WEDNESDAY, AND FRIDAY, AND TAKE ONE AND ONE-HALF  TABLET BY MOUTH DAILY THE OTHER DAYS. 36 tablet 1    DULoxetine (CYMBALTA) 60 MG extended release capsule TAKE ONE CAPSULE BY MOUTH DAILY 30 capsule 5    fluticasone-salmeterol (ADVAIR) 250-50 MCG/DOSE AEPB INHALE ONE PUFF BY MOUTH EVERY 12 HOURS 60 each 5    ondansetron (ZOFRAN) 4 MG tablet TAKE ONE TABLET BY MOUTH EVERY 8 HOURS AS NEEDED FOR NAUSEA OR VOMITING 30 tablet 2    omeprazole (PRILOSEC) 20 MG delayed release capsule TAKE ONE CAPSULE BY MOUTH DAILY 30 capsule 3    divalproex (DEPAKOTE) 250 MG DR tablet TAKE ONE TABLET BY MOUTH TWICE A DAY 60 tablet 3    busPIRone (BUSPAR) 15 MG tablet TAKE ONE TABLET BY MOUTH TWICE A DAY 60 tablet 3    albuterol-ipratropium (COMBIVENT RESPIMAT)  MCG/ACT AERS inhaler Inhale 1 puff into the lungs every 6 hours Increased to every 4 hrs if needed. 3 Inhaler 3     No current facility-administered medications for this visit.      Allergies   Allergen Reactions    Eggs Or Egg-Derived Products Other (See Comments)     Severe abdominal pains    Bupropion Nausea Only    Ceftin [Cefuroxime Axetil] Nausea Only     Nausea and upset stomach       Health Maintenance   Topic Date Due    Shingles Vaccine (1 of 2) Never done    Low dose CT lung screening  Never done    Breast cancer screen  12/02/2021    COVID-19 Vaccine (3 - Booster for Pfizer series) 01/23/2022    Depression Monitoring  05/04/2022    Cervical cancer screen  11/03/2023    Lipid screen  10/21/2024    DTaP/Tdap/Td vaccine (2 - Td or Tdap) 12/11/2024    Pneumococcal 0-64 years Vaccine (2 of 2 - PPSV23) 04/01/2025    Colorectal Cancer Screen  12/29/2026    Flu vaccine  Completed    Hepatitis C screen  Completed    HIV screen  Completed    Hepatitis A vaccine  Aged Out    Hepatitis B vaccine  Aged Out    Hib vaccine  Aged Out    Meningococcal (ACWY) vaccine  Aged Out       Subjective:      Review of Systems   Cardiovascular: Negative. Musculoskeletal: Positive for back pain. Objective:     /84   Pulse 83   Ht 5' 3.5\" (1.613 m)   Wt 165 lb 6.4 oz (75 kg)   SpO2 96%   BMI 28.84 kg/m²   Physical Exam  Vitals and nursing note reviewed. Constitutional:       General: She is not in acute distress. Appearance: She is well-developed. She is not ill-appearing. HENT:      Head: Normocephalic and atraumatic. Right Ear: External ear normal.      Left Ear: External ear normal.   Eyes:      General: No scleral icterus. Right eye: No discharge. Left eye: No discharge. Conjunctiva/sclera: Conjunctivae normal.   Neck:      Thyroid: No thyromegaly. Trachea: No tracheal deviation. Cardiovascular:      Rate and Rhythm: Normal rate and regular rhythm. Heart sounds: Normal heart sounds. Pulmonary:      Effort: Pulmonary effort is normal. No respiratory distress. Breath sounds: Normal breath sounds. No wheezing. Musculoskeletal:      Right lower leg: No edema. Left lower leg: No edema. Lymphadenopathy:      Cervical: No cervical adenopathy. Skin:     General: Skin is warm. Findings: No rash. Neurological:      Mental Status: She is alert and oriented to person, place, and time. Psychiatric:         Mood and Affect: Mood normal.         Behavior: Behavior normal.         Thought Content: Thought content normal.         Assessment:       Diagnosis Orders   1. Lumbar disc disease     2. Anxiety     3. Spinal stenosis of lumbar region without neurogenic claudication     4.  Breast cancer screening by mammogram  SHEYLA DIGITAL SCREEN W OR WO CAD BILATERAL   5. History of transient ischemic attack (TIA)  POCT INR        Plan:      Return in about 3 months (around 5/18/2022) for med check. Orders Placed This Encounter   Procedures    SHEYLA DIGITAL SCREEN W OR WO CAD BILATERAL     Standing Status:   Future     Standing Expiration Date:   2/18/2023     Scheduling Instructions:      Dx Code  Z12.31     Order Specific Question:   Reason for exam:     Answer:   screening    POCT INR     This external order was created through the results console. No orders of the defined types were placed in this encounter. Patient given educationalmaterials - see patient instructions. Discussed use, benefit, and side effectsof prescribed medications. All patient questions answered. Pt voiced understanding. Reviewed health maintenance. Instructed to continue current medications, diet andexercise. Patient agreed with treatment plan. Follow up as directed.      Electronicallysigned by Angie Faith MD on 2/21/2022 at 10:22 AM

## 2022-02-21 ASSESSMENT — ENCOUNTER SYMPTOMS: BACK PAIN: 1

## 2022-02-24 ENCOUNTER — TELEPHONE (OUTPATIENT)
Dept: PRIMARY CARE CLINIC | Age: 62
End: 2022-02-24

## 2022-02-24 DIAGNOSIS — G45.9 TIA (TRANSIENT ISCHEMIC ATTACK): ICD-10-CM

## 2022-02-24 DIAGNOSIS — Z86.73 HISTORY OF TRANSIENT ISCHEMIC ATTACK (TIA): ICD-10-CM

## 2022-02-24 RX ORDER — WARFARIN SODIUM 4 MG/1
TABLET ORAL
Qty: 50 TABLET | Refills: 8 | Status: SHIPPED | OUTPATIENT
Start: 2022-02-24 | End: 2022-03-18 | Stop reason: SDUPTHER

## 2022-02-24 NOTE — TELEPHONE ENCOUNTER
Patient called and said her warfarin dose is wrong, she said she is suppose to take 8 mg Mon-Thu and 6 mg Fri-Sun. She said she has been running out of medication because the directions are wrong. She asked if you could send in new script with correct directions. Please advise. Current script says :TAKE TWO 4mg TABLETS( BY MOUTH DAILY ON MONDAY, WEDNESDAY, AND FRIDAY, AND TAKE ONE AND ONE-HALF  TABLET BY MOUTH DAILY THE OTHER DAYS.     Arjun

## 2022-03-03 RX ORDER — OMEPRAZOLE 20 MG/1
CAPSULE, DELAYED RELEASE ORAL
Qty: 30 CAPSULE | Refills: 3 | Status: SHIPPED | OUTPATIENT
Start: 2022-03-03 | End: 2022-07-19 | Stop reason: SDUPTHER

## 2022-03-07 ENCOUNTER — TELEPHONE (OUTPATIENT)
Dept: PRIMARY CARE CLINIC | Age: 62
End: 2022-03-07

## 2022-03-07 DIAGNOSIS — M51.9 LUMBAR DISC DISEASE: ICD-10-CM

## 2022-03-07 DIAGNOSIS — M48.061 SPINAL STENOSIS OF LUMBAR REGION WITHOUT NEUROGENIC CLAUDICATION: ICD-10-CM

## 2022-03-07 RX ORDER — HYDROCODONE BITARTRATE AND ACETAMINOPHEN 7.5; 325 MG/1; MG/1
1 TABLET ORAL EVERY 6 HOURS PRN
Qty: 120 TABLET | Refills: 0 | Status: SHIPPED | OUTPATIENT
Start: 2022-03-07 | End: 2022-05-02 | Stop reason: SDUPTHER

## 2022-03-07 NOTE — TELEPHONE ENCOUNTER
----- Message from Álvaro Fink LPN sent at 3/8/6276  1:50 PM EST -----  Subject: Refill Request    QUESTIONS  Name of Medication? HYDROcodone-acetaminophen (NORCO) 7.5-325 MG per   tablet  Patient-reported dosage and instructions? 1 qid  How many days do you have left? 7  Preferred Pharmacy? [facility]  Pharmacy phone number (if available)? [phone]  ---------------------------------------------------------------------------  --------------  CALL BACK INFO  What is the best way for the office to contact you? OK to leave message on   voicemail  Preferred Call Back Phone Number?  [phone]

## 2022-03-14 RX ORDER — ONDANSETRON 4 MG/1
TABLET, FILM COATED ORAL
Qty: 30 TABLET | Refills: 2 | Status: SHIPPED | OUTPATIENT
Start: 2022-03-14 | End: 2022-06-16

## 2022-03-18 ENCOUNTER — NURSE ONLY (OUTPATIENT)
Dept: PRIMARY CARE CLINIC | Age: 62
End: 2022-03-18
Payer: MEDICARE

## 2022-03-18 VITALS — WEIGHT: 164.4 LBS | BODY MASS INDEX: 28.67 KG/M2

## 2022-03-18 DIAGNOSIS — Z86.73 HISTORY OF TRANSIENT ISCHEMIC ATTACK (TIA): ICD-10-CM

## 2022-03-18 DIAGNOSIS — G45.9 TIA (TRANSIENT ISCHEMIC ATTACK): ICD-10-CM

## 2022-03-18 LAB — INTERNATIONAL NORMALIZATION RATIO, POC: 4.2

## 2022-03-18 PROCEDURE — 85610 PROTHROMBIN TIME: CPT | Performed by: FAMILY MEDICINE

## 2022-03-18 PROCEDURE — 93793 ANTICOAG MGMT PT WARFARIN: CPT | Performed by: FAMILY MEDICINE

## 2022-03-18 RX ORDER — WARFARIN SODIUM 4 MG/1
TABLET ORAL
Qty: 50 TABLET | Refills: 8 | Status: SHIPPED | OUTPATIENT
Start: 2022-03-18

## 2022-03-18 RX ORDER — BUSPIRONE HYDROCHLORIDE 15 MG/1
15 TABLET ORAL 2 TIMES DAILY
Qty: 60 TABLET | Refills: 3 | Status: SHIPPED | OUTPATIENT
Start: 2022-03-18 | End: 2022-09-19

## 2022-03-18 NOTE — PROGRESS NOTES
Patient here for INR. has not had signs of bleeding or excessive bruising. Current Warfarin dose:  6mg F, Sat, Sun and 8 mg M,T,W, and TH            Previous INR results:    Lab Results   Component Value Date    INR 4.2 03/18/2022    INR 2.4 02/18/2022    INR 2.0 12/21/2021    PROTIME 13.1 09/20/2021    PROTIME 23.2 (H) 04/19/2021    PROTIME 29.9 (H) 09/09/2020       There were no vitals filed for this visit. Today's INR:  4.2  reviewed  Case discussed with Dr. Lakeisha Kidd. Patient advised to adjust to Hold for 2 days and then adjust to 6 mg M, T, W, [place], F and 8mg Saturday and Sunday.     Repeat next Protime 2-3 weeks Color consistent with ethnicity/race, warm, dry intact, resilient.

## 2022-04-04 ENCOUNTER — NURSE ONLY (OUTPATIENT)
Dept: PRIMARY CARE CLINIC | Age: 62
End: 2022-04-04
Payer: MEDICARE

## 2022-04-04 DIAGNOSIS — Z86.73 HISTORY OF TRANSIENT ISCHEMIC ATTACK (TIA): Primary | ICD-10-CM

## 2022-04-04 LAB — INTERNATIONAL NORMALIZATION RATIO, POC: 2.5

## 2022-04-04 PROCEDURE — 93793 ANTICOAG MGMT PT WARFARIN: CPT | Performed by: FAMILY MEDICINE

## 2022-04-04 PROCEDURE — 85610 PROTHROMBIN TIME: CPT | Performed by: FAMILY MEDICINE

## 2022-04-04 NOTE — PROGRESS NOTES
Patient here for INR. has not had signs of bleeding or excessive bruising. Current Warfarin dose:  6 mg M, T, W, TH, F and 8mg, Saturday and Sunday            Previous INR results:    Lab Results   Component Value Date    INR 2.5 04/04/2022    INR 4.2 03/18/2022    INR 2.4 02/18/2022    PROTIME 13.1 09/20/2021    PROTIME 23.2 (H) 04/19/2021    PROTIME 29.9 (H) 09/09/2020       There were no vitals filed for this visit. Today's INR:  2.5    Case discussed with Dr. Haydee Grant. Patient advised to continue same dose    Repeat next Protime 2  week(s).

## 2022-04-19 ENCOUNTER — NURSE ONLY (OUTPATIENT)
Dept: PRIMARY CARE CLINIC | Age: 62
End: 2022-04-19
Payer: MEDICARE

## 2022-04-19 VITALS
WEIGHT: 164 LBS | SYSTOLIC BLOOD PRESSURE: 118 MMHG | DIASTOLIC BLOOD PRESSURE: 72 MMHG | OXYGEN SATURATION: 96 % | BODY MASS INDEX: 28.6 KG/M2 | HEART RATE: 74 BPM

## 2022-04-19 DIAGNOSIS — Z86.73 HISTORY OF TRANSIENT ISCHEMIC ATTACK (TIA): Primary | ICD-10-CM

## 2022-04-19 LAB — INTERNATIONAL NORMALIZATION RATIO, POC: 4.6

## 2022-04-19 PROCEDURE — 85610 PROTHROMBIN TIME: CPT | Performed by: FAMILY MEDICINE

## 2022-04-19 NOTE — PROGRESS NOTES
Patient here for INR. has not had signs of bleeding or excessive bruising. Current Warfarin dose:  Patient states she is taking 6mg Monday - Friday and 8mg Saturday and Sunday. Previous INR results:    Lab Results   Component Value Date    INR 2.5 04/04/2022    INR 4.2 03/18/2022    INR 2.4 02/18/2022    PROTIME 13.1 09/20/2021    PROTIME 23.2 (H) 04/19/2021    PROTIME 29.9 (H) 09/09/2020       Vitals:    04/19/22 1552   BP: 118/72   Pulse: 74   SpO2: 96%       Today's INR:  4.6    Case discussed with Dr. Brinda Lozano and Hung (DR HAMMAD LAW)   Patient advised to hold warfarin for 2 days and restart same dose and recheck in 2 weeks. reviewed  Repeat next Protime 2  week(s).

## 2022-05-02 ENCOUNTER — TELEPHONE (OUTPATIENT)
Dept: PRIMARY CARE CLINIC | Age: 62
End: 2022-05-02

## 2022-05-02 DIAGNOSIS — M48.061 SPINAL STENOSIS OF LUMBAR REGION WITHOUT NEUROGENIC CLAUDICATION: ICD-10-CM

## 2022-05-02 DIAGNOSIS — M51.9 LUMBAR DISC DISEASE: ICD-10-CM

## 2022-05-02 RX ORDER — HYDROCODONE BITARTRATE AND ACETAMINOPHEN 7.5; 325 MG/1; MG/1
1 TABLET ORAL EVERY 6 HOURS PRN
Qty: 120 TABLET | Refills: 0 | Status: SHIPPED | OUTPATIENT
Start: 2022-05-02 | End: 2022-06-01

## 2022-05-02 NOTE — TELEPHONE ENCOUNTER
----- Message from [facility] sent at 5/2/2022 12:05 PM EDT -----  Subject: Refill Request    QUESTIONS  Name of Medication? HYDROcodone-acetaminophen (NORCO) 7.5-325 MG per   tablet  Patient-reported dosage and instructions? 1 tablet as needed for pain  How many days do you have left? 0  Preferred Pharmacy? [id number]  Pharmacy phone number (if available)? [phone]  Additional Information for Provider? Pt is requesting a 30 day supply of   meds. Pt has 0 days of rx left. Please send rx to pharmacy for pt.   ---------------------------------------------------------------------------  --------------  [address]  What is the best way for the office to contact you? OK to leave message on   voicemail  Preferred Call Back Phone Number? [phone]  ---------------------------------------------------------------------------  --------------  SCRIPT ANSWERS  Relationship to Patient?  Self

## 2022-05-24 ENCOUNTER — OFFICE VISIT (OUTPATIENT)
Dept: PRIMARY CARE CLINIC | Age: 62
End: 2022-05-24
Payer: MEDICARE

## 2022-05-24 VITALS
OXYGEN SATURATION: 100 % | HEART RATE: 80 BPM | SYSTOLIC BLOOD PRESSURE: 118 MMHG | BODY MASS INDEX: 28.92 KG/M2 | DIASTOLIC BLOOD PRESSURE: 80 MMHG | HEIGHT: 64 IN | WEIGHT: 169.4 LBS

## 2022-05-24 DIAGNOSIS — J44.9 CHRONIC OBSTRUCTIVE PULMONARY DISEASE, UNSPECIFIED COPD TYPE (HCC): ICD-10-CM

## 2022-05-24 DIAGNOSIS — M23.91 KNEE INTERNAL DERANGEMENT, RIGHT: ICD-10-CM

## 2022-05-24 DIAGNOSIS — M51.9 LUMBAR DISC DISEASE: Primary | ICD-10-CM

## 2022-05-24 DIAGNOSIS — M48.061 SPINAL STENOSIS OF LUMBAR REGION WITHOUT NEUROGENIC CLAUDICATION: ICD-10-CM

## 2022-05-24 DIAGNOSIS — F41.9 ANXIETY: ICD-10-CM

## 2022-05-24 DIAGNOSIS — H91.91 HEARING LOSS OF RIGHT EAR, UNSPECIFIED HEARING LOSS TYPE: ICD-10-CM

## 2022-05-24 LAB — INTERNATIONAL NORMALIZATION RATIO, POC: 3

## 2022-05-24 PROCEDURE — 85610 PROTHROMBIN TIME: CPT | Performed by: FAMILY MEDICINE

## 2022-05-24 PROCEDURE — 4004F PT TOBACCO SCREEN RCVD TLK: CPT | Performed by: FAMILY MEDICINE

## 2022-05-24 PROCEDURE — 3023F SPIROM DOC REV: CPT | Performed by: FAMILY MEDICINE

## 2022-05-24 PROCEDURE — G8417 CALC BMI ABV UP PARAM F/U: HCPCS | Performed by: FAMILY MEDICINE

## 2022-05-24 PROCEDURE — G8427 DOCREV CUR MEDS BY ELIG CLIN: HCPCS | Performed by: FAMILY MEDICINE

## 2022-05-24 PROCEDURE — 99214 OFFICE O/P EST MOD 30 MIN: CPT | Performed by: FAMILY MEDICINE

## 2022-05-24 PROCEDURE — 3017F COLORECTAL CA SCREEN DOC REV: CPT | Performed by: FAMILY MEDICINE

## 2022-05-24 RX ORDER — PREGABALIN 50 MG/1
50 CAPSULE ORAL 3 TIMES DAILY
Qty: 90 CAPSULE | Refills: 2 | Status: SHIPPED | OUTPATIENT
Start: 2022-05-24 | End: 2022-08-22

## 2022-05-24 SDOH — ECONOMIC STABILITY: FOOD INSECURITY: WITHIN THE PAST 12 MONTHS, YOU WORRIED THAT YOUR FOOD WOULD RUN OUT BEFORE YOU GOT MONEY TO BUY MORE.: NEVER TRUE

## 2022-05-24 SDOH — ECONOMIC STABILITY: FOOD INSECURITY: WITHIN THE PAST 12 MONTHS, THE FOOD YOU BOUGHT JUST DIDN'T LAST AND YOU DIDN'T HAVE MONEY TO GET MORE.: NEVER TRUE

## 2022-05-24 ASSESSMENT — PATIENT HEALTH QUESTIONNAIRE - PHQ9
SUM OF ALL RESPONSES TO PHQ QUESTIONS 1-9: 0
9. THOUGHTS THAT YOU WOULD BE BETTER OFF DEAD, OR OF HURTING YOURSELF: 0
10. IF YOU CHECKED OFF ANY PROBLEMS, HOW DIFFICULT HAVE THESE PROBLEMS MADE IT FOR YOU TO DO YOUR WORK, TAKE CARE OF THINGS AT HOME, OR GET ALONG WITH OTHER PEOPLE: 0
4. FEELING TIRED OR HAVING LITTLE ENERGY: 0
2. FEELING DOWN, DEPRESSED OR HOPELESS: 0
SUM OF ALL RESPONSES TO PHQ QUESTIONS 1-9: 0
3. TROUBLE FALLING OR STAYING ASLEEP: 0
SUM OF ALL RESPONSES TO PHQ9 QUESTIONS 1 & 2: 0
SUM OF ALL RESPONSES TO PHQ QUESTIONS 1-9: 0
SUM OF ALL RESPONSES TO PHQ QUESTIONS 1-9: 0
1. LITTLE INTEREST OR PLEASURE IN DOING THINGS: 0
6. FEELING BAD ABOUT YOURSELF - OR THAT YOU ARE A FAILURE OR HAVE LET YOURSELF OR YOUR FAMILY DOWN: 0
8. MOVING OR SPEAKING SO SLOWLY THAT OTHER PEOPLE COULD HAVE NOTICED. OR THE OPPOSITE, BEING SO FIGETY OR RESTLESS THAT YOU HAVE BEEN MOVING AROUND A LOT MORE THAN USUAL: 0
7. TROUBLE CONCENTRATING ON THINGS, SUCH AS READING THE NEWSPAPER OR WATCHING TELEVISION: 0
5. POOR APPETITE OR OVEREATING: 0

## 2022-05-24 ASSESSMENT — SOCIAL DETERMINANTS OF HEALTH (SDOH): HOW HARD IS IT FOR YOU TO PAY FOR THE VERY BASICS LIKE FOOD, HOUSING, MEDICAL CARE, AND HEATING?: NOT HARD AT ALL

## 2022-05-24 ASSESSMENT — ENCOUNTER SYMPTOMS: BACK PAIN: 1

## 2022-05-24 NOTE — PROGRESS NOTES
[facility] PRIMARY [facility]ARE  [address] [place] [id number]  Dept: [phone]    Stephanie Ochoa is a 58 y.o. female Established patient, who presents today for her medical conditions/complaintsas noted below. Chief Complaint   Patient presents with    Back Pain     Pt here today for medication check. Pt fell a few weeks ago and the pain is getting worse. Medication is not helping anymore.     Knee Pain    Hip Pain    Referral - General     Pt asking for a referral for pain managment and audiologist       HPI:     HPI    Had severe right post hip/lumbar pain and that made her fall down twice  She can't sleep due to pain, tries tylenol PM and benadryl  Right  Ear hearing loss x years    Reviewed prior notes None  Reviewed previous Labs and Imaging    LDL Calculated (mg/dL)   Date Value   10/21/2019 120   2018 131       (goal LDL is <100)   BUN (mg/dL)   Date Value   10/29/2020 11     TSH (uIU/mL)   Date Value   2021 2.75     BP Readings from Last 3 Encounters:   22 118/80   22 118/72   22 132/84          (goal 120/80)    Past Medical History:   Diagnosis Date    Acid reflux     Arthritis     neck, back, knees    Asthma     Emphysema     Headache(784.0)     Osteoarthritis     Osteoporosis     TIA (transient ischemic attack)       Past Surgical History:   Procedure Laterality Date    ACROMIOPLASTY Left 2016    SHOULDER ARTHROCOPY LABRAL DEBRIDEMENT      SECTION, LOW TRANSVERSE      DILATION AND CURETTAGE OF UTERUS      OTHER SURGICAL HISTORY      uterine lining removed by laser    SHOULDER SURGERY      THROAT SURGERY      camera    TONSILLECTOMY AND ADENOIDECTOMY      TUBAL LIGATION         Family History   Problem Relation Age of Onset    Heart Disease Father     Asthma Father     Cancer Paternal Grandfather         throat    Asthma Son        Social History     Tobacco Use    Smoking status: Current Every Day Smoker     Packs/day: 0.50     Years: 46.00     Pack years: 23.00     Types: Cigarettes     Start date: 65    Smokeless tobacco: Never Used   Substance Use Topics    Alcohol use: Yes     Alcohol/week: 0.0 standard drinks     Comment: OCCASIONAL      Current Outpatient Medications   Medication Sig Dispense Refill    pregabalin (LYRICA) 50 MG capsule Take 1 capsule by mouth 3 times daily for 90 days. 90 capsule 2    HYDROcodone-acetaminophen (NORCO) 7.5-325 MG per tablet Take 1 tablet by mouth every 6 hours as needed for Pain for up to 30 days. 120 tablet 0    busPIRone (BUSPAR) 15 MG tablet Take 15 mg by mouth 2 times daily 60 tablet 3    warfarin (COUMADIN) 4 MG tablet 6mg Monday through Friday  and 8 mg Friday through Sunday 50 tablet 8    ondansetron (ZOFRAN) 4 MG tablet TAKE ONE TABLET BY MOUTH EVERY 8 HOURS AS NEEDED FOR NAUSEA OR VOMITING 30 tablet 2    omeprazole (PRILOSEC) 20 MG delayed release capsule TAKE ONE CAPSULE BY MOUTH DAILY 30 capsule 3    DULoxetine (CYMBALTA) 60 MG extended release capsule TAKE ONE CAPSULE BY MOUTH DAILY 30 capsule 5    fluticasone-salmeterol (ADVAIR) 250-50 MCG/DOSE AEPB INHALE ONE PUFF BY MOUTH EVERY 12 HOURS 60 each 5    divalproex (DEPAKOTE) 250 MG DR tablet TAKE ONE TABLET BY MOUTH TWICE A DAY 60 tablet 3    albuterol-ipratropium (COMBIVENT RESPIMAT)  MCG/ACT AERS inhaler Inhale 1 puff into the lungs every 6 hours Increased to every 4 hrs if needed. 3 Inhaler 3     No current facility-administered medications for this visit.      Allergies   Allergen Reactions    Eggs Or Egg-Derived Products Other (See Comments)     Severe abdominal pains    Bupropion Nausea Only    Ceftin [Cefuroxime Axetil] Nausea Only     Nausea and upset stomach       Health Maintenance   Topic Date Due    Shingles vaccine (1 of 2) Never done    Low dose CT lung screening  Never done    Pneumococcal 0-64 years Vaccine (2 - PCV) 01/14/2020    Breast cancer screen External Referral To Pain Clinic   5. Hearing loss of right ear, unspecified hearing loss type  [facility] - Christal Fonseca, AUD, Audiology, [place]   6. Knee internal derangement, right  MRI KNEE RIGHT WO CONTRAST        Plan:      No follow-ups on file. Orders Placed This Encounter   Procedures    MRI LUMBAR SPINE WO CONTRAST     Standing Status:   Future     Standing Expiration Date:   5/24/2023    MRI KNEE RIGHT WO CONTRAST     Standing Status:   Future     Standing Expiration Date:   5/24/2023    External Referral To Pain Clinic     Referral Priority:   Routine     Referral Type:   Eval and Treat     Referral Reason:   Specialty Services Required     Requested Specialty:   Pain Management     Number of Visits Requested:   1    AFL - Christal Fonseca, AUD, Audiology, [place]     Referral Priority:   Routine     Referral Type:   Eval and Treat     Referral Reason:   Specialty Services Required     Referred to Provider:   Yossi Nicole     Requested Specialty:   Audiology     Number of Visits Requested:   1    POCT INR     This external order was created through the results console. Orders Placed This Encounter   Medications    pregabalin (LYRICA) 50 MG capsule     Sig: Take 1 capsule by mouth 3 times daily for 90 days. Dispense:  90 capsule     Refill:  2       Patient given educationalmaterials - see patient instructions. Discussed use, benefit, and side effectsof prescribed medications. All patient questions answered. Pt voiced understanding. Reviewed health maintenance. Instructed to continue current medications, diet andexercise. Patient agreed with treatment plan. Follow up as directed.      Electronicallysigned by Stacie Young MD on 5/24/2022 at 4:32 PM

## 2022-05-25 ENCOUNTER — TELEPHONE (OUTPATIENT)
Dept: PRIMARY CARE CLINIC | Age: 62
End: 2022-05-25

## 2022-05-25 NOTE — TELEPHONE ENCOUNTER
ENT physicians not taking her insurance at this time will have to refer to [address] [facility] for pt to call back.

## 2022-06-02 DIAGNOSIS — H91.91 HEARING LOSS OF RIGHT EAR, UNSPECIFIED HEARING LOSS TYPE: Primary | ICD-10-CM

## 2022-06-02 PROCEDURE — 92557 COMPREHENSIVE HEARING TEST: CPT | Performed by: FAMILY MEDICINE

## 2022-06-03 ENCOUNTER — HOSPITAL ENCOUNTER (OUTPATIENT)
Dept: MRI IMAGING | Age: 62
Discharge: HOME | End: 2022-06-05
Payer: MEDICARE

## 2022-06-03 DIAGNOSIS — M48.061 SPINAL STENOSIS OF LUMBAR REGION WITHOUT NEUROGENIC CLAUDICATION: ICD-10-CM

## 2022-06-03 DIAGNOSIS — M23.91 KNEE INTERNAL DERANGEMENT, RIGHT: ICD-10-CM

## 2022-06-03 DIAGNOSIS — M51.9 LUMBAR DISC DISEASE: ICD-10-CM

## 2022-06-03 PROCEDURE — 73721 MRI JNT OF LWR EXTRE W/O DYE: CPT

## 2022-06-03 PROCEDURE — 72148 MRI LUMBAR SPINE W/O DYE: CPT

## 2022-06-07 DIAGNOSIS — S83.203A OTHER TEAR OF MENISCUS OF RIGHT KNEE, UNSPECIFIED MENISCUS, UNSPECIFIED WHETHER OLD OR CURRENT TEAR, INITIAL ENCOUNTER: Primary | ICD-10-CM

## 2022-06-08 ENCOUNTER — TELEPHONE (OUTPATIENT)
Dept: ORTHOPEDIC SURGERY | Age: 62
End: 2022-06-08

## 2022-06-08 NOTE — TELEPHONE ENCOUNTER
Patient called to schedule an appt with Dr. Vicki Delgado for her Rt knee with [facility]. [facility] called into office to get assistance with obtaining 4/23/18 right knee op note with Dr. Alicia Mancilla. Op note was found through care everywhere, printed off, and scanned into chart. Dr. Vicki Delgado will then be able to see the op note on the day of her appt under the media tab.

## 2022-06-10 ENCOUNTER — TELEPHONE (OUTPATIENT)
Dept: PRIMARY CARE CLINIC | Age: 62
End: 2022-06-10
Payer: MEDICARE

## 2022-06-10 DIAGNOSIS — F34.1 DYSTHYMIA: ICD-10-CM

## 2022-06-10 DIAGNOSIS — Z01.10 ENCOUNTER FOR HEARING EXAMINATION, UNSPECIFIED WHETHER ABNORMAL FINDINGS: Primary | ICD-10-CM

## 2022-06-10 DIAGNOSIS — F39 MOOD DISORDER (HCC): ICD-10-CM

## 2022-06-10 PROCEDURE — 92557 COMPREHENSIVE HEARING TEST: CPT | Performed by: FAMILY MEDICINE

## 2022-06-10 RX ORDER — DIVALPROEX SODIUM 250 MG/1
TABLET, DELAYED RELEASE ORAL
Qty: 60 TABLET | Refills: 3 | Status: SHIPPED | OUTPATIENT
Start: 2022-06-10 | End: 2022-07-19

## 2022-06-10 NOTE — TELEPHONE ENCOUNTER
Pt stated that [employer] had left a message for her asking for some testing from us. She has an appt with them on Monday. [facility] are [facility].      [employer] [phone]

## 2022-06-13 NOTE — TELEPHONE ENCOUNTER
[facility] called back - they need orders for the hearing test.     OK to fax per Dr. Brinda Lozano.     Sent to [phone]

## 2022-06-15 ENCOUNTER — OFFICE VISIT (OUTPATIENT)
Dept: ORTHOPEDIC SURGERY | Age: 62
End: 2022-06-15

## 2022-06-15 VITALS — WEIGHT: 167 LBS | HEIGHT: 63 IN | BODY MASS INDEX: 29.59 KG/M2 | RESPIRATION RATE: 14 BRPM

## 2022-06-15 DIAGNOSIS — M25.561 RIGHT KNEE PAIN, UNSPECIFIED CHRONICITY: Primary | ICD-10-CM

## 2022-06-15 PROCEDURE — G8417 CALC BMI ABV UP PARAM F/U: HCPCS | Performed by: ORTHOPAEDIC SURGERY

## 2022-06-15 PROCEDURE — 3017F COLORECTAL CA SCREEN DOC REV: CPT | Performed by: ORTHOPAEDIC SURGERY

## 2022-06-15 PROCEDURE — G8427 DOCREV CUR MEDS BY ELIG CLIN: HCPCS | Performed by: ORTHOPAEDIC SURGERY

## 2022-06-15 PROCEDURE — 4004F PT TOBACCO SCREEN RCVD TLK: CPT | Performed by: ORTHOPAEDIC SURGERY

## 2022-06-15 PROCEDURE — 99203 OFFICE O/P NEW LOW 30 MIN: CPT | Performed by: ORTHOPAEDIC SURGERY

## 2022-06-15 NOTE — PROGRESS NOTES
Mirtha Rebollar M.D.            [address] [place], [place] [address]donn., [address] [facility],[address], [address] [facility]           Dept Phone: [phone]           Dept Fax:  [phone] [address]           Shikha Gibson          Dept Phone: [phone]           Dept Fax:  [phone]      Chief Compliant:  Chief Complaint   Patient presents with    Knee Pain     right        History of Present Illness: This is a 58 y.o. female who presents to the clinic today for evaluation / follow up of knee pain. Patient is a 60-year-old who was recently seen by Dr. Steve Monroe for right knee pain. Patient also has a history of low back and radicular symptoms. Patient had a previous arthroscopy of her knee approximately 4 years ago per Dr. Lindy Odell she initially did well but recently is been having increasing pain she does relate a history of having had a fall several weeks ago. She describes stabbing catching pain into the medial aspect of her knee. Patient also describes radicular symptoms however going to her lower leg as well. .       Review of Systems   Constitutional: Negative for fever, chills, sweats. Eyes: Negative for changes in vision, or pain. HENT: Negative for ear ache, epistaxis, or sore throat. Respiratory/Cardio: Negative for Chest pain, palpitations, SOB, or cough. Gastrointestinal: Negative for abdominal pain, N/V/D. Genitourinary: Negative for dysuria, frequency, urgency, or hematuria. Neurological: Negative for headache, numbness, or weakness. Integumentary: Negative for rash, itching, laceration, or abrasion. Musculoskeletal: Positive for Knee Pain (right)       Physical Exam:  Constitutional: Patient is oriented to person, place, and time. Patient appears well-developed and well nourished.    HENT: Negative otherwise noted  Head: Normocephalic and Atraumatic  Nose: Normal  Eyes: Conjunctivae and EOM are normal  Neck: Normal range of motion Neck supple. Respiratory/Cardio: Effort normal. No respiratory distress. Musculoskeletal: Examination of patient's right knee notes a slight effusion motion is 3 to about 130 degrees collaterals are good cruciates good she does have a positive reproducible Raissa's medially. Mild patellofemoral crepitus but minimal pain no hip rotational pain no trochanteric findings    Neurological: Patient is alert and oriented to person, place, and time. Normal strenght. No sensory deficit. Skin: Skin is warm and dry  Psychiatric: Behavior is normal. Thought content normal.  Nursing note and vitals reviewed. Labs and Imaging:     XR taken today:  XR KNEE RIGHT (1-2 VIEWS)    Result Date: 6/15/2022  X-rays taken today reviewed by me show standing AP both knees and lateral the right knee. Patient has very modest medial joint space narrowing based on the AP view. Left knee is relatively unremarkable. Lateral view of the right knee notes some modest patellofemoral narrowing. No acute process is noted     MRI dated 6/3/2022  Impression   1. Degenerative tearing involving both articular surfaces in the posterior   horn and body of the medial meniscus with slight outward subluxation of the   medial meniscus body. 2. Mild superior and inferior patellar tendinosis. 3. Small joint effusion. 4. Moderate to severe patellofemoral chondromalacia with underlying   subchondral reactive marrow changes in the central femoral trochlea. 5. Mild lateral compartment chondromalacia.  Mild-to-moderate medial   compartment chondromalacia.         Patient also had a MRI of her lumbar spine which showed multilevel degenerative disc disease is as well as foraminal stenosis severe at several levels.      Orders Placed This Encounter   Procedures    XR KNEE RIGHT (1-2 VIEWS)     Standing Status:   Future     Number of Occurrences:   1     Standing Expiration Date: 6/14/2023       Assessment and Plan:  1. Right knee pain, unspecified chronicity    2. Recurrent medial meniscus tear right knee  3. Right-sided lumbar radiculopathy        This is a 58 y.o. female who presents to the clinic today for evaluation / follow up of current medial meniscus tear right knee. Past History:    Current Outpatient Medications:     divalproex (DEPAKOTE) 250 MG DR tablet, 1 TABLET BY MOUTH TWICE DAILY, Disp: 60 tablet, Rfl: 3    pregabalin (LYRICA) 50 MG capsule, Take 1 capsule by mouth 3 times daily for 90 days. , Disp: 90 capsule, Rfl: 2    busPIRone (BUSPAR) 15 MG tablet, Take 15 mg by mouth 2 times daily, Disp: 60 tablet, Rfl: 3    warfarin (COUMADIN) 4 MG tablet, 6mg Monday through Friday  and 8 mg Friday through Sunday, Disp: 50 tablet, Rfl: 8    ondansetron (ZOFRAN) 4 MG tablet, TAKE ONE TABLET BY MOUTH EVERY 8 HOURS AS NEEDED FOR NAUSEA OR VOMITING, Disp: 30 tablet, Rfl: 2    omeprazole (PRILOSEC) 20 MG delayed release capsule, TAKE ONE CAPSULE BY MOUTH DAILY, Disp: 30 capsule, Rfl: 3    DULoxetine (CYMBALTA) 60 MG extended release capsule, TAKE ONE CAPSULE BY MOUTH DAILY, Disp: 30 capsule, Rfl: 5    fluticasone-salmeterol (ADVAIR) 250-50 MCG/DOSE AEPB, INHALE ONE PUFF BY MOUTH EVERY 12 HOURS, Disp: 60 each, Rfl: 5    albuterol-ipratropium (COMBIVENT RESPIMAT)  MCG/ACT AERS inhaler, Inhale 1 puff into the lungs every 6 hours Increased to every 4 hrs if needed. , Disp: 3 Inhaler, Rfl: 3  Allergies   Allergen Reactions    Eggs Or Egg-Derived Products Other (See Comments)     Severe abdominal pains    Bupropion Nausea Only    Ceftin [Cefuroxime Axetil] Nausea Only     Nausea and upset stomach     Social History     Socioeconomic History    Marital status:      Spouse name: Not on file    Number of children: Not on file    Years of education: Not on file    Highest education level: Not on file   Occupational History    Not on file   Tobacco Use    TIA (transient ischemic attack)      Past Surgical History:   Procedure Laterality Date    ACROMIOPLASTY Left 2016    SHOULDER ARTHROCOPY LABRAL DEBRIDEMENT      SECTION, LOW TRANSVERSE      DILATION AND CURETTAGE OF UTERUS      OTHER SURGICAL HISTORY      uterine lining removed by laser    SHOULDER SURGERY      THROAT SURGERY      camera    TONSILLECTOMY AND ADENOIDECTOMY      TUBAL LIGATION       Family History   Problem Relation Age of Onset    Heart Disease Father     Asthma Father     Cancer Paternal Grandfather         throat    Asthma Son    Plan  Patient is having lots of problems with need I think she would benefit from repeat arthroscopy of her knee and she does agree with with this. She is familiar with the risk and benefits as well as the typical postoperative course as she has had this before    Patient does also report that she is scheduled to get epidural steroid injections for her low back. She continues to have problems with that she may see my partner Dr. Katelynn Casarez      Provider Attestation:  Cesario Ahumada, personally performed the services described in this documentation. All medical record entries made by the scribe were at my direction and in my presence. I have reviewed the chart and discharge instructions and agree that the records reflect my personal performance and is accurate and complete. Grant Beth MD. 06/15/22      Please note that this chart was generated using voice recognition Dragon dictation software. Although every effort was made to ensure the accuracy of this automated transcription, some errors in transcription may have occurred.

## 2022-06-16 ENCOUNTER — TELEPHONE (OUTPATIENT)
Dept: PRIMARY CARE CLINIC | Age: 62
End: 2022-06-16

## 2022-06-16 RX ORDER — ONDANSETRON 4 MG/1
TABLET, FILM COATED ORAL
Qty: 30 TABLET | Refills: 2 | Status: SHIPPED | OUTPATIENT
Start: 2022-06-16 | End: 2022-08-22

## 2022-06-16 NOTE — TELEPHONE ENCOUNTER
Patient states she is scheduled for right knee surgery 7/7/22. Patient states  office is needing note stating how long should patient stop coumadin before surgery.      Please advise     Call pt back [phone]

## 2022-06-17 ENCOUNTER — TELEPHONE (OUTPATIENT)
Dept: ORTHOPEDIC SURGERY | Age: 62
End: 2022-06-17

## 2022-06-17 NOTE — TELEPHONE ENCOUNTER
DANIEL with patient informing her that her PCP contacted us and stated that she should stop the warfarin 4 days prior to her upcoming sx with Dr. Shay Bro.

## 2022-06-17 NOTE — TELEPHONE ENCOUNTER
Idris Patel from pts pcp office called in stating pt can stop warfarin 4 days prior to surgery. Idris Patel did state she did reach out to pt to advise her of this but was unable to reach her but did leave pt a VM for any further questions Idris Patel can be reached at [phone] opt 3
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
## 2022-06-22 NOTE — H&P
HISTORY and [id number]       NAME:  Stephanie Smith  MRN: [id number]   YOB: 1960   Date: 6/23/2022   Age: 58 y.o. Gender: female       COMPLAINT AND PRESENT HISTORY:   Stephanie Smith  is a 58 y.o. female presenting today for KNEE ARTHROSCOPIC PARTIAL MEDIAL MENISCECTOMY as r/t Tear of medial meniscus of right knee, current, unspecified tear type, initial encounter. Pt states she had a fall a couple of months ago and has been experiencing pain in the joint since. She states about 4 years ago she had pain in the same knee and had a scope at that time, she reports she was doing well until her recent fall. She currently reports pain 10/10 in severity with bending, going upstairs, long periods of walking. She states pain dissapates with rest and elevation. MRI was completed see below for details. She denies any falls but does state the joint does give out on her and has had some near misses. Pt has a PMHX significant for TIA-reports due to  A \"tiny hole in my heart too small to fix\" does take coumadin, emphysema/copd-states only problematic with high temps and humidity. EKG done today (preliminary)  Normal sinus rhythm  Normal ECG     Pt does not wear dentures. Pt denies any hx of MRSA infection  Pt on coumadin, instructions from MD to hold for 4 days prior to surgery. Pt denies any personal or FHx of complications with anesthesia. Pt denies any acute symptoms of illness at this time including no SOB, CP, fever, URI or UTI symptoms. RECENT IMAGING      MRI KNEE RIGHT WO CONTRAST    Result Date: 6/4/2022  1. Degenerative tearing involving both articular surfaces in the posterior horn and body of the medial meniscus with slight outward subluxation of the medial meniscus body. 2. Mild superior and inferior patellar tendinosis. 3. Small joint effusion.  4. Moderate to severe patellofemoral chondromalacia with underlying subchondral reactive marrow changes in the central femoral trochlea. 5. Mild lateral compartment chondromalacia. Mild-to-moderate medial compartment chondromalacia. PAST MEDICAL HISTORY     Past Medical History:   Diagnosis Date    Acid reflux     Arthritis     neck, back, knees    Asthma     Asthma     COPD (chronic obstructive pulmonary disease) ([facility] [address] 10/20/2015    Emphysema     Headache(784.0)     Heart abnormality     minute hole in heart per pt found in  after TIA    Osteoarthritis     Osteoporosis     Seborrheic keratoses 2019    TIA (transient ischemic attack)        SURGICAL HISTORY       Past Surgical History:   Procedure Laterality Date    ACROMIOPLASTY Left 2016    SHOULDER ARTHROCOPY LABRAL DEBRIDEMENT      SECTION, LOW TRANSVERSE      COLONOSCOPY      DILATION AND CURETTAGE OF UTERUS      KNEE ARTHROSCOPY Right     OTHER SURGICAL HISTORY      uterine lining removed by laser    SHOULDER SURGERY      THROAT SURGERY      camera    TONSILLECTOMY AND ADENOIDECTOMY      TUBAL LIGATION         FAMILY HISTORY       Family History   Problem Relation Age of Onset    Heart Disease Father     Asthma Father     Cancer Paternal Grandfather         throat    Asthma Son        SOCIAL HISTORY       Social History     Socioeconomic History    Marital status:      Spouse name: None    Number of children: None    Years of education: None    Highest education level: None   Occupational History    None   Tobacco Use    Smoking status: Current Every Day Smoker     Packs/day: 0.50     Years: 46.00     Pack years: 23.00     Types: Cigarettes     Start date:     Smokeless tobacco: Never Used   Vaping Use    Vaping Use: Every day   Substance and Sexual Activity    Alcohol use:  Yes     Alcohol/week: 0.0 standard drinks     Comment: OCCASIONAL    Drug use: No    Sexual activity: None   Other Topics Concern    None   Social History Narrative    None     Social Determinants of Health Financial Resource Strain: Low Risk     Difficulty of Paying Living Expenses: Not hard at all   Food Insecurity: No Food Insecurity    Worried About Running Out of Food in the Last Year: Never true    Al of Food in the Last Year: Never true   Transportation Needs:     Lack of Transportation (Medical): Not on file    Lack of Transportation (Non-Medical): Not on file   Physical Activity:     Days of Exercise per Week: Not on file    Minutes of Exercise per Session: Not on file   Stress:     Feeling of Stress : Not on file   Social Connections:     Frequency of Communication with Friends and Family: Not on file    Frequency of Social Gatherings with Friends and Family: Not on file    Attends Quaker Services: Not on file    Active Member of [address] [place] [address] [employer] or Organizations: Not on file    Attends Club or Organization Meetings: Not on file    Marital Status: Not on file   Intimate Partner Violence:     Fear of Current or Ex-Partner: Not on file    Emotionally Abused: Not on file    Physically Abused: Not on file    Sexually Abused: Not on file   Housing Stability:     Unable to Pay for Housing in the Last Year: Not on file    Number of [place] in the Last Year: Not on file    Unstable Housing in the Last Year: Not on file           REVIEW OF SYSTEMS      Allergies   Allergen Reactions    Eggs Or Egg-Derived Products Other (See Comments)     Severe abdominal pains    Bupropion Nausea Only    Ceftin [Cefuroxime Axetil] Nausea Only     Nausea and upset stomach       Current Outpatient Medications on File Prior to Encounter   Medication Sig Dispense Refill    HYDROcodone-acetaminophen ([place]) 7.5-325 MG per tablet Take 1 tablet by mouth every 6 hours as needed for Pain.       ondansetron (ZOFRAN) 4 MG tablet TAKE ONE TABLET BY MOUTH EVERY 8 HOURS AS NEEDED FOR NASUEA OR VOMITING 30 tablet 2    divalproex (DEPAKOTE) 250 MG DR tablet 1 TABLET BY MOUTH TWICE DAILY 60 tablet 3    pregabalin (LYRICA) 50 MG capsule Take 1 capsule by mouth 3 times daily for 90 days. 90 capsule 2    busPIRone (BUSPAR) 15 MG tablet Take 15 mg by mouth 2 times daily 60 tablet 3    warfarin (COUMADIN) 4 MG tablet 6mg Monday through Friday  and 8 mg Friday through Sunday 50 tablet 8    omeprazole (PRILOSEC) 20 MG delayed release capsule TAKE ONE CAPSULE BY MOUTH DAILY 30 capsule 3    DULoxetine (CYMBALTA) 60 MG extended release capsule TAKE ONE CAPSULE BY MOUTH DAILY 30 capsule 5    fluticasone-salmeterol (ADVAIR) 250-50 MCG/DOSE AEPB INHALE ONE PUFF BY MOUTH EVERY 12 HOURS 60 each 5    albuterol-ipratropium (COMBIVENT RESPIMAT)  MCG/ACT AERS inhaler Inhale 1 puff into the lungs every 6 hours Increased to every 4 hrs if needed. 3 Inhaler 3     No current facility-administered medications on file prior to encounter. Review of Systems   Constitutional: Negative for chills, diaphoresis, fatigue and fever. HENT: Positive for dental problem. Negative for congestion, ear pain, postnasal drip, rhinorrhea, sinus pressure, sinus pain, sore throat and trouble swallowing. Eyes: Positive for visual disturbance. Minimal vision to right eye. Respiratory: Negative for apnea, cough, chest tightness, shortness of breath and wheezing. Cardiovascular: Negative for chest pain, palpitations and leg swelling. Gastrointestinal: Negative for abdominal distention, abdominal pain, constipation, diarrhea, nausea and vomiting. Genitourinary: Negative for dysuria, flank pain, frequency and hematuria. Musculoskeletal: Positive for arthralgias and gait problem. Negative for back pain, joint swelling and myalgias. SEE HPI    Skin: Positive for color change. Negative for rash and wound. Sunburn    Neurological: Positive for numbness. Negative for dizziness, weakness and headaches. Right hand   Hematological: Bruises/bleeds easily. Psychiatric/Behavioral: Negative for agitation and confusion. Redness noted-pt states recent sunburn. Neurological:      General: No focal deficit present. Mental Status: She is alert and oriented to person, place, and time. Mental status is at baseline. Sensory: No sensory deficit. Psychiatric:         Mood and Affect: Mood normal.         Behavior: Behavior normal.         Thought Content: Thought content normal.         Judgment: Judgment normal.                                                                                         PROVISIONAL DIAGNOSES / SURGERY:      KNEE ARTHROSCOPIC PARTIAL MEDIAL MENISCECTOMY     Tear of medial meniscus of right knee, current, unspecified tear type, initial encounter     Patient Active Problem List    Diagnosis Date Noted    Lumbar radiculopathy     Mood disorder ([address] 01/25/2021    Unspecified inflammatory spondylopathy, lumbar region ([address] 10/27/2020    Dyspareunia in female 10/27/2020    History of transient ischemic attack (TIA) 07/13/2020    Thumb tendonitis 04/13/2020    Carpal tunnel syndrome of right wrist 04/13/2020    Dupuytren's contracture of both hands 04/13/2020    Arthritis of lumbar spine 04/13/2020    Lumbar radiculopathy, right 04/13/2020    Abnormal MRI, lumbar spine 04/13/2020    Chronic depressive disorder 10/14/2019    Seborrheic keratoses 09/18/2019    Neoplasm of uncertain behavior of skin 09/18/2019    Melanocytic nevus of trunk 09/18/2019    Spider veins of both lower extremities 09/18/2019    Lentigines 09/18/2019    Lumbar disc disease 07/17/2018    Stress at home 05/26/2016    Anxiety 10/20/2015    COPD (chronic obstructive pulmonary disease) ([facility] [address] 10/20/2015    TIA (transient ischemic attack)     Pulmonary emphysema (HCC)     Arthritis     Headache     Osteoporosis            Total time spent on encounter- PAT provider minutes: 21-30 minutes    No clearance requested.    NOHEMY Marquis - CNP on 6/23/2022 at 5:01 PM

## 2022-06-23 ENCOUNTER — HOSPITAL ENCOUNTER (OUTPATIENT)
Dept: PREADMISSION TESTING | Age: 62
Discharge: HOME | End: 2022-06-27
Payer: MEDICARE

## 2022-06-23 VITALS
HEART RATE: 85 BPM | BODY MASS INDEX: 28.51 KG/M2 | WEIGHT: 167 LBS | TEMPERATURE: 97.9 F | SYSTOLIC BLOOD PRESSURE: 109 MMHG | RESPIRATION RATE: 16 BRPM | HEIGHT: 64 IN | OXYGEN SATURATION: 99 % | DIASTOLIC BLOOD PRESSURE: 75 MMHG

## 2022-06-23 DIAGNOSIS — Z01.818 PREOP EXAMINATION: ICD-10-CM

## 2022-06-23 LAB
ABSOLUTE EOS #: 0.1 K/UL (ref 0–0.4)
ABSOLUTE LYMPH #: 2.6 K/UL (ref 1–4.8)
ABSOLUTE MONO #: 0.6 K/UL (ref 0.1–1.3)
ANION GAP SERPL CALCULATED.3IONS-SCNC: 9 MMOL/L (ref 9–17)
BASOPHILS # BLD: 1 % (ref 0–2)
BASOPHILS ABSOLUTE: 0 K/UL (ref 0–0.2)
BUN BLDV-MCNC: 9 MG/DL (ref 8–23)
CALCIUM SERPL-MCNC: 8.9 MG/DL (ref 8.6–10.4)
CHLORIDE BLD-SCNC: 100 MMOL/L (ref 98–107)
CO2: 27 MMOL/L (ref 20–31)
CREAT SERPL-MCNC: 0.88 MG/DL (ref 0.5–0.9)
EOSINOPHILS RELATIVE PERCENT: 1 % (ref 0–4)
GFR AFRICAN AMERICAN: >60 ML/MIN
GFR NON-AFRICAN AMERICAN: >60 ML/MIN
GFR SERPL CREATININE-BSD FRML MDRD: NORMAL ML/MIN/{1.73_M2}
GLUCOSE BLD-MCNC: 74 MG/DL (ref 70–99)
HCT VFR BLD CALC: 36.6 % (ref 36–46)
HEMOGLOBIN: 12.2 G/DL (ref 12–16)
LYMPHOCYTES # BLD: 36 % (ref 24–44)
MCH RBC QN AUTO: 30.3 PG (ref 26–34)
MCHC RBC AUTO-ENTMCNC: 33.4 G/DL (ref 31–37)
MCV RBC AUTO: 90.7 FL (ref 80–100)
MONOCYTES # BLD: 8 % (ref 1–7)
PDW BLD-RTO: 14.6 % (ref 11.5–14.9)
PLATELET # BLD: 282 K/UL (ref 150–450)
PMV BLD AUTO: 7.1 FL (ref 6–12)
POTASSIUM SERPL-SCNC: 4.2 MMOL/L (ref 3.7–5.3)
RBC # BLD: 4.04 M/UL (ref 4–5.2)
SEG NEUTROPHILS: 54 % (ref 36–66)
SEGMENTED NEUTROPHILS ABSOLUTE COUNT: 4 K/UL (ref 1.3–9.1)
SODIUM BLD-SCNC: 136 MMOL/L (ref 135–144)
WBC # BLD: 7.4 K/UL (ref 3.5–11)

## 2022-06-23 PROCEDURE — 93005 ELECTROCARDIOGRAM TRACING: CPT | Performed by: NURSE PRACTITIONER

## 2022-06-23 PROCEDURE — 36415 COLL VENOUS BLD VENIPUNCTURE: CPT

## 2022-06-23 PROCEDURE — 80048 BASIC METABOLIC PNL TOTAL CA: CPT

## 2022-06-23 PROCEDURE — APPSS30 APP SPLIT SHARED TIME 16-30 MINUTES: Performed by: NURSE PRACTITIONER

## 2022-06-23 PROCEDURE — 85025 COMPLETE CBC W/AUTO DIFF WBC: CPT

## 2022-06-23 RX ORDER — HYDROCODONE BITARTRATE AND ACETAMINOPHEN 7.5; 325 MG/1; MG/1
1 TABLET ORAL EVERY 6 HOURS PRN
COMMUNITY
End: 2022-07-21 | Stop reason: SDUPTHER

## 2022-06-23 ASSESSMENT — PAIN DESCRIPTION - LOCATION: LOCATION: KNEE

## 2022-06-23 ASSESSMENT — PAIN DESCRIPTION - DESCRIPTORS: DESCRIPTORS: ACHING

## 2022-06-23 ASSESSMENT — ENCOUNTER SYMPTOMS
ROS SKIN COMMENTS: SUNBURN
ABDOMINAL DISTENTION: 0
CONSTIPATION: 0
COLOR CHANGE: 1
RHINORRHEA: 0
SORE THROAT: 0
SINUS PAIN: 0
ABDOMINAL PAIN: 0
COUGH: 0
SINUS PRESSURE: 0
VOMITING: 0
DIARRHEA: 0
SHORTNESS OF BREATH: 0
NAUSEA: 0
CHEST TIGHTNESS: 0
APNEA: 0
BACK PAIN: 0
WHEEZING: 0
TROUBLE SWALLOWING: 0

## 2022-06-23 ASSESSMENT — PAIN DESCRIPTION - ORIENTATION: ORIENTATION: RIGHT

## 2022-06-23 ASSESSMENT — PAIN DESCRIPTION - PAIN TYPE: TYPE: ACUTE PAIN

## 2022-06-23 ASSESSMENT — PAIN SCALES - GENERAL: PAINLEVEL_OUTOF10: 5

## 2022-06-24 LAB
EKG ATRIAL RATE: 81 BPM
EKG P AXIS: 54 DEGREES
EKG P-R INTERVAL: 164 MS
EKG Q-T INTERVAL: 376 MS
EKG QRS DURATION: 90 MS
EKG QTC CALCULATION (BAZETT): 436 MS
EKG R AXIS: 42 DEGREES
EKG T AXIS: 31 DEGREES
EKG VENTRICULAR RATE: 81 BPM

## 2022-06-24 PROCEDURE — 93010 ELECTROCARDIOGRAM REPORT: CPT | Performed by: INTERNAL MEDICINE

## 2022-07-06 ENCOUNTER — ANESTHESIA EVENT (OUTPATIENT)
Dept: OPERATING ROOM | Age: 62
End: 2022-07-06
Payer: MEDICARE

## 2022-07-07 ENCOUNTER — ANESTHESIA (OUTPATIENT)
Dept: OPERATING ROOM | Age: 62
End: 2022-07-07
Payer: MEDICARE

## 2022-07-07 ENCOUNTER — TELEPHONE (OUTPATIENT)
Dept: ORTHOPEDIC SURGERY | Age: 62
End: 2022-07-07

## 2022-07-07 ENCOUNTER — HOSPITAL ENCOUNTER (OUTPATIENT)
Age: 62
Setting detail: OUTPATIENT SURGERY
Discharge: HOME | End: 2022-07-07
Attending: ORTHOPAEDIC SURGERY | Admitting: ORTHOPAEDIC SURGERY
Payer: MEDICARE

## 2022-07-07 VITALS
HEIGHT: 63 IN | HEART RATE: 71 BPM | RESPIRATION RATE: 14 BRPM | DIASTOLIC BLOOD PRESSURE: 90 MMHG | BODY MASS INDEX: 29.59 KG/M2 | TEMPERATURE: 96.5 F | SYSTOLIC BLOOD PRESSURE: 143 MMHG | OXYGEN SATURATION: 99 % | WEIGHT: 167 LBS

## 2022-07-07 DIAGNOSIS — S83.241A ACUTE MEDIAL MENISCUS TEAR OF RIGHT KNEE, INITIAL ENCOUNTER: Primary | ICD-10-CM

## 2022-07-07 PROCEDURE — 7100000000 HC PACU RECOVERY - FIRST 15 MIN: Performed by: ORTHOPAEDIC SURGERY

## 2022-07-07 PROCEDURE — 3700000000 HC ANESTHESIA ATTENDED CARE: Performed by: ORTHOPAEDIC SURGERY

## 2022-07-07 PROCEDURE — 3700000001 HC ADD 15 MINUTES (ANESTHESIA): Performed by: ORTHOPAEDIC SURGERY

## 2022-07-07 PROCEDURE — 6360000002 HC RX W HCPCS: Performed by: NURSE ANESTHETIST, CERTIFIED REGISTERED

## 2022-07-07 PROCEDURE — 2500000003 HC RX 250 WO HCPCS: Performed by: NURSE ANESTHETIST, CERTIFIED REGISTERED

## 2022-07-07 PROCEDURE — 7100000001 HC PACU RECOVERY - ADDTL 15 MIN: Performed by: ORTHOPAEDIC SURGERY

## 2022-07-07 PROCEDURE — 2709999900 HC NON-CHARGEABLE SUPPLY: Performed by: ORTHOPAEDIC SURGERY

## 2022-07-07 PROCEDURE — 3600000013 HC SURGERY LEVEL 3 ADDTL 15MIN: Performed by: ORTHOPAEDIC SURGERY

## 2022-07-07 PROCEDURE — 6360000002 HC RX W HCPCS: Performed by: ORTHOPAEDIC SURGERY

## 2022-07-07 PROCEDURE — 2580000003 HC RX 258: Performed by: ANESTHESIOLOGY

## 2022-07-07 PROCEDURE — 29881 ARTHRS KNE SRG MNISECTMY M/L: CPT | Performed by: ORTHOPAEDIC SURGERY

## 2022-07-07 PROCEDURE — 7100000011 HC PHASE II RECOVERY - ADDTL 15 MIN: Performed by: ORTHOPAEDIC SURGERY

## 2022-07-07 PROCEDURE — 7100000010 HC PHASE II RECOVERY - FIRST 15 MIN: Performed by: ORTHOPAEDIC SURGERY

## 2022-07-07 PROCEDURE — 3600000003 HC SURGERY LEVEL 3 BASE: Performed by: ORTHOPAEDIC SURGERY

## 2022-07-07 PROCEDURE — 6360000002 HC RX W HCPCS: Performed by: ANESTHESIOLOGY

## 2022-07-07 RX ORDER — HYDRALAZINE HYDROCHLORIDE 20 MG/ML
10 INJECTION INTRAMUSCULAR; INTRAVENOUS
Status: DISCONTINUED | OUTPATIENT
Start: 2022-07-07 | End: 2022-07-07 | Stop reason: HOSPADM

## 2022-07-07 RX ORDER — HYDROCODONE BITARTRATE AND ACETAMINOPHEN 5; 325 MG/1; MG/1
1 TABLET ORAL EVERY 6 HOURS PRN
Status: DISCONTINUED | OUTPATIENT
Start: 2022-07-07 | End: 2022-07-07 | Stop reason: HOSPADM

## 2022-07-07 RX ORDER — SODIUM CHLORIDE 0.9 % (FLUSH) 0.9 %
5-40 SYRINGE (ML) INJECTION PRN
Status: DISCONTINUED | OUTPATIENT
Start: 2022-07-07 | End: 2022-07-07 | Stop reason: HOSPADM

## 2022-07-07 RX ORDER — LABETALOL HYDROCHLORIDE 5 MG/ML
10 INJECTION, SOLUTION INTRAVENOUS
Status: DISCONTINUED | OUTPATIENT
Start: 2022-07-07 | End: 2022-07-07 | Stop reason: HOSPADM

## 2022-07-07 RX ORDER — FENTANYL CITRATE 50 UG/ML
25 INJECTION, SOLUTION INTRAMUSCULAR; INTRAVENOUS EVERY 5 MIN PRN
Status: DISCONTINUED | OUTPATIENT
Start: 2022-07-07 | End: 2022-07-07 | Stop reason: HOSPADM

## 2022-07-07 RX ORDER — DEXAMETHASONE SODIUM PHOSPHATE 4 MG/ML
INJECTION, SOLUTION INTRA-ARTICULAR; INTRALESIONAL; INTRAMUSCULAR; INTRAVENOUS; SOFT TISSUE PRN
Status: DISCONTINUED | OUTPATIENT
Start: 2022-07-07 | End: 2022-07-07 | Stop reason: SDUPTHER

## 2022-07-07 RX ORDER — SODIUM CHLORIDE 0.9 % (FLUSH) 0.9 %
5-40 SYRINGE (ML) INJECTION EVERY 12 HOURS SCHEDULED
Status: DISCONTINUED | OUTPATIENT
Start: 2022-07-07 | End: 2022-07-07 | Stop reason: HOSPADM

## 2022-07-07 RX ORDER — SODIUM CHLORIDE 9 MG/ML
INJECTION, SOLUTION INTRAVENOUS PRN
Status: DISCONTINUED | OUTPATIENT
Start: 2022-07-07 | End: 2022-07-07 | Stop reason: HOSPADM

## 2022-07-07 RX ORDER — SODIUM CHLORIDE, SODIUM LACTATE, POTASSIUM CHLORIDE, CALCIUM CHLORIDE 600; 310; 30; 20 MG/100ML; MG/100ML; MG/100ML; MG/100ML
INJECTION, SOLUTION INTRAVENOUS CONTINUOUS
Status: DISCONTINUED | OUTPATIENT
Start: 2022-07-07 | End: 2022-07-07 | Stop reason: HOSPADM

## 2022-07-07 RX ORDER — MEPERIDINE HYDROCHLORIDE 25 MG/ML
12.5 INJECTION INTRAMUSCULAR; INTRAVENOUS; SUBCUTANEOUS EVERY 5 MIN PRN
Status: DISCONTINUED | OUTPATIENT
Start: 2022-07-07 | End: 2022-07-07 | Stop reason: HOSPADM

## 2022-07-07 RX ORDER — ROPIVACAINE HYDROCHLORIDE 5 MG/ML
INJECTION, SOLUTION EPIDURAL; INFILTRATION; PERINEURAL PRN
Status: DISCONTINUED | OUTPATIENT
Start: 2022-07-07 | End: 2022-07-07 | Stop reason: ALTCHOICE

## 2022-07-07 RX ORDER — ONDANSETRON 2 MG/ML
4 INJECTION INTRAMUSCULAR; INTRAVENOUS
Status: COMPLETED | OUTPATIENT
Start: 2022-07-07 | End: 2022-07-07

## 2022-07-07 RX ORDER — HYDROCODONE BITARTRATE AND ACETAMINOPHEN 5; 325 MG/1; MG/1
1 TABLET ORAL EVERY 6 HOURS PRN
Qty: 20 TABLET | Refills: 0 | Status: SHIPPED | OUTPATIENT
Start: 2022-07-07 | End: 2022-07-12

## 2022-07-07 RX ORDER — ONDANSETRON 2 MG/ML
4 INJECTION INTRAMUSCULAR; INTRAVENOUS
Status: DISCONTINUED | OUTPATIENT
Start: 2022-07-07 | End: 2022-07-07 | Stop reason: HOSPADM

## 2022-07-07 RX ORDER — FENTANYL CITRATE 50 UG/ML
INJECTION, SOLUTION INTRAMUSCULAR; INTRAVENOUS PRN
Status: DISCONTINUED | OUTPATIENT
Start: 2022-07-07 | End: 2022-07-07 | Stop reason: SDUPTHER

## 2022-07-07 RX ORDER — DIPHENHYDRAMINE HYDROCHLORIDE 50 MG/ML
12.5 INJECTION INTRAMUSCULAR; INTRAVENOUS
Status: DISCONTINUED | OUTPATIENT
Start: 2022-07-07 | End: 2022-07-07 | Stop reason: HOSPADM

## 2022-07-07 RX ORDER — PROPOFOL 10 MG/ML
INJECTION, EMULSION INTRAVENOUS PRN
Status: DISCONTINUED | OUTPATIENT
Start: 2022-07-07 | End: 2022-07-07 | Stop reason: SDUPTHER

## 2022-07-07 RX ORDER — METOCLOPRAMIDE HYDROCHLORIDE 5 MG/ML
10 INJECTION INTRAMUSCULAR; INTRAVENOUS
Status: DISCONTINUED | OUTPATIENT
Start: 2022-07-07 | End: 2022-07-07 | Stop reason: HOSPADM

## 2022-07-07 RX ORDER — METOCLOPRAMIDE HYDROCHLORIDE 5 MG/ML
10 INJECTION INTRAMUSCULAR; INTRAVENOUS
Status: COMPLETED | OUTPATIENT
Start: 2022-07-07 | End: 2022-07-07

## 2022-07-07 RX ORDER — ONDANSETRON 2 MG/ML
INJECTION INTRAMUSCULAR; INTRAVENOUS PRN
Status: DISCONTINUED | OUTPATIENT
Start: 2022-07-07 | End: 2022-07-07 | Stop reason: SDUPTHER

## 2022-07-07 RX ORDER — LIDOCAINE HYDROCHLORIDE 10 MG/ML
1 INJECTION, SOLUTION EPIDURAL; INFILTRATION; INTRACAUDAL; PERINEURAL
Status: DISCONTINUED | OUTPATIENT
Start: 2022-07-07 | End: 2022-07-07 | Stop reason: HOSPADM

## 2022-07-07 RX ORDER — LIDOCAINE HYDROCHLORIDE 10 MG/ML
INJECTION, SOLUTION EPIDURAL; INFILTRATION; INTRACAUDAL; PERINEURAL PRN
Status: DISCONTINUED | OUTPATIENT
Start: 2022-07-07 | End: 2022-07-07 | Stop reason: SDUPTHER

## 2022-07-07 RX ORDER — DIPHENHYDRAMINE HYDROCHLORIDE 50 MG/ML
12.5 INJECTION INTRAMUSCULAR; INTRAVENOUS
Status: COMPLETED | OUTPATIENT
Start: 2022-07-07 | End: 2022-07-07

## 2022-07-07 RX ADMIN — DIPHENHYDRAMINE HYDROCHLORIDE 12.5 MG: 50 INJECTION, SOLUTION INTRAMUSCULAR; INTRAVENOUS at 12:03

## 2022-07-07 RX ADMIN — METOCLOPRAMIDE 10 MG: 5 INJECTION, SOLUTION INTRAMUSCULAR; INTRAVENOUS at 12:13

## 2022-07-07 RX ADMIN — LIDOCAINE HYDROCHLORIDE 40 MG: 10 INJECTION, SOLUTION EPIDURAL; INFILTRATION; INTRACAUDAL; PERINEURAL at 11:14

## 2022-07-07 RX ADMIN — FENTANYL CITRATE 25 MCG: 50 INJECTION, SOLUTION INTRAMUSCULAR; INTRAVENOUS at 11:26

## 2022-07-07 RX ADMIN — PROPOFOL 200 MG: 10 INJECTION, EMULSION INTRAVENOUS at 11:14

## 2022-07-07 RX ADMIN — FENTANYL CITRATE 25 MCG: 50 INJECTION, SOLUTION INTRAMUSCULAR; INTRAVENOUS at 11:14

## 2022-07-07 RX ADMIN — ONDANSETRON 4 MG: 2 INJECTION INTRAMUSCULAR; INTRAVENOUS at 11:17

## 2022-07-07 RX ADMIN — ONDANSETRON 4 MG: 2 INJECTION INTRAMUSCULAR; INTRAVENOUS at 12:03

## 2022-07-07 RX ADMIN — SODIUM CHLORIDE, POTASSIUM CHLORIDE, SODIUM LACTATE AND CALCIUM CHLORIDE: 600; 310; 30; 20 INJECTION, SOLUTION INTRAVENOUS at 09:51

## 2022-07-07 RX ADMIN — DEXAMETHASONE SODIUM PHOSPHATE 4 MG: 4 INJECTION, SOLUTION INTRAMUSCULAR; INTRAVENOUS at 11:17

## 2022-07-07 RX ADMIN — FENTANYL CITRATE 50 MCG: 50 INJECTION, SOLUTION INTRAMUSCULAR; INTRAVENOUS at 11:29

## 2022-07-07 ASSESSMENT — PAIN - FUNCTIONAL ASSESSMENT: PAIN_FUNCTIONAL_ASSESSMENT: 0-10

## 2022-07-07 ASSESSMENT — LIFESTYLE VARIABLES: SMOKING_STATUS: 0

## 2022-07-07 ASSESSMENT — ENCOUNTER SYMPTOMS
STRIDOR: 0
SHORTNESS OF BREATH: 0

## 2022-07-07 ASSESSMENT — COPD QUESTIONNAIRES: CAT_SEVERITY: NO INTERVAL CHANGE

## 2022-07-07 ASSESSMENT — PAIN SCALES - GENERAL: PAINLEVEL_OUTOF10: 0

## 2022-07-07 NOTE — ANESTHESIA POSTPROCEDURE EVALUATION
POST- ANESTHESIA EVALUATION       Pt Name: Stephanie Chamberlain  MRN: [id number]  YOB: 1960  Date of evaluation: 7/7/2022  Time:  12:14 PM      /85   Pulse 75   Temp 97.2 °F (36.2 °C) (Infrared)   Resp 12   Ht 5' 3\" (1.6 m)   Wt 167 lb (75.8 kg)   SpO2 99%   BMI 29.58 kg/m²      Consciousness Level  Awake  Cardiopulmonary Status  Stable  Pain Adequately Treated YES  Nausea / Vomiting  NO  Adequate Hydration  YES  Anesthesia Related Complications NONE      Electronically signed by Bushra Bautista MD on 7/7/2022 at 12:14 PM       Department of Anesthesiology  Postprocedure Note    Patient: Deloris Aguilar  MRN: [id number]  YOB: 1960  Date of evaluation: 7/7/2022      Procedure Summary     Date: 07/07/22 Room / Location: [address] [place] [address] / [facility]: [facility]    Anesthesia Start: 1109 Anesthesia Stop: 7039    Procedure: KNEE ARTHROSCOPIC PARTIAL MEDIAL MENISCECTOMY (Right Knee) Diagnosis:       Tear of medial meniscus of right knee, current, unspecified tear type, initial encounter      (Tear of medial meniscus of right knee, current, unspecified tear type, initial encounter [H38.569W])    Surgeons: Gina Alexander MD Responsible Provider: Bushra Bautista MD    Anesthesia Type: general ASA Status: 3          Anesthesia Type: No value filed.     Beth Phase I: Beth Score: 8    Beth Phase II:        Anesthesia Post Evaluation

## 2022-07-07 NOTE — INTERVAL H&P NOTE
Update History & Physical    The patient's History and Physical of June 23, 2022 was reviewed with the patient and I examined the patient. There was no change. The surgical site was confirmed by the patient and me. Patient will be having: KNEE ARTHROSCOPIC PARTIAL MEDIAL MENISCECTOMY - Right. Patient complains of present pain of 10/10 when walking. Patient has been NPO since midnight. No blood thinners in the past 4 days. Patient has been off coumadin since last Saturday. Patient denies any personal or family problems with anesthesia.          Electronically signed by NOHEMY Dorsey CNP on 7/7/2022 at 9:00 AM

## 2022-07-07 NOTE — ANESTHESIA PRE PROCEDURE
Department of Anesthesiology  Preprocedure Note       Name:  Osmany Haley   Age:  58 y.o.  :  1960                                          MRN:  [id number]         Date:  2022      Surgeon: Arnol Austin):  Justin Marr MD    Procedure: Procedure(s):  KNEE ARTHROSCOPIC PARTIAL MEDIAL MENISCECTOMY    Medications prior to admission:   Prior to Admission medications    Medication Sig Start Date End Date Taking? Authorizing Provider   HYDROcodone-acetaminophen ([place]) 5-325 MG per tablet Take 1 tablet by mouth every 6 hours as needed for Pain for up to 5 days. Intended supply: 5 days. Take lowest dose possible to manage pain 22 Yes Justin Marr MD   HYDROcodone-acetaminophen [facility]) 7.5-325 MG per tablet Take 1 tablet by mouth every 6 hours as needed for Pain. Historical Provider, MD   ondansetron (ZOFRAN) 4 MG tablet TAKE ONE TABLET BY MOUTH EVERY 8 HOURS AS NEEDED FOR NASUEA OR VOMITING 22   Robert Contreras MD   divalproex (DEPAKOTE) 250 MG DR tablet 1 TABLET BY MOUTH TWICE DAILY 6/10/22   Robert Contreras MD   pregabalin (LYRICA) 50 MG capsule Take 1 capsule by mouth 3 times daily for 90 days. 22  Robert Contreras MD   busPIRone (BUSPAR) 15 MG tablet Take 15 mg by mouth 2 times daily 3/18/22   Robert Contreras MD   warfarin (COUMADIN) 4 MG tablet 6mg Monday through Friday  and 8 mg Friday through [place] 3/18/22   Robert Contreras MD   omeprazole (PRILOSEC) 20 MG delayed release capsule TAKE ONE CAPSULE BY MOUTH DAILY 3/3/22   Robert Contreras MD   DULoxetine (CYMBALTA) 60 MG extended release capsule TAKE ONE CAPSULE BY MOUTH DAILY 21   Robert Contreras MD   fluticasone-salmeterol (ADVAIR) 250-50 MCG/DOSE AEPB INHALE ONE PUFF BY MOUTH EVERY 12 HOURS 10/25/21   Robert Contreras MD   albuterol-ipratropium (COMBIVENT RESPIMAT)  MCG/ACT AERS inhaler Inhale 1 puff into the lungs every 6 hours Increased to every 4 hrs if needed.  18   Robert Contreras MD Current medications:    No current facility-administered medications for this encounter. Allergies:     Allergies   Allergen Reactions    Eggs Or Egg-Derived Products Other (See Comments)     Severe abdominal pains    Bupropion Nausea Only    Ceftin [Cefuroxime Axetil] Nausea Only     Nausea and upset stomach       Problem List:    Patient Active Problem List   Diagnosis Code    TIA (transient ischemic attack) G45.9    Pulmonary emphysema ([address] J43.9    Arthritis M19.90    Headache R51.9    Osteoporosis M81.0    Anxiety F41.9    COPD (chronic obstructive pulmonary disease) ([address] J44.9    Stress at home F43.9    Lumbar disc disease M51.9    Seborrheic keratoses L82.1    Neoplasm of uncertain behavior of skin D48.5    Melanocytic nevus of trunk D22.5    Spider veins of both lower extremities I83.93    Lentigines L81.4    Chronic depressive disorder F32.9    Thumb tendonitis M77.8    Carpal tunnel syndrome of right wrist G56.01    Dupuytren's contracture of both hands M72.0    Arthritis of lumbar spine M47.816    Lumbar radiculopathy, right M54.16    Abnormal MRI, lumbar spine R93.7    History of transient ischemic attack (TIA) Z86.73    Unspecified inflammatory spondylopathy, lumbar region ([address] M46.96    Dyspareunia in female N94.10    Mood disorder (HCC) F39    Lumbar radiculopathy M54.16    Preop examination Z01.818       Past Medical History:        Diagnosis Date    Acid reflux     Arthritis     neck, back, knees    Asthma     Asthma     COPD (chronic obstructive pulmonary disease) ([address] 10/20/2015    Emphysema     Headache(784.0)     Heart abnormality     minute hole in heart per pt found in 2003 after TIA    Osteoarthritis     Osteoporosis     Seborrheic keratoses 9/18/2019    TIA (transient ischemic attack) 2003       Past Surgical History:        Procedure Laterality Date    ACROMIOPLASTY Left 2/8/2016    SHOULDER ARTHROCOPY LABRAL DEBRIDEMENT      SECTION, LOW TRANSVERSE      COLONOSCOPY      DILATION AND CURETTAGE OF UTERUS      KNEE ARTHROSCOPY Right     OTHER SURGICAL HISTORY      uterine lining removed by laser    SHOULDER SURGERY      THROAT SURGERY      camera    TONSILLECTOMY AND ADENOIDECTOMY      TUBAL LIGATION         Social History:    Social History     Tobacco Use    Smoking status: Current Every Day Smoker     Packs/day: 0.50     Years: 46.00     Pack years: 23.00     Types: Cigarettes     Start date:     Smokeless tobacco: Never Used   Substance Use Topics    Alcohol use: Yes     Alcohol/week: 0.0 standard drinks     Comment: OCCASIONAL                                Ready to quit: Not Answered  Counseling given: Not Answered      Vital Signs (Current): There were no vitals filed for this visit.                                            BP Readings from Last 3 Encounters:   22 109/75   22 118/80   22 118/72       NPO Status:                                                                                 BMI:   Wt Readings from Last 3 Encounters:   22 167 lb (75.8 kg)   06/15/22 167 lb (75.8 kg)   22 167 lb (75.8 kg)     There is no height or weight on file to calculate BMI.    CBC:   Lab Results   Component Value Date/Time    WBC 7.4 2022 04:55 PM    RBC 4.04 2022 04:55 PM    RBC 4.22 10/29/2020 02:24 PM    HGB 12.2 2022 04:55 PM    HCT 36.6 2022 04:55 PM    MCV 90.7 2022 04:55 PM    RDW 14.6 2022 04:55 PM     2022 04:55 PM     LR    CMP:   Lab Results   Component Value Date/Time     2022 04:55 PM    K 4.2 2022 04:55 PM     2022 04:55 PM    CO2 27 2022 04:55 PM    BUN 9 2022 04:55 PM    CREATININE 0.88 2022 04:55 PM    GFRAA >60 2022 04:55 PM    LABGLOM >60 2022 04:55 PM    GLUCOSE 74 2022 04:55 PM    GLUCOSE 98 10/29/2020 02:24 PM    CALCIUM 8.9 2022 04:55 PM       POC Tests: No results for input(s): POCGLU, POCNA, POCK, POCCL, POCBUN, POCHEMO, POCHCT in the last 72 hours. Coags:   Lab Results   Component Value Date/Time    PROTIME 13.1 09/20/2021 08:34 AM    PROTIME 18.2 07/13/2020 10:49 AM    INR 3.0 05/24/2022 04:16 PM    INR 1.0 09/20/2021 08:34 AM       HCG (If Applicable): No results found for: PREGTESTUR, PREGSERUM, HCG, HCGQUANT     ABGs: No results found for: PHART, PO2ART, CRD4BCN, TFD4IVQ, BEART, E3FPGONL     Type & Screen (If Applicable):  No results found for: LABABO, LABRH    Drug/Infectious Status (If Applicable):  No results found for: HIV, HEPCAB    COVID-19 Screening (If Applicable): No results found for: COVID19        Anesthesia Evaluation  Patient summary reviewed and Nursing notes reviewed no history of anesthetic complications:   Airway: Mallampati: II  TM distance: >3 FB   Neck ROM: full  Mouth opening: > = 3 FB   Dental: normal exam         Pulmonary:normal exam  breath sounds clear to auscultation  (+) COPD: no interval change,  asthma: allergic asthma,     (-) pneumonia, shortness of breath, recent URI, sleep apnea, rhonchi, wheezes, rales, stridor, not a current smoker and no decreased breath sounds          Patient did not smoke on day of surgery.                  Cardiovascular:  Exercise tolerance: good (>4 METS),       (-) pacemaker, hypertension, valvular problems/murmurs, past MI, CAD, CABG/stent, dysrhythmias,  angina,  CHF, orthopnea, PND,  PERALTA, murmur, weak pulses,  friction rub, systolic click, carotid bruit,  JVD, peripheral edema, no pulmonary hypertension and no hyperlipidemia    ECG reviewed  Rhythm: regular  Rate: normal           Beta Blocker:  Not on Beta Blocker         Neuro/Psych:   (+) neuromuscular disease:, TIA, headaches:, psychiatric history:   (-) seizures, CVA and depression/anxiety            GI/Hepatic/Renal:   (+) GERD: no interval change,      (-) hiatal hernia, PUD, hepatitis, liver disease, no renal disease, bowel prep and no morbid obesity       Endo/Other:    (+) : arthritis:., no malignancy/cancer. (-) diabetes mellitus, hypothyroidism, hyperthyroidism, blood dyscrasia, no electrolyte abnormalities, no malignancy/cancer               Abdominal:             Vascular:     - PVD, DVT and PE. Other Findings:           Anesthesia Plan      general     ASA 3       Induction: intravenous. MIPS: Postoperative opioids intended and Prophylactic antiemetics administered. Anesthetic plan and risks discussed with patient. Plan discussed with CRNA.                     Zayda Rendon MD   7/7/2022

## 2022-07-07 NOTE — OP NOTE
Operative Note      Patient: Shelly Cornelius  YOB: 1960  MRN: [id number]    Date of Procedure: 7/7/2022    Pre-Op Diagnosis: Tear of medial meniscus of right knee, current, unspecified tear type, initial encounter [S83.860A]    Post-Op Diagnosis: Same plus grade III chondromalacia medial femoral condyle     Procedure(s):  KNEE ARTHROSCOPIC PARTIAL MEDIAL MENISCECTOMY, chondroplasty medial femoral condyle    Surgeon(s):  Chuyita Jimenes MD    Assistant:   Resident: Alek Blakely DO; Karen Garrett DO    Anesthesia: General    Estimated Blood Loss (mL): Minimal    Complications: None    Specimens:   * No specimens in log *    Implants:  * No implants in log *      Drains: * No LDAs found *    Findings: Degenerative tear of the posterior horn medial meniscus as well as chondroplasty medial femoral condyle    Detailed Description of Procedure:     Patient is a 58y.o. year old female who presents with a history of pain, locking, and giving away sensations of their right knee. Physical examination was positive for Raissa's examination. MRI was consistent with a degenerative tearing of the posterior horn of the medial meniscus. Having failed conservative treatment, it was advised that arthroscopic examination and treatment of their knee would be beneficial and consent was obtained with risk and benefits explained to the patient. The patient was taken tot he operative room and general anesthesia was administered. A tourniquet was placed to the operatives lower extremity and then placed in the leg lipscomb. The leg was exsanguinated and the tourniquet inflated to the 300mmHg. The leg was the prepped and draped in the usual sterile fashion. Time-out was called to verify laterality. Medial and lateral portals were made and the scope placed in the lateral portal. The patella femoral joint was examined and noted grade 2 to early grade III chondromalacia but nothing required intervention. . The scope was then passes down the medial gutter into the medial compartment. A probe was used to assess the medial meniscus and a tear was identified in the posterior horn undersurface of the medial meniscus. A partial medial menisectomy was carried ou with basket forceps and then smoothed out with a shaver. Repeat probing of the meniscus found it to be stable. There was significant relation of the articular cartilage in the weightbearing surface of the medial femoral condyle. Some of this had some loose flaps. This was debrided via chondroplasty via 4.0 tomcat down to a stable rim. The arthroscope was then passed into the notch of the knee. The ACL was found not to have any significant damage or laxity. The scope was then passed in the lateral compartment. Thorough probing of the lateral meniscus and the articular cartilage did not demonstrate any significant finding that requires surgical intervention    The scope was then passed into the suprapatellar area and the shaver was used to remove any further soft tissue debris. The scope was removed and the knee evacuated of fluid and injected with 20cc of 0.5% ropivacaine. The sterile bulky dressing was applied and the leg then wrapped with a large 6-inch ace bandage from toes to the mid-thigh. The tourniquet was then deflated at less than 30 minutes. The patient was awaken and taken to the PACU in good condition.      Electronically signed by Alistair Nguyen MD on 7/7/2022 at 11:42 AM

## 2022-07-07 NOTE — TELEPHONE ENCOUNTER
Patient called in requesting a call back from clinical in regards to post op questions. Please advise thank you!

## 2022-07-18 ENCOUNTER — OFFICE VISIT (OUTPATIENT)
Dept: ORTHOPEDIC SURGERY | Age: 62
End: 2022-07-18

## 2022-07-18 DIAGNOSIS — Z98.890 S/P RIGHT KNEE ARTHROSCOPY: Primary | ICD-10-CM

## 2022-07-18 PROCEDURE — 99024 POSTOP FOLLOW-UP VISIT: CPT | Performed by: ORTHOPAEDIC SURGERY

## 2022-07-18 NOTE — PROGRESS NOTES
Patient returns today status post right knee arthroscopy with partial (medial/lateral) meniscectomy. Patient has no major complaints other than expected tightness/swelling with ROM. Sharp/stabbing pain has improved. On exam, portal sites are without redness or drainage. No calf tenderness; negative Anjana's sign. Motion is 0-100 degrees. No significant effusion. Assessment  Status post right knee arthroscopy    Plan  Patient given exercises to perform. Patient given activities/ motions to complete. Continue activities at home. Return to work. RTO PRN. Call with any future problems.

## 2022-07-19 DIAGNOSIS — F39 MOOD DISORDER (HCC): ICD-10-CM

## 2022-07-19 DIAGNOSIS — F34.1 DYSTHYMIA: ICD-10-CM

## 2022-07-19 RX ORDER — DIVALPROEX SODIUM 250 MG/1
TABLET, DELAYED RELEASE ORAL
Qty: 60 TABLET | Refills: 5 | Status: SHIPPED | OUTPATIENT
Start: 2022-07-19

## 2022-07-19 RX ORDER — OMEPRAZOLE 20 MG/1
CAPSULE, DELAYED RELEASE ORAL
Qty: 30 CAPSULE | Refills: 5 | Status: SHIPPED | OUTPATIENT
Start: 2022-07-19

## 2022-07-21 DIAGNOSIS — M48.061 SPINAL STENOSIS OF LUMBAR REGION WITHOUT NEUROGENIC CLAUDICATION: ICD-10-CM

## 2022-07-21 DIAGNOSIS — M51.9 LUMBAR DISC DISEASE: Primary | ICD-10-CM

## 2022-07-21 RX ORDER — HYDROCODONE BITARTRATE AND ACETAMINOPHEN 7.5; 325 MG/1; MG/1
1 TABLET ORAL EVERY 6 HOURS PRN
Qty: 120 TABLET | Refills: 0 | Status: SHIPPED | OUTPATIENT
Start: 2022-07-21 | End: 2022-09-01 | Stop reason: SDUPTHER

## 2022-07-23 PROBLEM — Z01.818 PREOP EXAMINATION: Status: RESOLVED | Noted: 2022-06-23 | Resolved: 2022-07-23

## 2022-08-20 DIAGNOSIS — M51.9 LUMBAR DISC DISEASE: ICD-10-CM

## 2022-08-20 DIAGNOSIS — M48.061 SPINAL STENOSIS OF LUMBAR REGION WITHOUT NEUROGENIC CLAUDICATION: ICD-10-CM

## 2022-08-22 RX ORDER — ONDANSETRON 4 MG/1
TABLET, FILM COATED ORAL
Qty: 30 TABLET | Refills: 2 | Status: SHIPPED
Start: 2022-08-22 | End: 2022-08-29 | Stop reason: SDUPTHER

## 2022-08-22 RX ORDER — PREGABALIN 50 MG/1
CAPSULE ORAL
Qty: 90 CAPSULE | Refills: 3 | Status: SHIPPED | OUTPATIENT
Start: 2022-08-22 | End: 2022-08-23 | Stop reason: SDUPTHER

## 2022-08-22 NOTE — TELEPHONE ENCOUNTER
LAST VISIT:   5/24/2022     Future Appointments   Date Time Provider Sarah Montano   8/30/2022  4:00 PM MD MACARIO Henry

## 2022-08-22 NOTE — TELEPHONE ENCOUNTER
LAST VISIT:   5/24/2022     Future Appointments   Date Time Provider Sarah Montano   8/30/2022  4:00 PM Justin Lynn MD [facility] [address] [facility]

## 2022-08-23 ENCOUNTER — TELEPHONE (OUTPATIENT)
Dept: PRIMARY CARE CLINIC | Age: 62
End: 2022-08-23

## 2022-08-23 DIAGNOSIS — M51.9 LUMBAR DISC DISEASE: ICD-10-CM

## 2022-08-23 DIAGNOSIS — M48.061 SPINAL STENOSIS OF LUMBAR REGION WITHOUT NEUROGENIC CLAUDICATION: ICD-10-CM

## 2022-08-23 RX ORDER — PREGABALIN 100 MG/1
100 CAPSULE ORAL 3 TIMES DAILY
Qty: 90 CAPSULE | Refills: 1 | Status: SHIPPED | OUTPATIENT
Start: 2022-08-23 | End: 2022-09-22

## 2022-08-23 NOTE — TELEPHONE ENCOUNTER
Pt notified. Instructions given. Pt will call back with list of new ins that she will be getting. Wants to make sure that Dr. Willy Fiore is taking whatever they offer her. Has 5 to choose from.

## 2022-08-23 NOTE — TELEPHONE ENCOUNTER
I am not sure why Tabitha LUQUE is not taking [place] Adv when it's run by [employer]. Is she taking the lyrica ? Can we try to increase  lyrica first  Is she taking the norco 1 1/2 tabs twice a day or how often ?

## 2022-08-23 NOTE — TELEPHONE ENCOUNTER
Pt states that [facility] is no longer take [place] Advantage. Pt has it until the end of the month. Will have 5 new ins's to choose from. Will call back with those. Asking if you would be able to increase her pain med, or change it to something stronger? The Norco 7.5/325, takes 1 1/2 tabs, is not working at all. The back injections were great, was not needing any pain meds. Pt will ck on seeing new P.M. after she gets her new ins. Uses [facility] [address].

## 2022-08-23 NOTE — TELEPHONE ENCOUNTER
She is at the max for the norco  Try increased lyrica dose  What about Dr Maura Briceño or Dr Ramy Wilson  ?  Please call and see if they accept her Edmundo adv

## 2022-08-29 ENCOUNTER — TELEPHONE (OUTPATIENT)
Dept: PRIMARY CARE CLINIC | Age: 62
End: 2022-08-29

## 2022-08-29 DIAGNOSIS — R11.0 NAUSEA: Primary | ICD-10-CM

## 2022-08-29 DIAGNOSIS — U07.1 COVID: ICD-10-CM

## 2022-08-29 RX ORDER — ONDANSETRON 4 MG/1
4 TABLET, FILM COATED ORAL 3 TIMES DAILY PRN
Qty: 30 TABLET | Refills: 0 | Status: SHIPPED | OUTPATIENT
Start: 2022-08-29

## 2022-08-29 NOTE — TELEPHONE ENCOUNTER
Pt tested positive for covid on Sun 8/28/22. Symptoms started 1 week ago. Just started with a temp of 102, cough with lots of phlegm, Nausea, sore throat, headache and wheezing. Lost 12 lbs due to the nausea, not eating. Was able to get a glass of water down yesterday. Please advise  Uses [facility] on [address] listed. All covid instructions given. Advised er, if any sob/difficulty breathing.

## 2022-09-01 ENCOUNTER — OFFICE VISIT (OUTPATIENT)
Dept: PRIMARY CARE CLINIC | Age: 62
End: 2022-09-01
Payer: MEDICARE

## 2022-09-01 ENCOUNTER — HOSPITAL ENCOUNTER (OUTPATIENT)
Dept: GENERAL RADIOLOGY | Age: 62
Discharge: HOME | End: 2022-09-03
Payer: MEDICARE

## 2022-09-01 ENCOUNTER — HOSPITAL ENCOUNTER (OUTPATIENT)
Age: 62
Discharge: HOME | End: 2022-09-03
Payer: MEDICARE

## 2022-09-01 VITALS
DIASTOLIC BLOOD PRESSURE: 68 MMHG | HEART RATE: 90 BPM | TEMPERATURE: 97.4 F | BODY MASS INDEX: 29.7 KG/M2 | WEIGHT: 167.6 LBS | OXYGEN SATURATION: 92 % | SYSTOLIC BLOOD PRESSURE: 102 MMHG | HEIGHT: 63 IN

## 2022-09-01 DIAGNOSIS — U07.1 COVID: Chronic | ICD-10-CM

## 2022-09-01 DIAGNOSIS — R05.9 COUGH: ICD-10-CM

## 2022-09-01 DIAGNOSIS — M51.9 LUMBAR DISC DISEASE: ICD-10-CM

## 2022-09-01 DIAGNOSIS — R11.0 NAUSEA: Primary | ICD-10-CM

## 2022-09-01 DIAGNOSIS — M48.061 SPINAL STENOSIS OF LUMBAR REGION WITHOUT NEUROGENIC CLAUDICATION: ICD-10-CM

## 2022-09-01 LAB — INTERNATIONAL NORMALIZATION RATIO, POC: 1.7

## 2022-09-01 PROCEDURE — 4004F PT TOBACCO SCREEN RCVD TLK: CPT | Performed by: FAMILY MEDICINE

## 2022-09-01 PROCEDURE — 3017F COLORECTAL CA SCREEN DOC REV: CPT | Performed by: FAMILY MEDICINE

## 2022-09-01 PROCEDURE — 99213 OFFICE O/P EST LOW 20 MIN: CPT | Performed by: FAMILY MEDICINE

## 2022-09-01 PROCEDURE — G8417 CALC BMI ABV UP PARAM F/U: HCPCS | Performed by: FAMILY MEDICINE

## 2022-09-01 PROCEDURE — G8427 DOCREV CUR MEDS BY ELIG CLIN: HCPCS | Performed by: FAMILY MEDICINE

## 2022-09-01 PROCEDURE — 71046 X-RAY EXAM CHEST 2 VIEWS: CPT

## 2022-09-01 PROCEDURE — 85610 PROTHROMBIN TIME: CPT | Performed by: FAMILY MEDICINE

## 2022-09-01 RX ORDER — PREDNISONE 20 MG/1
20 TABLET ORAL 2 TIMES DAILY
Qty: 10 TABLET | Refills: 0 | Status: SHIPPED | OUTPATIENT
Start: 2022-09-01 | End: 2022-09-19 | Stop reason: SDUPTHER

## 2022-09-01 RX ORDER — HYDROCODONE BITARTRATE AND ACETAMINOPHEN 7.5; 325 MG/1; MG/1
1.5 TABLET ORAL 4 TIMES DAILY
Qty: 180 TABLET | Refills: 0 | Status: SHIPPED | OUTPATIENT
Start: 2022-09-01 | End: 2022-10-03 | Stop reason: SDUPTHER

## 2022-09-01 RX ORDER — AZITHROMYCIN 250 MG/1
250 TABLET, FILM COATED ORAL SEE ADMIN INSTRUCTIONS
Qty: 6 TABLET | Refills: 0 | Status: SHIPPED | OUTPATIENT
Start: 2022-09-01 | End: 2022-09-19 | Stop reason: SDUPTHER

## 2022-09-01 NOTE — PROGRESS NOTES
[facility]  [address] [place] [id number]  Dept: [phone]    April Alexis Carlson is a 58 y.o. female Established patient, who presents today for her medical conditions/complaintsas noted below. Chief Complaint   Patient presents with    Nausea     Nausea, cough and sore throat. x1 week. Pt tested positive for COVID on        HPI:     HPI  Had Fever last week for 1 day: 101, 102 on Monday. Nausea, having trouble keeping things down  Had vomiting: better with the zofran  Coughing   ST  Wheezing since 22 since she got back from vacation. Wheezing better.    Reviewed prior notes None  Reviewed previous Labs    LDL Calculated (mg/dL)   Date Value   10/21/2019 120   2018 131       (goal LDL is <100)   BUN (mg/dL)   Date Value   2022 9     TSH (uIU/mL)   Date Value   2021 2.75     BP Readings from Last 3 Encounters:   22 102/68   22 (!) 143/90   22 109/75          (goal 120/80)    Past Medical History:   Diagnosis Date    Acid reflux     Arthritis     neck, back, knees    Asthma     Asthma     COPD (chronic obstructive pulmonary disease) ([facility] [address] 10/20/2015    Emphysema     Headache(784.0)     Heart abnormality     minute hole in heart per pt found in  after TIA    Osteoarthritis     Osteoporosis     Seborrheic keratoses 2019    TIA (transient ischemic attack)       Past Surgical History:   Procedure Laterality Date    ACROMIOPLASTY Left 2016    SHOULDER ARTHROCOPY LABRAL DEBRIDEMENT      SECTION, LOW TRANSVERSE      COLONOSCOPY      DILATION AND CURETTAGE OF UTERUS      KNEE ARTHROSCOPY Right     KNEE ARTHROSCOPY Right 2022    KNEE ARTHROSCOPIC PARTIAL MEDIAL MENISCECTOMY performed by Evie Reyez MD at [address]      uterine lining removed by laser    SHOULDER SURGERY      THROAT SURGERY      camera    TONSILLECTOMY AND ADENOIDECTOMY      TUBAL LIGATION Family History   Problem Relation Age of Onset    Heart Disease Father     Asthma Father     Cancer Paternal Grandfather         throat    Asthma Son        Social History     Tobacco Use    Smoking status: Every Day     Packs/day: 0.50     Years: 46.00     Pack years: 23.00     Types: Cigarettes     Start date: 1974    Smokeless tobacco: Never   Substance Use Topics    Alcohol use: Yes     Alcohol/week: 0.0 standard drinks     Comment: OCCASIONAL      Current Outpatient Medications   Medication Sig Dispense Refill    azithromycin (ZITHROMAX) 250 MG tablet Take 1 tablet by mouth See Admin Instructions for 5 days 500mg on day 1 followed by 250mg on days 2 - 5 6 tablet 0    predniSONE (DELTASONE) 20 MG tablet Take 1 tablet by mouth 2 times daily for 5 days 10 tablet 0    HYDROcodone-acetaminophen (NORCO) 7.5-325 MG per tablet Take 1.5 tablets by mouth 4 times daily for 30 days. 180 tablet 0    ondansetron (ZOFRAN) 4 MG tablet Take 1 tablet by mouth 3 times daily as needed for Nausea or Vomiting 30 tablet 0    pregabalin (LYRICA) 100 MG capsule Take 1 capsule by mouth 3 times daily for 30 days. 90 capsule 1    divalproex (DEPAKOTE) 250 MG DR tablet TAKE ONE TABLET BY MOUTH TWICE A DAY 60 tablet 5    omeprazole (PRILOSEC) 20 MG delayed release capsule TAKE ONE CAPSULE BY MOUTH DAILY 30 capsule 5    busPIRone (BUSPAR) 15 MG tablet Take 15 mg by mouth 2 times daily 60 tablet 3    warfarin (COUMADIN) 4 MG tablet 6mg Monday through Friday  and 8 mg Friday through Sunday 50 tablet 8    DULoxetine (CYMBALTA) 60 MG extended release capsule TAKE ONE CAPSULE BY MOUTH DAILY 30 capsule 5    fluticasone-salmeterol (ADVAIR) 250-50 MCG/DOSE AEPB INHALE ONE PUFF BY MOUTH EVERY 12 HOURS 60 each 5    albuterol-ipratropium (COMBIVENT RESPIMAT)  MCG/ACT AERS inhaler Inhale 1 puff into the lungs every 6 hours Increased to every 4 hrs if needed. 3 Inhaler 3     No current facility-administered medications for this visit. Allergies   Allergen Reactions    Eggs Or Egg-Derived Products Other (See Comments)     Severe abdominal pains    Bupropion Nausea Only    Ceftin [Cefuroxime Axetil] Nausea Only     Nausea and upset stomach       Health Maintenance   Topic Date Due    Shingles vaccine (1 of 2) Never done    Low dose CT lung screening  Never done    Pneumococcal 0-64 years Vaccine (2 - PCV) 01/14/2020    Breast cancer screen  12/02/2021    Flu vaccine (1) 09/01/2022    COVID-19 Vaccine (3 - Booster for Johnson Peter series) 05/23/2023 (Originally 1/23/2022)    Depression Monitoring  05/24/2023    Cervical cancer screen  11/03/2023    Lipids  10/21/2024    DTaP/Tdap/Td vaccine (2 - Td or Tdap) 12/11/2024    Colorectal Cancer Screen  12/29/2026    Hepatitis C screen  Completed    HIV screen  Completed    Hepatitis A vaccine  Aged Out    Hepatitis B vaccine  Aged Out    Hib vaccine  Aged Out    Meningococcal (ACWY) vaccine  Aged Out       Subjective:      Review of Systems    Objective:     /68   Pulse 90   Temp 97.4 °F (36.3 °C) (Infrared)   Ht 5' 3\" (1.6 m)   Wt 167 lb 9.6 oz (76 kg)   SpO2 92%   BMI 29.69 kg/m²   Physical Exam    Assessment:       Diagnosis Orders   1. Nausea  POCT INR      2. COVID  XR CHEST (2 VW)    azithromycin (ZITHROMAX) 250 MG tablet    predniSONE (DELTASONE) 20 MG tablet    POCT INR      3. Cough  XR CHEST (2 VW)    azithromycin (ZITHROMAX) 250 MG tablet    predniSONE (DELTASONE) 20 MG tablet    POCT INR      4. Lumbar disc disease  HYDROcodone-acetaminophen (NORCO) 7.5-325 MG per tablet      5. Spinal stenosis of lumbar region without neurogenic claudication  HYDROcodone-acetaminophen (NORCO) 7.5-325 MG per tablet             Plan:      No follow-ups on file. Orders Placed This Encounter   Procedures    XR CHEST (2 VW)     Standing Status:   Future     Standing Expiration Date:   9/1/2023    POCT INR     This external order was created through the results console.      Orders Placed This Encounter Medications    azithromycin (ZITHROMAX) 250 MG tablet     Sig: Take 1 tablet by mouth See Admin Instructions for 5 days 500mg on day 1 followed by 250mg on days 2 - 5     Dispense:  6 tablet     Refill:  0    predniSONE (DELTASONE) 20 MG tablet     Sig: Take 1 tablet by mouth 2 times daily for 5 days     Dispense:  10 tablet     Refill:  0    HYDROcodone-acetaminophen (NORCO) 7.5-325 MG per tablet     Sig: Take 1.5 tablets by mouth 4 times daily for 30 days. Dispense:  180 tablet     Refill:  0     Reduce doses taken as pain becomes manageable       Patient given educationalmaterials - see patient instructions. Discussed use, benefit, and side effectsof prescribed medications. All patient questions answered. Pt voiced understanding. Reviewed health maintenance. Instructed to continue current medications, diet andexercise. Patient agreed with treatment plan. Follow up as directed.      Electronicallysigned by Angeles Ojeda MD on 9/1/2022 at 11:45 AM

## 2022-09-08 ENCOUNTER — TELEPHONE (OUTPATIENT)
Dept: PRIMARY CARE CLINIC | Age: 62
End: 2022-09-08

## 2022-09-08 NOTE — TELEPHONE ENCOUNTER
Pt asking for a referral to a different pain mgmt.  She states the current one does not take her insurance

## 2022-09-13 DIAGNOSIS — M51.9 LUMBAR DISC DISEASE: Primary | ICD-10-CM

## 2022-09-18 DIAGNOSIS — F41.9 ANXIETY: ICD-10-CM

## 2022-09-18 DIAGNOSIS — F43.9 STRESS AT HOME: ICD-10-CM

## 2022-09-19 ENCOUNTER — TELEPHONE (OUTPATIENT)
Dept: PRIMARY CARE CLINIC | Age: 62
End: 2022-09-19

## 2022-09-19 DIAGNOSIS — U07.1 COVID: Chronic | ICD-10-CM

## 2022-09-19 DIAGNOSIS — R05.9 COUGH: ICD-10-CM

## 2022-09-19 RX ORDER — AZITHROMYCIN 250 MG/1
250 TABLET, FILM COATED ORAL SEE ADMIN INSTRUCTIONS
Qty: 6 TABLET | Refills: 0 | Status: SHIPPED | OUTPATIENT
Start: 2022-09-19 | End: 2022-09-24

## 2022-09-19 RX ORDER — DULOXETIN HYDROCHLORIDE 60 MG/1
CAPSULE, DELAYED RELEASE ORAL
Qty: 30 CAPSULE | Refills: 5 | Status: SHIPPED | OUTPATIENT
Start: 2022-09-19

## 2022-09-19 RX ORDER — PREDNISONE 20 MG/1
20 TABLET ORAL 2 TIMES DAILY
Qty: 10 TABLET | Refills: 0 | Status: SHIPPED | OUTPATIENT
Start: 2022-09-19 | End: 2022-09-24

## 2022-09-19 RX ORDER — BUSPIRONE HYDROCHLORIDE 15 MG/1
TABLET ORAL
Qty: 60 TABLET | Refills: 3 | Status: SHIPPED | OUTPATIENT
Start: 2022-09-19

## 2022-09-19 NOTE — TELEPHONE ENCOUNTER
Pt called stating she tested positive for covid about 2 weeks ago, she was given a z pack and steroids, she states it helped at the time but still feels she has some wheezing and a little rattle sound still.  She has been using her inhaler faithfully but asking if she could do another round of steroid or if there is something else that can help    Please advise     [address] [facility] on [address]

## 2022-10-03 DIAGNOSIS — M48.061 SPINAL STENOSIS OF LUMBAR REGION WITHOUT NEUROGENIC CLAUDICATION: ICD-10-CM

## 2022-10-03 DIAGNOSIS — M51.9 LUMBAR DISC DISEASE: ICD-10-CM

## 2022-10-03 RX ORDER — HYDROCODONE BITARTRATE AND ACETAMINOPHEN 7.5; 325 MG/1; MG/1
1.5 TABLET ORAL 4 TIMES DAILY
Qty: 180 TABLET | Refills: 0 | Status: SHIPPED | OUTPATIENT
Start: 2022-10-03 | End: 2022-11-03 | Stop reason: SDUPTHER

## 2022-10-12 ENCOUNTER — OFFICE VISIT (OUTPATIENT)
Dept: ORTHOPEDIC SURGERY | Age: 62
End: 2022-10-12
Payer: MEDICARE

## 2022-10-12 DIAGNOSIS — Z98.890 S/P RIGHT KNEE ARTHROSCOPY: Primary | ICD-10-CM

## 2022-10-12 PROCEDURE — 20610 DRAIN/INJ JOINT/BURSA W/O US: CPT | Performed by: ORTHOPAEDIC SURGERY

## 2022-10-12 PROCEDURE — 99024 POSTOP FOLLOW-UP VISIT: CPT | Performed by: ORTHOPAEDIC SURGERY

## 2022-10-12 RX ORDER — BUPIVACAINE HYDROCHLORIDE 5 MG/ML
2 INJECTION, SOLUTION PERINEURAL ONCE
Status: COMPLETED | OUTPATIENT
Start: 2022-10-12 | End: 2022-10-12

## 2022-10-12 RX ORDER — BETAMETHASONE SODIUM PHOSPHATE AND BETAMETHASONE ACETATE 3; 3 MG/ML; MG/ML
12 INJECTION, SUSPENSION INTRA-ARTICULAR; INTRALESIONAL; INTRAMUSCULAR; SOFT TISSUE ONCE
Status: COMPLETED | OUTPATIENT
Start: 2022-10-12 | End: 2022-10-12

## 2022-10-12 RX ORDER — LIDOCAINE HYDROCHLORIDE 10 MG/ML
2 INJECTION, SOLUTION INFILTRATION; PERINEURAL ONCE
Status: COMPLETED | OUTPATIENT
Start: 2022-10-12 | End: 2022-10-12

## 2022-10-12 RX ADMIN — BETAMETHASONE SODIUM PHOSPHATE AND BETAMETHASONE ACETATE 12 MG: 3; 3 INJECTION, SUSPENSION INTRA-ARTICULAR; INTRALESIONAL; INTRAMUSCULAR; SOFT TISSUE at 12:06

## 2022-10-12 RX ADMIN — BUPIVACAINE HYDROCHLORIDE 10 MG: 5 INJECTION, SOLUTION PERINEURAL at 12:07

## 2022-10-12 RX ADMIN — LIDOCAINE HYDROCHLORIDE 2 ML: 10 INJECTION, SOLUTION INFILTRATION; PERINEURAL at 12:08

## 2022-10-12 NOTE — PROGRESS NOTES
Hermila Hollins M.D.            [address] [facility] [address] [facility], [address] [facility]           Dept Phone: [phone]           Dept Fax:  [address]           Shikha Gibson          Dept Phone: [phone]           Dept Fax:  [phone]      Chief Compliant:  Chief Complaint   Patient presents with    Post-Op Check     Rt knee scope 7/7/22        History of Present Illness: This is a 58 y.o. female who presents to the clinic today for evaluation / follow up of right knee pain. Patient had arthroscopic partial medial meniscectomy of her right knee performed on 7/7/2022. She is notes over the last several weeks increasing pain and swelling to the deep she describes pain along the medial base difficulty getting out of her son's SUV. Does not really give a sharp stabbing pain but points to just globalized pain especially along the medial side. .       Review of Systems   Constitutional: Negative for fever, chills, sweats. Eyes: Negative for changes in vision, or pain. HENT: Negative for ear ache, epistaxis, or sore throat. Respiratory/Cardio: Negative for Chest pain, palpitations, SOB, or cough. Gastrointestinal: Negative for abdominal pain, N/V/D. Genitourinary: Negative for dysuria, frequency, urgency, or hematuria. Neurological: Negative for headache, numbness, or weakness. Integumentary: Negative for rash, itching, laceration, or abrasion. Musculoskeletal: Positive for Post-Op Check (Rt knee scope 7/7/22)       Physical Exam:  Constitutional: Patient is oriented to person, place, and time. Patient appears well-developed and well nourished. HENT: Negative otherwise noted  Head: Normocephalic and Atraumatic  Nose: Normal  Eyes: Conjunctivae and EOM are normal  Neck: Normal range of motion Neck supple.     Respiratory/Cardio: Effort normal. No respiratory distress. Musculoskeletal: Examination of the patient's right knee does note a modest effusion. Her motion is 5 to 125 degrees reexamination Raissa's really does not elicit any true pause Raissa's per se. She does have some tenderness over her pes area but more concerned with her mild to moderate effusion. No calf tenderness negative Homans. Neurological: Patient is alert and oriented to person, place, and time. Normal strength. No sensory deficit. Skin: Skin is warm and dry  Psychiatric: Behavior is normal. Thought content normal.  Nursing note and vitals reviewed. Labs and Imaging:     XR taken today: No new x-rays taken today  No results found. No orders of the defined types were placed in this encounter. Assessment and Plan:  1. S/P right knee arthroscopy            This is a 58 y.o. female who presents to the clinic today for evaluation / follow up of mild postoperative effusion status post arthroscopic partial medial vasectomy right knee. Past History:    Current Outpatient Medications:     busPIRone (BUSPAR) 15 MG tablet, TAKE ONE TABLET BY MOUTH TWICE A DAY, Disp: 60 tablet, Rfl: 3    HYDROcodone-acetaminophen (NORCO) 7.5-325 MG per tablet, Take 1.5 tablets by mouth 4 times daily for 30 days. , Disp: 180 tablet, Rfl: 0    DULoxetine (CYMBALTA) 60 MG extended release capsule, TAKE ONE CAPSULE BY MOUTH DAILY, Disp: 30 capsule, Rfl: 5    albuterol-ipratropium (COMBIVENT RESPIMAT)  MCG/ACT AERS inhaler, Inhale 1 puff into the lungs in the morning and 1 puff at noon and 1 puff in the evening and 1 puff before bedtime. Increased to every 4 hrs if needed. ., Disp: 3 each, Rfl: 3    ondansetron (ZOFRAN) 4 MG tablet, Take 1 tablet by mouth 3 times daily as needed for Nausea or Vomiting, Disp: 30 tablet, Rfl: 0    pregabalin (LYRICA) 100 MG capsule, Take 1 capsule by mouth 3 times daily for 30 days. , Disp: 90 capsule, Rfl: 1    divalproex (DEPAKOTE) 250 MG DR tablet, TAKE ONE TABLET BY MOUTH TWICE A DAY, Disp: 60 tablet, Rfl: 5    omeprazole (PRILOSEC) 20 MG delayed release capsule, TAKE ONE CAPSULE BY MOUTH DAILY, Disp: 30 capsule, Rfl: 5    warfarin (COUMADIN) 4 MG tablet, 6mg Monday through Friday  and 8 mg Friday through Sunday, Disp: 50 tablet, Rfl: 8    fluticasone-salmeterol (ADVAIR) 250-50 MCG/DOSE AEPB, INHALE ONE PUFF BY MOUTH EVERY 12 HOURS, Disp: 60 each, Rfl: 5  Allergies   Allergen Reactions    Eggs Or Egg-Derived Products Other (See Comments)     Severe abdominal pains    Bupropion Nausea Only    Ceftin [Cefuroxime Axetil] Nausea Only     Nausea and upset stomach     Social History     Socioeconomic History    Marital status:      Spouse name: Not on file    Number of children: Not on file    Years of education: Not on file    Highest education level: Not on file   Occupational History    Not on file   Tobacco Use    Smoking status: Every Day     Packs/day: 0.50     Years: 46.00     Pack years: 23.00     Types: Cigarettes     Start date: 1974    Smokeless tobacco: Never   Vaping Use    Vaping Use: Every day   Substance and Sexual Activity    Alcohol use:  Yes     Alcohol/week: 0.0 standard drinks     Comment: OCCASIONAL    Drug use: No    Sexual activity: Not on file   Other Topics Concern    Not on file   Social History Narrative    Not on file     Social Determinants of Health     Financial Resource Strain: Low Risk     Difficulty of Paying Living Expenses: Not hard at all   Food Insecurity: No Food Insecurity    Worried About Running Out of Food in the Last Year: Never true    Ran Out of Food in the Last Year: Never true   Transportation Needs: Not on file   Physical Activity: Not on file   Stress: Not on file   Social Connections: Not on file   Intimate Partner Violence: Not on file   Housing Stability: Not on file     Past Medical History:   Diagnosis Date    Acid reflux     Arthritis     neck, back, knees    Asthma     Asthma     [facility]20/2015    Emphysema     Headache(784.0)     Heart abnormality     minute hole in heart per pt found in  after TIA    Osteoarthritis     Osteoporosis     Seborrheic keratoses 2019    TIA (transient ischemic attack)      Past Surgical History:   Procedure Laterality Date    ACROMIOPLASTY Left 2016    SHOULDER ARTHROCOPY LABRAL DEBRIDEMENT      SECTION, LOW TRANSVERSE      COLONOSCOPY      DILATION AND CURETTAGE OF UTERUS      KNEE ARTHROSCOPY Right     KNEE ARTHROSCOPY Right 2022    KNEE ARTHROSCOPIC PARTIAL MEDIAL MENISCECTOMY performed by Edward Samuel MD at [address]      uterine lining removed by laser    SHOULDER SURGERY      THROAT SURGERY      camera    TONSILLECTOMY AND ADENOIDECTOMY      TUBAL LIGATION       Family History   Problem Relation Age of Onset    Heart Disease Father     Asthma Father     Cancer Paternal Grandfather         throat    Asthma Son    Plan  An informed verbal consent for the procedure was obtained and risks including, but not limited to: allergy to medications, injection, bleeding, stiffness of joint, recurrence of symptoms, loss of function, swelling, drainage, irrigation, need for surgery and pseudo-septic inflammation, were explained to the patient. Also, discussed was the potential for further injections, irrigation and debridement and surgery. Alternate means of treatment have also been discussed with the patient. Under sterile conditions patient's right knee was injected with lidocaine 2 cc and bupivacaine 2 cc. She was then aspirated of 20 cc of bloody/synovial fluid. She then was injected 2 cc of Celestone. She tolerated procedure well    Hopefully patient did well with his aspiration injection call if any further problems                       Provider Attestation:  Arthur Barros, personally performed the services described in this documentation.  All medical record entries made by the scribe were at my direction and in my presence. I have reviewed the chart and discharge instructions and agree that the records reflect my personal performance and is accurate and complete. Yue Mccord MD. 10/12/22      Please note that this chart was generated using voice recognition Dragon dictation software. Although every effort was made to ensure the accuracy of this automated transcription, some errors in transcription may have occurred.

## 2022-10-13 ENCOUNTER — OFFICE VISIT (OUTPATIENT)
Dept: PRIMARY CARE CLINIC | Age: 62
End: 2022-10-13
Payer: MEDICARE

## 2022-10-13 VITALS
BODY MASS INDEX: 29.84 KG/M2 | DIASTOLIC BLOOD PRESSURE: 64 MMHG | HEIGHT: 63 IN | WEIGHT: 168.4 LBS | OXYGEN SATURATION: 97 % | HEART RATE: 93 BPM | SYSTOLIC BLOOD PRESSURE: 108 MMHG

## 2022-10-13 DIAGNOSIS — Z86.73 HISTORY OF TRANSIENT ISCHEMIC ATTACK (TIA): ICD-10-CM

## 2022-10-13 DIAGNOSIS — G89.29 CHRONIC BILATERAL THORACIC BACK PAIN: ICD-10-CM

## 2022-10-13 DIAGNOSIS — M48.061 SPINAL STENOSIS OF LUMBAR REGION WITHOUT NEUROGENIC CLAUDICATION: Primary | ICD-10-CM

## 2022-10-13 DIAGNOSIS — Z79.891 CHRONIC USE OF OPIATE DRUG FOR THERAPEUTIC PURPOSE: ICD-10-CM

## 2022-10-13 DIAGNOSIS — R41.3 MEMORY LOSS: ICD-10-CM

## 2022-10-13 DIAGNOSIS — R41.3 MEMORY CHANGE: ICD-10-CM

## 2022-10-13 DIAGNOSIS — M79.5 FOREIGN BODY (FB) IN SOFT TISSUE: ICD-10-CM

## 2022-10-13 DIAGNOSIS — M54.6 CHRONIC BILATERAL THORACIC BACK PAIN: ICD-10-CM

## 2022-10-13 DIAGNOSIS — L03.011 CELLULITIS OF FINGER OF RIGHT HAND: ICD-10-CM

## 2022-10-13 LAB
ALCOHOL URINE: NORMAL
AMPHETAMINE SCREEN, URINE: NORMAL
BARBITURATE SCREEN, URINE: NORMAL
BENZODIAZEPINE SCREEN, URINE: NORMAL
BUPRENORPHINE URINE: NORMAL
COCAINE METABOLITE SCREEN URINE: NORMAL
FENTANYL SCREEN, URINE: NORMAL
GABAPENTIN SCREEN, URINE: NORMAL
INTERNATIONAL NORMALIZATION RATIO, POC: 2.2
MDMA URINE: NORMAL
METHADONE SCREEN, URINE: NORMAL
METHAMPHETAMINE, URINE: NORMAL
OPIATE SCREEN URINE: NORMAL
OXYCODONE SCREEN URINE: NORMAL
PHENCYCLIDINE SCREEN URINE: NORMAL
PROPOXYPHENE SCREEN, URINE: NORMAL
SYNTHETIC CANNABINOIDS(K2) SCREEN, URINE: NORMAL
THC SCREEN, URINE: NORMAL
TRAMADOL SCREEN URINE: NORMAL
TRICYCLIC ANTIDEPRESSANTS, UR: NORMAL

## 2022-10-13 PROCEDURE — 99214 OFFICE O/P EST MOD 30 MIN: CPT | Performed by: FAMILY MEDICINE

## 2022-10-13 PROCEDURE — 85610 PROTHROMBIN TIME: CPT | Performed by: FAMILY MEDICINE

## 2022-10-13 PROCEDURE — 80305 DRUG TEST PRSMV DIR OPT OBS: CPT | Performed by: FAMILY MEDICINE

## 2022-10-13 PROCEDURE — 3017F COLORECTAL CA SCREEN DOC REV: CPT | Performed by: FAMILY MEDICINE

## 2022-10-13 PROCEDURE — G8482 FLU IMMUNIZE ORDER/ADMIN: HCPCS | Performed by: FAMILY MEDICINE

## 2022-10-13 PROCEDURE — 4004F PT TOBACCO SCREEN RCVD TLK: CPT | Performed by: FAMILY MEDICINE

## 2022-10-13 PROCEDURE — 90674 CCIIV4 VAC NO PRSV 0.5 ML IM: CPT | Performed by: FAMILY MEDICINE

## 2022-10-13 PROCEDURE — G8427 DOCREV CUR MEDS BY ELIG CLIN: HCPCS | Performed by: FAMILY MEDICINE

## 2022-10-13 PROCEDURE — G8417 CALC BMI ABV UP PARAM F/U: HCPCS | Performed by: FAMILY MEDICINE

## 2022-10-13 PROCEDURE — 90471 IMMUNIZATION ADMIN: CPT | Performed by: FAMILY MEDICINE

## 2022-10-13 RX ORDER — AMOXICILLIN AND CLAVULANATE POTASSIUM 875; 125 MG/1; MG/1
1 TABLET, FILM COATED ORAL 2 TIMES DAILY
Qty: 20 TABLET | Refills: 0 | Status: SHIPPED | OUTPATIENT
Start: 2022-10-13 | End: 2022-10-23

## 2022-10-13 NOTE — PROGRESS NOTES
[facility] PRIMARY CARE  [address] [place] [id number]  Dept: [phone]    Stephanie Newsome is a 58 y.o. female Established patient, who presents today for her medical conditions/complaintsas noted below. Chief Complaint   Patient presents with    Memory Loss     Pt is here for memory loss f/u. Pt has a pastora thorn in her Rt thumb x 3 weeks. HPI:     HPI    Reviewed prior notes Neurology  Reviewed previous Labs and Imaging  Memory issues x 3 years, and seems worse  She forgot her dad told her he was going to [place]  She Lost a Credit card and called the [facility] and forget she called that in already. She forgot she had picked up son's medicine the week prior. Back pain worse with activity and doing house work etc.   Sai Garcia getting [facility] and hasn't gotten called back. She is going to see Dr Honey Steinberg on [facility]  She would like to off the pain pills  Has had shots in back in past, helped for 3 weeks.ago. Her best friend  last week.    LDL Calculated (mg/dL)   Date Value   10/21/2019 120   2018 131       (goal LDL is <100)   BUN (mg/dL)   Date Value   2022 9     TSH (uIU/mL)   Date Value   2021 2.75     BP Readings from Last 3 Encounters:   10/13/22 108/64   22 102/68   22 (!) 143/90          (goal 120/80)    Past Medical History:   Diagnosis Date    Acid reflux     Arthritis     neck, back, knees    Asthma     Asthma     COPD (chronic obstructive pulmonary disease) ([facility] [address] 10/20/2015    Emphysema     Headache(784.0)     Heart abnormality     minute hole in heart per pt found in  after TIA    Osteoarthritis     Osteoporosis     Seborrheic keratoses 2019    TIA (transient ischemic attack)       Past Surgical History:   Procedure Laterality Date    ACROMIOPLASTY Left 2016    SHOULDER ARTHROCOPY LABRAL DEBRIDEMENT      SECTION, LOW TRANSVERSE      COLONOSCOPY      DILATION AND CURETTAGE OF UTERUS      KNEE ARTHROSCOPY Right     KNEE ARTHROSCOPY Right 7/7/2022    KNEE ARTHROSCOPIC PARTIAL MEDIAL MENISCECTOMY performed by Deepti Wills MD at [facility]      uterine lining removed by laser    SHOULDER SURGERY      THROAT SURGERY      camera    TONSILLECTOMY AND ADENOIDECTOMY      TUBAL LIGATION         Family History   Problem Relation Age of Onset    Heart Disease Father     Asthma Father     Cancer Paternal Grandfather         throat    Asthma Son        Social History     Tobacco Use    Smoking status: Every Day     Packs/day: 0.50     Years: 46.00     Pack years: 23.00     Types: Cigarettes     Start date: 1974    Smokeless tobacco: Never   Substance Use Topics    Alcohol use: Yes     Alcohol/week: 0.0 standard drinks     Comment: OCCASIONAL      Current Outpatient Medications   Medication Sig Dispense Refill    amoxicillin-clavulanate (AUGMENTIN) 875-125 MG per tablet Take 1 tablet by mouth 2 times daily for 10 days 20 tablet 0    HYDROcodone-acetaminophen (NORCO) 7.5-325 MG per tablet Take 1.5 tablets by mouth 4 times daily for 30 days. 180 tablet 0    busPIRone (BUSPAR) 15 MG tablet TAKE ONE TABLET BY MOUTH TWICE A DAY 60 tablet 3    DULoxetine (CYMBALTA) 60 MG extended release capsule TAKE ONE CAPSULE BY MOUTH DAILY 30 capsule 5    albuterol-ipratropium (COMBIVENT RESPIMAT)  MCG/ACT AERS inhaler Inhale 1 puff into the lungs in the morning and 1 puff at noon and 1 puff in the evening and 1 puff before bedtime. Increased to every 4 hrs if needed. . 3 each 3    ondansetron (ZOFRAN) 4 MG tablet Take 1 tablet by mouth 3 times daily as needed for Nausea or Vomiting 30 tablet 0    divalproex (DEPAKOTE) 250 MG DR tablet TAKE ONE TABLET BY MOUTH TWICE A DAY 60 tablet 5    omeprazole (PRILOSEC) 20 MG delayed release capsule TAKE ONE CAPSULE BY MOUTH DAILY 30 capsule 5    warfarin (COUMADIN) 4 MG tablet 6mg Monday through Friday  and 8 mg Friday through Sunday 50 tablet 8    fluticasone-salmeterol (ADVAIR) 250-50 MCG/DOSE AEPB INHALE ONE PUFF BY MOUTH EVERY 12 HOURS 60 each 5    pregabalin (LYRICA) 100 MG capsule Take 1 capsule by mouth 3 times daily for 30 days. 90 capsule 1     No current facility-administered medications for this visit. Allergies   Allergen Reactions    Eggs Or Egg-Derived Products Other (See Comments)     Severe abdominal pains    Bupropion Nausea Only    Ceftin [Cefuroxime Axetil] Nausea Only     Nausea and upset stomach       Health Maintenance   Topic Date Due    Shingles vaccine (1 of 2) Never done    Low dose CT lung screening  Never done    Breast cancer screen  12/02/2021    Flu vaccine (1) 08/01/2022    COVID-19 Vaccine (3 - Booster for Pfizer series) 05/23/2023 (Originally 1/23/2022)    Depression Monitoring  05/24/2023    Cervical cancer screen  11/03/2023    Lipids  10/21/2024    DTaP/Tdap/Td vaccine (2 - Td or Tdap) 12/11/2024    Colorectal Cancer Screen  12/29/2026    Pneumococcal 0-64 years Vaccine  Completed    Hepatitis C screen  Completed    HIV screen  Completed    Hepatitis A vaccine  Aged Out    Hib vaccine  Aged Out    Meningococcal (ACWY) vaccine  Aged Out       Subjective:      Review of Systems  Used a lot of home remedies for the thorn, still has pain. Objective:     /64   Pulse 93   Ht 5' 3\" (1.6 m)   Wt 168 lb 6.4 oz (76.4 kg)   SpO2 97%   BMI 29.83 kg/m²   Physical Exam  Vitals and nursing note reviewed. Constitutional:       General: She is not in acute distress. Appearance: She is well-developed. She is not ill-appearing. HENT:      Head: Normocephalic and atraumatic. Right Ear: External ear normal.      Left Ear: External ear normal.   Eyes:      General: No scleral icterus. Right eye: No discharge. Left eye: No discharge. Conjunctiva/sclera: Conjunctivae normal.   Neck:      Thyroid: No thyromegaly. Trachea: No tracheal deviation.    Cardiovascular:      Rate and Expiration Date:   12/13/2022     Order Specific Question:   Reason for exam:     Answer:   attention right thumb distal.    Influenza, FLUCELVAX, (age 10 mo+), IM, Preservative Free, 0.5 mL    TSH With Reflex Ft4     Standing Status:   Future     Standing Expiration Date:   10/13/2023    Vitamin B12     Standing Status:   Future     Standing Expiration Date:   10/13/2023    [facility] - Frances Pastrana MD, Pain Management, [place]     Referral Priority:   Routine     Referral Type:   Eval and Treat     Referral Reason:   Specialty Services Required     Referred to Provider:   Jillian Mills MD     Requested Specialty:   Pain Management     Number of Visits Requested:   [address], Aly Reynoso DO, Orthopedic Surgery (Hand), [facility]     Referral Priority:   Routine     Referral Type:   Eval and Treat     Referral Reason:   Specialty Services Required     Referred to Provider:   Carolyn Whelan DO     Requested Specialty:   Orthopedic Surgery     Number of Visits Requested:   1    POCT Rapid Drug Screen    POCT INR     This external order was created through the results console. Orders Placed This Encounter   Medications    amoxicillin-clavulanate (AUGMENTIN) 875-125 MG per tablet     Sig: Take 1 tablet by mouth 2 times daily for 10 days     Dispense:  20 tablet     Refill:  0       Patient given educationalmaterials - see patient instructions. Discussed use, benefit, and side effectsof prescribed medications. All patient questions answered. Pt voiced understanding. Reviewed health maintenance. Instructed to continue current medications, diet andexercise. Patient agreed with treatment plan. Follow up as directed.      Electronicallysigned by Tawanna Macias MD on 10/13/2022 at 2:50 PM

## 2022-10-24 ENCOUNTER — TELEPHONE (OUTPATIENT)
Dept: PRIMARY CARE CLINIC | Age: 62
End: 2022-10-24

## 2022-10-24 NOTE — TELEPHONE ENCOUNTER
Patient is asking a referral for pain management  that does light sedation ? The referral that was given don't give the light sedation.

## 2022-10-24 NOTE — TELEPHONE ENCOUNTER
I don't know the answer to that question  She may have to call around and ask the different specialists

## 2022-10-27 ENCOUNTER — HOSPITAL ENCOUNTER (OUTPATIENT)
Dept: MRI IMAGING | Age: 62
Discharge: HOME | End: 2022-10-29
Payer: MEDICARE

## 2022-10-27 DIAGNOSIS — R41.3 MEMORY LOSS: ICD-10-CM

## 2022-10-27 DIAGNOSIS — R41.3 MEMORY CHANGE: ICD-10-CM

## 2022-10-27 PROCEDURE — 70551 MRI BRAIN STEM W/O DYE: CPT

## 2022-10-28 NOTE — RESULT ENCOUNTER NOTE
No acute changes on the MRI. She does have a few areas of cerebrovascular disease.   We will review this with her at next office visit

## 2022-11-02 DIAGNOSIS — M51.9 LUMBAR DISC DISEASE: ICD-10-CM

## 2022-11-02 DIAGNOSIS — M48.061 SPINAL STENOSIS OF LUMBAR REGION WITHOUT NEUROGENIC CLAUDICATION: ICD-10-CM

## 2022-11-02 NOTE — TELEPHONE ENCOUNTER
LAST VISIT:   10/13/2022     Future Appointments   Date Time Provider Sarah Montano   12/1/2022  3:20 PM Diane Amaral MD [facility]

## 2022-11-03 RX ORDER — HYDROCODONE BITARTRATE AND ACETAMINOPHEN 7.5; 325 MG/1; MG/1
1.5 TABLET ORAL 4 TIMES DAILY
Qty: 180 TABLET | Refills: 0 | Status: SHIPPED | OUTPATIENT
Start: 2022-11-03 | End: 2022-12-01

## 2022-11-09 DIAGNOSIS — M51.9 LUMBAR DISC DISEASE: ICD-10-CM

## 2022-11-09 DIAGNOSIS — M48.061 SPINAL STENOSIS OF LUMBAR REGION WITHOUT NEUROGENIC CLAUDICATION: ICD-10-CM

## 2022-11-10 ENCOUNTER — OFFICE VISIT (OUTPATIENT)
Dept: ORTHOPEDIC SURGERY | Age: 62
End: 2022-11-10
Payer: MEDICARE

## 2022-11-10 DIAGNOSIS — Z98.890 H/O ARTHROSCOPY OF LEFT KNEE: Primary | ICD-10-CM

## 2022-11-10 PROCEDURE — 20610 DRAIN/INJ JOINT/BURSA W/O US: CPT | Performed by: ORTHOPAEDIC SURGERY

## 2022-11-10 RX ORDER — BUPIVACAINE HYDROCHLORIDE 5 MG/ML
2 INJECTION, SOLUTION PERINEURAL ONCE
Status: COMPLETED | OUTPATIENT
Start: 2022-11-10 | End: 2022-11-10

## 2022-11-10 RX ORDER — LIDOCAINE HYDROCHLORIDE 10 MG/ML
2 INJECTION, SOLUTION INFILTRATION; PERINEURAL ONCE
Status: COMPLETED | OUTPATIENT
Start: 2022-11-10 | End: 2022-11-10

## 2022-11-10 RX ORDER — PREGABALIN 100 MG/1
CAPSULE ORAL
Qty: 90 CAPSULE | Refills: 0 | Status: SHIPPED | OUTPATIENT
Start: 2022-11-10 | End: 2022-12-10

## 2022-11-10 RX ORDER — BETAMETHASONE SODIUM PHOSPHATE AND BETAMETHASONE ACETATE 3; 3 MG/ML; MG/ML
12 INJECTION, SUSPENSION INTRA-ARTICULAR; INTRALESIONAL; INTRAMUSCULAR; SOFT TISSUE ONCE
Status: COMPLETED | OUTPATIENT
Start: 2022-11-10 | End: 2022-11-10

## 2022-11-10 RX ADMIN — BETAMETHASONE SODIUM PHOSPHATE AND BETAMETHASONE ACETATE 12 MG: 3; 3 INJECTION, SUSPENSION INTRA-ARTICULAR; INTRALESIONAL; INTRAMUSCULAR; SOFT TISSUE at 15:37

## 2022-11-10 RX ADMIN — BUPIVACAINE HYDROCHLORIDE 10 MG: 5 INJECTION, SOLUTION PERINEURAL at 15:38

## 2022-11-10 RX ADMIN — LIDOCAINE HYDROCHLORIDE 2 ML: 10 INJECTION, SOLUTION INFILTRATION; PERINEURAL at 15:39

## 2022-11-10 NOTE — PROGRESS NOTES
Ganesh Walker M.D.            [facility] [address] [facility],[address], [address] [facility]           Dept Phone: [phone]           Dept Fax:  [address]           Shikha Gibson          Dept Phone: [phone]           Dept Fax:  [phone]      Chief Compliant:  Chief Complaint   Patient presents with    Pain     Rt knee 7/7/22 knee scope        History of Present Illness: This is a 58 y.o. female who presents to the clinic today for evaluation / follow up of right knee pain. Patient is a 77-year-old female who had an arthroscopic examination of of her right knee on 7/7/2022. She did have a 1 aspiration of her knee postoperatively. She was doing very well up until recently but now states that she has reaccumulation of pain to her knee she denies any recurrent injury or trauma. .       Review of Systems   Constitutional: Negative for fever, chills, sweats. Eyes: Negative for changes in vision, or pain. HENT: Negative for ear ache, epistaxis, or sore throat. Respiratory/Cardio: Negative for Chest pain, palpitations, SOB, or cough. Gastrointestinal: Negative for abdominal pain, N/V/D. Genitourinary: Negative for dysuria, frequency, urgency, or hematuria. Neurological: Negative for headache, numbness, or weakness. Integumentary: Negative for rash, itching, laceration, or abrasion. Musculoskeletal: Positive for Pain (Rt knee 7/7/22 knee scope)       Physical Exam:  Constitutional: Patient is oriented to person, place, and time. Patient appears well-developed and well nourished. HENT: Negative otherwise noted  Head: Normocephalic and Atraumatic  Nose: Normal  Eyes: Conjunctivae and EOM are normal  Neck: Normal range of motion Neck supple. Respiratory/Cardio: Effort normal. No respiratory distress.   Musculoskeletal: Patient has examination notes to have a moderate effusion Raissa's is negative motion is 3 to but 120 degrees no calf tenderness negative Homans no patellofemoral pain no apprehension no compression pain no hip rotational pain no other contributory findings    Neurological: Patient is alert and oriented to person, place, and time. Normal strength. No sensory deficit. Skin: Skin is warm and dry  Psychiatric: Behavior is normal. Thought content normal.  Nursing note and vitals reviewed. Labs and Imaging:     XR taken today: No new x-rays taken today  No results found.         Orders Placed This Encounter   Procedures    20610 - DRAIN/INJECT LARGE JOINT BURSA       Assessment and Plan:  Previous arthroscopic partial medial meniscectomy right knee    Administrations This Visit       betamethasone acetate-betamethasone sodium phosphate (CELESTONE) injection 12 mg       Admin Date  11/10/2022  15:37 Action  Given Dose  12 mg Route  Intra-artICUlar Site  Knee Right Administered By  Adis Love LPN    Ordering Provider: Kalin Khan MD    NDC: 5202-5351-53    Lot#: [id number]    : [employer]    Patient Supplied?: No              bupivacaine (MARCAINE) 0.5 % injection 10 mg       Admin Date  11/10/2022  15:38 Action  Given Dose  10 mg Route  Intra-artICUlar Site  Knee Right Administered By  Adis Love LPN    Ordering Provider: Kalin Khan MD    . [facility] 47: [id number]    Lot#: [id number]    : [employer]    Patient Supplied?: No              lidocaine 1 % injection 2 mL       Admin Date  11/10/2022  15:39 Action  Given Dose  2 mL Route  Intra-artICUlar Site  Knee Right Administered By  Adis Love LPN    Ordering Provider: Kalin Khan MD    . [facility] 47: [id number]    Lot#: [id number]    : [facility] [employer]    Patient Supplied?: No                    This is a 58 y.o. female who presents to the clinic today for evaluation / follow up of previous arthroscopic examination right knee with now a recurrent effusion. Past History:    Current Outpatient Medications:     pregabalin (LYRICA) 100 MG capsule, TAKE ONE CAPSULE BY MOUTH THREE TIMES A DAY, Disp: 90 capsule, Rfl: 0    HYDROcodone-acetaminophen (NORCO) 7.5-325 MG per tablet, Take 1.5 tablets by mouth 4 times daily for 30 days. , Disp: 180 tablet, Rfl: 0    busPIRone (BUSPAR) 15 MG tablet, TAKE ONE TABLET BY MOUTH TWICE A DAY, Disp: 60 tablet, Rfl: 3    DULoxetine (CYMBALTA) 60 MG extended release capsule, TAKE ONE CAPSULE BY MOUTH DAILY, Disp: 30 capsule, Rfl: 5    albuterol-ipratropium (COMBIVENT RESPIMAT)  MCG/ACT AERS inhaler, Inhale 1 puff into the lungs in the morning and 1 puff at noon and 1 puff in the evening and 1 puff before bedtime. Increased to every 4 hrs if needed. ., Disp: 3 each, Rfl: 3    ondansetron (ZOFRAN) 4 MG tablet, Take 1 tablet by mouth 3 times daily as needed for Nausea or Vomiting, Disp: 30 tablet, Rfl: 0    divalproex (DEPAKOTE) 250 MG DR tablet, TAKE ONE TABLET BY MOUTH TWICE A DAY, Disp: 60 tablet, Rfl: 5    omeprazole (PRILOSEC) 20 MG delayed release capsule, TAKE ONE CAPSULE BY MOUTH DAILY, Disp: 30 capsule, Rfl: 5    warfarin (COUMADIN) 4 MG tablet, 6mg Monday through Friday  and 8 mg Friday through Sunday, Disp: 50 tablet, Rfl: 8    fluticasone-salmeterol (ADVAIR) 250-50 MCG/DOSE AEPB, INHALE ONE PUFF BY MOUTH EVERY 12 HOURS, Disp: 60 each, Rfl: 5  Allergies   Allergen Reactions    Eggs Or Egg-Derived Products Other (See Comments)     Severe abdominal pains    Bupropion Nausea Only    Ceftin [Cefuroxime Axetil] Nausea Only     Nausea and upset stomach     Social History     Socioeconomic History    Marital status:      Spouse name: Not on file    Number of children: Not on file    Years of education: Not on file    Highest education level: Not on file   Occupational History    Not on file   Tobacco Use    Smoking status: Every Day     Packs/day: 0.50     Years: 46.00     Pack years: 23.00     Types: Cigarettes     Start date:     Smokeless tobacco: Never   Vaping Use    Vaping Use: Every day   Substance and Sexual Activity    Alcohol use:  Yes     Alcohol/week: 0.0 standard drinks     Comment: OCCASIONAL    Drug use: No    Sexual activity: Not on file   Other Topics Concern    Not on file   Social History Narrative    Not on file     Social Determinants of Health     Financial Resource Strain: Low Risk     Difficulty of Paying Living Expenses: Not hard at all   Food Insecurity: No Food Insecurity    Worried About Running Out of Food in the Last Year: Never true    Ran Out of Food in the Last Year: Never true   Transportation Needs: Not on file   Physical Activity: Not on file   Stress: Not on file   Social Connections: Not on file   Intimate Partner Violence: Not on file   Housing Stability: Not on file     Past Medical History:   Diagnosis Date    Acid reflux     Arthritis     neck, back, knees    Asthma     Asthma     COPD (chronic obstructive pulmonary disease) ([facility] [address] 10/20/2015    Emphysema     Headache(784.0)     Heart abnormality     minute hole in heart per pt found in  after TIA    Osteoarthritis     Osteoporosis     Seborrheic keratoses 2019    TIA (transient ischemic attack)      Past Surgical History:   Procedure Laterality Date    ACROMIOPLASTY Left 2016    SHOULDER ARTHROCOPY LABRAL DEBRIDEMENT      SECTION, LOW TRANSVERSE      COLONOSCOPY      DILATION AND CURETTAGE OF UTERUS      KNEE ARTHROSCOPY Right     KNEE ARTHROSCOPY Right 2022    KNEE ARTHROSCOPIC PARTIAL MEDIAL MENISCECTOMY performed by Clayton Stevenson MD at [address] [place] [address],[facility]      uterine lining removed by laser    SHOULDER SURGERY      THROAT SURGERY      camera    TONSILLECTOMY AND ADENOIDECTOMY      TUBAL LIGATION       Family History   Problem Relation Age of Onset    Heart Disease Father     Asthma Father     Cancer Paternal Grandfather         throat    Asthma Son Plan  An informed verbal consent for the procedure was obtained and risks including, but not limited to: allergy to medications, injection, bleeding, stiffness of joint, recurrence of symptoms, loss of function, swelling, drainage, irrigation, need for surgery and pseudo-septic inflammation, were explained to the patient. Also, discussed was the potential for further injections, irrigation and debridement and surgery. Alternate means of treatment have also been discussed with the patient. Administrations This Visit       betamethasone acetate-betamethasone sodium phosphate (CELESTONE) injection 12 mg       Admin Date  11/10/2022  15:37 Action  Given Dose  12 mg Route  Intra-artICUlar Site  Knee Right Administered By  Ruth Walker LPN    Ordering Provider: Maureen Brown MD    NDC: 7615-9194-34    Lot#: [id number]    : [employer]    Patient Supplied?: No              bupivacaine (MARCAINE) 0.5 % injection 10 mg       Admin Date  11/10/2022  15:38 Action  Given Dose  10 mg Route  Intra-artICUlar Site  Knee Right Administered By  Ruth Walker LPN    Ordering Provider: Maureen Brown MD    . [facility] 47: [id number]    Lot#: [id number]    : [address] [place] [address]    Patient Supplied?: No              lidocaine 1 % injection 2 mL       Admin Date  11/10/2022  15:39 Action  Given Dose  2 mL Route  Intra-artICUlar Site  Knee Right Administered By  Ruth Walker LPN    Ordering Provider: MD Jeramie Crystal. [facility] 47: [id number]    Lot#: [id number]    : [address] [place] [address]    Patient Supplied?: No                Under sterile conditions patient's right knee was injected with lidocaine 2 cc and bupivacaine 2 cc. She is aspirated of 23 cc of straw-colored synovial fluid and injected with Celestone. She Toller procedure well    Patient was told to take it easy the next few days as well as ice and elevate.   We will see her back on an as-needed basis call if she has any recurrent problems or concerns. Provider Attestation:  Airam Gil, personally performed the services described in this documentation. All medical record entries made by the scribe were at my direction and in my presence. I have reviewed the chart and discharge instructions and agree that the records reflect my personal performance and is accurate and complete. Sola Macario MD. 11/10/22      Please note that this chart was generated using voice recognition Dragon dictation software. Although every effort was made to ensure the accuracy of this automated transcription, some errors in transcription may have occurred.

## 2022-11-16 ENCOUNTER — TELEPHONE (OUTPATIENT)
Dept: PRIMARY CARE CLINIC | Age: 62
End: 2022-11-16

## 2022-11-16 NOTE — TELEPHONE ENCOUNTER
Pt states she went to get her back injections done today and they did an INR. It was 5.7 so they did not do the procedure. She thought she was told to hold coumadin for 3 days prior to procedure so that is what she did. She is asking if it should be for more than 3 days? Pt aware you are gone for the day and message will be addressed tomorrow. Dr. Gianna Solis- should she do anything in the mean time regarding the abnormal INR?

## 2022-11-17 ENCOUNTER — TELEPHONE (OUTPATIENT)
Dept: PRIMARY CARE CLINIC | Age: 62
End: 2022-11-17

## 2022-11-17 DIAGNOSIS — Z86.73 HISTORY OF TRANSIENT ISCHEMIC ATTACK (TIA): Primary | ICD-10-CM

## 2022-11-17 NOTE — TELEPHONE ENCOUNTER
Patient is going to be at [facility] tomorrow morning - asking to have an order placed to get INR done there instead of coming in to the office.

## 2022-11-18 ENCOUNTER — HOSPITAL ENCOUNTER (OUTPATIENT)
Age: 62
Discharge: HOME | End: 2022-11-18
Payer: MEDICARE

## 2022-11-18 DIAGNOSIS — R41.3 MEMORY CHANGE: ICD-10-CM

## 2022-11-18 DIAGNOSIS — Z86.73 HISTORY OF TRANSIENT ISCHEMIC ATTACK (TIA): ICD-10-CM

## 2022-11-18 LAB
INR BLD: 1.4
PROTHROMBIN TIME: 17 SEC (ref 11.8–14.6)
TSH SERPL DL<=0.05 MIU/L-ACNC: 2.11 UIU/ML (ref 0.3–5)
VITAMIN B-12: 634 PG/ML (ref 232–1245)

## 2022-11-18 PROCEDURE — 84443 ASSAY THYROID STIM HORMONE: CPT

## 2022-11-18 PROCEDURE — 82607 VITAMIN B-12: CPT

## 2022-11-18 PROCEDURE — 85610 PROTHROMBIN TIME: CPT

## 2022-11-18 PROCEDURE — 36415 COLL VENOUS BLD VENIPUNCTURE: CPT

## 2022-12-01 ENCOUNTER — IMMUNIZATION (OUTPATIENT)
Dept: PRIMARY CARE CLINIC | Age: 62
End: 2022-12-01
Payer: MEDICARE

## 2022-12-01 ENCOUNTER — OFFICE VISIT (OUTPATIENT)
Dept: PRIMARY CARE CLINIC | Age: 62
End: 2022-12-01
Payer: MEDICARE

## 2022-12-01 VITALS
OXYGEN SATURATION: 96 % | SYSTOLIC BLOOD PRESSURE: 132 MMHG | BODY MASS INDEX: 31.86 KG/M2 | HEIGHT: 63 IN | WEIGHT: 179.8 LBS | DIASTOLIC BLOOD PRESSURE: 84 MMHG | HEART RATE: 88 BPM

## 2022-12-01 DIAGNOSIS — J45.20 MILD INTERMITTENT ASTHMA WITHOUT COMPLICATION: ICD-10-CM

## 2022-12-01 DIAGNOSIS — M48.061 SPINAL STENOSIS OF LUMBAR REGION WITHOUT NEUROGENIC CLAUDICATION: ICD-10-CM

## 2022-12-01 DIAGNOSIS — F39 MOOD DISORDER (HCC): ICD-10-CM

## 2022-12-01 DIAGNOSIS — F34.1 DYSTHYMIA: ICD-10-CM

## 2022-12-01 DIAGNOSIS — U07.1 COVID: Chronic | ICD-10-CM

## 2022-12-01 DIAGNOSIS — Z86.73 HISTORY OF TRANSIENT ISCHEMIC ATTACK (TIA): Primary | ICD-10-CM

## 2022-12-01 DIAGNOSIS — Z23 NEED FOR VACCINATION: Primary | ICD-10-CM

## 2022-12-01 DIAGNOSIS — Z79.891 CHRONIC USE OF OPIATE DRUG FOR THERAPEUTIC PURPOSE: ICD-10-CM

## 2022-12-01 DIAGNOSIS — F17.200 SMOKER: ICD-10-CM

## 2022-12-01 DIAGNOSIS — M54.16 LUMBAR RADICULOPATHY, RIGHT: ICD-10-CM

## 2022-12-01 DIAGNOSIS — M51.9 LUMBAR DISC DISEASE: ICD-10-CM

## 2022-12-01 LAB — INTERNATIONAL NORMALIZATION RATIO, POC: 3.1

## 2022-12-01 PROCEDURE — 99214 OFFICE O/P EST MOD 30 MIN: CPT | Performed by: FAMILY MEDICINE

## 2022-12-01 PROCEDURE — 3017F COLORECTAL CA SCREEN DOC REV: CPT | Performed by: FAMILY MEDICINE

## 2022-12-01 PROCEDURE — G8417 CALC BMI ABV UP PARAM F/U: HCPCS | Performed by: FAMILY MEDICINE

## 2022-12-01 PROCEDURE — 91312 COVID-19, PFIZER BIVALENT BOOSTER, (AGE 12Y+), IM, 30 MCG/0.3 ML: CPT | Performed by: FAMILY MEDICINE

## 2022-12-01 PROCEDURE — G8427 DOCREV CUR MEDS BY ELIG CLIN: HCPCS | Performed by: FAMILY MEDICINE

## 2022-12-01 PROCEDURE — 85610 PROTHROMBIN TIME: CPT | Performed by: FAMILY MEDICINE

## 2022-12-01 PROCEDURE — G8482 FLU IMMUNIZE ORDER/ADMIN: HCPCS | Performed by: FAMILY MEDICINE

## 2022-12-01 PROCEDURE — 4004F PT TOBACCO SCREEN RCVD TLK: CPT | Performed by: FAMILY MEDICINE

## 2022-12-01 PROCEDURE — 0124A COVID-19, PFIZER BIVALENT BOOSTER, (AGE 12Y+), IM, 30 MCG/0.3 ML: CPT | Performed by: FAMILY MEDICINE

## 2022-12-01 RX ORDER — VARENICLINE TARTRATE 1 MG/1
1 TABLET, FILM COATED ORAL 2 TIMES DAILY
Qty: 60 TABLET | Refills: 3 | Status: SHIPPED | OUTPATIENT
Start: 2022-12-01

## 2022-12-01 RX ORDER — PREDNISONE 20 MG/1
20 TABLET ORAL 2 TIMES DAILY
Qty: 10 TABLET | Refills: 0 | Status: SHIPPED | OUTPATIENT
Start: 2022-12-01 | End: 2022-12-06

## 2022-12-01 RX ORDER — HYDROCODONE BITARTRATE AND ACETAMINOPHEN 7.5; 325 MG/1; MG/1
1.5 TABLET ORAL 4 TIMES DAILY
Qty: 180 TABLET | Refills: 0 | Status: SHIPPED | OUTPATIENT
Start: 2022-12-01 | End: 2022-12-31

## 2022-12-01 RX ORDER — AZITHROMYCIN 250 MG/1
250 TABLET, FILM COATED ORAL SEE ADMIN INSTRUCTIONS
Qty: 6 TABLET | Refills: 0 | Status: SHIPPED | OUTPATIENT
Start: 2022-12-01 | End: 2022-12-06

## 2022-12-01 RX ORDER — DIVALPROEX SODIUM 250 MG/1
TABLET, DELAYED RELEASE ORAL
Qty: 60 TABLET | Refills: 5 | Status: SHIPPED | OUTPATIENT
Start: 2022-12-01

## 2022-12-01 ASSESSMENT — PATIENT HEALTH QUESTIONNAIRE - PHQ9
4. FEELING TIRED OR HAVING LITTLE ENERGY: 0
SUM OF ALL RESPONSES TO PHQ QUESTIONS 1-9: 0
SUM OF ALL RESPONSES TO PHQ QUESTIONS 1-9: 0
7. TROUBLE CONCENTRATING ON THINGS, SUCH AS READING THE NEWSPAPER OR WATCHING TELEVISION: 0
SUM OF ALL RESPONSES TO PHQ9 QUESTIONS 1 & 2: 0
3. TROUBLE FALLING OR STAYING ASLEEP: 0
SUM OF ALL RESPONSES TO PHQ QUESTIONS 1-9: 0
9. THOUGHTS THAT YOU WOULD BE BETTER OFF DEAD, OR OF HURTING YOURSELF: 0
SUM OF ALL RESPONSES TO PHQ QUESTIONS 1-9: 0
5. POOR APPETITE OR OVEREATING: 0
1. LITTLE INTEREST OR PLEASURE IN DOING THINGS: 0
6. FEELING BAD ABOUT YOURSELF - OR THAT YOU ARE A FAILURE OR HAVE LET YOURSELF OR YOUR FAMILY DOWN: 0
10. IF YOU CHECKED OFF ANY PROBLEMS, HOW DIFFICULT HAVE THESE PROBLEMS MADE IT FOR YOU TO DO YOUR WORK, TAKE CARE OF THINGS AT HOME, OR GET ALONG WITH OTHER PEOPLE: 0
8. MOVING OR SPEAKING SO SLOWLY THAT OTHER PEOPLE COULD HAVE NOTICED. OR THE OPPOSITE, BEING SO FIGETY OR RESTLESS THAT YOU HAVE BEEN MOVING AROUND A LOT MORE THAN USUAL: 0
2. FEELING DOWN, DEPRESSED OR HOPELESS: 0

## 2022-12-01 ASSESSMENT — ENCOUNTER SYMPTOMS
COUGH: 1
SHORTNESS OF BREATH: 1

## 2022-12-01 NOTE — PROGRESS NOTES
After obtaining consent, and per orders of Dr. Tesfaye Renner, injection of Pifzer given in Right deltoid by Tammy Paz MA. Patient instructed to remain in clinic for 20 minutes afterwards, and to report any adverse reaction to me immediately.

## 2022-12-01 NOTE — PROGRESS NOTES
[facility] PRIMARY CARE  [address] [place] [id number]  Dept: [phone]    April Baudilio Bhat is a 58 y.o. female Established patient, who presents today for her medical conditions/complaintsas noted below. Chief Complaint   Patient presents with    Depression     Medication check and discuss MRI    Immunizations     Pfizer booster       HPI:     HPI  Wheezing a lot x 1 month, has been using her inhalers. Has been eating to quit smoking, still smoking. Smokes 1/2 ppd or less    Her back is hurting a lot  Pain pills helps a little. Has had shots in her back and it helps for about 3 weeks. She is having trouble getting trouble getting an appt with PM at [facility]    Reviewed MRI results.   Reviewed prior notes None  Reviewed previous Labs    LDL Calculated (mg/dL)   Date Value   10/21/2019 120   2018 131       (goal LDL is <100)   BUN (mg/dL)   Date Value   2022 9     TSH (uIU/mL)   Date Value   2022 2.11     BP Readings from Last 3 Encounters:   22 132/84   10/13/22 108/64   22 102/68          (goal 120/80)    Past Medical History:   Diagnosis Date    Acid reflux     Arthritis     neck, back, knees    Asthma     Asthma     COPD (chronic obstructive pulmonary disease) ([address] 10/20/2015    Emphysema     Headache(784.0)     Heart abnormality     minute hole in heart per pt found in  after TIA    Osteoarthritis     Osteoporosis     Seborrheic keratoses 2019    TIA (transient ischemic attack)       Past Surgical History:   Procedure Laterality Date    ACROMIOPLASTY Left 2016    SHOULDER ARTHROCOPY LABRAL DEBRIDEMENT      SECTION, LOW TRANSVERSE      COLONOSCOPY      DILATION AND CURETTAGE OF UTERUS      KNEE ARTHROSCOPY Right     KNEE ARTHROSCOPY Right 2022    KNEE ARTHROSCOPIC PARTIAL MEDIAL MENISCECTOMY performed by Daria Yoo MD at [address]      uterine lining removed by laser SHOULDER SURGERY      THROAT SURGERY      camera    TONSILLECTOMY AND ADENOIDECTOMY      TUBAL LIGATION         Family History   Problem Relation Age of Onset    Heart Disease Father     Asthma Father     Cancer Paternal Grandfather         throat    Asthma Son        Social History     Tobacco Use    Smoking status: Every Day     Packs/day: 0.50     Years: 46.00     Pack years: 23.00     Types: Cigarettes     Start date: 1974    Smokeless tobacco: Never   Substance Use Topics    Alcohol use: Yes     Alcohol/week: 0.0 standard drinks     Comment: OCCASIONAL      Current Outpatient Medications   Medication Sig Dispense Refill    varenicline (CHANTIX CONTINUING MONTH NAN) 1 MG tablet Take 1 tablet by mouth 2 times daily 60 tablet 3    divalproex (DEPAKOTE) 250 MG DR tablet TAKE ONE TABLET BY MOUTH TWICE A DAY 60 tablet 5    HYDROcodone-acetaminophen (NORCO) 7.5-325 MG per tablet Take 1.5 tablets by mouth 4 times daily for 30 days. 180 tablet 0    azithromycin (ZITHROMAX) 250 MG tablet Take 1 tablet by mouth See Admin Instructions for 5 days 500mg on day 1 followed by 250mg on days 2 - 5 6 tablet 0    predniSONE (DELTASONE) 20 MG tablet Take 1 tablet by mouth 2 times daily for 5 days 10 tablet 0    pregabalin (LYRICA) 100 MG capsule TAKE ONE CAPSULE BY MOUTH THREE TIMES A DAY 90 capsule 0    busPIRone (BUSPAR) 15 MG tablet TAKE ONE TABLET BY MOUTH TWICE A DAY 60 tablet 3    DULoxetine (CYMBALTA) 60 MG extended release capsule TAKE ONE CAPSULE BY MOUTH DAILY 30 capsule 5    albuterol-ipratropium (COMBIVENT RESPIMAT)  MCG/ACT AERS inhaler Inhale 1 puff into the lungs in the morning and 1 puff at noon and 1 puff in the evening and 1 puff before bedtime. Increased to every 4 hrs if needed. . 3 each 3    ondansetron (ZOFRAN) 4 MG tablet Take 1 tablet by mouth 3 times daily as needed for Nausea or Vomiting 30 tablet 0    omeprazole (PRILOSEC) 20 MG delayed release capsule TAKE ONE CAPSULE BY MOUTH DAILY 30 capsule 5 warfarin (COUMADIN) 4 MG tablet 6mg Monday through Friday  and 8 mg Friday through Sunday (Patient taking differently: 6mg Monday through Friday  and 8 mg Saturday and Sunday) 50 tablet 8    fluticasone-salmeterol (ADVAIR) 250-50 MCG/DOSE AEPB INHALE ONE PUFF BY MOUTH EVERY 12 HOURS 60 each 5     No current facility-administered medications for this visit. Allergies   Allergen Reactions    Eggs Or Egg-Derived Products Other (See Comments)     Severe abdominal pains    Bupropion Nausea Only    Ceftin [Cefuroxime Axetil] Nausea Only     Nausea and upset stomach       Health Maintenance   Topic Date Due    Shingles vaccine (1 of 2) Never done    Low dose CT lung screening  Never done    COVID-19 Vaccine (3 - Booster for Pfizer series) 10/18/2021    Breast cancer screen  12/02/2021    Depression Monitoring  05/24/2023    Cervical cancer screen  11/03/2023    Lipids  10/21/2024    DTaP/Tdap/Td vaccine (2 - Td or Tdap) 12/11/2024    Colorectal Cancer Screen  12/29/2026    Flu vaccine  Completed    Pneumococcal 0-64 years Vaccine  Completed    Hepatitis C screen  Completed    HIV screen  Completed    Hepatitis A vaccine  Aged Out    Hib vaccine  Aged Out    Meningococcal (ACWY) vaccine  Aged Out       Subjective:      Review of Systems   Respiratory:  Positive for cough and shortness of breath. Objective:     /84   Pulse 88   Ht 5' 3\" (1.6 m)   Wt 179 lb 12.8 oz (81.6 kg)   SpO2 96%   BMI 31.85 kg/m²   Physical Exam  Vitals and nursing note reviewed. Constitutional:       General: She is not in acute distress. Appearance: She is well-developed. She is not ill-appearing. HENT:      Head: Normocephalic and atraumatic. Right Ear: External ear normal.      Left Ear: External ear normal.   Eyes:      General: No scleral icterus. Right eye: No discharge. Left eye: No discharge. Conjunctiva/sclera: Conjunctivae normal.   Neck:      Thyroid: No thyromegaly.       Trachea: No tracheal deviation. Cardiovascular:      Rate and Rhythm: Normal rate and regular rhythm. Heart sounds: Normal heart sounds. Pulmonary:      Effort: Pulmonary effort is normal. No respiratory distress. Breath sounds: No wheezing. Comments: Distant breath sounds  Lymphadenopathy:      Cervical: No cervical adenopathy. Skin:     General: Skin is warm. Findings: No rash. Neurological:      Mental Status: She is alert and oriented to person, place, and time. Psychiatric:         Mood and Affect: Mood normal.         Behavior: Behavior normal.         Thought Content: Thought content normal.       Assessment:       Diagnosis Orders   1. History of transient ischemic attack (TIA)        2. Chronic use of opiate drug for therapeutic purpose        3. Mild intermittent asthma without complication  azithromycin (ZITHROMAX) 250 MG tablet    predniSONE (DELTASONE) 20 MG tablet      4. Smoker  varenicline (CHANTIX CONTINUING MONTH ANN) 1 MG tablet      5. Lumbar disc disease  [facility]    HYDROcodone-acetaminophen (NORCO) 7.5-325 MG per tablet      6. Lumbar radiculopathy, right  [facility], Pain Management, [address] [place] [address]      7. Dysthymia  divalproex (DEPAKOTE) 250 MG DR tablet      8. Mood disorder (HCC)  divalproex (DEPAKOTE) 250 MG DR tablet      9. Spinal stenosis of lumbar region without neurogenic claudication  HYDROcodone-acetaminophen (NORCO) 7.5-325 MG per tablet      10. COVID        11. Cough  azithromycin (ZITHROMAX) 250 MG tablet    predniSONE (DELTASONE) 20 MG tablet           Plan:      No follow-ups on file. Skip warfarin for 2 days  She would like to see if another PM could get her in for procedures with sedation.      Orders Placed This Encounter   Procedures    [facility], Pain Management, [address] [place] [address]     Referral Priority:   Routine     Referral Type:   Eval and Treat     Referral Reason: Specialty Services Required     Referred to Provider:   [address] [facility],      Number of Visits Requested:   1    POCT INR     This external order was created through the results console. Orders Placed This Encounter   Medications    varenicline (CHANTIX CONTINUING MONTH ANN) 1 MG tablet     Sig: Take 1 tablet by mouth 2 times daily     Dispense:  60 tablet     Refill:  3    divalproex (DEPAKOTE) 250 MG DR tablet     Sig: TAKE ONE TABLET BY MOUTH TWICE A DAY     Dispense:  60 tablet     Refill:  5    HYDROcodone-acetaminophen (NORCO) 7.5-325 MG per tablet     Sig: Take 1.5 tablets by mouth 4 times daily for 30 days. Dispense:  180 tablet     Refill:  0     Reduce doses taken as pain becomes manageable    azithromycin (ZITHROMAX) 250 MG tablet     Sig: Take 1 tablet by mouth See Admin Instructions for 5 days 500mg on day 1 followed by 250mg on days 2 - 5     Dispense:  6 tablet     Refill:  0    predniSONE (DELTASONE) 20 MG tablet     Sig: Take 1 tablet by mouth 2 times daily for 5 days     Dispense:  10 tablet     Refill:  0         Patient given educationalmaterials - see patient instructions. Discussed use, benefit, and side effectsof prescribed medications. All patient questions answered. Pt voiced understanding. Reviewed health maintenance. Instructed to continue current medications, diet andexercise. Patient agreed with treatment plan. Follow up as directed.      Electronicallysigned by Vanessa Stevens MD on 12/1/2022 at 3:44 PM

## 2022-12-15 ENCOUNTER — TELEPHONE (OUTPATIENT)
Dept: PRIMARY CARE CLINIC | Age: 62
End: 2022-12-15

## 2022-12-15 NOTE — TELEPHONE ENCOUNTER
Stop the Chantix until the nausea and vomiting stop .   THen try just a half a tablet once a day after the nausea and vomiting resolved and see if she can handle half tablet daily

## 2022-12-15 NOTE — TELEPHONE ENCOUNTER
Patient called office states she started taking chantix 12/1/22. Patient c/o nausea, vomiting and dry heaving ongoing 1 week. Patient states sx are not going away. Patient states she is still taking chantix. Patient states she was told to call office if any side effects from medication.     Please advise     [address] [place] [address] Street

## 2023-01-03 ENCOUNTER — NURSE ONLY (OUTPATIENT)
Dept: PRIMARY CARE CLINIC | Age: 63
End: 2023-01-03
Payer: MEDICARE

## 2023-01-03 DIAGNOSIS — Z79.01 LONG TERM (CURRENT) USE OF ANTICOAGULANTS: Primary | ICD-10-CM

## 2023-01-03 LAB — INTERNATIONAL NORMALIZATION RATIO, POC: 1.6

## 2023-01-03 PROCEDURE — 93793 ANTICOAG MGMT PT WARFARIN: CPT | Performed by: FAMILY MEDICINE

## 2023-01-03 PROCEDURE — 85610 PROTHROMBIN TIME: CPT | Performed by: FAMILY MEDICINE

## 2023-01-03 NOTE — PROGRESS NOTES
Patient here for INR. has not had signs of bleeding or excessive bruising. Current Warfarin dose:  6 mg 5 days out of the week and 8 mg 2 days out of the week             Previous INR results:    Lab Results   Component Value Date    INR 3.1 12/01/2022    INR 1.4 11/18/2022    INR 2.2 10/13/2022    PROTIME 17.0 (H) 11/18/2022    PROTIME 13.1 09/20/2021    PROTIME 23.2 (H) 04/19/2021       There were no vitals filed for this visit. Today's INR:  1.6  Reviewed   Case discussed with Dr. Vidhi Amor. Patient advised to adjust to 8 mg 3 days out of the week and 6 mg 4 days out of the week. Repeat next Protime 2-3  week(s).

## 2023-01-04 ENCOUNTER — TELEPHONE (OUTPATIENT)
Dept: PRIMARY CARE CLINIC | Age: 63
End: 2023-01-04

## 2023-01-04 NOTE — TELEPHONE ENCOUNTER
Patient called office states she has been to [facility] for injections in lower back. Patient states injections usually helps her, patient states she is not sure what happen the last time she went but patient feels as if norco is not helping her. Patient is asking if she can increase to 2 tablets? Or add another pain medication?      Patient states she is having severe pain when she bends, walk, or move a different way     Please advise

## 2023-01-05 NOTE — TELEPHONE ENCOUNTER
Is she still taking the lyrica: if not then restart it  She CANNOT take 2 norco.   I would like her to see the PM doctor about what else they suggest.

## 2023-01-12 DIAGNOSIS — M48.061 SPINAL STENOSIS OF LUMBAR REGION WITHOUT NEUROGENIC CLAUDICATION: ICD-10-CM

## 2023-01-12 DIAGNOSIS — R11.0 NAUSEA: ICD-10-CM

## 2023-01-12 DIAGNOSIS — U07.1 COVID: ICD-10-CM

## 2023-01-12 DIAGNOSIS — M51.9 LUMBAR DISC DISEASE: ICD-10-CM

## 2023-01-12 RX ORDER — ONDANSETRON 4 MG/1
4 TABLET, FILM COATED ORAL 3 TIMES DAILY PRN
Qty: 30 TABLET | Refills: 0 | Status: SHIPPED | OUTPATIENT
Start: 2023-01-12

## 2023-01-12 RX ORDER — HYDROCODONE BITARTRATE AND ACETAMINOPHEN 7.5; 325 MG/1; MG/1
1.5 TABLET ORAL 4 TIMES DAILY
Qty: 180 TABLET | Refills: 0 | Status: SHIPPED | OUTPATIENT
Start: 2023-01-12 | End: 2023-02-11

## 2023-01-12 NOTE — TELEPHONE ENCOUNTER
Patient called office states Neva Kent is not helping asking if there is something else she can take or higher dosage? Patient states she is only taking zofran 4 mg once daily.      Please advise     [address]

## 2023-01-18 ENCOUNTER — OFFICE VISIT (OUTPATIENT)
Dept: ORTHOPEDIC SURGERY | Age: 63
End: 2023-01-18

## 2023-01-18 DIAGNOSIS — M25.561 RIGHT KNEE PAIN, UNSPECIFIED CHRONICITY: Primary | ICD-10-CM

## 2023-01-18 RX ORDER — LIDOCAINE HYDROCHLORIDE 10 MG/ML
2 INJECTION, SOLUTION INFILTRATION; PERINEURAL ONCE
Status: COMPLETED | OUTPATIENT
Start: 2023-01-18 | End: 2023-01-18

## 2023-01-18 RX ORDER — BUPIVACAINE HYDROCHLORIDE 2.5 MG/ML
30 INJECTION, SOLUTION EPIDURAL; INFILTRATION; INTRACAUDAL ONCE
Status: COMPLETED | OUTPATIENT
Start: 2023-01-18 | End: 2023-01-18

## 2023-01-18 RX ORDER — BETAMETHASONE SODIUM PHOSPHATE AND BETAMETHASONE ACETATE 3; 3 MG/ML; MG/ML
12 INJECTION, SUSPENSION INTRA-ARTICULAR; INTRALESIONAL; INTRAMUSCULAR; SOFT TISSUE ONCE
Status: COMPLETED | OUTPATIENT
Start: 2023-01-18 | End: 2023-01-18

## 2023-01-18 RX ADMIN — LIDOCAINE HYDROCHLORIDE 2 ML: 10 INJECTION, SOLUTION INFILTRATION; PERINEURAL at 10:25

## 2023-01-18 RX ADMIN — BUPIVACAINE HYDROCHLORIDE 75 MG: 2.5 INJECTION, SOLUTION EPIDURAL; INFILTRATION; INTRACAUDAL at 10:24

## 2023-01-18 RX ADMIN — BETAMETHASONE SODIUM PHOSPHATE AND BETAMETHASONE ACETATE 12 MG: 3; 3 INJECTION, SUSPENSION INTRA-ARTICULAR; INTRALESIONAL; INTRAMUSCULAR; SOFT TISSUE at 10:23

## 2023-01-18 NOTE — PROGRESS NOTES
Mona Ortiz M.D.            [address] [place] [address]., [address] [facility],[address], [facility].           Dept Phone: [phone]           Dept Fax:  [address]          Dept Phone: [phone]           Dept Fax:  [phone]      Chief Compliant:  Chief Complaint   Patient presents with    Pain     Rt knee        History of Present Illness: This is a 58 y.o. female who presents to the clinic today for evaluation / follow up of right knee pain. Patient has a history of a partial medial meniscectomy was performed in mid 2022. Since that time she has had an aspiration of her knee twice usually about 2 to 3 months apart she states she did very well since the last 1 in November but had a reaccumulation during the past 2 or 3 weeks denies any injury or trauma. Denies any fever chills. Review of Systems   Constitutional: Negative for fever, chills, sweats. Eyes: Negative for changes in vision, or pain. HENT: Negative for ear ache, epistaxis, or sore throat. Respiratory/Cardio: Negative for Chest pain, palpitations, SOB, or cough. Gastrointestinal: Negative for abdominal pain, N/V/D. Genitourinary: Negative for dysuria, frequency, urgency, or hematuria. Neurological: Negative for headache, numbness, or weakness. Integumentary: Negative for rash, itching, laceration, or abrasion. Musculoskeletal: Positive for Pain (Rt knee)       Physical Exam:  Constitutional: Patient is oriented to person, place, and time. Patient appears well-developed and well nourished. HENT: Negative otherwise noted  Head: Normocephalic and Atraumatic  Nose: Normal  Eyes: Conjunctivae and EOM are normal  Neck: Normal range of motion Neck supple. Respiratory/Cardio: Effort normal. No respiratory distress.   Musculoskeletal: Examination of her right knee notes an obvious effusion. She is got a fair amount of pain with any kind of flexion motion is 5 to about 90 degrees without any significant discomfort Raissa's is negative however ligamentously grossly intact    Neurological: Patient is alert and oriented to person, place, and time. Normal strength. No sensory deficit. Skin: Skin is warm and dry  Psychiatric: Behavior is normal. Thought content normal.  Nursing note and vitals reviewed. Labs and Imaging:     XR taken today: No new x-rays taken today  No results found. Orders Placed This Encounter   Procedures    20610 - DRAIN/INJECT LARGE JOINT BURSA       Assessment and Plan:  1.  Right knee pain, unspecified chronicity        Administrations This Visit       betamethasone acetate-betamethasone sodium phosphate (CELESTONE) injection 12 mg       Admin Date  01/18/2023  10:23 Action  Given Dose  12 mg Route  Intra-artICUlar Site  Knee Right Administered By  Km Uribe LPN    Ordering Provider: Annabella Stewart MD    NDC: 8912-1053-81    Lot#: [id number]    : [employer]    Patient Supplied?: No              bupivacaine (PF) (MARCAINE) 0.25 % injection 75 mg       Admin Date  01/18/2023  10:24 Action  Given Dose  75 mg Route  IntraDERmal Site  Knee Right Administered By  Km Uribe LPN    Ordering Provider: Annabella Stewart MD    . [facility] 47: [id number]    Lot#: [id number]    : [place]    Patient Supplied?: No              lidocaine 1 % injection 2 mL       Admin Date  01/18/2023  10:25 Action  Given Dose  2 mL Route  Intra-artICUlar Site  Knee Right Administered By  Km Uribe LPN    Ordering Provider: Annabella Stewart MD    . [facility] 47: [id number]    Lot#: [id number]    : [employer]    Patient Supplied?: No                    This is a 58 y.o. female who presents to the clinic today for evaluation / follow up of recurrent effusion right knee status post previous partial medial meniscectomy. Past History:    Current Outpatient Medications:     ondansetron (ZOFRAN) 4 MG tablet, Take 1 tablet by mouth 3 times daily as needed for Nausea or Vomiting, Disp: 30 tablet, Rfl: 0    HYDROcodone-acetaminophen (NORCO) 7.5-325 MG per tablet, Take 1.5 tablets by mouth 4 times daily for 30 days. , Disp: 180 tablet, Rfl: 0    varenicline (CHANTIX CONTINUING MONTH ANN) 1 MG tablet, Take 1 tablet by mouth 2 times daily, Disp: 60 tablet, Rfl: 3    divalproex (DEPAKOTE) 250 MG DR tablet, TAKE ONE TABLET BY MOUTH TWICE A DAY, Disp: 60 tablet, Rfl: 5    pregabalin (LYRICA) 100 MG capsule, TAKE ONE CAPSULE BY MOUTH THREE TIMES A DAY, Disp: 90 capsule, Rfl: 0    busPIRone (BUSPAR) 15 MG tablet, TAKE ONE TABLET BY MOUTH TWICE A DAY, Disp: 60 tablet, Rfl: 3    DULoxetine (CYMBALTA) 60 MG extended release capsule, TAKE ONE CAPSULE BY MOUTH DAILY, Disp: 30 capsule, Rfl: 5    albuterol-ipratropium (COMBIVENT RESPIMAT)  MCG/ACT AERS inhaler, Inhale 1 puff into the lungs in the morning and 1 puff at noon and 1 puff in the evening and 1 puff before bedtime. Increased to every 4 hrs if needed. ., Disp: 3 each, Rfl: 3    omeprazole (PRILOSEC) 20 MG delayed release capsule, TAKE ONE CAPSULE BY MOUTH DAILY, Disp: 30 capsule, Rfl: 5    warfarin (COUMADIN) 4 MG tablet, 6mg Monday through Friday  and 8 mg Friday through Sunday (Patient taking differently: 6mg Monday through Friday  and 8 mg Saturday and Sunday), Disp: 50 tablet, Rfl: 8    fluticasone-salmeterol (ADVAIR) 250-50 MCG/DOSE AEPB, INHALE ONE PUFF BY MOUTH EVERY 12 HOURS, Disp: 60 each, Rfl: 5  Allergies   Allergen Reactions    Eggs Or Egg-Derived Products Other (See Comments)     Severe abdominal pains    Bupropion Nausea Only    Ceftin [Cefuroxime Axetil] Nausea Only     Nausea and upset stomach     Social History     Socioeconomic History    Marital status:      Spouse name: Not on file    Number of children: Not on file    Years of education: Not on file    Highest education level: Not on file   Occupational History    Not on file   Tobacco Use    Smoking status: Every Day     Packs/day: 0.50     Years: 46.00     Pack years: 23.00     Types: Cigarettes     Start date:     Smokeless tobacco: Never   Vaping Use    Vaping Use: Every day   Substance and Sexual Activity    Alcohol use:  Yes     Alcohol/week: 0.0 standard drinks     Comment: OCCASIONAL    Drug use: No    Sexual activity: Not on file   Other Topics Concern    Not on file   Social History Narrative    Not on file     Social Determinants of Health     Financial Resource Strain: Low Risk     Difficulty of Paying Living Expenses: Not hard at all   Food Insecurity: No Food Insecurity    Worried About Running Out of Food in the Last Year: Never true    Ran Out of Food in the Last Year: Never true   Transportation Needs: Not on file   Physical Activity: Not on file   Stress: Not on file   Social Connections: Not on file   Intimate Partner Violence: Not on file   Housing Stability: Not on file     Past Medical History:   Diagnosis Date    Acid reflux     Arthritis     neck, back, knees    Asthma     Asthma     COPD (chronic obstructive pulmonary disease) ([facility] [address] 10/20/2015    Emphysema     Headache(784.0)     Heart abnormality     minute hole in heart per pt found in  after TIA    Osteoarthritis     Osteoporosis     Seborrheic keratoses 2019    TIA (transient ischemic attack)      Past Surgical History:   Procedure Laterality Date    ACROMIOPLASTY Left 2016    SHOULDER ARTHROCOPY LABRAL DEBRIDEMENT      SECTION, LOW TRANSVERSE      COLONOSCOPY      DILATION AND CURETTAGE OF UTERUS      KNEE ARTHROSCOPY Right     KNEE ARTHROSCOPY Right 2022    KNEE ARTHROSCOPIC PARTIAL MEDIAL MENISCECTOMY performed by Kitty Duong MD [address]      uterine lining removed by laser    [place] ADENOIDECTOMY      TUBAL LIGATION       Family History   Problem Relation Age of Onset    Heart Disease Father     Asthma Father     Cancer Paternal Grandfather         throat    Asthma Son    Plan  An informed verbal consent for the procedure was obtained and risks including, but not limited to: allergy to medications, injection, bleeding, stiffness of joint, recurrence of symptoms, loss of function, swelling, drainage, irrigation, need for surgery and pseudo-septic inflammation, were explained to the patient. Also, discussed was the potential for further injections, irrigation and debridement and surgery. Alternate means of treatment have also been discussed with the patient.       Administrations This Visit       betamethasone acetate-betamethasone sodium phosphate (CELESTONE) injection 12 mg       Admin Date  01/18/2023  10:23 Action  Given Dose  12 mg Route  Intra-artICUlar Site  Knee Right Administered By  Kely Mccloud LPN    Ordering Provider: Jazmin Hollins MD    NDC: 7045-1058-44    Lot#: [id number]    : [employer]    Patient Supplied?: No              bupivacaine (PF) (MARCAINE) 0.25 % injection 75 mg       Admin Date  01/18/2023  10:24 Action  Given Dose  75 mg Route  IntraDERmal Site  Knee Right Administered By  Kely Mccloud LPN    Ordering Provider: Jazmin Hollins MD    . [address]: [id number]    Lot#: [id number]    : [facility]    Patient Supplied?: No              lidocaine 1 % injection 2 mL       Admin Date  01/18/2023  10:25 Action  Given Dose  2 mL Route  Intra-artICUlar Site  Knee Right Administered By  Kely Mccloud LPN    Ordering Provider: Jazmin Hollins MD    . [facility]: [id number]    Lot#: [id number]    : [facility]    Patient Supplied?: No                Under sterile conditions patient's right knee was injected with lidocaine 2 cc bupivacaine 2 cc and then aspirated 34 cc of straw-colored synovial fluid and then injected 2 cc of Celestone. She tolerated the procedure well    Patient to take it easy for a few days hopefully will have no further recurrence back here. Provider Attestation:  Fidencio Murphy, personally performed the services described in this documentation. All medical record entries made by the scribe were at my direction and in my presence. I have reviewed the chart and discharge instructions and agree that the records reflect my personal performance and is accurate and complete. Nicolasa Hernández MD. 01/18/23      Please note that this chart was generated using voice recognition Dragon dictation software. Although every effort was made to ensure the accuracy of this automated transcription, some errors in transcription may have occurred.

## 2023-01-24 ENCOUNTER — TELEPHONE (OUTPATIENT)
Dept: PRIMARY CARE CLINIC | Age: 63
End: 2023-01-24

## 2023-01-24 DIAGNOSIS — F17.200 SMOKER: Primary | ICD-10-CM

## 2023-01-24 RX ORDER — NICOTINE 21 MG/24HR
1 PATCH, TRANSDERMAL 24 HOURS TRANSDERMAL EVERY 24 HOURS
Qty: 30 PATCH | Refills: 0 | Status: SHIPPED | OUTPATIENT
Start: 2023-01-24 | End: 2024-01-24

## 2023-01-24 NOTE — TELEPHONE ENCOUNTER
----- Message from [phone] sent at 1/24/2023  3:37 PM EST -----  Subject: Medication Problem    Medication: Other - Patch to stop smoking  Dosage: as prescribed  Ordering Provider: lane    Question/Problem: The patient is not wanting to take the Chantix because   of vomiting. She is wanting to be placed on a patch but has no idea what   the name of the medication is called and is asking for her PCP advise. Please call the pt back to advise.        Pharmacy: [facility] [id number] - [place], [address]   [phone] [facility] [phone]    ---------------------------------------------------------------------------  --------------  Rhonda Parikh INFO  [phone]; OK to leave message on voicemail  ---------------------------------------------------------------------------  --------------    SCRIPT ANSWERS  Relationship to Patient: Self

## 2023-01-24 NOTE — TELEPHONE ENCOUNTER
Since she was down to 1/2 ppd then start with the 14 mg patch and DO NOT SMOKE when on the patch and try to remove at HS and put on in am  In 3-4 weeks can step down to the 7 mg patch, call when she is ready.

## 2023-01-25 DIAGNOSIS — M25.561 RIGHT KNEE PAIN, UNSPECIFIED CHRONICITY: Primary | ICD-10-CM

## 2023-01-25 RX ORDER — METHYLPREDNISOLONE 4 MG/1
4 TABLET ORAL SEE ADMIN INSTRUCTIONS
Qty: 1 KIT | Refills: 0 | Status: SHIPPED | OUTPATIENT
Start: 2023-01-25 | End: 2023-01-31

## 2023-01-25 NOTE — TELEPHONE ENCOUNTER
MDP pended for your signature. Spoke with patient and provided her with update on Dr. Selena Belcher' recommendation and confirmed [facility] on [address].

## 2023-01-25 NOTE — TELEPHONE ENCOUNTER
Pt states she saw Dr. Kaushik Smith on 1/18/23 and had her R knee drained. She states she has had no relief from pain and is still limping. Please call pt with recommendations.

## 2023-01-27 ENCOUNTER — NURSE ONLY (OUTPATIENT)
Dept: PRIMARY CARE CLINIC | Age: 63
End: 2023-01-27

## 2023-01-27 DIAGNOSIS — G45.9 TIA (TRANSIENT ISCHEMIC ATTACK): ICD-10-CM

## 2023-01-27 DIAGNOSIS — Z79.01 LONG TERM (CURRENT) USE OF ANTICOAGULANTS: Primary | ICD-10-CM

## 2023-01-27 DIAGNOSIS — Z86.73 HISTORY OF TRANSIENT ISCHEMIC ATTACK (TIA): ICD-10-CM

## 2023-01-27 LAB — INTERNATIONAL NORMALIZATION RATIO, POC: 1.7

## 2023-01-27 RX ORDER — WARFARIN SODIUM 4 MG/1
TABLET ORAL
Qty: 52 TABLET | Refills: 3 | Status: SHIPPED | OUTPATIENT
Start: 2023-01-27 | End: 2023-02-26

## 2023-01-27 NOTE — PROGRESS NOTES
.Patient here for INR. has not had signs of bleeding or excessive bruising. Current Warfarin dose:  8 mg 3 days 6 mg all other days            Previous INR results:    Lab Results   Component Value Date    INR 1.6 01/03/2023    INR 3.1 12/01/2022    INR 1.4 11/18/2022    PROTIME 17.0 (H) 11/18/2022    PROTIME 13.1 09/20/2021    PROTIME 23.2 (H) 04/19/2021       There were no vitals filed for this visit. Today's INR:  1.7    Case discussed with Dr. Freddy Cat. Patient advised to adjust to 8 mg 5 days and 6 mg 2 days  reviewed  Repeat next Protime 2  week(s).

## 2023-01-29 ENCOUNTER — TELEPHONE (OUTPATIENT)
Dept: ORTHOPEDIC SURGERY | Age: 63
End: 2023-01-29

## 2023-01-29 NOTE — TELEPHONE ENCOUNTER
Non-Urgent Courtesy Notification of Patient's After Hours Call. Pt. had a Right Knee Aspiration on 1/18 in office. She continued to have significant pain afterwards and on 1/25, was placed on a Medrol dose pack x 6 days. Pt. states she is on day 4 of that dose pack and continues to have no pain relief. Still walking with a cane, icing and elevating extremity per instructions. Seeking care advice for how to proceed next. Dr. Godwin Valles in the provider on call. Dr. Godwin Valles returns page and writer informs of above. Dr. Godwin Valles' care recommendation is that the patient may proceed in one of two ways: go to the ED if pain is intolerable and patient does not feel she can wait or contact the office first thing in the AM to schedule an immediate follow up appointment. Writer contacts patient back and informs of provider's recommendation.  Patient states she will call the office first thing in the AM. Electronically signed by Natalia Morocho RN on 1/29/2023 at 6:10PM

## 2023-01-30 ENCOUNTER — OFFICE VISIT (OUTPATIENT)
Dept: ORTHOPEDIC SURGERY | Age: 63
End: 2023-01-30

## 2023-01-30 DIAGNOSIS — M25.561 RIGHT KNEE PAIN, UNSPECIFIED CHRONICITY: Primary | ICD-10-CM

## 2023-01-30 PROCEDURE — 99024 POSTOP FOLLOW-UP VISIT: CPT | Performed by: ORTHOPAEDIC SURGERY

## 2023-01-30 RX ORDER — METHYLPREDNISOLONE 4 MG/1
TABLET ORAL
Qty: 1 KIT | Refills: 0 | Status: SHIPPED | OUTPATIENT
Start: 2023-01-30 | End: 2023-02-05

## 2023-01-30 NOTE — PROGRESS NOTES
April returns today. Please see prior dictations. She had a previous arthroscopic partial medial meniscectomy of her right knee. Since that time she has been drained twice but she is continues to have pain she indicates her pain now is now more inferior medial to the knee joint not so much in the knee per se. Examination does not show obvious evidence for recurrence of her effusion. She was aspirated 17 cc last time I do not see evidence for this and do not see evidence for any Raissa's. The patient is exquisitely tender over her pes bursa and this extending up into her hamstrings and she indicates his elbow goes up of the medial side of her leg as well. She denies any groin pain. Impression  Status post arthroscopic partial medial meniscectomy right knee  Previous aspiration x2 no signs of infection  Pes bursitis rather exquisite right knee    Plan  Patient be given another Medrol Dosepak. Also suggested Voltaren gel insert and also consented for physical therapy for anti-inflammatory modalities. Test and pain her doing well with this.   Back here in a as needed basis

## 2023-01-31 ENCOUNTER — TELEPHONE (OUTPATIENT)
Dept: PRIMARY CARE CLINIC | Age: 63
End: 2023-01-31

## 2023-01-31 NOTE — TELEPHONE ENCOUNTER
I think his recommendation for Physical therapy and another steroid dose is fine.  See how that goes first.

## 2023-01-31 NOTE — TELEPHONE ENCOUNTER
Pt calling to let you know that she had fluid removed from her rt knee, last Tuesday. Finished up yesterday with a Prednisone burst. Pt is still having the same rt knee pain and swelling and Dr. Kat Woodall said that it is not her knee, but the hamstring and he wants her to be on a steroid again. Asking what you think? Pt is thinking that she should get a 2nd opinion and wants to know who you would recommend? Please advise. Pt currently see's Dr. Kat Woodall.

## 2023-02-03 ENCOUNTER — TELEPHONE (OUTPATIENT)
Dept: PRIMARY CARE CLINIC | Age: 63
End: 2023-02-03

## 2023-02-03 NOTE — TELEPHONE ENCOUNTER
Pt asking, if you would order a Right knee xray? She has never had an xray on her knee. Would like to go to [address] [place] [address]. C/o rt knee pain.

## 2023-02-03 NOTE — TELEPHONE ENCOUNTER
She had both  knees  xrayed   last summer. She also had an MRI of the right knee last summer. A plain xray is not going to show much difference.  She should talk to Dr Allan Velazquez about getting an MRI

## 2023-02-06 DIAGNOSIS — R11.0 NAUSEA: ICD-10-CM

## 2023-02-06 DIAGNOSIS — U07.1 COVID: ICD-10-CM

## 2023-02-06 NOTE — THERAPY EVALUATION
[address]   Outpatient Physical Therapy  Physical Therapy Lower Extremity Evaluation    Date:  2023  Patient: Sarabjit Stephenson  : 1960  MRN: [id number]  Physician: Dodie Voss MD Insurance: [facility]. Auth required after 30 visits. Iontophoresis and e-stim not covered  Medical Diagnosis: M25.561 (ICD-10-CM) - Right knee pain, unspecified chronicity   Rehab Codes: M25.561, R53.1, R26.9  Onset Date: DOS 22   Next 's appt: PRN    Subjective:   CC: Chronic R knee pain, weakness, instability, walking difficulties   HPI: Pt reports chronic longstanding hx of R medial knee pain and instability with prior surgical history of a R partial medial menisectomy performed by Dr. Lawrence Merino on 22. Pt reported some relief in symptoms initially after this procedure but long-term unfortunately continued to struggle with chronic medial knee pain and effusion. Since her procedure, pt has underwent three joint aspiration procedures and injections to her R knee but has continued to struggle with pain localized to her R inferomedial knee. Pt recently completed a second course of oral corticosteroids with minimal relief and now presents to PT for continued conservative mgmt. Pt expresses frustration with continued pain levels, stating that she's having just as much pain as she was having before her surgery last July. Feels like pain is progressively getting worse with pain localized primarily in R medial knee and proximal medial aspect of R tibia. Has recently started using a SPC in the past few weeks, verbalizing fear of falling, given instability at her R knee. Works as a self-employed  with significant difficulty and pain performing both ADLs and work duties.      PMHx: [] Unremarkable [] Diabetes [] HTN  [] Pacemaker   [] MI/Heart Problems [] Cancer [x] Arthritis  [x] Other: R partial medial menisectomy, L shoulder acromioplasty              [x] Refer to full medical chart  In EPIC Comorbidities:   [] Obesity [] Dialysis  [x] Other: Osteoporosis   [x] Asthma/COPD [] Dementia [] Other:   [x] Stroke (hx of TIA 2003) [] Sleep apnea [] Other:   [] Vascular disease [] Rheumatic disease [] Other:     Preferred Language:   [x] English           [] Other:    Prior Imaging: Remote x-rays of R knee from 6/15/22 showed mild medial joint space and patellofemoral joint space narrowing. 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 Previous Treatment: R partial medial menisectomy 7/7/22. Three prior R knee joint aspirations and injections with most recent one on 1/18/23. Two prior short courses of oral corticosteroids. Home Environment: Pt lives in a multistory home with her family. 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 Was not previously using an AD for amb.      Pain:  [x] Yes  [] No Location: R medial knee Pain Rating: (0-10 scale) 2/10 currently at rest.  Pain altered Tx:  [] Yes  [] No  Action:    Symptoms:  [] Improving [] Worsening [x] Same  Aggravating factors: squatting, putting on socks/shoes, stairs, standing/walking, any kind of movement of R knee  Alleviating factors: resting and keeping R LE still       Functional Status Previous level of function Current level of function Comments   Sitting [x] Independent  [] Deficit [x] Independent  [] Deficit    Standing/walking [x] Independent  [] Deficit [] Independent  [x] Deficit Uses SPC. R knee pain and instability   Driving [x] Independent  [] Deficit [] Independent  [x] Deficit Painful repositioning R LE from gas to brake   Housekeeping/Meal Preparation [x] Independent  [] Deficit [] Independent  [x] Deficit Pain    Lifting/Carrying [x] Independent  [] Deficit [] Independent  [x] Deficit Avoids   Bending/Reaching [x] Independent  [] Deficit [] Independent  [x] Deficit Painful with all active movement of R knee   Stair climbing [x] Independent  [] Deficit [] Independent  [x] Deficit Pain. Leads with L LE   Pivoting [x] Independent  [] Deficit [] Independent  [x] Deficit R knee pain and instability   Squatting [x] Independent  [] Deficit [] Independent  [x] Deficit Unable, high pain levels   Other [] Independent  [] Deficit [] Independent  [] Deficit      Patient Goals/Rehab Expectations: \"No pain\"       Objective:      Observations: Pt's special needs son present for evaluation today. Significant guarding with all movement of R knee. Subtle localized effusion around R medial knee. Ambulated into clinic using [address] in R hand. Lumbar Clearing:  [x] Not tested            [] No Deficit              [] Deficit:      Sensation:  [x] No Deficit         [] Deficit:     Fall risk:  [] No            [x] Yes:                                    Brown Fall Risk Assessment    Risk Factor Scale  Score   History of Falls [] Yes  [x] No 25  0 0   Secondary Diagnosis [] Yes  [x] No 15  0 0   Ambulatory Aid [] Furniture  [x] Crutches/cane/walker  [] None/bedrest/wheelchair/nurse 30  15  0 15   IV/Heparin Lock [] Yes  [x] No 20  0 0   Gait/Transferring [] Impaired  [x] Weak  [] Normal/bedrest/immobile 20  10  0 10   Mental Status [] Forgets limitations  [x] Oriented to own ability 15  0 0      Total: 25     Based on the Assessment score: check the appropriate box.     []  No intervention needed   Low =   Score of 0-24    [x]  Use standard prevention interventions Moderate =  Score of 24-44   [] Give patient handout and discuss fall prevention strategies   [x] Establish goal of education for patient/family RE: fall prevention strategies - educated pt on use of cane in contralateral UE    []  Use high risk prevention interventions High = Score of 45 and higher   [] Give patient handout and discuss fall prevention strategies   [] Establish goal of education for patient/family Re: fall prevention strategies   [] Discuss lifeline / other resources          Range of Motion  Left Range of Motion  Right Strength  Left Strength  Right   Hip Flexion   4/5 3/5   Hip Abduction   4/5 3+/5   Hip Adduction   4+/5 4-/5   Hip Extension   4/5 3/5   Hip ER       Hip IR       Knee Flexion 130 130  Guarded 4+/5 3+/5   Knee Extension 0 0  Guarded 4+/5 3/5   Dorsiflexion   4+/5 4/5   Plantar flexion   4+/5 4/5   Inversion       Eversion       *All LE MMT performed in modified (seated) positioning    Special Testing: Unable to tolerate any special testing due to guarding/withdrawal, especially with any localized pressure around L proximal tibia or medial. Unable to tolerate any kind of rotational varus/valgus torque     Joint Mobility: Mild restrictions in R patellofemoral mobility in all planes. Normal tibiofemoral accessory joint mobility    Palpation: Localized TTP, very sensitive to light palpation along R medial knee joint line and proximal 1/3 of R medial tibia. Non-tender at medial hamstrings, quads, gastroc, or pes anserine structures. Gait: Independent []           Modified Independent [x]            Not independent []  Comments: Modified independent with SPC, but very slow and labored ran. Ambulated into clinic with SPC in R hand with heavy lateral lean to R. Tendency to keep R knee extended during swing phase with compensatory vaulting. Minimal to no shock absorption present at initial contact with foot flat WB. Very antalgic through R stance phase of gait.  Improved mechanics noted after cueing pt in proper technique holding SPC in L hand. Tinetti Balance & Gait Assessment     0 1 2   Sitting balance Leans/slides in chair  [] Steady; safe  [x]    Rises from chair Unable without help  [] Uses arms  [x] Able without arms  []   Attempts to rise Unable without help  [] >1 attempt  [] 1 attempt  [x]   Immediate standing balance Unsteady  [] Steady w/AD  [x] Steady; Independent  [x]   Standing balance Unsteady  [] Wide SARANYA  [x] Steady; neutral SARANYA  []   Nudged Begins to fall  [] Staggers/Grabs/Catches  [x] Steady  []   Standing (eyes closed) Unsteady  [x]  Favors L LE Steady  []    360 deg Turn (steps) Discontinuous steps  [x] Continuous steps  []    360 deg Turn (steadiness) Unsteady  [] Steady  [x]    Sitting Down Unsafe  [] Uses arms; not smooth  [x] Safe and smooth  []         Gait initiation Hesitancy  [x] No hesitancy  []    Step Length (R) R swing foot does not pass stance foot  [] R swing foot passes stance foot  [x]    Step Length (L) L swing foot does not pass stance foot  [x] L swing foot passes stance foot  []    Step Height (R) R foot does not clear completely  [] R foot clears completely  [x]    Step Height (L) L foot does not clear completely  [] L foot clears completely  [x]    Step Symmetry Not equal  [x] Equal  []    Step Continuity Discontinuous  [x] Continuous  []    Walking Path Marked deviation  [] AD  [x] Straight w/o AD  []   Trunk Marked sway or AD  [x] Flexed knees/back or arms out  [] No sway/deviation  []   Walking Stance Heels apart  [x] Heels almost touch  []     Balance score: 11 / 16  Gait score: 4 / 12  Total score: 15 / 28  Fall risk: Low (>/=24)  []      Moderate (19-23)  []       High (</=18)  [x]      Assessment:  Problems:    [x] ? Pain:  [x] ? ROM:  [x] ? Strength:  [x] ? Function:  [] Other:    Pt is a 59 y/o F referred to PT with chronic hx of R medial knee pain, effusion and weakness/instability with hx of R partial medial menisectomy on 7/7/22.  Pt presents to PT evaluation today with high levels of pain and significant difficulty walking. Ambulated into clinic with [facility] in R hand with severe antalgia and significant lateral trunk lean/reliance on AD with poor shock absorption noted at initial contact. Cued pt on proper technique using AD in her L hand with improved pain levels and mechanics noted, but still demonstrating difficulty tolerating WB through her R LE. Pt's pain is localized primarily to R medial knee joint line as well as medial aspect of proximal 1/3 of pt's tibia. No specific tenderness to surrounding musculature with no pain at pes anserine structures, with pt most sensitive at medial shaft of R tibia, concerning for possible stress reaction. No increased pain with percussion to mid/distal shaft tibia or distal tib/fib compression, and sx seemed to alleviate significantly when switching cane to contralateral hand. Pt demonstrates AROM in R knee, guarded but no significant increase in pain, but does demonstrates significant R LE weakness in comparison to her L LE. Not yet able to tolerate full weight through her R LE. Skilled PT intervention is required to help modulate pain, improve R LE WB tolerance, and maximize R LE strength/stability necessary to restore symmetrical gait and ADLs/work duties at her PLOF. Initial written HEP prescribed today with good tolerance and understanding. Of note, MD referral also mentioned use of anti-inflammatory modalities (iontophoresis and ultrasound), but pt's insurance payor does not cover iontophoresis even with MD script. Ultrasound not indicated today secondary to location of pain (primarily localized at bone versus soft tissue) but will continue to monitor sx and treat appropriately. Pt receptive to all education and treatment plan today.      STG: (to be met in 6 treatments)  Pt will self report worst pain no greater than 3/10 in order to better tolerate ADLs/work activities with minimal dysfunction  Pt will improve Tinetti score to 24/28 or greater to show improved balance/gait stability without the use of an AD and reduced risk for falls with household amb  Pt will demonstrate full WB tolerance through her R LE necessary to show improved proprioception and stability necessary to transition to no AD  Pt will demonstrate ability to negotiate 1 flight of stairs, reciprocally, with minimal handrail assist to show improved functional strength and mobility through her ADLs and work duties. LTG: (to be met in 12 treatments)  Pt will demonstrate improved R LE strength to 4/5 or greater in order to demonstrate improved stability/strength necessary for unrestricted ADLs/work activities  SLS GOAL TO BE ADDED AT A FUTURE VISIT  Pt will improve LEFS to 70% function or greater in order to demonstrate improved functional tolerances at PLOF with minimal restriction/dysfunction  Pt will demonstrate independence with a long term HEP for continued progress/maintenance after completion of PT                   Functional Assessment Used: LEFS  Current Status Score: 16% function  Goal Status Score: 70% function    Evaluation Complexity:  History (Personal factors, comorbidities) [] 0 [] 1-2 [x] 3+   Exam (limitations, restrictions) [] 1-2 [x] 3 [] 4+   Clinical presentation (progression) [] Stable [x] Evolving  [] Unstable   Decision Making [] Low [x] Moderate [] High    [] Low Complexity [x] Moderate Complexity [] High Complexity     Rehab Potential:  [x] Good  [] Fair  [] Poor   Suggested Professional Referral:  [x] No  [] Yes:  Barriers to Goal Achievement[de-identified]  [x] No  [] Yes:  Domestic Concerns:  [x] No  [] Yes:    Pt. Education:  [x] Plans/Goals, Risks/Benefits discussed  [x] Home exercise program  Method of Education: [x] Verbal  [x] Demo  [x] Written  Comprehension of Education:  [x] Verbalizes understanding. [x] Demonstrates understanding. [] Needs Review. [] Demonstrates/verbalizes understanding of HEP/Ed previously given.     Access Code: [email or web address]  URL: [email or web address]  Date: 02/07/2023  Prepared by: Maria T Oliver    Exercises  Straight Leg Raise - 2 x daily - 7 x weekly - 2-3 sets - 10 reps  Supine Bridge - 2 x daily - 7 x weekly - 2-3 sets - 10 reps  Standing Weight Shift Side to Side - 2 x daily - 7 x weekly - 2 sets - 20 reps - 3 hold  Heel Toe Raises with Unilateral Counter Support - 2 x daily - 7 x weekly - 2-3 sets - 10 reps      Treatment Plan:  [x] Therapeutic Exercise   41732  [x] Iontophoresis: 4 mg/mL Dexamethasone Sodium Phosphate  mAmin  74282   [x] Therapeutic Activity  98913 [x] Vasopneumatic cold with compression  38303    [x] Gait Training   45161 [x] Ultrasound   85442   [x] Neuromuscular Re-education  65016 [] Electrical Stimulation Unattended  18536   [x] Manual Therapy  08813 [] Electrical Stimulation Attended  43603   [x] Instruction in HEP  [] Lumbar/Cervical Traction  20757   [] Aquatic Therapy   22202 [] Cold/hotpack    [] Massage   80685      [] Dry Needling, 1 or 2 muscles  59573   [] Biofeedback, first 15 minutes   68215  [] Biofeedback, additional 15 minutes   54077 [] Dry Needling, 3 or more muscles  21759     [x]  Medication allergies reviewed for use of    Dexamethasone Sodium Phosphate 4mg/ml     with iontophoresis treatments. Pt is not allergic. *IONTOPHORESIS NOT COVERED BY INSURANCE PAYOR*       Frequency: 2x/week for 12 visits    Todays Treatment:  Modalities:   Precautions:   Exercises:  Exercise Reps/ Time Weight/ Level Comments   SLRs 10x  R LE; Initial HEP   Bridges 10x  Initial HEP; emphasis on equal WB   Standing lateral weight shifts 3'' hold x 15  To improve R LE WB tolerance. Using [facility] [place] [facility] on L. Initial HEP   Standing heel/toe raises 10x  Initial HEP   Gait training 5'  Using [facility] on contralateral side. Other:    Specific Instructions for next treatment: Emphasis on R LE strengthening, proprioception/stability, and improving R LE WB tolerance.  Work to improve symmetrical gait mechanics and progress to no AD when appropriate. Treatment Charges: Mins Units   [x] Evaluation       []  Low       [x]  Moderate       []  High 40 1   []  Modalities     [x]  Ther Exercise 10 1   []  Manual Therapy     []  Ther Activities     []  Aquatics     []  Neuromuscular     [x]  Gait Training 5 0   []  Dry Needling           1-2 muscles     []  Dry Needling           3 or more muscles     [] Vasocompression     []  Other       TOTAL TREATMENT TIME: 55    Time in: 9:06am    Time Out: 10:01am    Electronically signed by: Charleen Mcintosh PT        Physician Signature:________________________________Date:__________________  By signing above or cosigning this note, I have reviewed this plan of care and certify a need for medically necessary rehabilitation services.      *PLEASE SIGN ABOVE AND FAX BACK ALL PAGES*

## 2023-02-07 ENCOUNTER — HOSPITAL ENCOUNTER (OUTPATIENT)
Dept: PHYSICAL THERAPY | Age: 63
Setting detail: THERAPIES SERIES
Discharge: HOME | End: 2023-02-07
Payer: MEDICAID

## 2023-02-07 ENCOUNTER — TELEPHONE (OUTPATIENT)
Dept: PRIMARY CARE CLINIC | Age: 63
End: 2023-02-07

## 2023-02-07 PROCEDURE — 97162 PT EVAL MOD COMPLEX 30 MIN: CPT

## 2023-02-07 PROCEDURE — 97110 THERAPEUTIC EXERCISES: CPT

## 2023-02-07 RX ORDER — ONDANSETRON 4 MG/1
TABLET, FILM COATED ORAL
Qty: 30 TABLET | Refills: 0 | Status: SHIPPED | OUTPATIENT
Start: 2023-02-07

## 2023-02-07 RX ORDER — OMEPRAZOLE 20 MG/1
CAPSULE, DELAYED RELEASE ORAL
Qty: 30 CAPSULE | Refills: 5 | Status: SHIPPED | OUTPATIENT
Start: 2023-02-07

## 2023-02-07 NOTE — TELEPHONE ENCOUNTER
Pt saw her P.T. today and she agrees that it is not pt's hamstring, it is her Rt knee. Pt asking, if you would order an MRI of the Rt knee? Will get it done at [facility]. Last one was done before her surgery in June. See P.T. notes under encounters.  RAISA.

## 2023-02-07 NOTE — TELEPHONE ENCOUNTER
LAST VISIT:   12/1/2022     Future Appointments   Date Time Provider Department Center   2/14/2023  2:00 PM SCHEDULE, [facility]   3/3/2023  3:20 PM [facility]

## 2023-02-07 NOTE — TELEPHONE ENCOUNTER
She would need an office visit here for me to evaluate her knee prior to ordering an MRI. She could also talk to Dr. Stanley White about ordering an MRI from his standpoint.

## 2023-02-08 ENCOUNTER — HOSPITAL ENCOUNTER (OUTPATIENT)
Dept: GENERAL RADIOLOGY | Age: 63
Discharge: HOME | End: 2023-02-10
Payer: MEDICAID

## 2023-02-08 ENCOUNTER — OFFICE VISIT (OUTPATIENT)
Dept: PRIMARY CARE CLINIC | Age: 63
End: 2023-02-08
Payer: MEDICAID

## 2023-02-08 ENCOUNTER — HOSPITAL ENCOUNTER (OUTPATIENT)
Age: 63
Discharge: HOME | End: 2023-02-10
Payer: MEDICAID

## 2023-02-08 VITALS
WEIGHT: 169.6 LBS | OXYGEN SATURATION: 97 % | HEART RATE: 91 BPM | DIASTOLIC BLOOD PRESSURE: 84 MMHG | SYSTOLIC BLOOD PRESSURE: 132 MMHG | HEIGHT: 63 IN | BODY MASS INDEX: 30.05 KG/M2

## 2023-02-08 DIAGNOSIS — M25.561 ACUTE PAIN OF RIGHT KNEE: ICD-10-CM

## 2023-02-08 DIAGNOSIS — M89.8X6 PAIN OF RIGHT TIBIA: ICD-10-CM

## 2023-02-08 DIAGNOSIS — Z79.01 LONG TERM (CURRENT) USE OF ANTICOAGULANTS: ICD-10-CM

## 2023-02-08 DIAGNOSIS — M48.061 SPINAL STENOSIS OF LUMBAR REGION WITHOUT NEUROGENIC CLAUDICATION: ICD-10-CM

## 2023-02-08 DIAGNOSIS — Z86.73 HISTORY OF TRANSIENT ISCHEMIC ATTACK (TIA): ICD-10-CM

## 2023-02-08 DIAGNOSIS — M25.561 ACUTE PAIN OF RIGHT KNEE: Primary | ICD-10-CM

## 2023-02-08 LAB — INTERNATIONAL NORMALIZATION RATIO, POC: 2.1

## 2023-02-08 PROCEDURE — 99213 OFFICE O/P EST LOW 20 MIN: CPT | Performed by: FAMILY MEDICINE

## 2023-02-08 PROCEDURE — 73560 X-RAY EXAM OF KNEE 1 OR 2: CPT

## 2023-02-08 PROCEDURE — 85610 PROTHROMBIN TIME: CPT | Performed by: FAMILY MEDICINE

## 2023-02-08 RX ORDER — PREGABALIN 50 MG/1
CAPSULE ORAL
COMMUNITY
Start: 2023-02-06

## 2023-02-08 SDOH — ECONOMIC STABILITY: INCOME INSECURITY: HOW HARD IS IT FOR YOU TO PAY FOR THE VERY BASICS LIKE FOOD, HOUSING, MEDICAL CARE, AND HEATING?: NOT HARD AT ALL

## 2023-02-08 SDOH — ECONOMIC STABILITY: FOOD INSECURITY: WITHIN THE PAST 12 MONTHS, THE FOOD YOU BOUGHT JUST DIDN'T LAST AND YOU DIDN'T HAVE MONEY TO GET MORE.: NEVER TRUE

## 2023-02-08 SDOH — ECONOMIC STABILITY: HOUSING INSECURITY
IN THE LAST 12 MONTHS, WAS THERE A TIME WHEN YOU DID NOT HAVE A STEADY PLACE TO SLEEP OR SLEPT IN A SHELTER (INCLUDING NOW)?: NO

## 2023-02-08 SDOH — ECONOMIC STABILITY: FOOD INSECURITY: WITHIN THE PAST 12 MONTHS, YOU WORRIED THAT YOUR FOOD WOULD RUN OUT BEFORE YOU GOT MONEY TO BUY MORE.: NEVER TRUE

## 2023-02-08 ASSESSMENT — PATIENT HEALTH QUESTIONNAIRE - PHQ9
4. FEELING TIRED OR HAVING LITTLE ENERGY: 0
5. POOR APPETITE OR OVEREATING: 0
3. TROUBLE FALLING OR STAYING ASLEEP: 0
9. THOUGHTS THAT YOU WOULD BE BETTER OFF DEAD, OR OF HURTING YOURSELF: 0
SUM OF ALL RESPONSES TO PHQ QUESTIONS 1-9: 2
7. TROUBLE CONCENTRATING ON THINGS, SUCH AS READING THE NEWSPAPER OR WATCHING TELEVISION: 0
10. IF YOU CHECKED OFF ANY PROBLEMS, HOW DIFFICULT HAVE THESE PROBLEMS MADE IT FOR YOU TO DO YOUR WORK, TAKE CARE OF THINGS AT HOME, OR GET ALONG WITH OTHER PEOPLE: 0
SUM OF ALL RESPONSES TO PHQ9 QUESTIONS 1 & 2: 2
6. FEELING BAD ABOUT YOURSELF - OR THAT YOU ARE A FAILURE OR HAVE LET YOURSELF OR YOUR FAMILY DOWN: 0
8. MOVING OR SPEAKING SO SLOWLY THAT OTHER PEOPLE COULD HAVE NOTICED. OR THE OPPOSITE, BEING SO FIGETY OR RESTLESS THAT YOU HAVE BEEN MOVING AROUND A LOT MORE THAN USUAL: 0
SUM OF ALL RESPONSES TO PHQ QUESTIONS 1-9: 2
1. LITTLE INTEREST OR PLEASURE IN DOING THINGS: 1
SUM OF ALL RESPONSES TO PHQ QUESTIONS 1-9: 2
2. FEELING DOWN, DEPRESSED OR HOPELESS: 1
SUM OF ALL RESPONSES TO PHQ QUESTIONS 1-9: 2

## 2023-02-08 NOTE — PROGRESS NOTES
Stephanie Obrien is a 58 y.o. femalewho presents today for her medical conditions/complaints as noted below. Chief Complaint   Patient presents with    Knee Pain     Pt here today with RT knee pain. Would like MRI         HPI:     HPI  Right knee pain, has seen ortho and had PT eval done yesterday  Increased pain x 3 weeks medial and anterior knee, and tibia. Mostly in the tibia proximal and medial  Using a cane x 3 weeks  No injury  Kneels a lot on her work  [place] [employer] with ROM    Reviewed Ortho notes, PT note, previous x-ray and MRI from last year  Current Outpatient Medications   Medication Sig Dispense Refill    pregabalin (LYRICA) 50 MG capsule       omeprazole (PRILOSEC) 20 MG delayed release capsule TAKE ONE CAPSULE BY MOUTH DAILY 30 capsule 5    ondansetron (ZOFRAN) 4 MG tablet TAKE ONE TABLET BY MOUTH THREE TIMES A DAY AS NEEDED FOR NAUSEA OR VOMITING 30 tablet 0    warfarin (COUMADIN) 4 MG tablet Take 2 tablets by mouth Five times weekly AND 1.5 tablets Twice a Week. 52 tablet 3    HYDROcodone-acetaminophen (NORCO) 7.5-325 MG per tablet Take 1.5 tablets by mouth 4 times daily for 30 days. 180 tablet 0    divalproex (DEPAKOTE) 250 MG DR tablet TAKE ONE TABLET BY MOUTH TWICE A DAY 60 tablet 5    pregabalin (LYRICA) 100 MG capsule TAKE ONE CAPSULE BY MOUTH THREE TIMES A DAY 90 capsule 0    busPIRone (BUSPAR) 15 MG tablet TAKE ONE TABLET BY MOUTH TWICE A DAY 60 tablet 3    DULoxetine (CYMBALTA) 60 MG extended release capsule TAKE ONE CAPSULE BY MOUTH DAILY 30 capsule 5    albuterol-ipratropium (COMBIVENT RESPIMAT)  MCG/ACT AERS inhaler Inhale 1 puff into the lungs in the morning and 1 puff at noon and 1 puff in the evening and 1 puff before bedtime. Increased to every 4 hrs if needed. . 3 each 3    fluticasone-salmeterol (ADVAIR) 250-50 MCG/DOSE AEPB INHALE ONE PUFF BY MOUTH EVERY 12 HOURS 60 each 5     No current facility-administered medications for this visit.      Allergies   Allergen Reactions    Eggs Or Egg-Derived Products Other (See Comments)     Severe abdominal pains    Bupropion Nausea Only    Ceftin [Cefuroxime Axetil] Nausea Only     Nausea and upset stomach       Subjective:     Review of Systems    Objective:     /84   Pulse 91   Ht 5' 3\" (1.6 m)   Wt 169 lb 9.6 oz (76.9 kg)   SpO2 97%   BMI 30.04 kg/m²   Physical Exam  Vitals and nursing note reviewed. Constitutional:       Appearance: Normal appearance. HENT:      Head: Normocephalic and atraumatic. Musculoskeletal:      Comments: Right knee: increased warmth, swelling, very tender and swollen proximal knee and medial tibia  Joint lines not tender  Pain and decreased ROM right knee  Stable collaterals and cruciates  McMurrays maneuver painful in proximal tibia  Severe limping gait  Needs cane to walk due to pain   Skin:     General: Skin is warm. Findings: Bruising present. Comments: Bruises : green: distal ant thigh and superior lower leg on right   Neurological:      Mental Status: She is alert and oriented to person, place, and time. Psychiatric:         Mood and Affect: Mood normal.         Behavior: Behavior normal.         Thought Content: Thought content normal.       Assessment:       Diagnosis Orders   1. Acute pain of right knee  MRI KNEE RIGHT W WO CONTRAST    XR KNEE RIGHT (1-2 VIEWS)      2. Pain of right tibia        3. Long term (current) use of anticoagulants  POCT INR      4. History of transient ischemic attack (TIA)  POCT INR      5. Spinal stenosis of lumbar region without neurogenic claudication  pregabalin (LYRICA) 50 MG capsule           Plan:      No follow-ups on file. Physical therapy. She does not want to see Dr. Christopher Skaggs any longer. She does not feel he listened to her problems her exam and examined her knee thoroughly.   Orders Placed This Encounter   Procedures    MRI KNEE RIGHT W WO CONTRAST     Standing Status:   Future     Standing Expiration Date:   2/8/2024     Order Specific Question: STAT Creatinine as needed:     Answer:   Yes     Order Specific Question:   Reason for exam:     Answer:   very tender proximal tibia. increased swelling and warmth    XR KNEE RIGHT (1-2 VIEWS)     Standing Status:   Future     Standing Expiration Date:   2/8/2024    POCT INR     This external order was created through the results console. No orders of the defined types were placed in this encounter. Reviewed medications and possibleside effects.        Electronically signed by Jorge A Marroquin MD on 2/8/2023 at 10:49 AM

## 2023-02-09 RX ORDER — BUSPIRONE HYDROCHLORIDE 15 MG/1
TABLET ORAL
Qty: 60 TABLET | Refills: 3 | Status: SHIPPED | OUTPATIENT
Start: 2023-02-09

## 2023-02-09 NOTE — RESULT ENCOUNTER NOTE
Knee xray normal: no arthritis, so her knee problems have to be due to other soft tissue or tibia: check MRI knee.  Possible Tibia injury

## 2023-02-13 ENCOUNTER — HOSPITAL ENCOUNTER (OUTPATIENT)
Dept: PHYSICAL THERAPY | Age: 63
Setting detail: THERAPIES SERIES
Discharge: HOME | End: 2023-02-13
Payer: MEDICAID

## 2023-02-13 PROCEDURE — 97110 THERAPEUTIC EXERCISES: CPT

## 2023-02-13 RX ORDER — FLUTICASONE PROPIONATE AND SALMETEROL 500; 50 UG/1; UG/1
1 POWDER RESPIRATORY (INHALATION) EVERY 12 HOURS
Qty: 60 EACH | Refills: 3 | Status: SHIPPED | OUTPATIENT
Start: 2023-02-13

## 2023-02-13 NOTE — TELEPHONE ENCOUNTER
LAST VISIT:   2/8/2023     Future Appointments   Date Time Provider Sarah Montano   2/13/2023  4:45 PM Aline Hugo, PTA [facility] [place]   2/16/2023 10:15 AM Cherylynn Miriam, PTA [facility] [place]   2/16/2023  1:00  [facility] [place]  [facility] [place]   2/20/2023 12:15 PM Estefani Bui, PT [facility] [place]   2/23/2023  9:30 AM Cherylynn Miriam, PTA [facility] [place]   2/27/2023  2:00 PM Cherylynn Miriam, PTA [facility] [place]   3/2/2023 10:15 AM Estefani [place], PT [facility] [place]   3/3/2023  3:20 PM George Sparrow MD [facility]             Was sent in last time for 250/50 and it didn't work for her should have been 500/50

## 2023-02-13 NOTE — FLOWSHEET NOTE
[x] [facility]  [address]  [place]: [phone]   F: [phone]    Physical Therapy Daily Treatment Note    Date:  2023  Patient Name:  Osmany Haley    :  1960  MRN: [id number]  Physician: Ninoska Valdes MD          Insurance: [facility]. Auth required after 30 visits. Iontophoresis and e-stim not covered  Medical Diagnosis: M25.561 (ICD-10-CM) - Right knee pain, unspecified chronicity          Rehab Codes: M25.561, R53.1, R26.9  Onset Date: DOS 22                    Next 's appt: PRN  Visit# / total visits:   Cancels/No Shows: 0/0    Subjective:  Pt uses icey/hot patches on right medial tibia starting just distal to joint line to help with pain. Notes pain down to 5/10 and patient can be functional around the house and at work. States pain usually will stay around a 5/10 if pt is wearing this patch. Pain:  [x] Yes  [] No Location: R medal knee down medial proximal tibial    Pain Rating: (0-10 scale) 5/10  Pain altered Tx:  [x] No  [] Yes  Action:  Comments:    Objective:  Modalities:   Precautions:   Exercises:  Exercise Reps/ Time Weight/ Level Comments Completed today   SAQ 10x3\"   x   Hamstring set 10x   x   Clamshells 10x   x   Sidelying Hip abd RLE 10x   x   SLRs 10x   R LE; Initial HEP x   Bridges 10x   Initial HEP; emphasis on equal WB x   Standing lateral weight shifts 3'' hold x 15   To improve R LE WB tolerance. Using [facility] on L. Initial HEP x   Standing heel/toe raises 10x   Initial HEP x   Standing 3-way hip 10x (B)   x   Mini squats 10x   x   Standing hamstring curls 10x R LE only  x          Gait training 5'   Using [facility] on contralateral side. Other:     Specific Instructions for next treatment: Emphasis on R LE strengthening, proprioception/stability, and improving R LE WB tolerance. Work to improve symmetrical gait mechanics and progress to no AD when appropriate.      Assessment: [x] Progressing toward goals.  Initiated therapeutic exercises and stretching for Right knee/leg pain. Reviewed HEP with good recall and technique. Focused on LE strengthening and stability to patient's tolerance. Increased pain in right knee with standing/stabilizing on right LE but able to complete all exercises. Will continue to progress strengthening and stability exercise to patient's tolerance next visit. [] No change. [] Other:    [x] Patient would continue to benefit from skilled physical therapy services in order to: help modulate pain, improve R LE WB tolerance, and maximize R LE strength/stability necessary to restore symmetrical gait and ADLs/work duties at her PLOF    STG: (to be met in 6 treatments)  Pt will self report worst pain no greater than 3/10 in order to better tolerate ADLs/work activities with minimal dysfunction  Pt will improve Tinetti score to 24/28 or greater to show improved balance/gait stability without the use of an AD and reduced risk for falls with household amb  Pt will demonstrate full WB tolerance through her R LE necessary to show improved proprioception and stability necessary to transition to no AD  Pt will demonstrate ability to negotiate 1 flight of stairs, reciprocally, with minimal handrail assist to show improved functional strength and mobility through her ADLs and work duties. LTG: (to be met in 12 treatments)  Pt will demonstrate improved R LE strength to 4/5 or greater in order to demonstrate improved stability/strength necessary for unrestricted ADLs/work activities  SLS GOAL TO BE ADDED AT A FUTURE VISIT  Pt will improve LEFS to 70% function or greater in order to demonstrate improved functional tolerances at PLOF with minimal restriction/dysfunction  Pt will demonstrate independence with a long term HEP for continued progress/maintenance after completion of PT    Pt.  Education:  [x] Yes  [] No  [x] Reviewed Prior HEP/Ed  Method of Education: [x] Verbal  [x] Demo  [] Written  Comprehension of Education:  [x] Verbalizes understanding. [x] Demonstrates understanding. [x] Needs review. [] Demonstrates/verbalizes HEP/Ed previously given. Plan: [x] Continue per plan of care.    [] Other:      Treatment Charges: Mins Units   []  Modalities     [x]  Ther Exercise 38 3   []  Manual Therapy     []  Ther Activities     []  Aquatics     []  Neuromuscular     [] Vasocompression     [] Gait Training     [] Dry needling        [] 1 or 2 muscles        [] 3 or more muscles     []  Other     Total Treatment time 38 3     Time In:  8568           Time Out: 0033    Electronically signed by:  Anderson Cai PTA

## 2023-02-15 ENCOUNTER — TELEPHONE (OUTPATIENT)
Dept: PRIMARY CARE CLINIC | Age: 63
End: 2023-02-15

## 2023-02-15 DIAGNOSIS — M48.061 SPINAL STENOSIS OF LUMBAR REGION WITHOUT NEUROGENIC CLAUDICATION: ICD-10-CM

## 2023-02-15 DIAGNOSIS — M25.561 ACUTE PAIN OF RIGHT KNEE: Primary | ICD-10-CM

## 2023-02-15 DIAGNOSIS — M51.9 LUMBAR DISC DISEASE: ICD-10-CM

## 2023-02-15 RX ORDER — HYDROCODONE BITARTRATE AND ACETAMINOPHEN 7.5; 325 MG/1; MG/1
1.5 TABLET ORAL 4 TIMES DAILY
Qty: 180 TABLET | Refills: 0 | Status: SHIPPED | OUTPATIENT
Start: 2023-02-15 | End: 2023-03-17

## 2023-02-16 ENCOUNTER — HOSPITAL ENCOUNTER (OUTPATIENT)
Dept: PHYSICAL THERAPY | Age: 63
Setting detail: THERAPIES SERIES
Discharge: HOME | End: 2023-02-16
Payer: MEDICAID

## 2023-02-16 ENCOUNTER — HOSPITAL ENCOUNTER (OUTPATIENT)
Dept: MRI IMAGING | Facility: CLINIC | Age: 63
Discharge: HOME | End: 2023-02-18
Payer: MEDICAID

## 2023-02-16 DIAGNOSIS — M25.561 ACUTE PAIN OF RIGHT KNEE: ICD-10-CM

## 2023-02-16 PROCEDURE — 73721 MRI JNT OF LWR EXTRE W/O DYE: CPT

## 2023-02-16 PROCEDURE — 97110 THERAPEUTIC EXERCISES: CPT

## 2023-02-16 NOTE — FLOWSHEET NOTE
[x] [facility]  [address]  [place]: [phone]   F: [phone]    Physical Therapy Daily Treatment Note    Date:  2023  Patient Name:  Idania Gonzales    :  1960  MRN: [id number]  Physician: Ashleigh Dennis MD          Insurance: [facility]. Auth required after 30 visits. Iontophoresis and e-stim not covered  Medical Diagnosis: M25.561 (ICD-10-CM) - Right knee pain, unspecified chronicity          Rehab Codes: M25.561, R53.1, R26.9  Onset Date: DOS 22                    Next 's appt: PRN  Visit# / total visits: 3/12  Cancels/No Shows: 0/0    Subjective:  : Patient reports today with increased pain. Reported that she tried to go without icy hot patch last night, but woke up with increased pain. Reported that she applied new icy hot patch right before therapy so pain remains elevated. Noted that icy hot patches completely relieve pain. Pain:  [x] Yes  [] No Location: R medal knee down medial proximal tibial    Pain Rating: (0-10 scale) 8/10  Pain altered Tx:  [x] No  [] Yes  Action:  Comments:    Objective:  Modalities:   Precautions:   Exercises:  Exercise Reps/ Time Weight/ Level Comments Completed today   Heel Slides 15x   x   SAQ 10x3\"      Hamstring set 10x      Clamshells 10x2   x   Sidelying Hip abd RLE 10x   x   SLRs 10x   R LE; Initial HEP x   Bridges 10x   Initial HEP; emphasis on equal WB x   Standing lateral weight shifts 3'' hold x 15   To improve R LE WB tolerance. Using [facility] on L. Initial HEP    Standing heel/toe raises 10x2   Initial HEP x   Standing 3-way hip 10x (B)   x   Mini squats 10x   x   Standing hamstring curls 10x R LE only     Alternating Marches with 3\" SLS at Top 15x  Finger tip support x          Gait training 5'   Using [facility] on contralateral side. Other:     Specific Instructions for next treatment: Emphasis on R LE strengthening, proprioception/stability, and improving R LE WB tolerance.  Work to improve symmetrical gait mechanics and progress to no AD when appropriate. Assessment:   2/16: Initiated session with heel slides to improve R knee stiffness with significant improvement following. Continued treatment with exercises to improve RLE strength. Added set to clamshells and heel to toe raises for additional strengthening. Added alternating marches for additional work on hip stability. Patient noted increased R knee pain in single leg stance, but otherwise to change to pain with exercises today. [x] Progressing toward goals. [] No change. [] Other:    [x] Patient would continue to benefit from skilled physical therapy services in order to: help modulate pain, improve R LE WB tolerance, and maximize R LE strength/stability necessary to restore symmetrical gait and ADLs/work duties at her PLOF    STG: (to be met in 6 treatments)  Pt will self report worst pain no greater than 3/10 in order to better tolerate ADLs/work activities with minimal dysfunction  Pt will improve Tinetti score to 24/28 or greater to show improved balance/gait stability without the use of an AD and reduced risk for falls with household amb  Pt will demonstrate full WB tolerance through her R LE necessary to show improved proprioception and stability necessary to transition to no AD  Pt will demonstrate ability to negotiate 1 flight of stairs, reciprocally, with minimal handrail assist to show improved functional strength and mobility through her ADLs and work duties.    LTG: (to be met in 12 treatments)  Pt will demonstrate improved R LE strength to 4/5 or greater in order to demonstrate improved stability/strength necessary for unrestricted ADLs/work activities  SLS GOAL TO BE ADDED AT A FUTURE VISIT  Pt will improve LEFS to 70% function or greater in order to demonstrate improved functional tolerances at PLOF with minimal restriction/dysfunction  Pt will demonstrate independence with a long term HEP for continued progress/maintenance after completion of PT    Pt. Education:  [x] Yes  [] No  [x] Reviewed Prior HEP/Ed  Method of Education: [x] Verbal  [x] Demo  [] Written  Comprehension of Education:  [x] Verbalizes understanding. [x] Demonstrates understanding. [x] Needs review. [] Demonstrates/verbalizes HEP/Ed previously given. Plan: [x] Continue per plan of care.    [] Other:      Treatment Charges: Mins Units   []  Modalities     [x]  Ther Exercise 42 3   []  Manual Therapy     []  Ther Activities     []  Aquatics     []  Neuromuscular     [] Vasocompression     [] Gait Training     [] Dry needling        [] 1 or 2 muscles        [] 3 or more muscles     []  Other     Total Treatment time 42 3     Time In: 10:16 am           Time Out: 10:58 am    Electronically signed by:  Kaila Cadena, PTA

## 2023-02-16 NOTE — RESULT ENCOUNTER NOTE
Complex tear of the medial meniscus. Also has damage in the lower thigh bone and the tibia bone she should try to stay off that leg is much as possible.   See orthopedic doctor again

## 2023-02-17 ENCOUNTER — TELEPHONE (OUTPATIENT)
Dept: PRIMARY CARE CLINIC | Age: 63
End: 2023-02-17

## 2023-02-17 DIAGNOSIS — S83.249A TEAR OF MEDIAL MENISCUS OF KNEE, UNSPECIFIED LATERALITY, UNSPECIFIED TEAR TYPE, UNSPECIFIED WHETHER OLD OR CURRENT TEAR, INITIAL ENCOUNTER: Primary | ICD-10-CM

## 2023-02-17 NOTE — TELEPHONE ENCOUNTER
----- Message from Caretha Brittle sent at 2/17/2023  9:53 AM EST -----  Subject: Referral Request    Reason for referral request? Patient had MRI 2/16, that verified she had a   complex meniscus issue. Provider patient wants to be referred to(if known):     Provider Phone Number(if known): Additional Information for Provider?  Patient would like to be seen by   Nik Holliday DO (ortho)  ---------------------------------------------------------------------------  --------------  [id number] [facility] [facility]    [phone]; OK to leave message on voicemail,OK to respond with electronic   message via [employer] portal (only for patients who have registered [employer]   account)  ---------------------------------------------------------------------------  --------------

## 2023-02-20 ENCOUNTER — HOSPITAL ENCOUNTER (OUTPATIENT)
Dept: PHYSICAL THERAPY | Age: 63
Setting detail: THERAPIES SERIES
Discharge: HOME | End: 2023-02-20
Payer: MEDICAID

## 2023-02-20 NOTE — FLOWSHEET NOTE
[x] [facility]) [facility] & Therapy  [address]  [place]: [phone]   F: [phone]     Physical Therapy Cancel/No Show note    Date: 2023  Patient: Pete Prescott  : 1960  MRN: [id number]    Visit Count: 3/12  Cancels/No Shows to date:     For today's appointment patient:    [x]  Cancelled    [] Rescheduled appointment    [] No-show     Reason given by patient:    []  Patient ill    []  Conflicting appointment    [] No transportation      [] Conflict with work    [] No reason given    [] Weather related    [] [id number]    [x] Other:      Comments: Physician direction. PCP advised pt to hold on PT until consulting with ortho on 23. Per chart notes, recent MRI showed complex tearing of R medial meniscus with subchondral fracturing at outer medial tibial plateau.        [] Next appointment was confirmed    Electronically signed by: Ari Plunkett PT

## 2023-02-22 ENCOUNTER — OFFICE VISIT (OUTPATIENT)
Dept: ORTHOPEDIC SURGERY | Age: 63
End: 2023-02-22

## 2023-02-22 VITALS — HEIGHT: 64 IN | WEIGHT: 176.2 LBS | BODY MASS INDEX: 30.08 KG/M2

## 2023-02-22 DIAGNOSIS — M70.51 BURSITIS OF OTHER BURSA OF RIGHT KNEE: Primary | ICD-10-CM

## 2023-02-22 RX ORDER — METHYLPREDNISOLONE ACETATE 80 MG/ML
80 INJECTION, SUSPENSION INTRA-ARTICULAR; INTRALESIONAL; INTRAMUSCULAR; SOFT TISSUE ONCE
Status: SHIPPED | OUTPATIENT
Start: 2023-02-22

## 2023-02-22 RX ORDER — BUPIVACAINE HYDROCHLORIDE 2.5 MG/ML
2 INJECTION, SOLUTION INFILTRATION; PERINEURAL ONCE
Status: SHIPPED | OUTPATIENT
Start: 2023-02-22

## 2023-02-22 NOTE — PROGRESS NOTES
[address] [facility] ORTHO SPECIALISTS  [facility] [address]  Dept: [phone]    Ambulatory Orthopedic Office Visit      CHIEF COMPLAINT:    Chief Complaint   Patient presents with    New Patient     Right knee pain     HISTORY OF PRESENT ILLNESS:    The patient is a 58 y.o. female right knee pain. Predominantly medial sided knee pain. Patient had medial menisectomy by Dr. Kymberly Kline in 2022. Since then has been aspirated and injected twice. He last sent her to PT for pes bursitis. She was referred to our office with Dr. Kymberly Kline out of town. Denies any new injuries or falls. Denies numbness, tingling, fevers, chills, shortness of breath, or chest pain. Past Medical History:    Past Medical History:   Diagnosis Date    Acid reflux     Arthritis     neck, back, knees    Asthma     Asthma     COPD (chronic obstructive pulmonary disease) ([facility] [address] 10/20/2015    Emphysema     Headache(784.0)     Heart abnormality     minute hole in heart per pt found in  after TIA    Osteoarthritis     Osteoporosis     Seborrheic keratoses 2019    TIA (transient ischemic attack)      Past Surgical History:    Past Surgical History:   Procedure Laterality Date    ACROMIOPLASTY Left 2016    SHOULDER ARTHROCOPY LABRAL DEBRIDEMENT      SECTION, LOW TRANSVERSE      COLONOSCOPY      DILATION AND CURETTAGE OF UTERUS      KNEE ARTHROSCOPY Right     KNEE ARTHROSCOPY Right 2022    KNEE ARTHROSCOPIC PARTIAL MEDIAL MENISCECTOMY performed by Clayton Stevenson MD at [facility]      uterine lining removed by laser    SHOULDER SURGERY      THROAT SURGERY      camera    TONSILLECTOMY AND ADENOIDECTOMY      TUBAL LIGATION       Current Medications:   Current Outpatient Medications   Medication Sig Dispense Refill    HYDROcodone-acetaminophen (NORCO) 7.5-325 MG per tablet Take 1.5 tablets by mouth 4 times daily for 30 days.  180 tablet 0 fluticasone-salmeterol (ADVAIR DISKUS) 500-50 MCG/ACT AEPB diskus inhaler Inhale 1 puff into the lungs in the morning and 1 puff in the evening. 60 each 3    busPIRone (BUSPAR) 15 MG tablet TAKE ONE TABLET BY MOUTH TWICE A DAY 60 tablet 3    pregabalin (LYRICA) 50 MG capsule       omeprazole (PRILOSEC) 20 MG delayed release capsule TAKE ONE CAPSULE BY MOUTH DAILY 30 capsule 5    ondansetron (ZOFRAN) 4 MG tablet TAKE ONE TABLET BY MOUTH THREE TIMES A DAY AS NEEDED FOR NAUSEA OR VOMITING 30 tablet 0    warfarin (COUMADIN) 4 MG tablet Take 2 tablets by mouth Five times weekly AND 1.5 tablets Twice a Week. (Patient taking differently: M-F 8mg Sat+sun 6mg) 52 tablet 3    divalproex (DEPAKOTE) 250 MG DR tablet TAKE ONE TABLET BY MOUTH TWICE A DAY 60 tablet 5    DULoxetine (CYMBALTA) 60 MG extended release capsule TAKE ONE CAPSULE BY MOUTH DAILY 30 capsule 5    albuterol-ipratropium (COMBIVENT RESPIMAT)  MCG/ACT AERS inhaler Inhale 1 puff into the lungs in the morning and 1 puff at noon and 1 puff in the evening and 1 puff before bedtime. Increased to every 4 hrs if needed. . 3 each 3    pregabalin (LYRICA) 100 MG capsule TAKE ONE CAPSULE BY MOUTH THREE TIMES A DAY 90 capsule 0     No current facility-administered medications for this visit. Allergies:    Eggs or egg-derived products, Bupropion, and Ceftin [cefuroxime axetil]  Social History:   Social History     Socioeconomic History    Marital status:      Spouse name: Not on file    Number of children: Not on file    Years of education: Not on file    Highest education level: Not on file   Occupational History    Not on file   Tobacco Use    Smoking status: Every Day     Packs/day: 0.50     Years: 46.00     Pack years: 23.00     Types: Cigarettes     Start date: 1974    Smokeless tobacco: Never   Vaping Use    Vaping Use: Every day   Substance and Sexual Activity    Alcohol use:  Yes     Alcohol/week: 0.0 standard drinks     Comment: OCCASIONAL    Drug use: No    Sexual activity: Not on file   Other Topics Concern    Not on file   Social History Narrative    Not on file     Social Determinants of Health     Financial Resource Strain: Low Risk     Difficulty of Paying Living Expenses: Not hard at all   Food Insecurity: No Food Insecurity    Worried About [address] in the Last Year: Never true    [address] in the Last Year: Never true   Transportation Needs: Unknown    Lack of Transportation (Medical): Not on file    Lack of Transportation (Non-Medical): No   Physical Activity: Not on file   Stress: Not on file   Social Connections: Not on file   Intimate Partner Violence: Not on file   Housing Stability: Unknown    Unable to Pay for Housing in the Last Year: Not on file    Number of Places Lived in the Last Year: Not on file    Unstable Housing in the Last Year: No     Family History:  Family History   Problem Relation Age of Onset    Heart Disease Father     Asthma Father     Cancer Paternal Grandfather         throat    Asthma Son        REVIEW OF SYSTEMS:  Constitutional: Negative for fever and chills. HENT: Negative forcongestion or drainage   Eyes: Negative for blurred vision and double vision. Respiratory: Negative for cough, shortness of breath and wheezing. Cardiovascular: Negative for chest pain and palpitations. Gastrointestinal: Negative for nausea. Negative for vomiting. Musculoskeletal: Positive for myalgias and joint pain. Skin: Negative for itching and rash. PHYSICAL EXAM:  Ht 5' 3.5\" (1.613 m)   Wt 176 lb 3.2 oz (79.9 kg)   BMI 30.72 kg/m²     General: Stephanie Galindo is a 58 y.o. female who is alert and oriented and sitting comfortably in our office. Neuro: Alert and oriented. Eyes: Extra-ocular muscles intact. Mouth: Oral mucosa moist. No perioral lesions. Pulm: Respirations unlabored and regular. Skin: Warm, well perfused.   Psych: Patient has good fund of knowledge and displays understanging of exam, diagnosis, and plan. Ortho Exam:  MS:  RLE: Tender and swollen over pes anserine bursa. Tender over MCL. Pain with varus/valgus stress. 0-120 knee ROM. Lachman negative. McMurrays unable to test due to pain. Compartments soft and compressible. TA/EHL/FHL/GS motor intact. Deep and Superficial Peroneal/Saphenous/Sural SILT. 2+ DP pulse. RADIOLOGY:   EXAMINATION:   MRI OF THE RIGHT KNEE WITHOUT CONTRAST, 2/16/2023 12:55 pm       TECHNIQUE:   Multiplanar multisequence MRI of the right knee was performed without the   administration of intravenous contrast.       COMPARISON:   Right knee plain radiographs from 02/08/2023. HISTORY:   ORDERING SYSTEM PROVIDED HISTORY: Acute pain of right knee   TECHNOLOGIST PROVIDED HISTORY:   Acute knee pain   Reason for Exam: pt stated right medial knee pain swelling , pain radiates   down       80-year-old female with acute right knee pain and swelling. FINDINGS:   MENISCI: Degeneration of the lateral meniscus. No lateral meniscus tear. Complex tearing in the posterior horn and body of the medial meniscus with   outward extrusion. CRUCIATE LIGAMENTS: Anterior and posterior cruciate ligaments appear intact. EXTENSOR MECHANISM: Mild patellar tendinosis. Distal quadriceps tendon and   patellar retinacula appear intact. LATERAL COLLATERAL LIGAMENT COMPLEX: Popliteus muscle/tendon, iliotibial   band, lateral collateral ligament, and biceps femoris appear intact. MEDIAL COLLATERAL LIGAMENT COMPLEX: Periligamentous edema and fluid   surrounding the medial collateral ligament. Slight increased T2 signal   involving the medial collateral ligament. Findings consistent with grade 2   MCL injury. KNEE JOINT: Small to moderate joint effusion. Osseous alignment is normal.       Subchondral fracturing and subjacent marrow edema at the outer medial tibial   plateau. No dislocation.        Grade 2-3 medial compartment chondromalacia. Superimposed full-thickness   fissuring at the medial femoral condyle. Grade 2 lateral compartment chondromalacia. Grade 3 patellofemoral chondromalacia with underlying subcortical cystic   changes in the lateral patellar facet. BONE MARROW: Bone marrow signal intensity within the visualized osseous   structures is within normal limits. SOFT TISSUES: Small Baker's cyst.  Mild edema in the soft tissues about the   knee. Visualized popliteal neurovascular bundle grossly unremarkable. Impression   1. Complex tearing in the posterior horn and body of the medial meniscus with   outward extrusion. 2. Subchondral fracturing and subjacent marrow edema at the outer medial   tibial plateau. 3. Grade 2 MCL injury. 4. Lateral meniscus degeneration. No lateral meniscus tear. 5. Mild patellar tendinosis. 6. Small to moderate joint effusion. Small Baker's cyst.   7. Mild-to-moderate medial compartment chondromalacia. Mild lateral   compartment chondromalacia. Moderate patellofemoral chondromalacia. Superimposed full-thickness fissuring at the medial femoral condyle. EXAMINATION:   TWO XRAY VIEWS OF THE RIGHT KNEE       2/8/2023 4:54 pm       COMPARISON:   Pauly 15, 2022       HISTORY:   ORDERING SYSTEM PROVIDED HISTORY: Acute pain of right knee   TECHNOLOGIST PROVIDED HISTORY:   Reason for Exam: Pt. states swelling, pain in rt. knee x3 weeks that radiates   down leg. Pt. states history of meniscus tear       FINDINGS:   Knee alignment anatomic. No acute osseous abnormality. No significant   degenerative change. Soft tissues unremarkable. No significant joint   effusion. Impression   No acute findings. Injection procedure note  The alternatives, benefits, and risks were discussed with the patient.  After answering all questions to the patient's satisfaction, the patient agreed to proceed forward with injection and gave verbal consent for the procedure. With the patient's permission, appropriate anatomic landmarks were identified and the right knee pes bursa was prepped in a sterile fashion using alcohol and/or betadine. A 21 gauge needle was then used to inject 2cc 0.25% marcaine plain and 80mg depo medrol into the right pes bursa. The injection was advanced without resistance confirming appropriate position. The patient tolerated the procedure well and the site was dressed with a band-aid. Patient was advised to ice the area for 15-20 minutes to relieve any injection site related pain. Patient was advised to contact nurse if area becomes swollen, hot, erythematous, or painful, or to go to the emergency room after business hours.     Assessment/Plan:   April David Byrnes is a 58y.o. year old female right knee pain  - Right medial compartment grade 3 chondromalacia  - Right knee pes anserine bursitis  - Right knee medial meniscus tear  - Right knee grade 2 MCL strain  - Right knee medial plateau subchondral insufficiency edema    History of right knee medial menisectomy in July of 2022    - Patient has had 2 intra-articular aspirations and injections in the past 6 months  - Her predominant symptoms are over her pes anserine bursa today  - Recommend PT  - Performed steroid injection for bursa today  - Discussed possible medial  in future  - Follow up as needed    ----------------------------------------  Ophelia Morris DO  PGY-5, Department of [address], [place], [place]

## 2023-02-27 ENCOUNTER — APPOINTMENT (OUTPATIENT)
Dept: PHYSICAL THERAPY | Age: 63
End: 2023-02-27
Payer: MEDICAID

## 2023-02-27 PROCEDURE — 97110 THERAPEUTIC EXERCISES: CPT

## 2023-02-27 NOTE — FLOWSHEET NOTE
[x] [facility]  [address]  [place]: [phone]   F: [phone]    Physical Therapy Daily Treatment Note    Date:  2023  Patient Name:  Sarabjit Stephenson    :  1960  MRN: [id number]  Physician: Dodie Voss MD          Insurance: [facility]. Auth required after 30 visits. Iontophoresis and e-stim not covered  Medical Diagnosis: M25.561 (ICD-10-CM) - Right knee pain, unspecified chronicity          Rehab Codes: M25.561, R53.1, R26.9  Onset Date: DOS 22                    Next 's appt: PRN  Visit# / total visits:   Cancels/No Shows:     Subjective: : Patient reports today that R knee is feeling worse over the past week. Reported new pain in posterior knee. Noted that she tried walking with cane, but increased L shoulder and B hip pain so discontinued. Reported that at recent ortho appt she was told if pain cannot be better managed with PT that partial knee replacement would be next treatment option. Pain:  [x] Yes  [] No Location: R medal knee down medial proximal tibia, posterior knee    Pain Rating: (0-10 scale) 0/10 sitting, 7/10 standing  Pain altered Tx:  [x] No  [] Yes  Action:  Comments:    Objective:  Modalities:   Precautions:   Exercises:  Exercise Reps/ Time Weight/ Level Comments Completed today   Heel Slides 15x   x   SAQ 10x3\"      Hamstring set 10x      Clamshells 15x2   x   Sidelying Hip abd RLE 10x2   x   SLRs 10x2   R LE; Initial HEP x   Bridges 10x2   Initial HEP; emphasis on equal WB x   Prone Hip Extension 10x2   x   LAQ 20x 3\" hold   x   Standing lateral weight shifts 3'' hold x 15   To improve R LE WB tolerance. Using [facility] on L.  Initial HEP    Standing heel/toe raises 10x2   Initial HEP x   Standing 3-way hip 10x (B)   x   Mini squats 10x2   x   Standing hamstring curls 10x R LE only     Alternating Marches with 3\" SLS at Top 15x  Finger tip support x          Gait training 5'   Using [facility] on contralateral side. Other:     Specific Instructions for next treatment: Emphasis on R LE strengthening, proprioception/stability, and improving R LE WB tolerance. Work to improve symmetrical gait mechanics and progress to no AD when appropriate. Assessment:   2/27: Minimal change in knee mobility with heel slides today. Added sets to [facility] exercises today for additional strengthening within pt tolerance. Able to add total gym squats for additional CKC strengthening, keeping depth within pt tolerance. No change in pain reported following exercises today. [x] Progressing toward goals. [] No change. [] Other:    [x] Patient would continue to benefit from skilled physical therapy services in order to: help modulate pain, improve R LE WB tolerance, and maximize R LE strength/stability necessary to restore symmetrical gait and ADLs/work duties at her PLOF    STG: (to be met in 6 treatments)  Pt will self report worst pain no greater than 3/10 in order to better tolerate ADLs/work activities with minimal dysfunction  Pt will improve Tinetti score to 24/28 or greater to show improved balance/gait stability without the use of an AD and reduced risk for falls with household amb  Pt will demonstrate full WB tolerance through her R LE necessary to show improved proprioception and stability necessary to transition to no AD  Pt will demonstrate ability to negotiate 1 flight of stairs, reciprocally, with minimal handrail assist to show improved functional strength and mobility through her ADLs and work duties.    LTG: (to be met in 12 treatments)  Pt will demonstrate improved R LE strength to 4/5 or greater in order to demonstrate improved stability/strength necessary for unrestricted ADLs/work activities  SLS GOAL TO BE ADDED AT A FUTURE VISIT  Pt will improve LEFS to 70% function or greater in order to demonstrate improved functional tolerances at PLOF with minimal restriction/dysfunction  Pt will demonstrate independence with a long term HEP for continued progress/maintenance after completion of PT    Pt. Education:  [x] Yes  [] No  [x] Reviewed Prior HEP/Ed  Method of Education: [x] Verbal  [x] Demo  [] Written  Comprehension of Education:  [x] Verbalizes understanding. [x] Demonstrates understanding. [x] Needs review. [] Demonstrates/verbalizes HEP/Ed previously given. Plan: [x] Continue per plan of care.    [] Other:      Treatment Charges: Mins Units   []  Modalities     [x]  Ther Exercise 39 3   []  Manual Therapy     []  Ther Activities     []  Aquatics     []  Neuromuscular     [] Vasocompression     [] Gait Training     [] Dry needling        [] 1 or 2 muscles        [] 3 or more muscles     []  Other     Total Treatment time 39 3     Time In: 2:02 pm           Time Out: 2:41 pm     Electronically signed by:  Jean Carlos Braga PTA

## 2023-03-01 NOTE — PROGRESS NOTES
[x] [facility] Therapy  [address]  [place]: [phone]   F: [phone]    Physical Therapy Daily Treatment Note    Date:  3/1/2023  Patient Name:  Terry Romero    :  1960  MRN: [id number]  Physician: Maurizio Quinn MD           Insurance: [facility]. Auth required after 30 visits. Iontophoresis and e-stim not covered  Medical Diagnosis: M25.561 (ICD-10-CM) - Right knee pain, unspecified chronicity          Rehab Codes: M25.561, R53.1, R26.9  Onset Date: DOS 22                     Next 's appt: PRN  Visit# / total visits:    Cancels/No Shows:     Subjective: 3/2: Pt states that she has been struggling with severe pain levels, reporting 10/10 pain currently. Pain continues to be localized primarily to her R medial tibial shaft and states that she was unable to walk after her last therapy visit for about two days. States that she is not able to get a TKA at this time due to financial reasons. Is planning on getting in to her pain mgmt physician for possible nerve blocks, as she has not been able to even tolerate the pressure from her icy hot patches which are leaving her leg bruised. Expresses interest in trying aquatics to see if she can reduce her pain some. Progress note performed today for reporting period -3/1 to reassess all STGs and progress to date. Pain:  [x] Yes  [] No Location: R medal knee down medial proximal tibia, posterior knee    Pain Rating: (0-10 scale) 9-10/10 currently  Pain altered Tx:  [x] No  [] Yes  Action:  Comments:    Objective:  All below objective measures updated 3/2 by Xiomara Pap, PT       Range of Motion  Left Range of Motion  Right Strength  Left Strength  Right   Hip Flexion     4/5 3/5   Hip Abduction     4/5 3+/5   Hip Adduction     4+/5 4-/5   Hip Extension     4/5 3/5   Hip ER           Hip IR           Knee Flexion 130 130  Guarded 4+/5 3+/5   Knee Extension 0 0  Guarded 4+/5 3/5 Dorsiflexion     4+/5 4/5   Plantar flexion     4+/5 4/5   Inversion           Eversion           *All LE MMT performed in modified (seated) positioning    WB Tolerance: Not yet able to tolerate full WB through R LE due to pain and instability in R knee. Severely antalgic through R stance phase of gait while walking    Stairs: Step-to pattern leading with L LE and heavy handrail assist.    Modalities: Vasopneumatic cold compression (Game [employer]) to R knee in supine 34 deg low pressure x 10'  Precautions:   Exercises:  Exercise Reps/ Time Weight/ Level Comments Completed today   Heel Slides 15x      SAQ 10x3\"      Hamstring set 10x      Clamshells 15x2   x   Sidelying Hip abd RLE 10x2      SLRs 10x2   R LE; Initial HEP    Bridges 10x2   Initial HEP; emphasis on equal WB x   Supine HS/calf stretch with belt 30'' x 3  R LE x   Prone Hip Extension 10x2      LAQ 20x 3\" hold      Standing lateral weight shifts 3'' hold x 15   To improve R LE WB tolerance. Using SPC on L. Initial HEP    Standing heel/toe raises 10x2   Initial HEP    Standing 3-way hip 10x (B)      Mini squats 10x2      Standing hamstring curls 10x R LE only     Alternating Marches with 3\" SLS at Top 15x  Finger tip support           Gait training 5'   Using [facility] on contralateral side. Re-evaluation of all recent subjective information, all STGs 21'  3/2 by primary PT, including aquatics orientation. Long discussion of recent sx and POC options x   Other:     Specific Instructions for next treatment: Switch to aquatic therapy focusing on gentle R LE strengthening, WB tolerance, normal gait and balance/proprioception. Assessment:   3/2: Pt has been seen for 5 PT visits to date for her chronic hx of R medial knee pain, effusion and weakness/instability with hx of R partial menisectomy on 7/7/22.  Unfortunately pain levels continue to be extremely high in pt's R knee extending into medial tibial shaft, with recent MRI imaging confirming R meniscus tears and medial plateau subchondral changes. No significant change in any subjective or objective measures with pain/guarding still with all active motion and loading of R LE. Gait continues to be severely antalgic with pt not able to tolerate full WB through her R LE, but has not been able to use her cane consistently due to worsening hip/back issues. Discussed option of trying walker for longer community distances which pt is looking into. At this point, pt unfortunately has not been tolerating land-based PT intervention well and I do have some concern based on the extent of pain localized at pt's R tibial shaft. Despite MRI findings, pt has still been cleared to continue PT with no restrictions, but offered option of aquatic therapy to help reduce compressive load on pt's R LE. Pt amenable to trying this, with remainder of POC scheduled in aquatic environment. Will reassess after that time to determine appropriateness of continued therapy. Session finished with vasopneumatic cold compression for pain control. [x] Progressing toward goals. [] No change.      [] Other:    [x] Patient would continue to benefit from skilled physical therapy services in order to: help modulate pain, improve R LE WB tolerance, and maximize R LE strength/stability necessary to restore symmetrical gait and ADLs/work duties at her PLOF    STG: (to be met in 6 treatments)- all goals extended through end of POC to reassess after aquatics trial  Pt will self report worst pain no greater than 3/10 in order to better tolerate ADLs/work activities with minimal dysfunction NO CHANGE/PAIN WORSENING 3/2  Pt will improve Tinetti score to 24/28 or greater to show improved balance/gait stability without the use of an AD and reduced risk for falls with household amb NO CHANGE/SX WORSENING 3/2  Pt will demonstrate full WB tolerance through her R LE necessary to show improved proprioception and stability necessary to transition to no AD NO CHANGE/SX WORSENING 3/2  Pt will demonstrate ability to negotiate 1 flight of stairs, reciprocally, with minimal handrail assist to show improved functional strength and mobility through her ADLs and work duties. NO CHANGE/SX WORSENING 3/2  LTG: (to be met in 12 treatments)  Pt will demonstrate improved R LE strength to 4/5 or greater in order to demonstrate improved stability/strength necessary for unrestricted ADLs/work activities  SLS GOAL TO BE ADDED AT A FUTURE VISIT  Pt will improve LEFS to 70% function or greater in order to demonstrate improved functional tolerances at PLOF with minimal restriction/dysfunction  Pt will demonstrate independence with a long term HEP for continued progress/maintenance after completion of PT    Pt. Education:  [x] Yes  [] No  [x] Reviewed Prior HEP/Ed  Method of Education: [x] Verbal  [x] Demo  [] Written  Comprehension of Education:  [x] Verbalizes understanding. [x] Demonstrates understanding. [x] Needs review. [] Demonstrates/verbalizes HEP/Ed previously given. Plan: [] Continue per plan of care. [x] Other: Switch to aquatic therapy      Treatment Plan:  [x] Therapeutic Exercise   86613  [] Iontophoresis: 4 mg/mL Dexamethasone Sodium Phosphate  mAmin  37489   [] Therapeutic Activity  40694 [] Vasopneumatic cold with compression  35898    [] Gait Training   61222 [] Ultrasound   35123   [] Neuromuscular Re-education  30832 [] Electrical Stimulation Unattended  86545   [] Manual Therapy  33820 [] Electrical Stimulation Attended  58356   [x] Instruction in HEP  [] Lumbar/Cervical Traction  39860   [x] Aquatic Therapy   68055 [] Cold/hotpack    [] Massage   44969      [] Dry Needling, 1 or 2 muscles  52088   [] Biofeedback, first 15 minutes   62692  [] Biofeedback, additional 15 minutes   40283 [] Dry Needling, 3 or more muscles  60568      Patient Status:     [x] Continue per initial plan of care. [] Additional visits necessary.     [] Other:           Treatment Charges: Mins Units   []  Modalities     [x]  Ther Exercise 30 2   []  Manual Therapy     []  Ther Activities     []  Aquatics     []  Neuromuscular     [x] Vasocompression 10 1   [] Gait Training     [] Dry needling        [] 1 or 2 muscles        [] 3 or more muscles     []  Other     Total Treatment time 40 3     Time In: 10:15am           Time Out: 10:55am    Electronically signed by:  Lucian Benton PT

## 2023-03-02 ENCOUNTER — HOSPITAL ENCOUNTER (OUTPATIENT)
Dept: PHYSICAL THERAPY | Age: 63
Setting detail: THERAPIES SERIES
Discharge: HOME | End: 2023-03-02
Payer: MEDICAID

## 2023-03-02 PROCEDURE — 97016 VASOPNEUMATIC DEVICE THERAPY: CPT

## 2023-03-02 PROCEDURE — 97110 THERAPEUTIC EXERCISES: CPT

## 2023-03-03 ENCOUNTER — OFFICE VISIT (OUTPATIENT)
Dept: PRIMARY CARE CLINIC | Age: 63
End: 2023-03-03

## 2023-03-03 VITALS
HEART RATE: 76 BPM | DIASTOLIC BLOOD PRESSURE: 76 MMHG | RESPIRATION RATE: 12 BRPM | HEIGHT: 64 IN | SYSTOLIC BLOOD PRESSURE: 132 MMHG | WEIGHT: 172.4 LBS | BODY MASS INDEX: 29.43 KG/M2

## 2023-03-03 DIAGNOSIS — M48.061 SPINAL STENOSIS OF LUMBAR REGION WITHOUT NEUROGENIC CLAUDICATION: Primary | ICD-10-CM

## 2023-03-03 DIAGNOSIS — J44.9 CHRONIC OBSTRUCTIVE PULMONARY DISEASE, UNSPECIFIED COPD TYPE (HCC): ICD-10-CM

## 2023-03-03 DIAGNOSIS — Z86.73 HISTORY OF TRANSIENT ISCHEMIC ATTACK (TIA): ICD-10-CM

## 2023-03-03 DIAGNOSIS — J43.9 PULMONARY EMPHYSEMA, UNSPECIFIED EMPHYSEMA TYPE (HCC): ICD-10-CM

## 2023-03-03 LAB — INTERNATIONAL NORMALIZATION RATIO, POC: 4.5

## 2023-03-03 ASSESSMENT — ENCOUNTER SYMPTOMS: SHORTNESS OF BREATH: 1

## 2023-03-03 NOTE — PROGRESS NOTES
[facility] PRIMARY CARE  [address] [place] [address]  Dept: [phone]    April Lyly Ramirez is a 58 y.o. female Established patient, who presents today for her medical conditions/complaintsas noted below. Chief Complaint   Patient presents with    Pain     Med check - UDS up to date, med contract needed. Office Visit for Anticoagulation Management       HPI:     Pain    Breathing : not doing well on combivent, uses it 4 x a day. Uses advair BID. Started smoking again due to pain /stress    Reviewed prior notes None  Reviewed previous Labs  Is getting pain block for back and maybe for the knee too  Dr Ade Machuca at [facility] [place].     See PT also for the knee and it's not helping  LDL Calculated (mg/dL)   Date Value   10/21/2019 120   2018 131       (goal LDL is <100)   BUN (mg/dL)   Date Value   2022 9     TSH (uIU/mL)   Date Value   2022 2.11     BP Readings from Last 3 Encounters:   23 132/76   23 132/84   22 132/84          (goal 120/80)    Past Medical History:   Diagnosis Date    Acid reflux     Arthritis     neck, back, knees    Asthma     Asthma     COPD (chronic obstructive pulmonary disease) ([facility] [address] 10/20/2015    Emphysema     Headache(784.0)     Heart abnormality     minute hole in heart per pt found in  after TIA    Osteoarthritis     Osteoporosis     Seborrheic keratoses 2019    TIA (transient ischemic attack)       Past Surgical History:   Procedure Laterality Date    ACROMIOPLASTY Left 2016    SHOULDER ARTHROCOPY LABRAL DEBRIDEMENT      SECTION, LOW TRANSVERSE      COLONOSCOPY      DILATION AND CURETTAGE OF UTERUS      KNEE ARTHROSCOPY Right     KNEE ARTHROSCOPY Right 2022    KNEE ARTHROSCOPIC PARTIAL MEDIAL MENISCECTOMY performed by Jeff Edmonds MD at [facility]      uterine lining removed by laser    SHOULDER SURGERY      THROAT SURGERY      camera    TONSILLECTOMY AND ADENOIDECTOMY      TUBAL LIGATION         Family History   Problem Relation Age of Onset    Thyroid Disease Mother     Graves Disease Mother     Heart Disease Father     Asthma Father     Thyroid Disease Sister     Cancer Paternal Grandfather         throat    Asthma Son        Social History     Tobacco Use    Smoking status: Every Day     Packs/day: 0.50     Years: 46.00     Pack years: 23.00     Types: Cigarettes     Start date: 1974    Smokeless tobacco: Never   Substance Use Topics    Alcohol use: Yes     Alcohol/week: 0.0 standard drinks     Comment: OCCASIONAL      Current Outpatient Medications   Medication Sig Dispense Refill    HYDROcodone-acetaminophen (NORCO) 7.5-325 MG per tablet Take 1.5 tablets by mouth 4 times daily for 30 days. 180 tablet 0    fluticasone-salmeterol (ADVAIR DISKUS) 500-50 MCG/ACT AEPB diskus inhaler Inhale 1 puff into the lungs in the morning and 1 puff in the evening. 60 each 3    busPIRone (BUSPAR) 15 MG tablet TAKE ONE TABLET BY MOUTH TWICE A DAY 60 tablet 3    pregabalin (LYRICA) 50 MG capsule       omeprazole (PRILOSEC) 20 MG delayed release capsule TAKE ONE CAPSULE BY MOUTH DAILY 30 capsule 5    ondansetron (ZOFRAN) 4 MG tablet TAKE ONE TABLET BY MOUTH THREE TIMES A DAY AS NEEDED FOR NAUSEA OR VOMITING 30 tablet 0    warfarin (COUMADIN) 4 MG tablet Take 2 tablets by mouth Five times weekly AND 1.5 tablets Twice a Week.  (Patient taking differently: M-F 8mg Sat+sun 6mg) 52 tablet 3    divalproex (DEPAKOTE) 250 MG DR tablet TAKE ONE TABLET BY MOUTH TWICE A DAY 60 tablet 5    pregabalin (LYRICA) 100 MG capsule TAKE ONE CAPSULE BY MOUTH THREE TIMES A DAY 90 capsule 0    DULoxetine (CYMBALTA) 60 MG extended release capsule TAKE ONE CAPSULE BY MOUTH DAILY 30 capsule 5    albuterol-ipratropium (COMBIVENT RESPIMAT)  MCG/ACT AERS inhaler Inhale 1 puff into the lungs in the morning and 1 puff at noon and 1 puff in the evening and 1 puff before bedtime. Increased to every 4 hrs if needed. . 3 each 3     Current Facility-Administered Medications   Medication Dose Route Frequency Provider Last Rate Last Admin    methylPREDNISolone acetate (DEPO-MEDROL) injection 80 mg  80 mg Intra-artICUlar Once Dana Kraft, DO        bupivacaine (MARCAINE) 0.25 % injection 5 mg  2 mL Intra-artICUlar Once Dana Kraft, DO         Allergies   Allergen Reactions    Eggs Or Egg-Derived Products Other (See Comments)     Severe abdominal pains    Bupropion Nausea Only    Ceftin [Cefuroxime Axetil] Nausea Only     Nausea and upset stomach       Health Maintenance   Topic Date Due    Low dose CT lung screening  Never done    Breast cancer screen  12/02/2021    Shingles vaccine (2 of 2) 03/10/2023    Cervical cancer screen  11/03/2023    Depression Monitoring  02/08/2024    Lipids  10/21/2024    DTaP/Tdap/Td vaccine (2 - Td or Tdap) 12/11/2024    Colorectal Cancer Screen  12/29/2026    Flu vaccine  Completed    Pneumococcal 0-64 years Vaccine  Completed    COVID-19 Vaccine  Completed    Hepatitis C screen  Completed    HIV screen  Completed    Hepatitis A vaccine  Aged Out    Hib vaccine  Aged Out    Meningococcal (ACWY) vaccine  Aged Out       Subjective:      Review of Systems   Respiratory:  Positive for shortness of breath. Objective:     /76   Pulse 76   Resp 12   Ht 5' 3.5\" (1.613 m)   Wt 172 lb 6.4 oz (78.2 kg)   BMI 30.06 kg/m²   Physical Exam  Vitals and nursing note reviewed. Constitutional:       General: She is not in acute distress. Appearance: She is well-developed. She is not ill-appearing. HENT:      Head: Normocephalic and atraumatic. Right Ear: External ear normal.      Left Ear: External ear normal.   Eyes:      General: No scleral icterus. Right eye: No discharge. Left eye: No discharge. Conjunctiva/sclera: Conjunctivae normal.   Neck:      Thyroid: No thyromegaly. Trachea: No tracheal deviation. Cardiovascular:      Rate and Rhythm: Normal rate and regular rhythm. Heart sounds: Normal heart sounds. Pulmonary:      Effort: Pulmonary effort is normal. No respiratory distress. Breath sounds: Rhonchi present. No wheezing. Comments: Rhonchi cleared after a cough  Musculoskeletal:      Right lower leg: No edema. Left lower leg: No edema. Lymphadenopathy:      Cervical: No cervical adenopathy. Skin:     General: Skin is warm. Findings: No rash. Neurological:      Mental Status: She is alert and oriented to person, place, and time. Psychiatric:         Mood and Affect: Mood normal.         Behavior: Behavior normal.         Thought Content: Thought content normal.       Assessment:       Diagnosis Orders   1. Spinal stenosis of lumbar region without neurogenic claudication        2. Chronic obstructive pulmonary disease, unspecified COPD type ([address]  Full PFT Study With Bronchodilator      3. History of transient ischemic attack (TIA)        4. Pulmonary emphysema, unspecified emphysema type ([address]  Full PFT Study With Bronchodilator           Plan:      Return in about 3 months (around 6/3/2023) for med check. .  Hold warfarin for 3 day  Recheck 10-14 days. Orders Placed This Encounter   Procedures    POCT INR     This external order was created through the results console. Full PFT Study With Bronchodilator     Standing Status:   Future     Standing Expiration Date:   3/2/2024     Scheduling Instructions: This is to be done at the hospital     No orders of the defined types were placed in this encounter. Patient given educationalmaterials - see patient instructions. Discussed use, benefit, and side effectsof prescribed medications. All patient questions answered. Pt voiced understanding. Reviewed health maintenance. Instructed to continue current medications, diet andexercise. Patient agreed with treatment plan. Follow up as directed.      Electronicallysigned by Cedric Gallardo MD on 3/3/2023 at 3:37 PM

## 2023-03-03 NOTE — PATIENT INSTRUCTIONS
Patient Education        A Healthy Lifestyle: Care Instructions  A healthy lifestyle can help you feel good, have more energy, and stay at a weight that's healthy for you. You can share a healthy lifestyle with your friends and family. And you can do it on your own. Eat meals with your friends or family. You could try cooking together. Plan activities with other people. Go for a walk with a friend, try a free online fitness class, or join a sports league. Eat a variety of healthy foods. These include fruits, vegetables, whole grains, low-fat dairy, and lean protein. Choose healthy portions of food. You can use the Nutrition Facts label on food packages as a guide. Eat more fruits and vegetables. You could add vegetables to sandwiches or add fruit to cereal.   Drink water when you are thirsty. Limit soda, juice, and sports drinks. Try to exercise most days. Aim for at least 2½ hours of exercise each week. Keep moving. Work in the garden or take your dog on a walk. Use the stairs instead of the elevator. If you use tobacco or nicotine, try to quit. Ask your doctor about programs and medicines to help you quit. Limit alcohol. Men should have no more than 2 drinks a day. Women should have no more than 1. For some people, no alcohol is the best choice. Follow-up care is a key part of your treatment and safety. Be sure to make and go to all appointments, and call your doctor if you are having problems. It's also a good idea to know your test results and keep a list of the medicines you take. Where can you learn more? Go to http://www.da islva.com/ and enter U807 to learn more about \"A Healthy Lifestyle: Care Instructions. \"  Current as of: March 9, 2022               Content Version: 13.5  © 0408-6428 [employer], [employer]. Care instructions adapted under license by [facility] ([facility]).  If you have questions about a medical condition or this instruction, always ask your healthcare professional. [address] any warranty or liability for your use of this information.

## 2023-03-04 DIAGNOSIS — G45.9 TIA (TRANSIENT ISCHEMIC ATTACK): ICD-10-CM

## 2023-03-04 DIAGNOSIS — Z86.73 HISTORY OF TRANSIENT ISCHEMIC ATTACK (TIA): ICD-10-CM

## 2023-03-06 ENCOUNTER — APPOINTMENT (OUTPATIENT)
Dept: PHYSICAL THERAPY | Age: 63
End: 2023-03-06
Payer: MEDICAID

## 2023-03-06 ENCOUNTER — HOSPITAL ENCOUNTER (OUTPATIENT)
Dept: PHYSICAL THERAPY | Age: 63
Setting detail: THERAPIES SERIES
Discharge: HOME | End: 2023-03-06
Payer: MEDICAID

## 2023-03-06 PROCEDURE — 97113 AQUATIC THERAPY/EXERCISES: CPT

## 2023-03-06 RX ORDER — WARFARIN SODIUM 4 MG/1
TABLET ORAL
Qty: 52 TABLET | Refills: 4 | Status: SHIPPED | OUTPATIENT
Start: 2023-03-06 | End: 2023-04-04 | Stop reason: SDUPTHER

## 2023-03-06 NOTE — FLOWSHEET NOTE
[x] [facility] & Therapy  [address]  [place]: [phone]   F: [phone]    Physical Therapy Daily Treatment Note    Date:  3/6/2023  Patient Name:  Idania Gonzales    :  1960  MRN: [id number]  Physician: Ashleigh Dennis MD           Insurance: [facility]. Auth required after 30 visits. Iontophoresis and e-stim not covered  Medical Diagnosis: M25.561 (ICD-10-CM) - Right knee pain, unspecified chronicity          Rehab Codes: M25.561, R53.1, R26.9  Onset Date: DOS 22                     Next 's appt: PRN  Visit# / total visits:    Cancels/No Shows:     Subjective: Pt reports being sore for 2-3 days and unable to sleep after last gym therapy session. Notes back to having difficulty sleeping with no relief, even after having a patch on.  Pain:  [x] Yes  [] No Location: R medal knee down medial proximal tibia, posterior knee    Pain Rating: (0-10 scale) 7/10 currently  Pain altered Tx:  [x] No  [] Yes  Action:  Comments:  Initial aquatic therapy visit. Educated on postural awareness, core stability and benefits of aquatic therapy. Objective: All below objective measures updated 3/2 by Chele Life, PT       Range of Motion  Left Range of Motion  Right Strength  Left Strength  Right   Hip Flexion     4/5 3/5   Hip Abduction     4/5 3+/5   Hip Adduction     4+/5 4-/5   Hip Extension     4/5 3/5   Hip ER           Hip IR           Knee Flexion 130 130  Guarded 4+/5 3+/5   Knee Extension 0 0  Guarded 4+/5 3/5   Dorsiflexion     4+/5 4/5   Plantar flexion     4+/5 4/5   Inversion           Eversion           *All LE MMT performed in modified (seated) positioning    WB Tolerance: Not yet able to tolerate full WB through R LE due to pain and instability in R knee.  Severely antalgic through R stance phase of gait while walking    Stairs: Step-to pattern leading with L LE and heavy handrail assist.      [address] [employer] Exercise Log  Aquatic,  Program- Phase 1    Date of Eval:   2/7/23                             Primary PT:  Essence Tejada, PT        Date 3/6/23       Visit # 6/12       Walk F/L/R 2 Laps       Marching 10x       Squats 10x       Step-Ups F/L        Step Down F/L        Heel-toe raises 10x       SLR F/L/R 10x       HS curl 10x       Hip/Knee Flex/Ext        F/L Lunges                Kickboard Ex. Iso Abd. Push-pull        Paddling                UE Format:        Horiz Abd/Add        IR/ER (wipers)        Alt Flex/Ext        Alt Press Down        Abd/Add                Deep Water: 1 Noodle       Hang 5'       Cycling        MARTTILA        X-Country                Balance        SLS                Stretches        Achllies        Hamstring 3x20\"               Cool Down        Pain Rating 7             Specific Instructions for next treatment: assess tolerance to initial aquatic therapy visit and progress to patient's tolerance. Assessment:   Initiated aquatic therapy for knee pain. Emphasis on core stability, postural awareness and working in pain free ranges. Notes discomfort in right knee with lateral walking in pool. Cueing to decrease height of knee flexion exercises (marching and hamstring curl) to avoid increased pain. Good technique with most exercises and notes slight increase in pain before performing deep water hang. Notes little to no pain while hanging and only tightness after deep water hang when exiting pool. [x] Progressing toward goals. [] No change.      [] Other:    [x] Patient would continue to benefit from skilled physical therapy services in order to: help modulate pain, improve R LE WB tolerance, and maximize R LE strength/stability necessary to restore symmetrical gait and ADLs/work duties at her PLOF    STG: (to be met in 6 treatments)- all goals extended through end of POC to reassess after aquatics trial  Pt will self report worst pain no greater than 3/10 in order to better tolerate ADLs/work activities with minimal dysfunction NO CHANGE/PAIN WORSENING 3/2  Pt will improve Tinetti score to 24/28 or greater to show improved balance/gait stability without the use of an AD and reduced risk for falls with household amb NO CHANGE/SX WORSENING 3/2  Pt will demonstrate full WB tolerance through her R LE necessary to show improved proprioception and stability necessary to transition to no AD NO CHANGE/SX WORSENING 3/2  Pt will demonstrate ability to negotiate 1 flight of stairs, reciprocally, with minimal handrail assist to show improved functional strength and mobility through her ADLs and work duties. NO CHANGE/SX WORSENING 3/2  LTG: (to be met in 12 treatments)  Pt will demonstrate improved R LE strength to 4/5 or greater in order to demonstrate improved stability/strength necessary for unrestricted ADLs/work activities  SLS GOAL TO BE ADDED AT A FUTURE VISIT  Pt will improve LEFS to 70% function or greater in order to demonstrate improved functional tolerances at PLOF with minimal restriction/dysfunction  Pt will demonstrate independence with a long term HEP for continued progress/maintenance after completion of PT    Pt. Education:  [x] Yes  [] No  [x] Reviewed Prior HEP/Ed- benefits of aquatic therapy and working in pain free ranges. Method of Education: [x] Verbal  [x] Demo  [] Written  Comprehension of Education:  [x] Verbalizes understanding. [x] Demonstrates understanding. [x] Needs review. [] Demonstrates/verbalizes HEP/Ed previously given. Plan: [x] Continue per plan of care. Continue aquatic therapy.    [] Other:       Treatment Plan:  [x] Therapeutic Exercise   72603  [] Iontophoresis: 4 mg/mL Dexamethasone Sodium Phosphate  mAmin  68400   [] Therapeutic Activity  92550 [] Vasopneumatic cold with compression  88356    [] Gait Training   75436 [] Ultrasound   04285   [] Neuromuscular Re-education  57172 [] Electrical Stimulation Unattended  [id number]   [] Manual Therapy  40551 [] Electrical Stimulation Attended  [id number]   [x] Instruction in HEP  [] Lumbar/Cervical Traction  [id number]   [x] Aquatic Therapy   [id number] [] Cold/hotpack    [] Massage   35850      [] Dry Needling, 1 or 2 muscles  19790   [] Biofeedback, first 15 minutes   85638  [] Biofeedback, additional 15 minutes   60244 [] Dry Needling, 3 or more muscles  05049      Patient Status:     [x] Continue per initial plan of care. [] Additional visits necessary.     [] Other:           Treatment Charges: Mins Units   []  Modalities     []  Ther Exercise     []  Manual Therapy     []  Ther Activities     [x]  Aquatics 27 2   []  Neuromuscular     [] Vasocompression     [] Gait Training     [] Dry needling        [] 1 or 2 muscles        [] 3 or more muscles     []  Other     Total Treatment time 27 2     Time In: 10:03 am           Time Out: 10:35 am    Electronically signed by:  Gabe Monreal PTA

## 2023-03-09 ENCOUNTER — HOSPITAL ENCOUNTER (OUTPATIENT)
Dept: PHYSICAL THERAPY | Age: 63
Setting detail: THERAPIES SERIES
Discharge: HOME | End: 2023-03-09
Payer: MEDICAID

## 2023-03-09 ENCOUNTER — APPOINTMENT (OUTPATIENT)
Dept: PHYSICAL THERAPY | Age: 63
End: 2023-03-09
Payer: MEDICAID

## 2023-03-09 PROCEDURE — 97113 AQUATIC THERAPY/EXERCISES: CPT

## 2023-03-09 NOTE — FLOWSHEET NOTE
[x] [facility] & Therapy  [address]  [place]: [phone]   F: [phone]    Physical Therapy Daily Treatment Note    Date:  3/9/2023  Patient Name:  Gaurang Gavin    :  1960  MRN: [id number]  Physician: Ivelisse Hickman MD           Insurance: [facility]. Auth required after 30 visits. Iontophoresis and e-stim not covered  Medical Diagnosis: M25.561 (ICD-10-CM) - Right knee pain, unspecified chronicity          Rehab Codes: M25.561, R53.1, R26.9  Onset Date: DOS 22                     Next 's appt: PRN  Visit# / total visits:    Cancels/No Shows:     Subjective: Pt reports increased pain the same day as the initial aquatic therapy visit. Notes decreased pain the day after therapy and improved tolerance to WB and gait. Notes pain decreased pain this morning and less antalgic gait pattern this morning. Pain:  [x] Yes  [] No Location: R medal knee down medial proximal tibia, posterior knee    Pain Rating: (0-10 scale) 6/10   Pain altered Tx:  [x] No  [] Yes  Action:  Comments:  reviewed postural awareness, core stability and benefits of aquatic therapy. Objective: All below objective measures updated 3/2 by Ronni Mariano, PT      [facility] Exercise Log  Aquatic,  Program- Phase 1    Date of Eval:   23                             Primary PT:  Ronni Mariano, PT      Date 3/6/23 3/9/23      Visit #       Walk F/L/R 2 Laps 2 Laps      Marching 10x 10x      Squats 10x 10x      Step-Ups F/L        Step Down F/L        Heel-toe raises 10x 10x      SLR F/L/R 10x 10x      HS curl 10x 10x      Hip/Knee Flex/Ext        F/L Lunges                Kickboard Ex. Iso Abd.         Push-pull        Paddling                UE Format:        Horiz Abd/Add        IR/ER (wipers)        Alt Flex/Ext        Alt Press Down        Abd/Add                Deep Water: 1 Noodle 1 Noodle      Hang 5' 5' Cycling  1'      [address]        [employer]-Country                Balance        SLS                Stretches        Achllies        Hamstring 3x20\" 3x20\"              Cool Down  2 Laps Breast Stroke      Pain Rating 7 6            Specific Instructions for next treatment: assess tolerance to initial aquatic therapy visit and progress to patient's tolerance. Assessment:   Added retro ambulation for rear chain muscle engagement and patient notes \"feels good on my back. \"  Added deep water cycling for LE/hip strengthening and core stabilization. Also added breast stroke walking laps for core/trunk stabilization with ambulation. Denies any issues with added exercises and good technique throughout treatment. [x] Progressing toward goals. [] No change. [] Other:    [x] Patient would continue to benefit from skilled physical therapy services in order to: help modulate pain, improve R LE WB tolerance, and maximize R LE strength/stability necessary to restore symmetrical gait and ADLs/work duties at her PLOF    STG: (to be met in 6 treatments)- all goals extended through end of POC to reassess after aquatics trial  Pt will self report worst pain no greater than 3/10 in order to better tolerate ADLs/work activities with minimal dysfunction NO CHANGE/PAIN WORSENING 3/2  Pt will improve Tinetti score to 24/28 or greater to show improved balance/gait stability without the use of an AD and reduced risk for falls with household amb NO CHANGE/SX WORSENING 3/2  Pt will demonstrate full WB tolerance through her R LE necessary to show improved proprioception and stability necessary to transition to no AD NO CHANGE/SX WORSENING 3/2  Pt will demonstrate ability to negotiate 1 flight of stairs, reciprocally, with minimal handrail assist to show improved functional strength and mobility through her ADLs and work duties.   NO CHANGE/SX WORSENING 3/2  LTG: (to be met in 12 treatments)  Pt will demonstrate improved R LE strength to 4/5 or greater in order to demonstrate improved stability/strength necessary for unrestricted ADLs/work activities  SLS GOAL TO BE ADDED AT A FUTURE VISIT  Pt will improve LEFS to 70% function or greater in order to demonstrate improved functional tolerances at PLOF with minimal restriction/dysfunction  Pt will demonstrate independence with a long term HEP for continued progress/maintenance after completion of PT    Pt. Education:  [x] Yes  [] No  [x] Reviewed Prior HEP/Ed- benefits of aquatic therapy and working in pain free ranges. Method of Education: [x] Verbal  [x] Demo  [] Written  Comprehension of Education:  [x] Verbalizes understanding. [x] Demonstrates understanding. [x] Needs review. [] Demonstrates/verbalizes HEP/Ed previously given. Plan: [x] Continue per plan of care. Continue aquatic therapy. [] Other:       Treatment Plan:  [x] Therapeutic Exercise   53674  [] Iontophoresis: 4 mg/mL Dexamethasone Sodium Phosphate  mAmin  93055   [] Therapeutic Activity  23053 [] Vasopneumatic cold with compression  07915    [] Gait Training   66131 [] Ultrasound   40726   [] Neuromuscular Re-education  96325 [] Electrical Stimulation Unattended  53008   [] Manual Therapy  40349 [] Electrical Stimulation Attended  77424   [x] Instruction in HEP  [] Lumbar/Cervical Traction  54810   [x] Aquatic Therapy   25705 [] Cold/hotpack    [] Massage   36886      [] Dry Needling, 1 or 2 muscles  44528   [] Biofeedback, first 15 minutes   90033  [] Biofeedback, additional 15 minutes   30593 [] Dry Needling, 3 or more muscles  96219      Patient Status:     [x] Continue per initial plan of care. [] Additional visits necessary.     [] Other:         Treatment Charges: Mins Units   []  Modalities     []  Ther Exercise     []  Manual Therapy     []  Ther Activities     [x]  Aquatics 30 2   []  Neuromuscular     [] Vasocompression     [] Gait Training     [] Dry needling        [] 1 or 2 muscles        [] 3 or more muscles     []  Other     Total Treatment time 30 2     5 Minute deep water hang unbilled time    Time In: 10:22 am           Time Out: 10:57 am    Electronically signed by:  Anderson Cai PTA

## 2023-03-13 ENCOUNTER — HOSPITAL ENCOUNTER (OUTPATIENT)
Dept: PHYSICAL THERAPY | Age: 63
Setting detail: THERAPIES SERIES
Discharge: HOME | End: 2023-03-13
Payer: MEDICAID

## 2023-03-13 ENCOUNTER — NURSE ONLY (OUTPATIENT)
Dept: PRIMARY CARE CLINIC | Age: 63
End: 2023-03-13
Payer: MEDICAID

## 2023-03-13 VITALS
WEIGHT: 172.6 LBS | SYSTOLIC BLOOD PRESSURE: 124 MMHG | BODY MASS INDEX: 30.1 KG/M2 | OXYGEN SATURATION: 97 % | DIASTOLIC BLOOD PRESSURE: 86 MMHG | HEART RATE: 85 BPM

## 2023-03-13 DIAGNOSIS — Z86.73 HISTORY OF TRANSIENT ISCHEMIC ATTACK (TIA): Primary | ICD-10-CM

## 2023-03-13 LAB — INTERNATIONAL NORMALIZATION RATIO, POC: 1.4

## 2023-03-13 PROCEDURE — 97113 AQUATIC THERAPY/EXERCISES: CPT

## 2023-03-13 PROCEDURE — 93793 ANTICOAG MGMT PT WARFARIN: CPT | Performed by: FAMILY MEDICINE

## 2023-03-13 PROCEDURE — 85610 PROTHROMBIN TIME: CPT | Performed by: FAMILY MEDICINE

## 2023-03-13 NOTE — FLOWSHEET NOTE
[x] [facility] & Therapy  [address]  [place]: [phone]   F: [phone]    Physical Therapy Daily Treatment Note    Date:  3/13/2023  Patient Name:  Connor Mckeon    :  1960  MRN: [id number]  Physician: Kiko Adkins MD           Insurance: [facility]. Auth required after 30 visits. Iontophoresis and e-stim not covered  Medical Diagnosis: M25.561 (ICD-10-CM) - Right knee pain, unspecified chronicity          Rehab Codes: M25.561, R53.1, R26.9  Onset Date: DOS 22                     Next 's appt: PRN  Visit# / total visits:    Cancels/No Shows:     Subjective: Patient reporting to therapy with no new concerns or complaints. Pain:  [x] Yes  [] No Location: R medal knee down medial proximal tibia, posterior knee    Pain Rating: (0-10 scale) 5/10   Pain altered Tx:  [x] No  [] Yes  Action:  Comments:  reviewed postural awareness, core stability and benefits of aquatic therapy. Objective:   [address] [facility] Exercise Log  Aquatic,  Program- Phase 1    Date of Eval:   23                             Primary PT:  Lula Rose, PT      Date 3/6/23 3/9/23 3/13/23     Visit #      Walk F/L/R 2 Laps 2 Laps 2 laps     Marching 10x 10x 10x     Squats 10x 10x 10x     Step-Ups F/L   10x     Step Down F/L        Heel-toe raises 10x 10x 10x     SLR F/L/R 10x 10x 10x     HS curl 10x 10x 10x     Hip/Knee Flex/Ext        F/L Lunges                Kickboard Ex. Iso Abd.         Push-pull        Paddling                UE Format:        Horiz Abd/Add        IR/ER (wipers)        Alt Flex/Ext        Alt Press Down        Abd/Add                Deep Water: 1 Noodle 1 Noodle 1 Noodle     Hang 5' 5' 5'     Cycling  1' 1'     Jacks        X-Country                Balance        SLS                Stretches        Achllies        Hamstring 3x20\" 3x20\" 3x20\"             Cool Down  2 Laps Breast Stroke 2 Laps Breast Stroke     Pain Rating 7 6 sore           Specific Instructions for next treatment: assess tolerance to initial aquatic therapy visit and progress to patient's tolerance. Assessment: 3/13 Continued with program as charted above with additions made last session. Patient reporting soreness in her R knee but nothing she can't tolerate. Added step ups this session on the short step to continue with strengthening with discomfort noted in the medial aspect of her knee. No increase in pain noted at the end of session. [x] Progressing toward goals. [] No change. [] Other:    [x] Patient would continue to benefit from skilled physical therapy services in order to: help modulate pain, improve R LE WB tolerance, and maximize R LE strength/stability necessary to restore symmetrical gait and ADLs/work duties at her PLOF    STG: (to be met in 6 treatments)- all goals extended through end of POC to reassess after aquatics trial  Pt will self report worst pain no greater than 3/10 in order to better tolerate ADLs/work activities with minimal dysfunction NO CHANGE/PAIN WORSENING 3/2  Pt will improve Tinetti score to 24/28 or greater to show improved balance/gait stability without the use of an AD and reduced risk for falls with household amb NO CHANGE/SX WORSENING 3/2  Pt will demonstrate full WB tolerance through her R LE necessary to show improved proprioception and stability necessary to transition to no AD NO CHANGE/SX WORSENING 3/2  Pt will demonstrate ability to negotiate 1 flight of stairs, reciprocally, with minimal handrail assist to show improved functional strength and mobility through her ADLs and work duties.   NO CHANGE/SX WORSENING 3/2  LTG: (to be met in 12 treatments)  Pt will demonstrate improved R LE strength to 4/5 or greater in order to demonstrate improved stability/strength necessary for unrestricted ADLs/work activities  SLS GOAL TO BE ADDED AT A FUTURE VISIT  Pt will improve LEFS to 70% function or greater in order to demonstrate improved functional tolerances at PLOF with minimal restriction/dysfunction  Pt will demonstrate independence with a long term HEP for continued progress/maintenance after completion of PT    Pt. Education:  [x] Yes  [] No  [x] Reviewed Prior HEP/Ed- benefits of aquatic therapy and working in pain free ranges. Method of Education: [x] Verbal  [x] Demo  [] Written  Comprehension of Education:  [x] Verbalizes understanding. [x] Demonstrates understanding. [x] Needs review. [] Demonstrates/verbalizes HEP/Ed previously given. Plan: [x] Continue per plan of care. Continue aquatic therapy. [] Other:       Treatment Plan:  [x] Therapeutic Exercise   89517  [] Iontophoresis: 4 mg/mL Dexamethasone Sodium Phosphate  mAmin  32210   [] Therapeutic Activity  97432 [] Vasopneumatic cold with compression  99811    [] Gait Training   41975 [] Ultrasound   33621   [] Neuromuscular Re-education  22634 [] Electrical Stimulation Unattended  57310   [] Manual Therapy  65519 [] Electrical Stimulation Attended  46692   [x] Instruction in HEP  [] Lumbar/Cervical Traction  08764   [x] Aquatic Therapy   18257 [] Cold/hotpack    [] Massage   87135      [] Dry Needling, 1 or 2 muscles  26639   [] Biofeedback, first 15 minutes   48926  [] Biofeedback, additional 15 minutes   32750 [] Dry Needling, 3 or more muscles  31520      Patient Status:     [x] Continue per initial plan of care. [] Additional visits necessary.     [] Other:         Treatment Charges: Mins Units   []  Modalities     []  Ther Exercise     []  Manual Therapy     []  Ther Activities     [x]  Aquatics 43 3   []  Neuromuscular     [] Vasocompression     [] Gait Training     [] Dry needling        [] 1 or 2 muscles        [] 3 or more muscles     []  Other     Total Treatment time 43 3     5 Minute deep water hang unbilled time    Time In: 2350           Time Out: 1835    Electronically signed by:  Chiqui Reyes Raven Pat, PTA

## 2023-03-13 NOTE — PROGRESS NOTES
Patient here for INR. has not had signs of bleeding or excessive bruising.      Current Warfarin dose:  6.5 mg Monday - Friday. 8 mg on Saturday and Sunday             Previous INR results:    Lab Results   Component Value Date    INR 4.5 03/03/2023    INR 2.1 02/08/2023    INR 1.7 01/27/2023    PROTIME 17.0 (H) 11/18/2022    PROTIME 13.1 09/20/2021    PROTIME 23.2 (H) 04/19/2021       Vitals:    03/13/23 0904   BP: 124/86   Pulse: 85   SpO2: 97%       Today's INR:  1.4  The last INR the warfarin was held for 2 days.  In a week the patient is expecting to have to have a spinal procedure.  She will have to hold her warfarin for 3 to 4 days prior to the procedure.  No changes in warfarin today.  Recheck INR 2 weeks after her procedure.  Case discussed with Dr. Toledo.     Patient advised to continue same dose    Repeat next Protime 2  week(s).    Patient is having back injections next week. Dr Toledo was notified. Patient was informed to hold coumadin 3 days prior.

## 2023-03-16 ENCOUNTER — HOSPITAL ENCOUNTER (OUTPATIENT)
Dept: PHYSICAL THERAPY | Age: 63
Setting detail: THERAPIES SERIES
Discharge: HOME | End: 2023-03-16
Payer: MEDICAID

## 2023-03-16 PROCEDURE — 97113 AQUATIC THERAPY/EXERCISES: CPT

## 2023-03-16 NOTE — FLOWSHEET NOTE
[x] [facility] & Therapy  [address]  [place]: [phone]   F: [phone]    Physical Therapy Daily Treatment Note    Date:  3/16/2023  Patient Name:  Dian Taveras    :  1960  MRN: [id number]  Physician: Shira Guallpa MD           Insurance: [facility]. Auth required after 30 visits. Iontophoresis and e-stim not covered  Medical Diagnosis: M25.561 (ICD-10-CM) - Right knee pain, unspecified chronicity          Rehab Codes: M25.561, R53.1, R26.9  Onset Date: DOS 22                     Next 's appt: PRN  Visit# / total visits:    Cancels/No Shows:     Subjective: Patient reports improved symptoms for 1-2 hours after last therapy session. Was able to go grocery shopping after last visit. States pain has been in posterior knee with 10/10 pain when squatting or bending to the floor. Also notes sharp stabbing pain when trying to knee on R knee. Pain:  [x] Yes  [] No Location: R medal knee down medial proximal tibia, posterior knee    Pain Rating: (0-10 scale) 6/10   Pain altered Tx:  [x] No  [] Yes  Action:  Comments:  reviewed postural awareness, core stability and benefits of aquatic therapy. Objective:   [address] [facility] Exercise Log  Aquatic,  Program- Phase 1    Date of Eval:   23                             Primary PT:  Mortimer Craven, PT      Date 3/6/23 3/9/23 3/13/23 3/16/23    Visit #     Walk F/L/R 2 Laps 2 Laps 2 laps 2 Laps    Marching 10x 10x 10x 12x    Squats 10x 10x 10x 12x    Step-Ups F/L   10x Low 12x    Step Down F/L        Heel-toe raises 10x 10x 10x 12x    SLR F/L/R 10x 10x 10x 12x    HS curl 10x 10x 10x 12x    Hip/Knee Flex/Ext    10x    F/L Lunges                Kickboard Ex. Iso Abd.         Push-pull        Paddling                UE Format:        Horiz Abd/Add        IR/ER (wipers)        Alt Flex/Ext        Alt Press Down        Abd/Add Deep Water: 1 Noodle 1 Noodle 1 Noodle 1 Noodle    Hang 5' 5' 5' 5'    Cycling  1' 1' 1'    Jacks        X-Country                Balance        SLS                Stretches        Achllies        Hamstring 3x20\" 3x20\" 3x20\" 3x20\"            Cool Down  2 Laps Breast Stroke 2 Laps Breast Stroke 2 Laps  breast stroke    Pain Rating 7 6 sore 6          Specific Instructions for next treatment: Progress deep water aerobic exercises and add kick board exercises next visit. Assessment:   Progressed reps with all exercises performed today. Good tolerance to increased reps. Added hip/knee flexion/extension exercises for LE mobility and strengthening. Denies any increased pain. Ended with deep water hang for pain relief and unloading of right LE. Notes less pain in RLE when exiting pool. [x] Progressing toward goals. [] No change. [] Other:    [x] Patient would continue to benefit from skilled physical therapy services in order to: help modulate pain, improve R LE WB tolerance, and maximize R LE strength/stability necessary to restore symmetrical gait and ADLs/work duties at her PLOF    STG: (to be met in 6 treatments)- all goals extended through end of POC to reassess after aquatics trial  Pt will self report worst pain no greater than 3/10 in order to better tolerate ADLs/work activities with minimal dysfunction NO CHANGE/PAIN WORSENING 3/2  Pt will improve Tinetti score to 24/28 or greater to show improved balance/gait stability without the use of an AD and reduced risk for falls with household amb NO CHANGE/SX WORSENING 3/2  Pt will demonstrate full WB tolerance through her R LE necessary to show improved proprioception and stability necessary to transition to no AD NO CHANGE/SX WORSENING 3/2  Pt will demonstrate ability to negotiate 1 flight of stairs, reciprocally, with minimal handrail assist to show improved functional strength and mobility through her ADLs and work duties.   NO CHANGE/SX WORSENING 3/2  LTG: (to be met in 12 treatments)  Pt will demonstrate improved R LE strength to 4/5 or greater in order to demonstrate improved stability/strength necessary for unrestricted ADLs/work activities  SLS GOAL TO BE ADDED AT A FUTURE VISIT  Pt will improve LEFS to 70% function or greater in order to demonstrate improved functional tolerances at PLOF with minimal restriction/dysfunction  Pt will demonstrate independence with a long term HEP for continued progress/maintenance after completion of PT    Pt. Education:  [x] Yes  [] No  [x] Reviewed Prior HEP/Ed- benefits of aquatic therapy and working in pain free ranges. Method of Education: [x] Verbal  [x] Demo  [] Written  Comprehension of Education:  [x] Verbalizes understanding. [x] Demonstrates understanding. [x] Needs review. [] Demonstrates/verbalizes HEP/Ed previously given. Plan: [x] Continue per plan of care. Continue aquatic therapy. [] Other:       Treatment Plan:  [x] Therapeutic Exercise   33491  [] Iontophoresis: 4 mg/mL Dexamethasone Sodium Phosphate  mAmin  08131   [] Therapeutic Activity  05938 [] Vasopneumatic cold with compression  64168    [] Gait Training   65375 [] Ultrasound   03281   [] Neuromuscular Re-education  67420 [] Electrical Stimulation Unattended  48884   [] Manual Therapy  67577 [] Electrical Stimulation Attended  65573   [x] Instruction in HEP  [] Lumbar/Cervical Traction  19765   [x] Aquatic Therapy   42445 [] Cold/hotpack    [] Massage   88611      [] Dry Needling, 1 or 2 muscles  60785   [] Biofeedback, first 15 minutes   98004  [] Biofeedback, additional 15 minutes   85462 [] Dry Needling, 3 or more muscles  50711      Patient Status:     [x] Continue per initial plan of care. [] Additional visits necessary.     [] Other:         Treatment Charges: Mins Units   []  Modalities     []  Ther Exercise     []  Manual Therapy     []  Ther Activities     [x]  Aquatics 41 3   []  Neuromuscular     [] Vasocompression     [] Gait Training     [] Dry needling        [] 1 or 2 muscles        [] 3 or more muscles     []  Other     Total Treatment time 41 3     5 Minute deep water hang unbilled time    Time In: 8015           Time Out: 3256    Electronically signed by:  Roxana Tolentino PTA

## 2023-03-17 DIAGNOSIS — U07.1 COVID: ICD-10-CM

## 2023-03-17 DIAGNOSIS — M51.9 LUMBAR DISC DISEASE: ICD-10-CM

## 2023-03-17 DIAGNOSIS — R11.0 NAUSEA: ICD-10-CM

## 2023-03-17 DIAGNOSIS — M48.061 SPINAL STENOSIS OF LUMBAR REGION WITHOUT NEUROGENIC CLAUDICATION: ICD-10-CM

## 2023-03-17 RX ORDER — PREGABALIN 100 MG/1
100 CAPSULE ORAL 3 TIMES DAILY
Qty: 90 CAPSULE | Refills: 0 | Status: SHIPPED | OUTPATIENT
Start: 2023-03-17 | End: 2023-04-16

## 2023-03-17 RX ORDER — ONDANSETRON 4 MG/1
TABLET, FILM COATED ORAL
Qty: 30 TABLET | Refills: 3 | Status: SHIPPED | OUTPATIENT
Start: 2023-03-17

## 2023-03-17 RX ORDER — HYDROCODONE BITARTRATE AND ACETAMINOPHEN 7.5; 325 MG/1; MG/1
1.5 TABLET ORAL 4 TIMES DAILY
Qty: 180 TABLET | Refills: 0 | Status: SHIPPED | OUTPATIENT
Start: 2023-03-17 | End: 2023-04-16

## 2023-03-20 ENCOUNTER — HOSPITAL ENCOUNTER (OUTPATIENT)
Dept: PHYSICAL THERAPY | Age: 63
Setting detail: THERAPIES SERIES
Discharge: HOME | End: 2023-03-20
Payer: MEDICAID

## 2023-03-20 PROCEDURE — 97113 AQUATIC THERAPY/EXERCISES: CPT

## 2023-03-20 NOTE — FLOWSHEET NOTE
to no AD NO CHANGE/SX WORSENING 3/2  Pt will demonstrate ability to negotiate 1 flight of stairs, reciprocally, with minimal handrail assist to show improved functional strength and mobility through her ADLs and work duties. NO CHANGE/SX WORSENING 3/2  LTG: (to be met in 12 treatments)  Pt will demonstrate improved R LE strength to 4/5 or greater in order to demonstrate improved stability/strength necessary for unrestricted ADLs/work activities  SLS GOAL TO BE ADDED AT A FUTURE VISIT  Pt will improve LEFS to 70% function or greater in order to demonstrate improved functional tolerances at PLOF with minimal restriction/dysfunction  Pt will demonstrate independence with a long term HEP for continued progress/maintenance after completion of PT    Pt. Education:  [x] Yes  [] No  [x] Reviewed Prior HEP/Ed- benefits of aquatic therapy and working in pain free ranges. Method of Education: [x] Verbal  [x] Demo  [] Written  Comprehension of Education:  [x] Verbalizes understanding. [x] Demonstrates understanding. [x] Needs review. [] Demonstrates/verbalizes HEP/Ed previously given. Plan: [x] Continue per plan of care. [] Other:       Treatment Plan:  [x] Therapeutic Exercise   60567  [] Iontophoresis: 4 mg/mL Dexamethasone Sodium Phosphate  mAmin  15592   [] Therapeutic Activity  10408 [] Vasopneumatic cold with compression  83228    [] Gait Training   56873 [] Ultrasound   65253   [] Neuromuscular Re-education  08898 [] Electrical Stimulation Unattended  40889   [] Manual Therapy  96837 [] Electrical Stimulation Attended  86384   [x] Instruction in HEP  [] Lumbar/Cervical Traction  42560   [x] Aquatic Therapy   72376 [] Cold/hotpack    [] Massage   03417      [] Dry Needling, 1 or 2 muscles  12237   [] Biofeedback, first 15 minutes   72141  [] Biofeedback, additional 15 minutes   24539 [] Dry Needling, 3 or more muscles  76240      Patient Status:     [x] Continue per initial plan of care.     []

## 2023-03-23 ENCOUNTER — HOSPITAL ENCOUNTER (OUTPATIENT)
Dept: PHYSICAL THERAPY | Age: 63
Setting detail: THERAPIES SERIES
Discharge: HOME | End: 2023-03-23
Payer: MEDICAID

## 2023-03-23 PROCEDURE — 97113 AQUATIC THERAPY/EXERCISES: CPT

## 2023-03-23 NOTE — FLOWSHEET NOTE
duties. NO CHANGE/SX WORSENING 3/2  LTG: (to be met in 12 treatments)  Pt will demonstrate improved R LE strength to 4/5 or greater in order to demonstrate improved stability/strength necessary for unrestricted ADLs/work activities  SLS GOAL TO BE ADDED AT A FUTURE VISIT  Pt will improve LEFS to 70% function or greater in order to demonstrate improved functional tolerances at PLOF with minimal restriction/dysfunction  Pt will demonstrate independence with a long term HEP for continued progress/maintenance after completion of PT    Pt. Education:  [x] Yes  [] No  [x] Reviewed Prior HEP/Ed- benefits of aquatic therapy and working in pain free ranges. Method of Education: [x] Verbal  [x] Demo  [] Written  Comprehension of Education:  [x] Verbalizes understanding. [x] Demonstrates understanding. [x] Needs review. [] Demonstrates/verbalizes HEP/Ed previously given. Plan: [x] Continue per plan of care. [] Other:       Treatment Plan:  [x] Therapeutic Exercise   79098  [] Iontophoresis: 4 mg/mL Dexamethasone Sodium Phosphate  mAmin  35620   [] Therapeutic Activity  63433 [] Vasopneumatic cold with compression  99277    [] Gait Training   25266 [] Ultrasound   13309   [] Neuromuscular Re-education  68614 [] Electrical Stimulation Unattended  20496   [] Manual Therapy  55715 [] Electrical Stimulation Attended  44533   [x] Instruction in HEP  [] Lumbar/Cervical Traction  60185   [x] Aquatic Therapy   76109 [] Cold/hotpack    [] Massage   20123      [] Dry Needling, 1 or 2 muscles  61916   [] Biofeedback, first 15 minutes   00480  [] Biofeedback, additional 15 minutes   10450 [] Dry Needling, 3 or more muscles  50403      Patient Status:     [x] Continue per initial plan of care. [] Additional visits necessary.     [] Other:         Treatment Charges: Mins Units   []  Modalities     []  Ther Exercise     []  Manual Therapy     []  Ther Activities     [x]  Aquatics 39 3   []  Neuromuscular     []

## 2023-03-27 ENCOUNTER — HOSPITAL ENCOUNTER (OUTPATIENT)
Dept: PHYSICAL THERAPY | Age: 63
Setting detail: THERAPIES SERIES
End: 2023-03-27
Payer: MEDICAID

## 2023-03-27 NOTE — FLOWSHEET NOTE
[] [facility] - [facility] & Therapy  [address]  [place]: [phone]   F: [phone]     Physical Therapy Cancel/No Show note    Date: 3/27/2023  Patient: Deloris Aguilar  : 1960  MRN: [id number]    Visit Count:   Cancels/No Shows to date:     For today's appointment patient:    [x]  Cancelled    [] Rescheduled appointment    [] No-show     Reason given by patient:    [x]  Patient ill    []  Conflicting appointment    [] No transportation      [] Conflict with work    [] No reason given    [] Weather related    [] COVID-19    [] Other:      Comments:        [x] Next appointment was confirmed- R/S for 3/30/23    Electronically signed by: David Jacob PTA

## 2023-03-30 ENCOUNTER — APPOINTMENT (OUTPATIENT)
Dept: PHYSICAL THERAPY | Age: 63
End: 2023-03-30
Payer: MEDICAID

## 2023-04-04 ENCOUNTER — HOSPITAL ENCOUNTER (OUTPATIENT)
Dept: PHYSICAL THERAPY | Age: 63
Setting detail: THERAPIES SERIES
Discharge: HOME | End: 2023-04-04
Payer: MEDICAID

## 2023-04-04 ENCOUNTER — NURSE ONLY (OUTPATIENT)
Dept: PRIMARY CARE CLINIC | Age: 63
End: 2023-04-04
Payer: MEDICAID

## 2023-04-04 DIAGNOSIS — Z86.73 HISTORY OF TRANSIENT ISCHEMIC ATTACK (TIA): ICD-10-CM

## 2023-04-04 DIAGNOSIS — G45.9 TIA (TRANSIENT ISCHEMIC ATTACK): ICD-10-CM

## 2023-04-04 LAB — INTERNATIONAL NORMALIZATION RATIO, POC: 1.5

## 2023-04-04 PROCEDURE — 85610 PROTHROMBIN TIME: CPT | Performed by: FAMILY MEDICINE

## 2023-04-04 PROCEDURE — 97110 THERAPEUTIC EXERCISES: CPT

## 2023-04-04 PROCEDURE — 97113 AQUATIC THERAPY/EXERCISES: CPT

## 2023-04-04 RX ORDER — WARFARIN SODIUM 1 MG/1
1 TABLET ORAL
COMMUNITY
End: 2023-04-04 | Stop reason: SDUPTHER

## 2023-04-04 RX ORDER — WARFARIN SODIUM 1 MG/1
0.5 TABLET ORAL
Qty: 30 TABLET | Refills: 3 | Status: SHIPPED | OUTPATIENT
Start: 2023-04-04

## 2023-04-04 RX ORDER — WARFARIN SODIUM 4 MG/1
TABLET ORAL
Qty: 46 TABLET | Refills: 3 | Status: SHIPPED | OUTPATIENT
Start: 2023-04-04 | End: 2023-05-04

## 2023-04-04 NOTE — FLOWSHEET NOTE
LE necessary to show improved proprioception and stability necessary to transition to no AD GOAL MET 4/4  Pt will demonstrate ability to negotiate 1 flight of stairs, reciprocally, with minimal handrail assist to show improved functional strength and mobility through her ADLs and work duties. GOAL MET 4/4  LTG: (to be met in 20 treatments)  Pt will demonstrate improved R LE strength to 4/5 or greater in order to demonstrate improved stability/strength necessary for unrestricted ADLs/work activities GOAL PROGRESSING AND EXTENDED 4/4  Pt will improve R SLS to 10 seconds or greater to demonstrate improved balance/proprioception through stance phase activities with decreased risk for falling NEW GOAL ADDED 4/4  Pt will improve LEFS to 70% function or greater in order to demonstrate improved functional tolerances at PLOF with minimal restriction/dysfunction GOAL EXTENDED 4/4  Pt will demonstrate independence with a long term HEP for continued progress/maintenance after completion of PT GOAL PROGRESSING AND EXTENDED 4/4    Pt. Education:  [x] Yes  [] No  [x] Reviewed Prior HEP/Ed- benefits of aquatic therapy and working in pain free ranges. Method of Education: [x] Verbal  [x] Demo  [] Written  Comprehension of Education:  [x] Verbalizes understanding. [x] Demonstrates understanding. [x] Needs review. [] Demonstrates/verbalizes HEP/Ed previously given. Plan: [] Continue per plan of care. [x] Other: PT re-assess this date. POC extended to 20 visits (see separate PT progress note)         Treatment Charges: Mins Units   []  Modalities     [x]  Ther Exercise 14 1   []  Manual Therapy     []  Ther Activities     [x]  Aquatics 33 2   []  Neuromuscular     [] Vasocompression     [] Gait Training     [] Dry needling        [] 1 or 2 muscles        [] 3 or more muscles     []  Other     Total Treatment time 47 3     Treatment times reflect total treatment time combined by both PT and PTA for today's service.   Physical

## 2023-04-04 NOTE — PROGRESS NOTES
and work duties. GOAL MET 4/4  LTG: (to be met in 20 treatments)  Pt will demonstrate improved R LE strength to 4/5 or greater in order to demonstrate improved stability/strength necessary for unrestricted ADLs/work activities GOAL PROGRESSING AND EXTENDED 4/4  Pt will improve R SLS to 10 seconds or greater to demonstrate improved balance/proprioception through stance phase activities with decreased risk for falling NEW GOAL ADDED 4/4  Pt will improve LEFS to 70% function or greater in order to demonstrate improved functional tolerances at PLOF with minimal restriction/dysfunction GOAL EXTENDED 4/4  Pt will demonstrate independence with a long term HEP for continued progress/maintenance after completion of PT GOAL PROGRESSING AND EXTENDED 4/4    Treatment to Date:  [x] Therapeutic Exercise   09155  [] Iontophoresis: 4 mg/mL Dexamethasone Sodium Phosphate  mAmin  50690   [] Therapeutic Activity  70763 [] Vasopneumatic cold with compression  25891    [] Gait Training   77098 [] Ultrasound   29491   [] Neuromuscular Re-education  99785 [] Electrical Stimulation Unattended  04860   [] Manual Therapy  95049 [] Electrical Stimulation Attended  17481   [x] Instruction in HEP  [] Lumbar/Cervical Traction  98715   [x] Aquatic Therapy   16154 [] Cold/hotpack    [] Massage   96393      [] Dry Needling, 1 or 2 muscles  51933   [] Biofeedback, first 15 minutes   39224  [] Biofeedback, additional 15 minutes   98763 [] Dry Needling, 3 or more muscles  59770      Patient Status:     [] Continue per initial plan of care. [x] Additional visits necessary. [] Other:     Requested Frequency/Duration: 2 times per week for 8 additional treatments.       PT Re-evaluation Time: 2:06pm-2:20pm (14' billable time for re-evaluation of all recent subjective info, objective measures, all goals, updating POC)  *SEE PTA AQUATICS NOTE FOR COMPLETE BILLING SUMMARY FOR TODAY'S DOS*        Electronically signed by: Ari Plunkett, PT    If

## 2023-04-04 NOTE — PROGRESS NOTES
Patient here for INR. has not had signs of bleeding or excessive bruising. Current Warfarin dose:  6mg Monday- Friday 8mg Saturday and Sunday     Previous encounters state 6.5 mg but patient has only been taking 6mg Mon-Friday and 8mg Sat-Sun. Patient states she got mg incorrect. Per Dr Narinder Oscar will need to start 6.5 mg Mon-Friday and 8 mg Sat and Sunday    Previous INR results:    Lab Results   Component Value Date    INR 1.4 03/13/2023    INR 4.5 03/03/2023    INR 2.1 02/08/2023    PROTIME 17.0 (H) 11/18/2022    PROTIME 13.1 09/20/2021    PROTIME 23.2 (H) 04/19/2021       There were no vitals filed for this visit. Today's INR:  1.5    Case discussed with Dr. Narinder Oscar. Patient advised to  take 8 mg today and start 6.5 Mon-Fri and 8mg Saturday and Sunday    Repeat next Protime 2  week(s).

## 2023-04-07 RX ORDER — IPRATROPIUM/ALBUTEROL SULFATE 20-100 MCG
MIST INHALER (GRAM) INHALATION
Qty: 4 G | Refills: 3 | Status: SHIPPED | OUTPATIENT
Start: 2023-04-07

## 2023-04-07 NOTE — TELEPHONE ENCOUNTER
LAST VISIT:   3/3/2023     Future Appointments   Date Time Provider Sarah Montano   4/11/2023  1:30 PM Kvng Levine, PTA [facility]   4/13/2023  8:30 AM Lovella Zuleika, PTA [facility]   4/17/2023  9:30 AM Lety Sapp [facility]   4/17/2023 10:30 AM Lovella Zuleika, PTA [facility]   4/20/2023  9:30 AM Lovella Zuleika, PTA [facility]   4/24/2023  1:00 PM Kvng Levine, PTA [facility]   4/27/2023  9:00 AM Lovella Zuleika, PTA [facility]   5/1/2023  9:30 AM Lovella Zuleika, PTA [facility]   5/4/2023  9:30 AM Lovella Zuleika, PTA [facility]   5/4/2023 10:30 AM Leo Severino, PT [facility]   6/5/2023  1:00 PM Sandy Shrestha MD [facility]uk[facility]

## 2023-04-11 ENCOUNTER — APPOINTMENT (OUTPATIENT)
Dept: PHYSICAL THERAPY | Age: 63
End: 2023-04-11
Payer: MEDICAID

## 2023-04-12 ENCOUNTER — TELEPHONE (OUTPATIENT)
Dept: PRIMARY CARE CLINIC | Age: 63
End: 2023-04-12

## 2023-04-14 ENCOUNTER — TELEPHONE (OUTPATIENT)
Dept: PRIMARY CARE CLINIC | Age: 63
End: 2023-04-14

## 2023-04-17 ENCOUNTER — APPOINTMENT (OUTPATIENT)
Dept: PHYSICAL THERAPY | Age: 63
End: 2023-04-17
Payer: MEDICAID

## 2023-04-19 NOTE — TELEPHONE ENCOUNTER
Patient notified. - states she is getting the shot in her back on May 2nd. Verbalized understanding of being off warfarin for 5 days per Dr. Charly Bravo note.

## 2023-04-20 ENCOUNTER — HOSPITAL ENCOUNTER (OUTPATIENT)
Dept: PHYSICAL THERAPY | Age: 63
Setting detail: THERAPIES SERIES
Discharge: HOME | End: 2023-04-20
Payer: MEDICAID

## 2023-04-20 PROCEDURE — 97113 AQUATIC THERAPY/EXERCISES: CPT

## 2023-04-20 NOTE — FLOWSHEET NOTE
X-Country 1' 2' 2'           Balance       SLS              Stretches       Achllies       Hamstring 3x20\" 3x20\" 3x20\"           Cool Down 2 Laps Breast Stroke 2 Laps Breast Stroke 2 Laps breast stroke    Pain Rating 10 8        Specific Instructions for next treatment:  Increase speed of all exercises next visit. Assessment:    [x] Progressing toward goals. Progressed marching to laps for further core and hip stability. Also decreased UE support to 1 hand/finger support to challenge hip/core stability throughout treatment. Required more concentration with all LE exercises due to less stability. Good tolerance to progressions made. Educated to decrease height of retro SLR to avoid back pain. [] No change. [] Other:     [x] Patient would continue to benefit from skilled physical therapy services in order to: help modulate pain, improve R LE WB tolerance, and maximize R LE strength/stability necessary to restore symmetrical gait and ADLs/work duties at her PLOF    STG: (to be met in 6 treatments) - ALL GOAL EXTENDED THROUGH NEW POC 4/4  Pt will self report worst pain no greater than 3/10 in order to better tolerate ADLs/work activities with minimal dysfunction GOAL PROGRESSING AND EXTENDED 4/4  Pt will improve Tinetti score to 24/28 or greater to show improved balance/gait stability without the use of an AD and reduced risk for falls with household amb GOAL PROGRESSING AND EXTENDED 4/4  Pt will demonstrate full WB tolerance through her R LE necessary to show improved proprioception and stability necessary to transition to no AD GOAL MET 4/4  Pt will demonstrate ability to negotiate 1 flight of stairs, reciprocally, with minimal handrail assist to show improved functional strength and mobility through her ADLs and work duties.  GOAL MET 4/4  LTG: (to be met in 20 treatments)  Pt will demonstrate improved R LE strength to 4/5 or greater in order to demonstrate improved stability/strength necessary for

## 2023-04-24 ENCOUNTER — HOSPITAL ENCOUNTER (OUTPATIENT)
Dept: PHYSICAL THERAPY | Age: 63
Setting detail: THERAPIES SERIES
Discharge: HOME | End: 2023-04-24
Payer: MEDICAID

## 2023-04-24 PROCEDURE — 97113 AQUATIC THERAPY/EXERCISES: CPT

## 2023-04-26 DIAGNOSIS — M48.061 SPINAL STENOSIS OF LUMBAR REGION WITHOUT NEUROGENIC CLAUDICATION: ICD-10-CM

## 2023-04-26 DIAGNOSIS — M51.9 LUMBAR DISC DISEASE: ICD-10-CM

## 2023-04-26 RX ORDER — PREGABALIN 100 MG/1
CAPSULE ORAL
Qty: 90 CAPSULE | Refills: 2 | Status: SHIPPED | OUTPATIENT
Start: 2023-04-26 | End: 2023-06-16

## 2023-04-27 ENCOUNTER — HOSPITAL ENCOUNTER (OUTPATIENT)
Dept: PHYSICAL THERAPY | Age: 63
Setting detail: THERAPIES SERIES
Discharge: HOME | End: 2023-04-27
Payer: MEDICAID

## 2023-04-27 PROCEDURE — 97113 AQUATIC THERAPY/EXERCISES: CPT

## 2023-05-01 ENCOUNTER — HOSPITAL ENCOUNTER (OUTPATIENT)
Dept: PHYSICAL THERAPY | Age: 63
Setting detail: THERAPIES SERIES
Discharge: HOME | End: 2023-05-01
Payer: MEDICAID

## 2023-05-01 PROCEDURE — 97113 AQUATIC THERAPY/EXERCISES: CPT

## 2023-05-01 RX ORDER — PREGABALIN 50 MG/1
CAPSULE ORAL
Qty: 90 CAPSULE | Refills: 0 | OUTPATIENT
Start: 2023-05-01 | End: 2023-05-31

## 2023-05-01 NOTE — FLOWSHEET NOTE
[x] [facility] & Therapy  [address]  [place]: [phone]   F: [phone]    Physical Therapy Daily Treatment Note    Date:  2023  Patient Name:  Caio Morales    :  1960  MRN: [id number]  Physician: Seng Espinoza MD           Insurance: [facility]. Auth required after 30 visits. Iontophoresis and e-stim not covered  Medical Diagnosis: M25.561 (ICD-10-CM) - Right knee pain, unspecified chronicity          Rehab Codes: M25.561, R53.1, R26.9  Onset Date: DOS 22                     Next 's appt: PRN  Visit# / total visits:    Cancels/No Shows:     Subjective: States continues to try and push herself to improve her knee strength. States saw pain management and they cannot do an injection and patient refuses to get replacement at this time. Pain:  [x] Yes  [] No Location: (L) > (R)Hip/buttock; R medal knee down medial proximal tibia, posterior knee    Pain Rating: (0-10 scale) 4/10  Pain altered Tx:  [x] No  [] Yes  Action:      Objective:   [address] [facility] Exercise Log  Aquatic,  Program- Phase 1    Date of Eval:   23                             Primary PT:  Mary Canchola, PT    Date 23   Visit # 14/20 15/20 16/20 17/20   Walk F/L/R 2 Laps 2 Laps 2 Laps 2 Laps   Marching 2 Laps 2 Laps 2 Laps 2 Laps   Squats 15x 15x3\" 15x3\" 15x3\"   Step-Ups F/L Tall 15x Tall 15x Tall 15x Tall 15x   Step Down F/L       Heel-toe raises 15x 15x 15x 20x   SLR F/L/R 15x 15x Steamboats  15x Steamboats  15x   HS curl 15x 15x 15x 15x   Hip/Knee Flex/Ext 15x 15x 15x 15x   F/L Lunges   Tall 10x Tall 10x          Kickboard Ex.  Small Small Small Small   Iso Abd. 10x5\" 10x5\" 10x5\" 15x5\"   Push-pull 10x 10x 10x 15x   Paddling              UE Format:       Horiz Abd/Add       IR/ER (wipers)       Alt Flex/Ext       Alt Press Down       Abd/Add              Deep Water: 1 Noodle 1 Noodle 1 Noodle 1 Noodle

## 2023-05-04 ENCOUNTER — HOSPITAL ENCOUNTER (OUTPATIENT)
Dept: PHYSICAL THERAPY | Age: 63
Setting detail: THERAPIES SERIES
Discharge: HOME | End: 2023-05-04
Payer: MEDICAID

## 2023-05-04 PROCEDURE — 97110 THERAPEUTIC EXERCISES: CPT

## 2023-05-04 PROCEDURE — 97113 AQUATIC THERAPY/EXERCISES: CPT

## 2023-05-04 NOTE — FLOWSHEET NOTE
[x] [facility] & Therapy  [address]  [place]: [phone]   F: [phone]    Physical Therapy Daily Treatment Note    Date:  2023  Patient Name:  Carrol Romo    :  1960  MRN: [id number]  Physician: Cony Vegas MD           Insurance: [facility]. Auth required after 30 visits. Iontophoresis and e-stim not covered  Medical Diagnosis: M25.561 (ICD-10-CM) - Right knee pain, unspecified chronicity          Rehab Codes: M25.561, R53.1, R26.9  Onset Date: DOS 22                     Next 's appt: PRN  Visit# / total visits:    Cancels/No Shows:     Subjective: States no issues after last visit. Denies any pain after last visit. Notes still having to be aware of turning/pivoting and still slight pain in knee when ascending stairs. Pain:  [x] Yes  [] No Location: (L) > (R)Hip/buttock; R medal knee down medial proximal tibia, posterior knee    Pain Rating: (0-10 scale) 1/10  Pain altered Tx:  [x] No  [] Yes  Action:      Objective:   [address] [facility] J Exercise Log  Aquatic,  Program- Phase 1    Date of Eval:   23                             Primary PT:  Latha Darling, PT    Date 23   Visit # 14/20 15/20 16/20 17/20 18/20   Walk F/L/R 2 Laps 2 Laps 2 Laps 2 Laps 2 Laps   Marching 2 Laps 2 Laps 2 Laps 2 Laps 2 Laps   Squats 15x 15x3\" 15x3\" 15x3\" 15x5\"   Step-Ups F/L Tall 15x Tall 15x Tall 15x Tall 15x Tall 15x   Step Down F/L        Heel-toe raises 15x 15x 15x 20x 15x   SLR F/L/R 15x 15x Steamboats  15x Steamboats  15x 15x   HS curl 15x 15x 15x 15x 15x   Hip/Knee Flex/Ext 15x 15x 15x 15x 15x   F/L Lunges   Tall 10x Tall 10x Tall 15x           Kickboard Ex.  Small Small Small Small Med   Iso Abd. 10x5\" 10x5\" 10x5\" 15x5\" 15x5\"   Push-pull 10x 10x 10x 15x 15x   Paddling                UE Format:        Horiz Abd/Add        IR/ER (wipers)        Alt Flex/Ext        [employer]

## 2023-05-04 NOTE — DISCHARGE SUMMARY
Assessment:  Pt has been seen for 18 PT visits to date for her chronic R medial knee pain, effusion and weakness/instability with hx of partial menisectomy on 7/7/22. Overall, pt demonstrates excellent progress since previous assessment with decreased pain levels and improved self-reported functional tolerances with all ADLs, work duties and community activities. Also demonstrates good improvement in all objective measure re-evaluation today, with R knee ROM, strength and stability now safe and WFL. At this time, pt is able to complete all ADLs and desired activities with minimal to no discomfort or limitations. All PT goals have been met and is appropriate to transition to an independent HEP for continued mgmt. Encouraged continued mobility outside of clinic and continued HEP compliance. Provided pt with information on nearby aquatic facilities where she can continue to independently exercise as she reports very good success in this environment. Expresses interest in considering aquatic group fitness classes held at this facility, with info provided today. Anticipate good long term prognosis with continued self-mgmt and pt will be discharged from outpatient PT at this time. STG: (to be met in 6 treatments) - ALL GOAL EXTENDED THROUGH NEW POC 4/4  Pt will self report worst pain no greater than 3/10 in order to better tolerate ADLs/work activities with minimal dysfunction GOAL MET 5/4  Pt will improve Tinetti score to 24/28 or greater to show improved balance/gait stability without the use of an AD and reduced risk for falls with household amb GOAL MET 5/4  Pt will demonstrate full WB tolerance through her R LE necessary to show improved proprioception and stability necessary to transition to no AD GOAL MET 4/4  Pt will demonstrate ability to negotiate 1 flight of stairs, reciprocally, with minimal handrail assist to show improved functional strength and mobility through her ADLs and work duties.  GOAL MET

## 2023-05-16 ENCOUNTER — TELEPHONE (OUTPATIENT)
Dept: PRIMARY CARE CLINIC | Age: 63
End: 2023-05-16

## 2023-05-16 DIAGNOSIS — M48.061 SPINAL STENOSIS OF LUMBAR REGION WITHOUT NEUROGENIC CLAUDICATION: ICD-10-CM

## 2023-05-16 DIAGNOSIS — M51.9 LUMBAR DISC DISEASE: ICD-10-CM

## 2023-05-16 RX ORDER — HYDROCODONE BITARTRATE AND ACETAMINOPHEN 7.5; 325 MG/1; MG/1
1.5 TABLET ORAL 4 TIMES DAILY
Qty: 180 TABLET | Refills: 0 | Status: SHIPPED | OUTPATIENT
Start: 2023-05-16 | End: 2023-06-15

## 2023-05-16 NOTE — TELEPHONE ENCOUNTER
Patient is on Coumadin - has a back surgery scheduled on June 13- asking how long she should be off the Coumadin prior to surgery.

## 2023-05-18 DIAGNOSIS — F43.9 STRESS AT HOME: ICD-10-CM

## 2023-05-18 DIAGNOSIS — F41.9 ANXIETY: ICD-10-CM

## 2023-05-18 RX ORDER — DULOXETIN HYDROCHLORIDE 60 MG/1
60 CAPSULE, DELAYED RELEASE ORAL DAILY
Qty: 30 CAPSULE | Refills: 5 | Status: SHIPPED | OUTPATIENT
Start: 2023-05-18

## 2023-05-24 ENCOUNTER — TELEPHONE (OUTPATIENT)
Dept: PRIMARY CARE CLINIC | Age: 63
End: 2023-05-24

## 2023-05-24 DIAGNOSIS — J44.9 CHRONIC OBSTRUCTIVE PULMONARY DISEASE, UNSPECIFIED COPD TYPE (HCC): Primary | ICD-10-CM

## 2023-05-29 NOTE — TELEPHONE ENCOUNTER
Combivent is supposed to be used at least 4 x a day and can be increased to every 4 hrs if needed. Symbicort had different meds in it and would be used in addition to the combivent. Does she want to use 2 inhalers ?   Also I need her to get the PFT done that was ordered in March

## 2023-05-30 ENCOUNTER — TELEPHONE (OUTPATIENT)
Dept: PRIMARY CARE CLINIC | Age: 63
End: 2023-05-30

## 2023-06-01 RX ORDER — BUDESONIDE AND FORMOTEROL FUMARATE DIHYDRATE 160; 4.5 UG/1; UG/1
2 AEROSOL RESPIRATORY (INHALATION) 2 TIMES DAILY
Qty: 2 EACH | Refills: 1 | Status: SHIPPED | OUTPATIENT
Start: 2023-06-01 | End: 2023-07-01

## 2023-06-05 ENCOUNTER — OFFICE VISIT (OUTPATIENT)
Dept: PRIMARY CARE CLINIC | Age: 63
End: 2023-06-05
Payer: MEDICAID

## 2023-06-05 VITALS
DIASTOLIC BLOOD PRESSURE: 76 MMHG | HEIGHT: 64 IN | HEART RATE: 84 BPM | BODY MASS INDEX: 30.25 KG/M2 | SYSTOLIC BLOOD PRESSURE: 102 MMHG | WEIGHT: 177.2 LBS

## 2023-06-05 DIAGNOSIS — F41.9 ANXIETY: ICD-10-CM

## 2023-06-05 DIAGNOSIS — G45.9 TIA (TRANSIENT ISCHEMIC ATTACK): ICD-10-CM

## 2023-06-05 DIAGNOSIS — Z12.31 BREAST CANCER SCREENING BY MAMMOGRAM: ICD-10-CM

## 2023-06-05 DIAGNOSIS — M19.90 ARTHRITIS: ICD-10-CM

## 2023-06-05 DIAGNOSIS — J44.9 CHRONIC OBSTRUCTIVE PULMONARY DISEASE, UNSPECIFIED COPD TYPE (HCC): Primary | ICD-10-CM

## 2023-06-05 DIAGNOSIS — M51.9 LUMBAR DISC DISEASE: ICD-10-CM

## 2023-06-05 LAB — INTERNATIONAL NORMALIZATION RATIO, POC: 3.8

## 2023-06-05 PROCEDURE — 85610 PROTHROMBIN TIME: CPT | Performed by: FAMILY MEDICINE

## 2023-06-05 PROCEDURE — 99214 OFFICE O/P EST MOD 30 MIN: CPT | Performed by: FAMILY MEDICINE

## 2023-06-05 ASSESSMENT — ENCOUNTER SYMPTOMS: WHEEZING: 1

## 2023-06-05 NOTE — PROGRESS NOTES
instructions. Discussed use, benefit, and side effectsof prescribed medications. All patient questions answered. Pt voiced understanding. Reviewed health maintenance. Instructed to continue current medications, diet andexercise. Patient agreed with treatment plan. Follow up as directed.      Electronicallysigned by Don Cano MD on 6/5/2023 at 3:55 PM

## 2023-06-05 NOTE — PATIENT INSTRUCTIONS
Patient Education        Learning About Low-Carbohydrate Foods  What foods are low in carbohydrate? The foods you eat contain nutrients, such as vitamins and minerals. Carbohydrate is a nutrient. Your body needs the right amount to stay healthy and work as it should. You can use the list below to help you make choices about which foods to eat. Some foods that are lower in carbohydrate include:  Dairy and dairy alternatives  Cheese  Cottage cheese  Cream cheese  Nut milk (unsweetened)  Soy milk (unsweetened)  Yogurt (Greek, plain)  Fruits  Avocado  [employer] and other protein foods  Almonds  Beef  Chicken  Cod  Eggs  Halibut  Peanut butter and other nut butters  Pistachios  Pork  Pumpkin seeds  Tofu  [place]  Tuna  [place]  [place] ([facility])  Walnuts  Vegetables  Broccoli  Carrots  Cauliflower  Green beans  Mushrooms  Peppers  Salad greens  Spinach  Tomatoes  Work with your doctor to find out how much of this nutrient you need. Depending on your health, you may need more or less of it in your diet. Where can you learn more? Go to http://www.woods.com/ and enter C470 to learn more about \"Learning About Low-Carbohydrate Foods. \"  Current as of: May 9, 2022               Content Version: 13.6  © 2006-2023 Healthwise, Incorporated. Care instructions adapted under license by [facility] ([facility]). If you have questions about a medical condition or this instruction, always ask your healthcare professional. [address] any warranty or liability for your use of this information.

## 2023-06-28 DIAGNOSIS — Z86.73 HISTORY OF TRANSIENT ISCHEMIC ATTACK (TIA): ICD-10-CM

## 2023-06-28 DIAGNOSIS — G45.9 TIA (TRANSIENT ISCHEMIC ATTACK): ICD-10-CM

## 2023-06-29 RX ORDER — WARFARIN SODIUM 1 MG/1
0.5 TABLET ORAL
Qty: 30 TABLET | Refills: 3 | Status: SHIPPED | OUTPATIENT
Start: 2023-06-29

## 2023-06-29 RX ORDER — WARFARIN SODIUM 4 MG/1
TABLET ORAL
Qty: 46 TABLET | Refills: 0 | Status: SHIPPED | OUTPATIENT
Start: 2023-06-29 | End: 2023-07-29

## 2023-07-06 ENCOUNTER — NURSE ONLY (OUTPATIENT)
Dept: PRIMARY CARE CLINIC | Age: 63
End: 2023-07-06
Payer: MEDICAID

## 2023-07-06 VITALS
BODY MASS INDEX: 31.39 KG/M2 | HEART RATE: 83 BPM | SYSTOLIC BLOOD PRESSURE: 112 MMHG | DIASTOLIC BLOOD PRESSURE: 72 MMHG | OXYGEN SATURATION: 95 % | WEIGHT: 180 LBS

## 2023-07-06 DIAGNOSIS — Z86.73 HISTORY OF TRANSIENT ISCHEMIC ATTACK (TIA): Primary | ICD-10-CM

## 2023-07-06 LAB — INTERNATIONAL NORMALIZATION RATIO, POC: 2.7

## 2023-07-06 PROCEDURE — 85610 PROTHROMBIN TIME: CPT | Performed by: FAMILY MEDICINE

## 2023-07-06 PROCEDURE — 93793 ANTICOAG MGMT PT WARFARIN: CPT | Performed by: FAMILY MEDICINE

## 2023-07-06 NOTE — PROGRESS NOTES
Patient here for INR. has not had signs of bleeding or excessive bruising. Current Warfarin dose:  8mg x 2 days, 6.5mg x 5 days             Previous INR results:    Lab Results   Component Value Date    INR 3.8 06/05/2023    INR 1.5 04/04/2023    INR 1.4 03/13/2023    PROTIME 17.0 (H) 11/18/2022    PROTIME 13.1 09/20/2021    PROTIME 23.2 (H) 04/19/2021       Vitals:    07/06/23 1413   BP: 112/72   Pulse: 83   SpO2: 95%       Today's INR:  2.7   Reviewed    Case discussed with Dr. Kenya Alvarenga. Patient advised to continue same dose    Repeat next Protime 4  week(s).

## 2023-07-13 DIAGNOSIS — M51.9 LUMBAR DISC DISEASE: ICD-10-CM

## 2023-07-13 DIAGNOSIS — M48.061 SPINAL STENOSIS OF LUMBAR REGION WITHOUT NEUROGENIC CLAUDICATION: ICD-10-CM

## 2023-07-13 RX ORDER — HYDROCODONE BITARTRATE AND ACETAMINOPHEN 7.5; 325 MG/1; MG/1
1.5 TABLET ORAL 4 TIMES DAILY
Qty: 180 TABLET | Refills: 0 | Status: SHIPPED | OUTPATIENT
Start: 2023-07-13 | End: 2023-08-12

## 2023-07-18 DIAGNOSIS — M51.9 LUMBAR DISC DISEASE: ICD-10-CM

## 2023-07-18 DIAGNOSIS — M48.061 SPINAL STENOSIS OF LUMBAR REGION WITHOUT NEUROGENIC CLAUDICATION: ICD-10-CM

## 2023-07-18 RX ORDER — FLUTICASONE PROPIONATE AND SALMETEROL 500; 50 UG/1; UG/1
1 POWDER RESPIRATORY (INHALATION) EVERY 12 HOURS
Qty: 60 EACH | Refills: 8 | Status: SHIPPED | OUTPATIENT
Start: 2023-07-18

## 2023-07-18 RX ORDER — PREGABALIN 100 MG/1
CAPSULE ORAL
Qty: 90 CAPSULE | Refills: 2 | Status: SHIPPED | OUTPATIENT
Start: 2023-07-18 | End: 2023-08-17

## 2023-07-18 NOTE — TELEPHONE ENCOUNTER
LAST VISIT:   6/16/2023     Future Appointments   Date Time Provider [address]   7/31/2023  1:30 PM Lennie Pina [facility]   9/6/2023  1:40 PM [facility]   10/27/2023  9:00 AM NGOZI Mccall

## 2023-07-26 ENCOUNTER — OFFICE VISIT (OUTPATIENT)
Dept: PRIMARY CARE CLINIC | Age: 63
End: 2023-07-26
Payer: MEDICAID

## 2023-07-26 VITALS
WEIGHT: 181.6 LBS | DIASTOLIC BLOOD PRESSURE: 70 MMHG | HEIGHT: 63 IN | HEART RATE: 77 BPM | SYSTOLIC BLOOD PRESSURE: 110 MMHG | BODY MASS INDEX: 32.18 KG/M2 | OXYGEN SATURATION: 93 %

## 2023-07-26 DIAGNOSIS — R10.10 PAIN OF UPPER ABDOMEN: ICD-10-CM

## 2023-07-26 DIAGNOSIS — R10.9 FLANK PAIN: Primary | ICD-10-CM

## 2023-07-26 DIAGNOSIS — Z86.010 HISTORY OF COLON POLYPS: ICD-10-CM

## 2023-07-26 LAB
BILIRUBIN, POC: NEGATIVE
BLOOD URINE, POC: NEGATIVE
CLARITY, POC: NORMAL
COLOR, POC: NORMAL
GLUCOSE URINE, POC: NEGATIVE
KETONES, POC: NEGATIVE
LEUKOCYTE EST, POC: NORMAL
NITRITE, POC: NEGATIVE
PH, POC: 7.5
PROTEIN, POC: NEGATIVE
SPECIFIC GRAVITY, POC: 1.01
UROBILINOGEN, POC: NORMAL

## 2023-07-26 PROCEDURE — 81003 URINALYSIS AUTO W/O SCOPE: CPT | Performed by: PHYSICIAN ASSISTANT

## 2023-07-26 PROCEDURE — G8427 DOCREV CUR MEDS BY ELIG CLIN: HCPCS | Performed by: PHYSICIAN ASSISTANT

## 2023-07-26 PROCEDURE — 4004F PT TOBACCO SCREEN RCVD TLK: CPT | Performed by: PHYSICIAN ASSISTANT

## 2023-07-26 PROCEDURE — 99214 OFFICE O/P EST MOD 30 MIN: CPT | Performed by: PHYSICIAN ASSISTANT

## 2023-07-26 PROCEDURE — G8417 CALC BMI ABV UP PARAM F/U: HCPCS | Performed by: PHYSICIAN ASSISTANT

## 2023-07-26 PROCEDURE — 3017F COLORECTAL CA SCREEN DOC REV: CPT | Performed by: PHYSICIAN ASSISTANT

## 2023-07-26 ASSESSMENT — ENCOUNTER SYMPTOMS
DIARRHEA: 0
SHORTNESS OF BREATH: 0
VOMITING: 0
ABDOMINAL PAIN: 1
CONSTIPATION: 0
NAUSEA: 0
BLOOD IN STOOL: 0
COUGH: 0

## 2023-07-26 NOTE — PROGRESS NOTES
[facility]  [address] [address]  Phone: [phone]  Fax: [phone]    April Sarahy Mcclellan is a 61 y.o. female who presents today for her medical conditions/complaintsas noted below. Chief Complaint   Patient presents with    Abdominal Pain     Rt side lower pain Been going on for 1 month , Pt thought she pulled a muscle. Pain the same but sometimes more sharp pain. HPI:     Abdominal Pain  Pertinent negatives include no constipation, diarrhea, fever, nausea or vomiting. Abdominal Pain started 1 month ago. Was at a concert and thought she pulled a muscle. Hurts in flank and RUQ. Hurts to move, no rash, no fevers. No change in bowels or bladder. Current Outpatient Medications   Medication Sig Dispense Refill    fluticasone-salmeterol (ADVAIR DISKUS) 500-50 MCG/ACT AEPB diskus inhaler Inhale 1 puff into the lungs in the morning and 1 puff in the evening. 60 each 8    pregabalin (LYRICA) 100 MG capsule TAKE ONE CAPSULE BY MOUTH THREE TIMES DAILY (MORNING NOON AND BEDTIME) 90 capsule 2    HYDROcodone-acetaminophen (NORCO) 7.5-325 MG per tablet Take 1.5 tablets by mouth 4 times daily for 30 days. 180 tablet 0    warfarin (COUMADIN) 4 MG tablet Take 2 tablets by mouth Twice a Week AND 1.5 tablets Five times weekly. And 0.5 mg 5 times a week with the 6 mg to equal 6.5 mg 5 x a week. . 46 tablet 0    warfarin (COUMADIN) 1 MG tablet Take 0.5 tablets by mouth Five times weekly With the 6 mg to equal 6.5 mg 5 times a week.  30 tablet 3    DULoxetine (CYMBALTA) 60 MG extended release capsule Take 1 capsule by mouth daily 30 capsule 5    omeprazole (PRILOSEC) 20 MG delayed release capsule Take 1 capsule by mouth daily 30 capsule 5    busPIRone (BUSPAR) 15 MG tablet TAKE ONE TABLET BY MOUTH TWICE A DAY 60 tablet 3    divalproex (DEPAKOTE) 250 MG DR tablet TAKE ONE TABLET BY MOUTH TWICE A DAY 60 tablet 5    COMBIVENT RESPIMAT  MCG/ACT AERS inhaler INHALE ONE

## 2023-07-31 ENCOUNTER — HOSPITAL ENCOUNTER (OUTPATIENT)
Age: 63
Discharge: HOME | End: 2023-07-31
Payer: MEDICAID

## 2023-07-31 DIAGNOSIS — M19.90 ARTHRITIS: ICD-10-CM

## 2023-07-31 DIAGNOSIS — F41.9 ANXIETY: ICD-10-CM

## 2023-07-31 DIAGNOSIS — M51.9 LUMBAR DISC DISEASE: ICD-10-CM

## 2023-07-31 LAB
ALBUMIN SERPL-MCNC: 3.8 G/DL (ref 3.5–5.2)
ALP SERPL-CCNC: 91 U/L (ref 35–104)
ALT SERPL-CCNC: 9 U/L (ref 5–33)
ANION GAP SERPL CALCULATED.3IONS-SCNC: 11 MMOL/L (ref 9–17)
AST SERPL-CCNC: 14 U/L
BASOPHILS # BLD: 0 K/UL (ref 0–0.2)
BASOPHILS NFR BLD: 0 % (ref 0–2)
BILIRUB SERPL-MCNC: 0.4 MG/DL (ref 0.3–1.2)
BUN SERPL-MCNC: 14 MG/DL (ref 8–23)
CALCIUM SERPL-MCNC: 9.4 MG/DL (ref 8.6–10.4)
CHLORIDE SERPL-SCNC: 103 MMOL/L (ref 98–107)
CO2 SERPL-SCNC: 25 MMOL/L (ref 20–31)
CREAT SERPL-MCNC: 1 MG/DL (ref 0.5–0.9)
EOSINOPHIL # BLD: 0.1 K/UL (ref 0–0.4)
EOSINOPHILS RELATIVE PERCENT: 1 % (ref 0–4)
ERYTHROCYTE [DISTWIDTH] IN BLOOD BY AUTOMATED COUNT: 14.9 % (ref 11.5–14.9)
GFR SERPL CREATININE-BSD FRML MDRD: >60 ML/MIN/1.73M2
GLUCOSE SERPL-MCNC: 96 MG/DL (ref 70–99)
HCT VFR BLD AUTO: 41.3 % (ref 36–46)
HGB BLD-MCNC: 13.8 G/DL (ref 12–16)
LYMPHOCYTES NFR BLD: 3.1 K/UL (ref 1–4.8)
LYMPHOCYTES RELATIVE PERCENT: 39 % (ref 24–44)
MCH RBC QN AUTO: 29.9 PG (ref 26–34)
MCHC RBC AUTO-ENTMCNC: 33.4 G/DL (ref 31–37)
MCV RBC AUTO: 89.3 FL (ref 80–100)
MONOCYTES NFR BLD: 0.6 K/UL (ref 0.1–1.3)
MONOCYTES NFR BLD: 8 % (ref 1–7)
NEUTROPHILS NFR BLD: 52 % (ref 36–66)
NEUTS SEG NFR BLD: 4.3 K/UL (ref 1.3–9.1)
PLATELET # BLD AUTO: 254 K/UL (ref 150–450)
PMV BLD AUTO: 7.6 FL (ref 6–12)
POTASSIUM SERPL-SCNC: 4.6 MMOL/L (ref 3.7–5.3)
PROT SERPL-MCNC: 6.6 G/DL (ref 6.4–8.3)
RBC # BLD AUTO: 4.63 M/UL (ref 4–5.2)
SODIUM SERPL-SCNC: 139 MMOL/L (ref 135–144)
WBC OTHER # BLD: 8.2 K/UL (ref 3.5–11)

## 2023-07-31 PROCEDURE — 85025 COMPLETE CBC W/AUTO DIFF WBC: CPT

## 2023-07-31 PROCEDURE — 36415 COLL VENOUS BLD VENIPUNCTURE: CPT

## 2023-07-31 PROCEDURE — 80053 COMPREHEN METABOLIC PANEL: CPT

## 2023-08-07 ENCOUNTER — TELEPHONE (OUTPATIENT)
Dept: PRIMARY CARE CLINIC | Age: 63
End: 2023-08-07

## 2023-08-07 DIAGNOSIS — G89.29 CHRONIC BILATERAL THORACIC BACK PAIN: ICD-10-CM

## 2023-08-07 DIAGNOSIS — M48.061 SPINAL STENOSIS OF LUMBAR REGION WITHOUT NEUROGENIC CLAUDICATION: Primary | ICD-10-CM

## 2023-08-07 DIAGNOSIS — M51.9 LUMBAR DISC DISEASE: ICD-10-CM

## 2023-08-07 DIAGNOSIS — M54.6 CHRONIC BILATERAL THORACIC BACK PAIN: ICD-10-CM

## 2023-08-07 RX ORDER — GABAPENTIN 300 MG/1
300 CAPSULE ORAL 3 TIMES DAILY
Qty: 90 CAPSULE | Refills: 0 | Status: SHIPPED | OUTPATIENT
Start: 2023-08-07 | End: 2023-08-08

## 2023-08-07 NOTE — TELEPHONE ENCOUNTER
I can try switching her to gabapentin, there is a risk for more fatigue on gabapentin. It is very similar to pregabalin so I am not sure if it will work better. I cannot give her any other pain pills than the one she is on. When and where  was her surgery ? I have no records in her chart  I could give her some short term steroids for pain but it depends on when she had surgery. Has she talked to the surgeon about the pain ?

## 2023-08-07 NOTE — TELEPHONE ENCOUNTER
Pt called stating she had back surgery done which did not help, she states she is still miserable in pain, Currently sees PM but has to find a new one due to no longer accepting insurance. Pt states the norco is not helping, states she also was talking to a friend and they mentioned gabapentin for their pain and stated it worked great, pt is asking if she could try gabapentin instead of pregabalin & asking for something else instead of the norco.     Please advise     [facility] GIOVANNI [place]

## 2023-08-08 RX ORDER — PREDNISONE 20 MG/1
TABLET ORAL
Qty: 15 TABLET | Refills: 0 | Status: SHIPPED | OUTPATIENT
Start: 2023-08-08 | End: 2023-08-18

## 2023-08-08 RX ORDER — PREGABALIN 200 MG/1
200 CAPSULE ORAL 2 TIMES DAILY
Qty: 60 CAPSULE | Refills: 0 | Status: SHIPPED | OUTPATIENT
Start: 2023-08-08 | End: 2023-09-07

## 2023-08-08 NOTE — TELEPHONE ENCOUNTER
Surgery was 6 weeks ago, ablation on back, both sides of spinal cord. 8 nerves. Comprehensive pain management on [place]. April will call them and  office notes for our office. Patient would like to try short term steroids if appropriate. She does not want to try gabapentin. Can you increase dosage of pregabalin? [facility].

## 2023-08-09 DIAGNOSIS — Z86.73 HISTORY OF TRANSIENT ISCHEMIC ATTACK (TIA): ICD-10-CM

## 2023-08-09 DIAGNOSIS — G45.9 TIA (TRANSIENT ISCHEMIC ATTACK): ICD-10-CM

## 2023-08-09 DIAGNOSIS — R11.0 NAUSEA: ICD-10-CM

## 2023-08-09 DIAGNOSIS — U07.1 COVID: ICD-10-CM

## 2023-08-09 RX ORDER — WARFARIN SODIUM 1 MG/1
0.5 TABLET ORAL
Qty: 30 TABLET | Refills: 3 | Status: SHIPPED | OUTPATIENT
Start: 2023-08-09

## 2023-08-09 RX ORDER — ONDANSETRON 4 MG/1
TABLET, FILM COATED ORAL
Qty: 30 TABLET | Refills: 3 | Status: SHIPPED | OUTPATIENT
Start: 2023-08-09

## 2023-08-09 RX ORDER — WARFARIN SODIUM 4 MG/1
TABLET ORAL
Qty: 46 TABLET | Refills: 0 | Status: SHIPPED | OUTPATIENT
Start: 2023-08-09 | End: 2023-09-08

## 2023-08-09 NOTE — TELEPHONE ENCOUNTER
LAST VISIT:   7/26/2023     Future Appointments   Date Time Provider [address]   8/22/2023  2:15 PM STC CT RM 1 [facility] CT SCAN [facility] Radiolog   8/22/2023  2:45 PM [facility] RESP THERAPY  [facility] PFT [facility]   9/6/2023  1:40 PM MD MACARIO De Oliveira [facility]   10/27/2023  9:00 AM NGOZI De Guzman

## 2023-08-14 DIAGNOSIS — M48.061 SPINAL STENOSIS OF LUMBAR REGION WITHOUT NEUROGENIC CLAUDICATION: ICD-10-CM

## 2023-08-14 DIAGNOSIS — M51.9 LUMBAR DISC DISEASE: ICD-10-CM

## 2023-08-14 RX ORDER — HYDROCODONE BITARTRATE AND ACETAMINOPHEN 7.5; 325 MG/1; MG/1
1.5 TABLET ORAL 4 TIMES DAILY
Qty: 180 TABLET | Refills: 0 | Status: SHIPPED | OUTPATIENT
Start: 2023-08-14 | End: 2023-09-13

## 2023-08-16 DIAGNOSIS — J44.9 CHRONIC OBSTRUCTIVE PULMONARY DISEASE, UNSPECIFIED COPD TYPE (HCC): ICD-10-CM

## 2023-08-16 RX ORDER — BUDESONIDE AND FORMOTEROL FUMARATE DIHYDRATE 160; 4.5 UG/1; UG/1
AEROSOL RESPIRATORY (INHALATION)
Qty: 1 EACH | Refills: 10 | Status: SHIPPED | OUTPATIENT
Start: 2023-08-16

## 2023-08-16 NOTE — TELEPHONE ENCOUNTER
LAST VISIT:   7/26/2023     Future Appointments   Date Time Provider [address]   8/22/2023  2:15 PM STC CT RM 1 STCZ CT SCAN [facility] Radiolog   8/22/2023  2:45 PM STC RESP THERAPY  [facility] PFT [facility]   9/6/2023  1:40 PM MD MACARIO Mcmullen [facility]   10/27/2023  9:00 AM NGOZI Dumont

## 2023-08-22 ENCOUNTER — HOSPITAL ENCOUNTER (OUTPATIENT)
Dept: CT IMAGING | Age: 63
Discharge: HOME | End: 2023-08-24
Payer: MEDICAID

## 2023-08-22 ENCOUNTER — HOSPITAL ENCOUNTER (OUTPATIENT)
Dept: PULMONOLOGY | Age: 63
Discharge: HOME | End: 2023-08-22
Payer: MEDICAID

## 2023-08-22 DIAGNOSIS — J43.9 PULMONARY EMPHYSEMA, UNSPECIFIED EMPHYSEMA TYPE (HCC): ICD-10-CM

## 2023-08-22 DIAGNOSIS — J44.9 CHRONIC OBSTRUCTIVE PULMONARY DISEASE, UNSPECIFIED COPD TYPE (HCC): ICD-10-CM

## 2023-08-22 DIAGNOSIS — R10.10 PAIN OF UPPER ABDOMEN: ICD-10-CM

## 2023-08-22 DIAGNOSIS — R10.9 FLANK PAIN: ICD-10-CM

## 2023-08-22 LAB
DLCO %PRED: NORMAL
DLCO PRED: NORMAL
DLCO/VA %PRED: NORMAL
DLCO/VA PRED: NORMAL
DLCO/VA: NORMAL
DLCO: NORMAL
EXPIRATORY TIME: NORMAL
FEF 25-75% %PRED-PRE: NORMAL
FEF 25-75% PRED: NORMAL
FEF 25-75%-PRE: NORMAL
FEV1 %PRED-PRE: NORMAL
FEV1 PRED: NORMAL
FEV1/FVC %PRED-PRE: NORMAL
FEV1/FVC PRED: NORMAL
FEV1/FVC: NORMAL
FEV1: NORMAL
FVC %PRED-PRE: NORMAL
FVC PRED: NORMAL
FVC: NORMAL
GAW %PRED: NORMAL
GAW PRED: NORMAL
GAW: NORMAL
IC %PRED: NORMAL
IC PRED: NORMAL
IC: NORMAL
MVV %PRED-PRE: NORMAL
MVV PRED: NORMAL
MVV-PRE: NORMAL
PEF %PRED-PRE: NORMAL
PEF PRED: NORMAL
PEF-PRE: NORMAL
RAW %PRED: NORMAL
RAW PRED: NORMAL
RAW: NORMAL
RV %PRED: NORMAL
RV PRED: NORMAL
RV: NORMAL
SVC %PRED: NORMAL
SVC PRED: NORMAL
SVC: NORMAL
TLC %PRED: NORMAL
TLC PRED: NORMAL
TLC: NORMAL
VA %PRED: NORMAL
VA PRED: NORMAL
VA: NORMAL
VTG %PRED: NORMAL
VTG PRED: NORMAL
VTG: NORMAL

## 2023-08-22 PROCEDURE — 94060 EVALUATION OF WHEEZING: CPT

## 2023-08-22 PROCEDURE — 94664 DEMO&/EVAL PT USE INHALER: CPT

## 2023-08-22 PROCEDURE — 94375 RESPIRATORY FLOW VOLUME LOOP: CPT

## 2023-08-22 PROCEDURE — 74177 CT ABD & PELVIS W/CONTRAST: CPT

## 2023-08-22 PROCEDURE — 2580000003 HC RX 258: Performed by: PHYSICIAN ASSISTANT

## 2023-08-22 PROCEDURE — 94729 DIFFUSING CAPACITY: CPT

## 2023-08-22 PROCEDURE — 6370000000 HC RX 637 (ALT 250 FOR IP): Performed by: FAMILY MEDICINE

## 2023-08-22 PROCEDURE — 94726 PLETHYSMOGRAPHY LUNG VOLUMES: CPT

## 2023-08-22 PROCEDURE — 6360000004 HC RX CONTRAST MEDICATION: Performed by: PHYSICIAN ASSISTANT

## 2023-08-22 RX ORDER — ALBUTEROL SULFATE 90 UG/1
2 AEROSOL, METERED RESPIRATORY (INHALATION) ONCE
Status: COMPLETED | OUTPATIENT
Start: 2023-08-22 | End: 2023-08-22

## 2023-08-22 RX ORDER — SODIUM CHLORIDE 0.9 % (FLUSH) 0.9 %
10 SYRINGE (ML) INJECTION PRN
Status: DISCONTINUED | OUTPATIENT
Start: 2023-08-22 | End: 2023-08-25 | Stop reason: HOSPADM

## 2023-08-22 RX ORDER — 0.9 % SODIUM CHLORIDE 0.9 %
100 INTRAVENOUS SOLUTION INTRAVENOUS ONCE
Status: COMPLETED | OUTPATIENT
Start: 2023-08-22 | End: 2023-08-22

## 2023-08-22 RX ADMIN — ALBUTEROL SULFATE 2 PUFF: 90 AEROSOL, METERED RESPIRATORY (INHALATION) at 14:43

## 2023-08-22 RX ADMIN — IOPAMIDOL 75 ML: 755 INJECTION, SOLUTION INTRAVENOUS at 14:20

## 2023-08-22 RX ADMIN — SODIUM CHLORIDE, PRESERVATIVE FREE 10 ML: 5 INJECTION INTRAVENOUS at 14:21

## 2023-08-22 RX ADMIN — SODIUM CHLORIDE 100 ML: 9 INJECTION, SOLUTION INTRAVENOUS at 14:21

## 2023-08-22 NOTE — PROCEDURES
Pulmonary function report    Flow-volume showed adequate effort and tracings. The spirometry suggests a mild restrictive defect with no response to bronchodilator therapy    The mild restrictive defect is not supported by the static lung volumes. The total lung capacity actually implies mild hyperinflation and the RV implies severe air trapping. The DLCO is normal    Airways resistance is normal.    In summary, the patient has evidence of an obstructive pattern which can be seen in multiple conditions such as reactive airway disease, COPD, bronchiectasis. Clinical correlation recommended.     Ehsan Carias MD

## 2023-08-28 ENCOUNTER — HOSPITAL ENCOUNTER (OUTPATIENT)
Dept: GENERAL RADIOLOGY | Age: 63
Discharge: HOME | End: 2023-08-30
Payer: MEDICAID

## 2023-08-28 ENCOUNTER — HOSPITAL ENCOUNTER (OUTPATIENT)
Age: 63
Discharge: HOME | End: 2023-08-30
Payer: MEDICAID

## 2023-08-28 DIAGNOSIS — Z86.73 HISTORY OF TRANSIENT ISCHEMIC ATTACK (TIA): ICD-10-CM

## 2023-08-28 DIAGNOSIS — G45.9 TIA (TRANSIENT ISCHEMIC ATTACK): ICD-10-CM

## 2023-08-28 DIAGNOSIS — M47.816 LUMBAR SPONDYLOSIS: ICD-10-CM

## 2023-08-28 DIAGNOSIS — M48.062 LUMBAR STENOSIS WITH NEUROGENIC CLAUDICATION: ICD-10-CM

## 2023-08-28 PROCEDURE — 72100 X-RAY EXAM L-S SPINE 2/3 VWS: CPT

## 2023-08-28 RX ORDER — WARFARIN SODIUM 4 MG/1
TABLET ORAL
Qty: 46 TABLET | Refills: 10 | Status: SHIPPED | OUTPATIENT
Start: 2023-08-28

## 2023-08-28 NOTE — TELEPHONE ENCOUNTER
LAST VISIT:   7/26/2023     Future Appointments   Date Time Provider [address]   9/6/2023  1:40 PM MD MACARIO Grant [facility]   10/27/2023  9:00 AM NGOZI Dow

## 2023-08-30 ENCOUNTER — HOSPITAL ENCOUNTER (OUTPATIENT)
Dept: MAMMOGRAPHY | Age: 63
Discharge: HOME | End: 2023-09-01
Payer: MEDICAID

## 2023-08-30 DIAGNOSIS — Z12.31 BREAST CANCER SCREENING BY MAMMOGRAM: ICD-10-CM

## 2023-08-30 PROCEDURE — 77063 BREAST TOMOSYNTHESIS BI: CPT

## 2023-09-01 ENCOUNTER — HOSPITAL ENCOUNTER (OUTPATIENT)
Dept: MRI IMAGING | Age: 63
End: 2023-09-01
Payer: MEDICAID

## 2023-09-01 DIAGNOSIS — M81.6 LOCALIZED OSTEOPOROSIS OF SPINE: ICD-10-CM

## 2023-09-01 DIAGNOSIS — M48.062 SPINAL STENOSIS, LUMBAR REGION WITH NEUROGENIC CLAUDICATION: ICD-10-CM

## 2023-09-01 PROCEDURE — 72148 MRI LUMBAR SPINE W/O DYE: CPT

## 2023-09-04 DIAGNOSIS — M48.061 SPINAL STENOSIS OF LUMBAR REGION WITHOUT NEUROGENIC CLAUDICATION: ICD-10-CM

## 2023-09-04 DIAGNOSIS — M51.9 LUMBAR DISC DISEASE: ICD-10-CM

## 2023-09-05 DIAGNOSIS — M51.9 LUMBAR DISC DISEASE: ICD-10-CM

## 2023-09-05 DIAGNOSIS — M48.061 SPINAL STENOSIS OF LUMBAR REGION WITHOUT NEUROGENIC CLAUDICATION: ICD-10-CM

## 2023-09-05 RX ORDER — PREGABALIN 200 MG/1
200 CAPSULE ORAL 2 TIMES DAILY
Qty: 60 CAPSULE | Refills: 4 | OUTPATIENT
Start: 2023-09-05 | End: 2023-10-05

## 2023-09-05 RX ORDER — PREGABALIN 200 MG/1
200 CAPSULE ORAL 2 TIMES DAILY
Qty: 60 CAPSULE | OUTPATIENT
Start: 2023-09-05 | End: 2023-10-05

## 2023-09-06 ENCOUNTER — OFFICE VISIT (OUTPATIENT)
Dept: PRIMARY CARE CLINIC | Age: 63
End: 2023-09-06
Payer: MEDICAID

## 2023-09-06 VITALS
HEIGHT: 63 IN | SYSTOLIC BLOOD PRESSURE: 110 MMHG | WEIGHT: 182.6 LBS | HEART RATE: 104 BPM | OXYGEN SATURATION: 97 % | DIASTOLIC BLOOD PRESSURE: 80 MMHG | BODY MASS INDEX: 32.36 KG/M2

## 2023-09-06 DIAGNOSIS — F39 MOOD DISORDER (HCC): ICD-10-CM

## 2023-09-06 DIAGNOSIS — J45.20 MILD INTERMITTENT ASTHMA WITHOUT COMPLICATION: ICD-10-CM

## 2023-09-06 DIAGNOSIS — J44.9 CHRONIC OBSTRUCTIVE PULMONARY DISEASE, UNSPECIFIED COPD TYPE (HCC): ICD-10-CM

## 2023-09-06 DIAGNOSIS — M51.9 LUMBAR DISC DISEASE: Primary | ICD-10-CM

## 2023-09-06 DIAGNOSIS — R11.0 NAUSEA IN ADULT: ICD-10-CM

## 2023-09-06 DIAGNOSIS — Z79.891 CHRONIC USE OF OPIATE DRUG FOR THERAPEUTIC PURPOSE: ICD-10-CM

## 2023-09-06 LAB
ALCOHOL URINE: NORMAL
AMPHETAMINE SCREEN, URINE: NEGATIVE
BARBITURATE SCREEN, URINE: NEGATIVE
BENZODIAZEPINE SCREEN, URINE: NEGATIVE
BUPRENORPHINE URINE: NEGATIVE
COCAINE METABOLITE SCREEN URINE: NEGATIVE
FENTANYL SCREEN, URINE: NORMAL
GABAPENTIN SCREEN, URINE: NORMAL
INTERNATIONAL NORMALIZATION RATIO, POC: 3.4
MDMA URINE: NEGATIVE
METHADONE SCREEN, URINE: NEGATIVE
METHAMPHETAMINE, URINE: NEGATIVE
OPIATE SCREEN URINE: NEGATIVE
OXYCODONE SCREEN URINE: NEGATIVE
PHENCYCLIDINE SCREEN URINE: NEGATIVE
PROPOXYPHENE SCREEN, URINE: NEGATIVE
SYNTHETIC CANNABINOIDS(K2) SCREEN, URINE: NORMAL
THC SCREEN, URINE: NEGATIVE
TRAMADOL SCREEN URINE: NORMAL
TRICYCLIC ANTIDEPRESSANTS, UR: NEGATIVE

## 2023-09-06 PROCEDURE — 3023F SPIROM DOC REV: CPT | Performed by: FAMILY MEDICINE

## 2023-09-06 PROCEDURE — 4004F PT TOBACCO SCREEN RCVD TLK: CPT | Performed by: FAMILY MEDICINE

## 2023-09-06 PROCEDURE — 3017F COLORECTAL CA SCREEN DOC REV: CPT | Performed by: FAMILY MEDICINE

## 2023-09-06 PROCEDURE — G8417 CALC BMI ABV UP PARAM F/U: HCPCS | Performed by: FAMILY MEDICINE

## 2023-09-06 PROCEDURE — G8427 DOCREV CUR MEDS BY ELIG CLIN: HCPCS | Performed by: FAMILY MEDICINE

## 2023-09-06 PROCEDURE — 80305 DRUG TEST PRSMV DIR OPT OBS: CPT | Performed by: FAMILY MEDICINE

## 2023-09-06 PROCEDURE — 99214 OFFICE O/P EST MOD 30 MIN: CPT | Performed by: FAMILY MEDICINE

## 2023-09-06 PROCEDURE — 85610 PROTHROMBIN TIME: CPT | Performed by: FAMILY MEDICINE

## 2023-09-06 RX ORDER — PROCHLORPERAZINE MALEATE 5 MG/1
5 TABLET ORAL EVERY 6 HOURS PRN
Qty: 60 TABLET | Refills: 1 | Status: SHIPPED | OUTPATIENT
Start: 2023-09-06

## 2023-09-06 ASSESSMENT — ENCOUNTER SYMPTOMS
RESPIRATORY NEGATIVE: 1
BACK PAIN: 1

## 2023-09-06 NOTE — PROGRESS NOTES
[id number] PRIMARY CARE  [address] [address]  Dept: [phone]    April Reggie Chi is a 61 y.o. female Established patient, who presents today for her medical conditions/complaintsas noted below. Chief Complaint   Patient presents with    Depression     Pt is here for a f/u. Coagulation Disorder     Pt needs it checked,       HPI:     HPI  3.4 INR today  Monday night she took 4 of the 1 mg tablet  Tuesday pm took 8 mg with splitting pill  Reviewed prior notes None  Reviewed previous Labs and Imaging  Reviewed PFT    Had covid 3 weeks ago. Nausea pills not helping  Nausea issues x years. Can't eat breakfast.    Pain pills : she takes 1.5 tabs 3-4 x a day. She usually only needs them on days she is working. She is running out of pills already. 2 friends are staying with her and pay some rent. Discussed pill safety for opiates. She does have a lock box.      LDL Calculated (mg/dL)   Date Value   10/21/2019 120   2018 131       (goal LDL is <100)   AST (U/L)   Date Value   2023 14     ALT (U/L)   Date Value   2023 9     BUN (mg/dL)   Date Value   2023 14     TSH (uIU/mL)   Date Value   2022 2.11     BP Readings from Last 3 Encounters:   23 110/80   23 110/70   23 112/72          (goal 120/80)    Past Medical History:   Diagnosis Date    Acid reflux     Arthritis     neck, back, knees    Asthma     Asthma     COPD (chronic obstructive pulmonary disease) ([address]) 10/20/2015    Emphysema     Headache(784.0)     Heart abnormality     minute hole in heart per pt found in  after TIA    Osteoarthritis     Osteoporosis     Seborrheic keratoses 2019    TIA (transient ischemic attack)       Past Surgical History:   Procedure Laterality Date    ACROMIOPLASTY Left 2016    SHOULDER ARTHROCOPY LABRAL DEBRIDEMENT      SECTION, LOW TRANSVERSE      COLONOSCOPY      DILATION AND CURETTAGE OF

## 2023-09-11 DIAGNOSIS — M51.9 LUMBAR DISC DISEASE: ICD-10-CM

## 2023-09-11 DIAGNOSIS — M48.061 SPINAL STENOSIS OF LUMBAR REGION WITHOUT NEUROGENIC CLAUDICATION: ICD-10-CM

## 2023-09-11 RX ORDER — PREGABALIN 200 MG/1
200 CAPSULE ORAL 2 TIMES DAILY
Qty: 60 CAPSULE | Refills: 0 | Status: SHIPPED | OUTPATIENT
Start: 2023-09-11 | End: 2023-10-11

## 2023-09-11 RX ORDER — HYDROCODONE BITARTRATE AND ACETAMINOPHEN 7.5; 325 MG/1; MG/1
1.5 TABLET ORAL 4 TIMES DAILY
Qty: 180 TABLET | Refills: 0 | Status: SHIPPED | OUTPATIENT
Start: 2023-09-11 | End: 2023-10-11

## 2023-09-22 DIAGNOSIS — R11.0 NAUSEA: ICD-10-CM

## 2023-09-22 DIAGNOSIS — U07.1 COVID: ICD-10-CM

## 2023-09-22 RX ORDER — ONDANSETRON 4 MG/1
TABLET, FILM COATED ORAL
Qty: 30 TABLET | Refills: 3 | Status: SHIPPED | OUTPATIENT
Start: 2023-09-22

## 2023-09-22 NOTE — TELEPHONE ENCOUNTER
LAST VISIT:   9/6/2023     Future Appointments   Date Time Provider [address]   10/19/2023  3:00 PM Milenabailee Leyla [facility]   10/27/2023  9:00 AM [facility]I [facility]   12/7/2023 10:20 AM Miya Norwood  [address]

## 2023-09-26 NOTE — TELEPHONE ENCOUNTER
LAST VISIT:   9/6/2023     Future Appointments   Date Time Provider [address]   10/19/2023  3:00 PM [facility]   10/27/2023  9:00 AM NGOZI Dow [facility]   12/7/2023 10:20 AM MD MACARIO Grant

## 2023-09-27 RX ORDER — BUSPIRONE HYDROCHLORIDE 15 MG/1
TABLET ORAL
Qty: 60 TABLET | Refills: 10 | Status: SHIPPED | OUTPATIENT
Start: 2023-09-27

## 2023-10-04 DIAGNOSIS — M48.061 SPINAL STENOSIS OF LUMBAR REGION WITHOUT NEUROGENIC CLAUDICATION: ICD-10-CM

## 2023-10-04 DIAGNOSIS — M51.9 LUMBAR DISC DISEASE: ICD-10-CM

## 2023-10-04 RX ORDER — HYDROCODONE BITARTRATE AND ACETAMINOPHEN 7.5; 325 MG/1; MG/1
TABLET ORAL
Qty: 180 TABLET | Refills: 0 | Status: SHIPPED | OUTPATIENT
Start: 2023-10-10 | End: 2023-11-06

## 2023-10-11 ENCOUNTER — TELEPHONE (OUTPATIENT)
Dept: PRIMARY CARE CLINIC | Age: 63
End: 2023-10-11

## 2023-10-11 DIAGNOSIS — R11.0 NAUSEA: Primary | ICD-10-CM

## 2023-10-11 NOTE — TELEPHONE ENCOUNTER
----- Message from Vidal Doty sent at 10/11/2023  9:23 AM EDT -----  Subject: Medication Problem    Medication: prochlorperazine (COMPAZINE) 5 MG tablet  Dosage: Take 1 tablet by mouth every 6 hours as needed for Nausea  Ordering Provider: Trip Bryant    Question/Problem: Patient was taking ondansetron [facility] and it was not   working well for her, so Dr Fartun Murillo gave her prochlorperazine (COMPAZINE)   instead and patient states that this medication does not work to   reduce/relief of nausea symptoms. Patient would like something else to   try, please send to pharmacy. Thank you       Pharmacy: [place], [place] [facility] [address] [place] [address] [phone]    ---------------------------------------------------------------------------  --------------  Pretty ARITA  [phone];  Do not leave any message, patient will call back for answer  ---------------------------------------------------------------------------  --------------    SCRIPT ANSWERS  Relationship to Patient: Self

## 2023-10-12 RX ORDER — PROMETHAZINE HYDROCHLORIDE 25 MG/1
25 TABLET ORAL 3 TIMES DAILY PRN
Qty: 21 TABLET | Refills: 0 | Status: SHIPPED | OUTPATIENT
Start: 2023-10-12 | End: 2023-10-19

## 2023-10-18 RX ORDER — OMEPRAZOLE 20 MG/1
20 CAPSULE, DELAYED RELEASE ORAL DAILY
Qty: 30 CAPSULE | Refills: 10 | Status: SHIPPED | OUTPATIENT
Start: 2023-10-18

## 2023-10-18 NOTE — TELEPHONE ENCOUNTER
LAST VISIT:   9/6/2023     Future Appointments   Date Time Provider [address]   10/25/2023  3:30 PM Jose M Padron [facility]  [facility]   10/27/2023  9:00 AM NGOZI Colin [facility]   12/7/2023 10:20 AM MD MACARIO Higgins [facility]

## 2023-10-25 ENCOUNTER — NURSE ONLY (OUTPATIENT)
Dept: PRIMARY CARE CLINIC | Age: 63
End: 2023-10-25
Payer: MEDICAID

## 2023-10-25 VITALS — OXYGEN SATURATION: 96 % | DIASTOLIC BLOOD PRESSURE: 78 MMHG | HEART RATE: 102 BPM | SYSTOLIC BLOOD PRESSURE: 106 MMHG

## 2023-10-25 DIAGNOSIS — Z86.73 HISTORY OF TRANSIENT ISCHEMIC ATTACK (TIA): ICD-10-CM

## 2023-10-25 DIAGNOSIS — Z79.01 LONG TERM (CURRENT) USE OF ANTICOAGULANTS: ICD-10-CM

## 2023-10-25 DIAGNOSIS — Z23 IMMUNIZATION DUE: Primary | ICD-10-CM

## 2023-10-25 LAB — INTERNATIONAL NORMALIZATION RATIO, POC: 1.9

## 2023-10-25 PROCEDURE — 90471 IMMUNIZATION ADMIN: CPT | Performed by: FAMILY MEDICINE

## 2023-10-25 PROCEDURE — 93793 ANTICOAG MGMT PT WARFARIN: CPT | Performed by: FAMILY MEDICINE

## 2023-10-25 PROCEDURE — 90674 CCIIV4 VAC NO PRSV 0.5 ML IM: CPT | Performed by: FAMILY MEDICINE

## 2023-10-25 PROCEDURE — 85610 PROTHROMBIN TIME: CPT | Performed by: FAMILY MEDICINE

## 2023-10-25 NOTE — PROGRESS NOTES
After obtaining consent, and per orders of Dr. Antony Angelo, injection of influenza vaccination given in Left deltoid by Viktoriya Matthews MA. Patient instructed to remain in clinic for 20 minutes afterwards, and to report any adverse reaction to me immediately. Patient here for INR. has not had signs of bleeding or excessive bruising. Current Warfarin dose:  6.5 mg Monday thru Friday, 8 mg Saturday & Sunday     Previous INR results:    Lab Results   Component Value Date    INR 1.0 10/17/2023    INR 3.4 09/06/2023    INR 2.7 07/06/2023    PROTIME 9.6 10/17/2023    PROTIME 17.0 (H) 11/18/2022    PROTIME 13.1 09/20/2021       There were no vitals filed for this visit. Today's INR:  1.9  reviewed  Case discussed with Dr. Antony Angelo. Patient advised to continue same dose    Repeat next Protime 1  month(s).

## 2023-11-06 DIAGNOSIS — R11.0 NAUSEA: ICD-10-CM

## 2023-11-06 DIAGNOSIS — M48.061 SPINAL STENOSIS OF LUMBAR REGION WITHOUT NEUROGENIC CLAUDICATION: ICD-10-CM

## 2023-11-06 DIAGNOSIS — M51.9 LUMBAR DISC DISEASE: ICD-10-CM

## 2023-11-06 RX ORDER — PROMETHAZINE HYDROCHLORIDE 25 MG/1
25 TABLET ORAL 3 TIMES DAILY PRN
Qty: 21 TABLET | Refills: 0 | Status: SHIPPED | OUTPATIENT
Start: 2023-11-06 | End: 2023-11-13

## 2023-11-06 RX ORDER — HYDROCODONE BITARTRATE AND ACETAMINOPHEN 7.5; 325 MG/1; MG/1
TABLET ORAL
Qty: 180 TABLET | Refills: 0 | Status: SHIPPED | OUTPATIENT
Start: 2023-11-06 | End: 2023-12-06

## 2023-11-06 NOTE — TELEPHONE ENCOUNTER
LAST VISIT:   9/6/2023     Future Appointments   Date Time Provider [address]   12/7/2023 10:20 AM Cruz Villeda MD [facility] [address] [facility]

## 2023-11-11 DIAGNOSIS — F43.9 STRESS AT HOME: ICD-10-CM

## 2023-11-11 DIAGNOSIS — F41.9 ANXIETY: ICD-10-CM

## 2023-11-11 DIAGNOSIS — F34.1 DYSTHYMIA: ICD-10-CM

## 2023-11-11 DIAGNOSIS — F39 MOOD DISORDER (HCC): ICD-10-CM

## 2023-11-13 NOTE — TELEPHONE ENCOUNTER
LAST VISIT:   9/6/2023     Future Appointments   Date Time Provider [address]   12/7/2023 10:20 AM MD MACARIO Higgins

## 2023-11-14 RX ORDER — DIVALPROEX SODIUM 250 MG/1
TABLET, DELAYED RELEASE ORAL
Qty: 60 TABLET | Refills: 10 | Status: SHIPPED | OUTPATIENT
Start: 2023-11-14

## 2023-11-14 RX ORDER — DULOXETIN HYDROCHLORIDE 60 MG/1
60 CAPSULE, DELAYED RELEASE ORAL DAILY
Qty: 30 CAPSULE | Refills: 10 | Status: SHIPPED | OUTPATIENT
Start: 2023-11-14

## 2023-12-04 DIAGNOSIS — M48.061 SPINAL STENOSIS OF LUMBAR REGION WITHOUT NEUROGENIC CLAUDICATION: ICD-10-CM

## 2023-12-04 DIAGNOSIS — M51.9 LUMBAR DISC DISEASE: ICD-10-CM

## 2023-12-04 NOTE — TELEPHONE ENCOUNTER
LAST VISIT:   9/6/2023     Future Appointments   Date Time Provider [address]   12/7/2023 10:20 AM Robby Goodpasture, MD STAR [facility]

## 2023-12-05 RX ORDER — HYDROCODONE BITARTRATE AND ACETAMINOPHEN 7.5; 325 MG/1; MG/1
1.5 TABLET ORAL EVERY 6 HOURS PRN
Qty: 180 TABLET | Refills: 0 | Status: SHIPPED | OUTPATIENT
Start: 2023-12-05 | End: 2024-01-04

## 2023-12-07 ENCOUNTER — OFFICE VISIT (OUTPATIENT)
Dept: PRIMARY CARE CLINIC | Age: 63
End: 2023-12-07
Payer: MEDICAID

## 2023-12-07 VITALS
DIASTOLIC BLOOD PRESSURE: 80 MMHG | HEART RATE: 84 BPM | HEIGHT: 63 IN | WEIGHT: 186.2 LBS | BODY MASS INDEX: 32.99 KG/M2 | SYSTOLIC BLOOD PRESSURE: 120 MMHG

## 2023-12-07 DIAGNOSIS — G45.9 TIA (TRANSIENT ISCHEMIC ATTACK): ICD-10-CM

## 2023-12-07 DIAGNOSIS — Z13.220 SCREENING CHOLESTEROL LEVEL: ICD-10-CM

## 2023-12-07 DIAGNOSIS — F34.1 DYSTHYMIA: ICD-10-CM

## 2023-12-07 DIAGNOSIS — F39 MOOD DISORDER (HCC): ICD-10-CM

## 2023-12-07 DIAGNOSIS — M51.9 LUMBAR DISC DISEASE: Primary | ICD-10-CM

## 2023-12-07 DIAGNOSIS — Z79.01 LONG TERM (CURRENT) USE OF ANTICOAGULANTS: ICD-10-CM

## 2023-12-07 DIAGNOSIS — L25.9 CONTACT DERMATITIS OF SCALP: ICD-10-CM

## 2023-12-07 DIAGNOSIS — F41.9 ANXIETY: ICD-10-CM

## 2023-12-07 DIAGNOSIS — F43.9 STRESS AT HOME: ICD-10-CM

## 2023-12-07 DIAGNOSIS — Z86.73 HISTORY OF TRANSIENT ISCHEMIC ATTACK (TIA): ICD-10-CM

## 2023-12-07 LAB — INTERNATIONAL NORMALIZATION RATIO, POC: 3.5

## 2023-12-07 PROCEDURE — 3017F COLORECTAL CA SCREEN DOC REV: CPT | Performed by: FAMILY MEDICINE

## 2023-12-07 PROCEDURE — 99214 OFFICE O/P EST MOD 30 MIN: CPT | Performed by: FAMILY MEDICINE

## 2023-12-07 PROCEDURE — G8427 DOCREV CUR MEDS BY ELIG CLIN: HCPCS | Performed by: FAMILY MEDICINE

## 2023-12-07 PROCEDURE — G8482 FLU IMMUNIZE ORDER/ADMIN: HCPCS | Performed by: FAMILY MEDICINE

## 2023-12-07 PROCEDURE — G8417 CALC BMI ABV UP PARAM F/U: HCPCS | Performed by: FAMILY MEDICINE

## 2023-12-07 PROCEDURE — 4004F PT TOBACCO SCREEN RCVD TLK: CPT | Performed by: FAMILY MEDICINE

## 2023-12-07 PROCEDURE — 85610 PROTHROMBIN TIME: CPT | Performed by: FAMILY MEDICINE

## 2023-12-07 RX ORDER — WARFARIN SODIUM 4 MG/1
6 TABLET ORAL DAILY
Qty: 46 TABLET | Refills: 10 | Status: SHIPPED | OUTPATIENT
Start: 2023-12-07

## 2023-12-07 RX ORDER — DIVALPROEX SODIUM 250 MG/1
TABLET, DELAYED RELEASE ORAL
Qty: 60 TABLET | Refills: 10 | Status: SHIPPED | OUTPATIENT
Start: 2023-12-07

## 2023-12-07 RX ORDER — ONDANSETRON 4 MG/1
TABLET, FILM COATED ORAL
COMMUNITY
Start: 2023-12-04

## 2023-12-07 RX ORDER — CLOBETASOL PROPIONATE 0.05 MG/G
1 GEL TOPICAL 2 TIMES DAILY
Qty: 30 G | Refills: 0 | Status: SHIPPED | OUTPATIENT
Start: 2023-12-07 | End: 2024-01-06

## 2023-12-07 RX ORDER — WARFARIN SODIUM 1 MG/1
0.5 TABLET ORAL DAILY
Qty: 30 TABLET | Refills: 3 | Status: SHIPPED | OUTPATIENT
Start: 2023-12-07

## 2023-12-07 RX ORDER — DULOXETIN HYDROCHLORIDE 60 MG/1
60 CAPSULE, DELAYED RELEASE ORAL DAILY
Qty: 30 CAPSULE | Refills: 10 | Status: SHIPPED | OUTPATIENT
Start: 2023-12-07

## 2023-12-07 NOTE — PROGRESS NOTES
Problem Relation Age of Onset    Thyroid Disease Mother     Graves Disease Mother     Heart Disease Father     Asthma Father     Thyroid Disease Sister     Cancer Paternal Grandfather         throat    Asthma Son        Social History     Tobacco Use    Smoking status: Every Day     Packs/day: 0.50     Years: 46.00     Additional pack years: 0.00     Total pack years: 23.00     Types: Cigarettes     Start date: 65    Smokeless tobacco: Never   Substance Use Topics    Alcohol use: Yes     Alcohol/week: 0.0 standard drinks of alcohol     Comment: OCCASIONAL      Current Outpatient Medications   Medication Sig Dispense Refill    ondansetron (ZOFRAN) 4 MG tablet TAKE ONE TABLET BY MOUTH THREE TIMES A DAY AS NEEDED FOR NAUSEA OR VOMITING      clobetasol propionate 0.05 % GEL gel Apply 1 Application topically 2 times daily Scalp for itching 30 g 0    HYDROcodone-acetaminophen (NORCO) 7.5-325 MG per tablet Take 1.5 tablets by mouth every 6 hours as needed for Pain for up to 30 days. Max Daily Amount: 6 tablets 180 tablet 0    albuterol-ipratropium (COMBIVENT RESPIMAT)  MCG/ACT AERS inhaler INHALE ONE PUFF INTO THE LUNGS FOUR TIMES DAILY IN THE MORNING NOON IN THE EVENING AND BEFORE BEDTIME (MAY INCREASE TO EVERY 4 HOURS AS NEEDED) 4 g 5    divalproex (DEPAKOTE) 250 MG DR tablet TAKE ONE TABLET BY MOUTH TWICE A DAY 60 tablet 10    DULoxetine (CYMBALTA) 60 MG extended release capsule TAKE 1 CAPSULE BY MOUTH DAILY 30 capsule 10    omeprazole (PRILOSEC) 20 MG delayed release capsule TAKE 1 CAPSULE BY MOUTH DAILY 30 capsule 10    busPIRone (BUSPAR) 15 MG tablet TAKE ONE TABLET BY MOUTH TWICE A DAY 60 tablet 10    pregabalin (LYRICA) 200 MG capsule Take 1 capsule by mouth 2 times daily for 30 days. Max Daily Amount: 400 mg 60 capsule 0    warfarin (COUMADIN) 4 MG tablet TAKE 2 TABLETS BY MOUTH TWICE A WEEK AND 1 AND 1/2 TABLETS FIVE TIMES WEEKLY. AND 1/2 MG 5 TIMES A WEEK WITH THE 6 MG TO EQUAL 6.5 MG 5 X A WEEK. . 46

## 2024-01-03 ENCOUNTER — TELEPHONE (OUTPATIENT)
Dept: PRIMARY CARE CLINIC | Age: 64
End: 2024-01-03

## 2024-01-03 DIAGNOSIS — M51.9 LUMBAR DISC DISEASE: ICD-10-CM

## 2024-01-03 DIAGNOSIS — M48.061 SPINAL STENOSIS OF LUMBAR REGION WITHOUT NEUROGENIC CLAUDICATION: ICD-10-CM

## 2024-01-03 RX ORDER — HYDROCODONE BITARTRATE AND ACETAMINOPHEN 7.5; 325 MG/1; MG/1
1.5 TABLET ORAL EVERY 6 HOURS PRN
Qty: 180 TABLET | Refills: 0 | Status: SHIPPED | OUTPATIENT
Start: 2024-01-03 | End: 2024-02-02

## 2024-01-03 NOTE — TELEPHONE ENCOUNTER
Spoke with pt and let her know that the provider will not be in the office on 02/16/24. Pt stated she will call back to reschedule her Full Body skin check.

## 2024-01-17 ENCOUNTER — OFFICE VISIT (OUTPATIENT)
Dept: ORTHOPEDIC SURGERY | Age: 64
End: 2024-01-17

## 2024-01-17 DIAGNOSIS — M25.561 RIGHT KNEE PAIN, UNSPECIFIED CHRONICITY: Primary | ICD-10-CM

## 2024-01-17 RX ORDER — MELOXICAM 15 MG/1
15 TABLET ORAL DAILY
Qty: 30 TABLET | Refills: 3 | Status: SHIPPED | OUTPATIENT
Start: 2024-01-17

## 2024-01-17 RX ORDER — BETAMETHASONE SODIUM PHOSPHATE AND BETAMETHASONE ACETATE 3; 3 MG/ML; MG/ML
12 INJECTION, SUSPENSION INTRA-ARTICULAR; INTRALESIONAL; INTRAMUSCULAR; SOFT TISSUE ONCE
Status: COMPLETED | OUTPATIENT
Start: 2024-01-17 | End: 2024-01-17

## 2024-01-17 RX ORDER — BUPIVACAINE HYDROCHLORIDE 2.5 MG/ML
2 INJECTION, SOLUTION INFILTRATION; PERINEURAL ONCE
Status: COMPLETED | OUTPATIENT
Start: 2024-01-17 | End: 2024-01-17

## 2024-01-17 RX ORDER — LIDOCAINE HYDROCHLORIDE 10 MG/ML
2 INJECTION, SOLUTION INFILTRATION; PERINEURAL ONCE
Status: COMPLETED | OUTPATIENT
Start: 2024-01-17 | End: 2024-01-17

## 2024-01-17 RX ADMIN — BETAMETHASONE SODIUM PHOSPHATE AND BETAMETHASONE ACETATE 12 MG: 3; 3 INJECTION, SUSPENSION INTRA-ARTICULAR; INTRALESIONAL; INTRAMUSCULAR; SOFT TISSUE at 16:24

## 2024-01-17 RX ADMIN — LIDOCAINE HYDROCHLORIDE 2 ML: 10 INJECTION, SOLUTION INFILTRATION; PERINEURAL at 16:26

## 2024-01-17 RX ADMIN — BUPIVACAINE HYDROCHLORIDE 5 MG: 2.5 INJECTION, SOLUTION INFILTRATION; PERINEURAL at 16:25

## 2024-01-17 NOTE — PROGRESS NOTES
[facility]                   Scott Brown M.D.            [address], [address]               [place], [place] [address]           Dept Phone: [phone]           Dept Fax:  [phone] [address] [facility]                       [address]           [place], [place] [address]          Dept Phone: [phone]           Dept Fax:  [phone]      Chief Compliant:  Chief Complaint   Patient presents with    Pain     Rt knee        History of Present Illness:  This is a 63 y.o. female who presents to the clinic today for evaluation / follow up of right knee pain and swelling.  Patient had a previous arthroscopic partial medial meniscectomy of her right knee back in July 2022.  She had a couple aspirations of her knee after that time.  She has been more or less pushed living with this for the last year or so she did have a repeat MRI performed in February 2023 which showed whether it was a complex tear versus a real or remnants from her previous meniscectomy.  She was noted to have chondromalacia of the medial femoral condyle at that time and she also did have a chondroplasty at the point of surgery.    Patient states that she has had swelling especially during the last month or so.  She is not take any anti-inflammatories but she does take Coumadin.    Patient is scheduled to have epidural steroid injection in the next couple weeks..       Review of Systems   Constitutional: Negative for fever, chills, sweats.   Eyes: Negative for changes in vision, or pain.   HENT: Negative for ear ache, epistaxis, or sore throat.  Respiratory/Cardio: Negative for Chest pain, palpitations, SOB, or cough.  Gastrointestinal: Negative for abdominal pain, N/V/D.   Genitourinary: Negative for dysuria, frequency, urgency, or hematuria.   Neurological: Negative for headache, numbness, or weakness.   Integumentary: Negative for rash, itching, laceration, or abrasion.   Musculoskeletal:

## 2024-01-19 ENCOUNTER — TELEPHONE (OUTPATIENT)
Dept: PRIMARY CARE CLINIC | Age: 64
End: 2024-01-19

## 2024-01-19 NOTE — TELEPHONE ENCOUNTER
Being on meloxicam increases her bleeding risk, I prefer she not be on it with warfarin. She should restart her warfarin immediately after surgery as soon as she can swallow pills.

## 2024-01-19 NOTE — TELEPHONE ENCOUNTER
Pt calling to let you know that Dr. Brown put her on Meloxicam 15 mg qd. Told her that it could lower her INR. Med is on med list. Pt having surgery on 1/30/24 and told to stop her coumadin 6 days prior. Current dose is 6 1/2 mg, 7 days a week. Asking when she is to restart it and get her INR rechecked? Please advise.

## 2024-01-19 NOTE — TELEPHONE ENCOUNTER
Pt notified. Left detailed message with instructions. Asked pt to call the office back, so we know message was received.

## 2024-01-29 DIAGNOSIS — M51.9 LUMBAR DISC DISEASE: ICD-10-CM

## 2024-01-29 DIAGNOSIS — M48.061 SPINAL STENOSIS OF LUMBAR REGION WITHOUT NEUROGENIC CLAUDICATION: ICD-10-CM

## 2024-01-30 ENCOUNTER — TELEPHONE (OUTPATIENT)
Dept: PRIMARY CARE CLINIC | Age: 64
End: 2024-01-30

## 2024-01-30 RX ORDER — HYDROCODONE BITARTRATE AND ACETAMINOPHEN 7.5; 325 MG/1; MG/1
1.5 TABLET ORAL EVERY 6 HOURS PRN
Qty: 180 TABLET | Refills: 0 | Status: SHIPPED | OUTPATIENT
Start: 2024-02-02 | End: 2024-03-03

## 2024-01-30 NOTE — TELEPHONE ENCOUNTER
Pt called stating she was suppose to have an epidural injection in her back today but her INR was 3.3. She states she has been off of her warfarin since last Tuesday 1/23/24 and inr was still 3.3. states she did not take or eat anything that would cause it to still be high. She was unable to have procedure done and has to reschedule.       Please advise

## 2024-01-31 NOTE — TELEPHONE ENCOUNTER
Pt was told that she has to have clearance from you before getting scheduled for the Epidural. Do you need to see her or can we fax clearance to Dr. Baum @ [phone] ?

## 2024-01-31 NOTE — TELEPHONE ENCOUNTER
Clearance faxed. Pt will call back and let us know when surgery is and ask when she should be off warfarin.

## 2024-01-31 NOTE — TELEPHONE ENCOUNTER
Ok to fax clearance.   Patient is low medical risk for the procedure and is ok to stay off warfarin prior.

## 2024-02-02 ENCOUNTER — TELEPHONE (OUTPATIENT)
Dept: PRIMARY CARE CLINIC | Age: 64
End: 2024-02-02

## 2024-02-02 NOTE — TELEPHONE ENCOUNTER
It is safe to be off warfarin for 4-5 days  Her level was too high though. Check another INR  In the chart it states her dose is 6.5 mg daily.

## 2024-02-02 NOTE — TELEPHONE ENCOUNTER
Get back on the warfarin until she get another procedure scheduled  Recheck INR   10 days  Take 6.5 mg daily

## 2024-02-02 NOTE — TELEPHONE ENCOUNTER
Patient states she is worried about being off her coumadin too long. Patient states with her medical history she doesn't feel its safe. Patient also wanted to let you know she had been taking 6.5 mg coumadin 7 days a week instead of 5 days a week.    From what I can find in patients chart this is the correct dose.

## 2024-02-02 NOTE — TELEPHONE ENCOUNTER
Pt states she has been off of her Warfarin for 10 days as of today. Is that ok? Pt stated that her new prescription says to take the 6.5mg for 5 days, please clarify how you would like her to take it.     Pt did not get the epidural procedure done and it is not rescheduled yet. What should she do about the Warfarin?    Please advise.

## 2024-02-12 ENCOUNTER — NURSE ONLY (OUTPATIENT)
Dept: PRIMARY CARE CLINIC | Age: 64
End: 2024-02-12
Payer: MEDICAID

## 2024-02-12 VITALS — DIASTOLIC BLOOD PRESSURE: 80 MMHG | SYSTOLIC BLOOD PRESSURE: 120 MMHG | HEART RATE: 96 BPM | OXYGEN SATURATION: 95 %

## 2024-02-12 DIAGNOSIS — Z86.73 HISTORY OF TRANSIENT ISCHEMIC ATTACK (TIA): Primary | ICD-10-CM

## 2024-02-12 LAB — INTERNATIONAL NORMALIZATION RATIO, POC: 2.7

## 2024-02-12 PROCEDURE — 93793 ANTICOAG MGMT PT WARFARIN: CPT | Performed by: STUDENT IN AN ORGANIZED HEALTH CARE EDUCATION/TRAINING PROGRAM

## 2024-02-12 PROCEDURE — 85610 PROTHROMBIN TIME: CPT | Performed by: STUDENT IN AN ORGANIZED HEALTH CARE EDUCATION/TRAINING PROGRAM

## 2024-02-12 NOTE — PROGRESS NOTES
Patient here for INR. has not had signs of bleeding or excessive bruising.      Current Warfarin dose: per telephone encounter 2/2/24 pt was suppose to take 6.5mg daily, pt only taking 6mg daily     Previous INR results:    Lab Results   Component Value Date    INR 3.5 12/07/2023    INR 1.9 10/25/2023    INR 1.0 10/17/2023    PROTIME 9.6 10/17/2023    PROTIME 17.0 (H) 11/18/2022    PROTIME 13.1 09/20/2021       Vitals:    02/12/24 1435   BP: 120/80   Pulse: 96   SpO2: 95%       Today's INR:  2.7    Case discussed with  Dr. Rey  .     Patient advised to continue same dose    Repeat next Protime 1  month(s).

## 2024-02-24 DIAGNOSIS — M48.061 SPINAL STENOSIS OF LUMBAR REGION WITHOUT NEUROGENIC CLAUDICATION: ICD-10-CM

## 2024-02-24 DIAGNOSIS — M51.9 LUMBAR DISC DISEASE: ICD-10-CM

## 2024-02-26 ENCOUNTER — INITIAL CONSULT (OUTPATIENT)
Dept: PRIMARY CARE CLINIC | Age: 64
End: 2024-02-26

## 2024-02-26 VITALS
WEIGHT: 174.2 LBS | HEART RATE: 83 BPM | HEIGHT: 63 IN | DIASTOLIC BLOOD PRESSURE: 76 MMHG | SYSTOLIC BLOOD PRESSURE: 128 MMHG | OXYGEN SATURATION: 96 % | BODY MASS INDEX: 30.87 KG/M2

## 2024-02-26 DIAGNOSIS — L82.1 SEBORRHEIC KERATOSES: ICD-10-CM

## 2024-02-26 DIAGNOSIS — I83.93 SPIDER VEINS OF BOTH LOWER EXTREMITIES: Primary | ICD-10-CM

## 2024-02-26 DIAGNOSIS — D22.5 MELANOCYTIC NEVUS OF TRUNK: ICD-10-CM

## 2024-02-26 DIAGNOSIS — D48.5 NEOPLASM OF UNCERTAIN BEHAVIOR OF SKIN: ICD-10-CM

## 2024-02-26 DIAGNOSIS — L81.4 LENTIGINES: ICD-10-CM

## 2024-02-26 DIAGNOSIS — D22.9 MULTIPLE BENIGN NEVI: ICD-10-CM

## 2024-02-26 DIAGNOSIS — L57.0 ACTINIC KERATOSES: ICD-10-CM

## 2024-02-26 RX ORDER — HYDROCODONE BITARTRATE AND ACETAMINOPHEN 7.5; 325 MG/1; MG/1
1.5 TABLET ORAL EVERY 6 HOURS PRN
Qty: 180 TABLET | Refills: 0 | Status: SHIPPED | OUTPATIENT
Start: 2024-02-26 | End: 2024-03-27

## 2024-02-26 SDOH — ECONOMIC STABILITY: INCOME INSECURITY: HOW HARD IS IT FOR YOU TO PAY FOR THE VERY BASICS LIKE FOOD, HOUSING, MEDICAL CARE, AND HEATING?: NOT HARD AT ALL

## 2024-02-26 SDOH — ECONOMIC STABILITY: FOOD INSECURITY: WITHIN THE PAST 12 MONTHS, THE FOOD YOU BOUGHT JUST DIDN'T LAST AND YOU DIDN'T HAVE MONEY TO GET MORE.: NEVER TRUE

## 2024-02-26 SDOH — ECONOMIC STABILITY: FOOD INSECURITY: WITHIN THE PAST 12 MONTHS, YOU WORRIED THAT YOUR FOOD WOULD RUN OUT BEFORE YOU GOT MONEY TO BUY MORE.: NEVER TRUE

## 2024-02-26 ASSESSMENT — PATIENT HEALTH QUESTIONNAIRE - PHQ9
SUM OF ALL RESPONSES TO PHQ QUESTIONS 1-9: 0
SUM OF ALL RESPONSES TO PHQ QUESTIONS 1-9: 0
9. THOUGHTS THAT YOU WOULD BE BETTER OFF DEAD, OR OF HURTING YOURSELF: 0
3. TROUBLE FALLING OR STAYING ASLEEP: 0
8. MOVING OR SPEAKING SO SLOWLY THAT OTHER PEOPLE COULD HAVE NOTICED. OR THE OPPOSITE, BEING SO FIGETY OR RESTLESS THAT YOU HAVE BEEN MOVING AROUND A LOT MORE THAN USUAL: 0
5. POOR APPETITE OR OVEREATING: 0
4. FEELING TIRED OR HAVING LITTLE ENERGY: 0
10. IF YOU CHECKED OFF ANY PROBLEMS, HOW DIFFICULT HAVE THESE PROBLEMS MADE IT FOR YOU TO DO YOUR WORK, TAKE CARE OF THINGS AT HOME, OR GET ALONG WITH OTHER PEOPLE: 0
SUM OF ALL RESPONSES TO PHQ QUESTIONS 1-9: 0
1. LITTLE INTEREST OR PLEASURE IN DOING THINGS: 0
6. FEELING BAD ABOUT YOURSELF - OR THAT YOU ARE A FAILURE OR HAVE LET YOURSELF OR YOUR FAMILY DOWN: 0
SUM OF ALL RESPONSES TO PHQ QUESTIONS 1-9: 0
7. TROUBLE CONCENTRATING ON THINGS, SUCH AS READING THE NEWSPAPER OR WATCHING TELEVISION: 0
2. FEELING DOWN, DEPRESSED OR HOPELESS: 0

## 2024-02-26 NOTE — PROGRESS NOTES
[facility]  [address]  [place] [address]  Phone: [phone]  Fax: [phone]    Stephanie Vega is a 63 y.o. female who presents today for her Complete Skin Check.    Personal history ofskin cancer:  Yes  What type?      Where? [place]  Family history of skin cancer? Yes Type? 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 No wounds, no dryness or color changes  Ears:  No suspicious lesions, no wounds, no dryness or color changes  Neck:  No suspicious lesions, no wounds, no dryness or color changes. Left neck has a SK at base that is darkening and she says it itches a lot  Chest:  Chest appears with lentigines and a few benign nevi, Has a rough macule appx nickel sized in decollette  Back:  Back appears normal with lentigines and a few benign nevi and OCC SK  Arms:  No suspicious lesions, no wounds, no dryness or color changes. Few scattered SKs, lentigines, and tattoos.  She has 3 rough macules on right forearm  Legs:  No suspicious lesions, no wounds, no dryness or color changes. Few scattered SKs, several spider veins. Left upper thigh has a large glandular like nevi on groin that gets irritated by underwear and pants    Hands:  No Callus, Ulcers, Abnormal nails or suspicious lesions  Feet:  Callus on heels,  no Ulcers, Abnormal nails or suspicious lesions. Thick callused skin on heels.    Breasts:  No suspicious lesions, nowounds or dryness or color changes      Diagnosis Orders   1. Spider veins of both lower extremities        2. Seborrheic keratoses        3. Neoplasm of uncertain behavior of skin        4. Melanocytic nevus of

## 2024-02-26 NOTE — PROGRESS NOTES
Actinic Keratoses destruction procedure note:  2/26/2024 April Silvia  1960    /76   Pulse 83   Ht 1.6 m (5' 2.99\")   Wt 79 kg (174 lb 3.2 oz)   SpO2 96%   BMI 30.87 kg/m²   ALL VITALS REVIEWED    Allergies   Allergen Reactions    Eggs Or Egg-Derived Products Other (See Comments)     Severe abdominal pains    Bupropion Nausea Only    Ceftin [Cefuroxime Axetil] Nausea Only     Nausea and upset stomach       Chief Complaint   Patient presents with    Procedure     Skin check     Written consent was obtained.  Lesion(s) cleansed with Alcohol.    Location:  Right arm and forehead    Histofreeze applied with applicator.  Numberlesions treated: 5    Physical Exam  HENT:      Head:        Comments: 2 AKs treated  Musculoskeletal:        Arms:       Comments: 3 AKs treated         Patient tolerated procedure well.     Diagnosis Orders   1. Spider veins of both lower extremities        2. Seborrheic keratoses        3. Neoplasm of uncertain behavior of skin        4. Melanocytic nevus of trunk        5. Lentigines        6. Multiple benign nevi        7. Actinic keratoses  NM DESTRUCTION PREMALIGNANT LESION 1ST    NM DESTRUCTION PREMALIGNANT LESION 2-14 EA          Follow Up:  Wound care discussed.  Keep well moisturized.  Watch for signs of infection which would include:  Redness, swelling, fever.  If signs appear, please return to office.  Return for 2 shave removals and 1 biopsy--30min.    Electronically signed by Jhoana Khan PA-C on 2/26/2024 at 9:58 AM

## 2024-02-29 ENCOUNTER — OFFICE VISIT (OUTPATIENT)
Dept: ORTHOPEDIC SURGERY | Age: 64
End: 2024-02-29
Payer: MEDICAID

## 2024-02-29 VITALS — WEIGHT: 177 LBS | HEIGHT: 63 IN | RESPIRATION RATE: 14 BRPM | BODY MASS INDEX: 31.36 KG/M2

## 2024-02-29 DIAGNOSIS — Z98.890 S/P RIGHT KNEE ARTHROSCOPY: Primary | ICD-10-CM

## 2024-02-29 DIAGNOSIS — S83.231D COMPLEX TEAR OF MEDIAL MENISCUS OF RIGHT KNEE AS CURRENT INJURY, SUBSEQUENT ENCOUNTER: ICD-10-CM

## 2024-02-29 PROCEDURE — G8417 CALC BMI ABV UP PARAM F/U: HCPCS | Performed by: PHYSICIAN ASSISTANT

## 2024-02-29 PROCEDURE — 99213 OFFICE O/P EST LOW 20 MIN: CPT | Performed by: PHYSICIAN ASSISTANT

## 2024-02-29 PROCEDURE — 4004F PT TOBACCO SCREEN RCVD TLK: CPT | Performed by: PHYSICIAN ASSISTANT

## 2024-02-29 PROCEDURE — G8482 FLU IMMUNIZE ORDER/ADMIN: HCPCS | Performed by: PHYSICIAN ASSISTANT

## 2024-02-29 PROCEDURE — G8427 DOCREV CUR MEDS BY ELIG CLIN: HCPCS | Performed by: PHYSICIAN ASSISTANT

## 2024-02-29 PROCEDURE — 3017F COLORECTAL CA SCREEN DOC REV: CPT | Performed by: PHYSICIAN ASSISTANT

## 2024-02-29 NOTE — PROGRESS NOTES
fissuring at the medial femoral condyle.       X-rays taken in clinic today and preliminarily reviewed by me 2/29/24:  No new x-rays taken today however AP bilateral knees standing lateral view of the right knee from 1/17/2024 demonstrate mild to moderate tricompartmental DJD with small suprapatellar joint effusion present at that time.  Left knee with some very mild medial compartmental subchondral sclerosis     No orders of the defined types were placed in this encounter.      Assessment and Plan:  1. S/P right knee arthroscopy    2. Complex tear of medial meniscus of right knee as current injury, subsequent encounter          PLAN:  Stephanie Vega is a 63 y.o. old female who returns today for reevaluation of right knee pain and swelling.  Patient with history of right knee arthroscopy partial medial meniscectomy in July 2022.  Unfortunately had some postoperative recurrent effusions and underwent multiple aspirations and injections, most recently when seen Dr. Brown on 1/17/2024.  Due to this she did have a repeat MRI by her PCP on 2/16/2023 which demonstrated some possible complex tearing of the posterior horn of the medial meniscus versus volume loss secondary to recent arthroscopy.  1.  Given patient's failure of multiple aspirations and injections as well as meloxicam treatment she may be a candidate for repeat arthroscopic examination and possible partial medial meniscectomy.  2.  Had discussion of patient's presentation and history with Dr. Brown who was familiar with the patient recommended pursuing arthroscopic examination with probable partial meniscectomy.   3.  Will send a message to our surgery scheduler to reach out and schedule for the patient accordingly.    Electronically signed by SALOME Brooks on 2/29/24 at 8:48 AM EST        Please note that this chart was generated using voice recognition Dragon dictation software.  Although every effort was made to ensure the accuracy of this automated

## 2024-03-01 ENCOUNTER — TELEPHONE (OUTPATIENT)
Dept: ORTHOPEDIC SURGERY | Age: 64
End: 2024-03-01

## 2024-03-07 ENCOUNTER — TELEPHONE (OUTPATIENT)
Dept: PRIMARY CARE CLINIC | Age: 64
End: 2024-03-07

## 2024-03-07 ENCOUNTER — HOSPITAL ENCOUNTER (OUTPATIENT)
Age: 64
Setting detail: SPECIMEN
Discharge: HOME | End: 2024-03-07

## 2024-03-07 ENCOUNTER — OFFICE VISIT (OUTPATIENT)
Dept: PRIMARY CARE CLINIC | Age: 64
End: 2024-03-07
Payer: MEDICAID

## 2024-03-07 VITALS
OXYGEN SATURATION: 96 % | WEIGHT: 176 LBS | HEART RATE: 88 BPM | HEIGHT: 63 IN | DIASTOLIC BLOOD PRESSURE: 80 MMHG | SYSTOLIC BLOOD PRESSURE: 110 MMHG | BODY MASS INDEX: 31.18 KG/M2

## 2024-03-07 DIAGNOSIS — Z79.01 LONG TERM (CURRENT) USE OF ANTICOAGULANTS: ICD-10-CM

## 2024-03-07 DIAGNOSIS — Z23 IMMUNIZATION DUE: ICD-10-CM

## 2024-03-07 DIAGNOSIS — Z01.419 ENCOUNTER FOR GYNECOLOGICAL EXAMINATION WITHOUT ABNORMAL FINDING: Primary | ICD-10-CM

## 2024-03-07 DIAGNOSIS — M48.061 SPINAL STENOSIS OF LUMBAR REGION WITHOUT NEUROGENIC CLAUDICATION: Primary | ICD-10-CM

## 2024-03-07 DIAGNOSIS — Z87.891 PERSONAL HISTORY OF TOBACCO USE: ICD-10-CM

## 2024-03-07 DIAGNOSIS — H91.93 BILATERAL HEARING LOSS, UNSPECIFIED HEARING LOSS TYPE: ICD-10-CM

## 2024-03-07 DIAGNOSIS — Z86.73 HISTORY OF TRANSIENT ISCHEMIC ATTACK (TIA): ICD-10-CM

## 2024-03-07 LAB — INTERNATIONAL NORMALIZATION RATIO, POC: 2.9

## 2024-03-07 PROCEDURE — 85610 PROTHROMBIN TIME: CPT | Performed by: FAMILY MEDICINE

## 2024-03-07 ASSESSMENT — ENCOUNTER SYMPTOMS: BACK PAIN: 1

## 2024-03-07 NOTE — PROGRESS NOTES
SURGERY  No date: THROAT SURGERY      Comment:  camera  No date: TONSILLECTOMY AND ADENOIDECTOMY  No date: TUBAL LIGATION      Social History    Tobacco Use      Smoking status: Every Day        Packs/day: 0.50        Years: 0.5 packs/day for 50.2 years (25.1 ttl pk-yrs)        Types: Cigarettes        Start date: 1974      Smokeless tobacco: Never       Standing Status:   Future     Standing Expiration Date:   3/7/2025     Order Specific Question:   Collection Type     Answer:   Thin Prep     Order Specific Question:   Prior Abnormal Pap Test     Answer:   No     Order Specific Question:   Screening or Diagnostic     Answer:   Screening     Order Specific Question:   HPV Requested?     Answer:   Yes     Order Specific Question:   High Risk Patient     Answer:   N/A    POCT INR    POCT INR     This external order was created through the results console.    ND VISIT TO DISCUSS LUNG CA SCREEN W LDCT     No orders of the defined types were placed in this encounter.      Patient given educational materials - see patient instructions.  Discussed use, benefit, and side effects of prescribed medications.  All patient questions answered. Pt voiced understanding.Reviewed health maintenance.  Instructed to continue current medications, diet and exercise.  Patient agreed with treatment plan. Follow up as directed.     PDMP Monitoring:    Last PDMP Ricci as Reviewed:  Review User Review Instant Review Result   CUEVA CHUCK CARTER 3/7/2024  7:46 AM Reviewed PDMP [1]     Last Controlled Substance Monitoring Documentation      Flowsheet Row Consultation from 9/1/2021 in [facility]   Periodic Controlled Substance Monitoring No signs of potential drug abuse or diversion identified., Assessed functional status. filed at 09/01/2021 0604          Urine Drug Screenings (1 yr)       POCT Rapid Drug Screen  Collected: 9/6/2023  2:36 PM (Edited Result - FINAL)              POCT Rapid Drug Screen  Collected: 10/13/2022  2:52 PM

## 2024-03-07 NOTE — TELEPHONE ENCOUNTER
Patient also want referral for hearing test ?  Please let her know when the referral done . Thank you

## 2024-03-07 NOTE — TELEPHONE ENCOUNTER
Patient  needs referral for [facility] -   Dr Bah Neurosurgery please let her know when referral is done.

## 2024-03-08 LAB
HPV I/H RISK 4 DNA CVX QL NAA+PROBE: NOT DETECTED
HPV, INTERPRETATION: NORMAL
HPV16 DNA CVX QL NAA+PROBE: NOT DETECTED
HPV18 DNA CVX QL NAA+PROBE: NOT DETECTED
SPECIMEN DESCRIPTION: NORMAL

## 2024-03-11 ENCOUNTER — TELEPHONE (OUTPATIENT)
Dept: ORTHOPEDIC SURGERY | Age: 64
End: 2024-03-11

## 2024-03-11 ENCOUNTER — TELEPHONE (OUTPATIENT)
Dept: PRIMARY CARE CLINIC | Age: 64
End: 2024-03-11

## 2024-03-11 NOTE — TELEPHONE ENCOUNTER
Pt called in has upcoming surg with Dr. Brown on 04/25/24 and needs to know how long she will need to take off to heal. 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        Please call pt with any updates or questions @ [phone]

## 2024-03-12 NOTE — TELEPHONE ENCOUNTER
LM on VM that the time off work for a mensicus repair is 4 weeks.  Told pt to call back if she has any other questions.

## 2024-03-13 ENCOUNTER — TELEPHONE (OUTPATIENT)
Dept: PRIMARY CARE CLINIC | Age: 64
End: 2024-03-13

## 2024-03-13 NOTE — TELEPHONE ENCOUNTER
Called Pt due to the ENT Office she was referred to saying they don't have the Pt's insurance told Pt to call insurance to see what is covered and the we will send out referral

## 2024-03-19 ENCOUNTER — TELEPHONE (OUTPATIENT)
Dept: ONCOLOGY | Age: 64
End: 2024-03-19

## 2024-03-19 LAB — CYTOLOGY REPORT: NORMAL

## 2024-03-23 NOTE — PROGRESS NOTES
Skin Biopsy Procedure Note  3/25/2024  April Vega  1960    /80   Pulse 88   Ht 1.6 m (5' 2.99\")   Wt 77.3 kg (170 lb 6.4 oz)   SpO2 96%   BMI 30.19 kg/m²   VITALS REVIEWED    Allergies   Allergen Reactions    Egg-Derived Products Other (See Comments)     Severe abdominal pains    Bupropion Nausea Only    Ceftin [Cefuroxime Axetil] Nausea Only     Nausea and upset stomach       Chief Complaint   Patient presents with    Procedure       Lesion:  1. Left neck  2. Left upper chest        Indication for Biopsy   1: dark, itchy lesion  Indication for Biopsy   2: non healing lesion  Indication for Removal 3:  very dark, oddly shaped, glandular looking--see picture    Procedure:  The lesion(s) were cleansed with Alcohol.  2% lidocaine with epinephrine was used for local anaesthesia.  1.A 7 mm  shave was used to obtain a specimen.   2. A 5 mm shave was used.  The specimen(s) were    preserved and sent for pathological examination.  The biopsy site(s) were  cleansed and hemostasis using ACl and a pressure dressing(s) was achieved with good results.  The patient was instructed on wound care.  No complications occurred and the patient tolerated the procedure well.     Sending to plastics for lesion in groin as it was removed once and is back and is \"bigger\".      Diagnosis Orders   1. Neoplasm of uncertain behavior of skin  Derm biopsy    Derm biopsy    Lisa Venegas MD, Plastic Surgery, [place]          Follow Up:  Wound care discussed.  Keep well moisturized.  Watch for signs of infection which would include:  Redness, swelling, fever.  If signs appear, please return to office.  Return if symptoms worsen or fail to improve.       Electronically signed by Jhoana Khan PA-C on 3/25/2024 at 9:54 AM

## 2024-03-25 ENCOUNTER — PROCEDURE VISIT (OUTPATIENT)
Dept: PRIMARY CARE CLINIC | Age: 64
End: 2024-03-25

## 2024-03-25 VITALS
DIASTOLIC BLOOD PRESSURE: 80 MMHG | SYSTOLIC BLOOD PRESSURE: 120 MMHG | HEART RATE: 88 BPM | BODY MASS INDEX: 30.19 KG/M2 | HEIGHT: 63 IN | OXYGEN SATURATION: 96 % | WEIGHT: 170.4 LBS

## 2024-03-25 DIAGNOSIS — D48.5 NEOPLASM OF UNCERTAIN BEHAVIOR OF SKIN: Primary | ICD-10-CM

## 2024-03-25 RX ORDER — FLUTICASONE PROPIONATE AND SALMETEROL 50; 500 UG/1; UG/1
POWDER RESPIRATORY (INHALATION)
Qty: 60 EACH | Refills: 10 | Status: SHIPPED | OUTPATIENT
Start: 2024-03-25

## 2024-03-26 ENCOUNTER — NURSE ONLY (OUTPATIENT)
Dept: LAB | Age: 64
End: 2024-03-26

## 2024-03-27 ENCOUNTER — TELEPHONE (OUTPATIENT)
Dept: SURGERY | Age: 64
End: 2024-03-27

## 2024-03-27 NOTE — TELEPHONE ENCOUNTER
Spoke to patient, she is at work and will contact the office on Thursday after 3pm to schedule appt with Dr. Brooks for a mole on her thigh. Referral in chart.

## 2024-04-01 DIAGNOSIS — M48.061 SPINAL STENOSIS OF LUMBAR REGION WITHOUT NEUROGENIC CLAUDICATION: ICD-10-CM

## 2024-04-01 DIAGNOSIS — M51.9 LUMBAR DISC DISEASE: ICD-10-CM

## 2024-04-02 RX ORDER — HYDROCODONE BITARTRATE AND ACETAMINOPHEN 7.5; 325 MG/1; MG/1
1.5 TABLET ORAL EVERY 6 HOURS PRN
Qty: 180 TABLET | Refills: 0 | Status: SHIPPED | OUTPATIENT
Start: 2024-04-02 | End: 2024-05-02

## 2024-04-03 ENCOUNTER — TELEPHONE (OUTPATIENT)
Dept: PRIMARY CARE CLINIC | Age: 64
End: 2024-04-03

## 2024-04-03 NOTE — TELEPHONE ENCOUNTER
Pt having knee scope done 4/25 she is asking how many days prior does she need to hold blood thinners and when can she resume?

## 2024-04-03 NOTE — TELEPHONE ENCOUNTER
Usually she would have to hold her warfarin for 5 days prior to her procedure and restarted immediately after if its okay with her surgeon

## 2024-04-04 ENCOUNTER — HOSPITAL ENCOUNTER (OUTPATIENT)
Dept: CT IMAGING | Age: 64
Discharge: HOME | End: 2024-04-06
Payer: MEDICAID

## 2024-04-04 VITALS — WEIGHT: 180 LBS | BODY MASS INDEX: 31.89 KG/M2 | HEIGHT: 63 IN

## 2024-04-04 DIAGNOSIS — Z87.891 PERSONAL HISTORY OF TOBACCO USE: ICD-10-CM

## 2024-04-04 PROCEDURE — 71271 CT THORAX LUNG CANCER SCR C-: CPT

## 2024-04-11 ENCOUNTER — HOSPITAL ENCOUNTER (OUTPATIENT)
Dept: PREADMISSION TESTING | Age: 64
Discharge: HOME | End: 2024-04-15

## 2024-04-11 VITALS
HEART RATE: 85 BPM | HEIGHT: 63 IN | RESPIRATION RATE: 16 BRPM | OXYGEN SATURATION: 97 % | SYSTOLIC BLOOD PRESSURE: 124 MMHG | WEIGHT: 171 LBS | TEMPERATURE: 97.7 F | BODY MASS INDEX: 30.3 KG/M2 | DIASTOLIC BLOOD PRESSURE: 82 MMHG

## 2024-04-11 LAB
EKG ATRIAL RATE: 0 BPM
EKG Q-T INTERVAL: 0 MS
EKG QRS DURATION: 0 MS
EKG QTC CALCULATION (BAZETT): 0 MS
EKG R AXIS: 0 DEGREES
EKG T AXIS: 0 DEGREES
EKG VENTRICULAR RATE: 0 BPM

## 2024-04-11 ASSESSMENT — PAIN DESCRIPTION - ORIENTATION: ORIENTATION: RIGHT

## 2024-04-11 ASSESSMENT — PAIN SCALES - GENERAL: PAINLEVEL_OUTOF10: 10

## 2024-04-11 ASSESSMENT — PAIN DESCRIPTION - LOCATION: LOCATION: KNEE

## 2024-04-11 NOTE — DISCHARGE INSTRUCTIONS
DAY OF SURGERY/PROCEDURE  GUIDELINES    As a patient at the [facility], you can expect quality medical and nursing care that is centered on your individual needs. It is our goal to make your surgical experience as comfortable and excellent as possible.  ________________________________________________________________________    The following instructions are general guidelines, if any information on this sheet is different from what your doctor has instructed you to do, please follow your doctor's instructions.    Please arrive on 4/25 @ 1:30 am       Enter through entrance C. Check in at registration     Upon arrival you will be taken to the pre-operative area to get ready for surgery, your family will stay in the waiting room and visit with you once you are ready for surgery. Due to special limitations please limit visitation to 1-2 members of your family at a time. When it is time for surgery your family will return to the waiting room.    Nothing to eat, drink, smoke, suck or chew after midnight (no water, gum, mints, cigarettes, cigars, pipes, snuff, chewing tobacco, etc.) or your surgery may be canceled.     Take a shower or bath on the morning of your surgery/procedure (Hibiclens if directed) Do not apply any lotions.    Brush your teeth, but do not swallow any water    IN CASE OF ILLNESS - If you have a cold or flu symptoms (high fever, runny nose, sore throat, cough, etc.) rash, nausea, vomiting, loose stools, and/or recent contact with someone who has a contagious disease (chick pox, measles, etc.) please call your doctor before coming to the surgery center    Take a small sip of water with heart, blood pressure, and/or seizure medication the morning of surgery. omeprazole    If applicable bring your:  Inhaler (s)  Eyeglasses and Case (If you wear contacts they have to be removed before surgery, bring case and solution)    DO NOT take anticoagulants (blood thinners, aspirin or

## 2024-04-17 ENCOUNTER — OFFICE VISIT (OUTPATIENT)
Dept: SURGERY | Age: 64
End: 2024-04-17
Payer: MEDICAID

## 2024-04-17 VITALS
WEIGHT: 170 LBS | HEART RATE: 83 BPM | SYSTOLIC BLOOD PRESSURE: 138 MMHG | BODY MASS INDEX: 30.12 KG/M2 | HEIGHT: 63 IN | DIASTOLIC BLOOD PRESSURE: 82 MMHG

## 2024-04-17 DIAGNOSIS — D48.5 NEOPLASM OF UNCERTAIN BEHAVIOR OF SKIN: Primary | ICD-10-CM

## 2024-04-17 LAB
EKG ATRIAL RATE: 0 BPM
EKG ATRIAL RATE: 79 BPM
EKG P AXIS: 57 DEGREES
EKG P-R INTERVAL: 166 MS
EKG Q-T INTERVAL: 0 MS
EKG Q-T INTERVAL: 372 MS
EKG QRS DURATION: 0 MS
EKG QRS DURATION: 86 MS
EKG QTC CALCULATION (BAZETT): 0 MS
EKG QTC CALCULATION (BAZETT): 426 MS
EKG R AXIS: 0 DEGREES
EKG R AXIS: 21 DEGREES
EKG T AXIS: 0 DEGREES
EKG T AXIS: 52 DEGREES
EKG VENTRICULAR RATE: 0 BPM
EKG VENTRICULAR RATE: 79 BPM

## 2024-04-17 PROCEDURE — 3017F COLORECTAL CA SCREEN DOC REV: CPT | Performed by: PLASTIC SURGERY

## 2024-04-17 PROCEDURE — G8417 CALC BMI ABV UP PARAM F/U: HCPCS | Performed by: PLASTIC SURGERY

## 2024-04-17 PROCEDURE — 99203 OFFICE O/P NEW LOW 30 MIN: CPT | Performed by: PLASTIC SURGERY

## 2024-04-17 PROCEDURE — 4004F PT TOBACCO SCREEN RCVD TLK: CPT | Performed by: PLASTIC SURGERY

## 2024-04-17 PROCEDURE — G8427 DOCREV CUR MEDS BY ELIG CLIN: HCPCS | Performed by: PLASTIC SURGERY

## 2024-04-17 NOTE — PROGRESS NOTES
[facility]  [address] [place] [address]       History and Physical     No chief complaint on file.       HPI:   Stephanie Vega is a 64 y.o. female who presents with a personal history of melanoma.  Patient has a lesion on the left upper thigh.  It has been excised in the past.  It has come back and it has increased in size.  There is nothing that has improved it.  Patient is here for evaluation and treatment..  Patient is on Coumadin.  Patient is a smoker  Medications:     Current Outpatient Medications   Medication Sig Dispense Refill    HYDROcodone-acetaminophen (NORCO) 7.5-325 MG per tablet Take 1.5 tablets by mouth every 6 hours as needed for Pain for up to 30 days. Max Daily Amount: 6 tablets 180 tablet 0    ADVAIR DISKUS 500-50 MCG/ACT AEPB diskus inhaler INHALE 1 PUFF INTO THE LUNGS IN THE MORNING AND 1 PUFF IN THE EVENING. **RINSE MOUTH AFTER EACH USE** 60 each 10    meloxicam (MOBIC) 15 MG tablet Take 1 tablet by mouth daily 30 tablet 3    ondansetron (ZOFRAN) 4 MG tablet TAKE ONE TABLET BY MOUTH THREE TIMES A DAY AS NEEDED FOR NAUSEA OR VOMITING      warfarin (COUMADIN) 1 MG tablet Take 0.5 tablets by mouth daily With the 6 mg to equal 6.5 mg 7 times a week. (Patient taking differently: Take 0.5 tablets by mouth daily  6 mg 7 times a week.) 30 tablet 3    warfarin (COUMADIN) 4 MG tablet Take 1.5 tablets by mouth daily Take with a 0.5 mg to equal 6.5 mg warfarin daily total dose (Patient taking differently: Take 1.5 tablets by mouth daily  6 mg warfarin daily total dose) 46 tablet 10    divalproex (DEPAKOTE) 250 MG DR tablet TAKE ONE TABLET BY MOUTH TWICE A DAY 60 tablet 10    DULoxetine (CYMBALTA) 60 MG extended release capsule TAKE 1 CAPSULE BY MOUTH DAILY 30 capsule 10    albuterol-ipratropium (COMBIVENT RESPIMAT)  MCG/ACT AERS inhaler INHALE ONE PUFF INTO THE LUNGS FOUR TIMES DAILY IN THE MORNING

## 2024-04-18 ENCOUNTER — TELEPHONE (OUTPATIENT)
Dept: SURGERY | Age: 64
End: 2024-04-18

## 2024-04-18 NOTE — TELEPHONE ENCOUNTER
Spoke to patient, surgery scheduled at [facility]. Patient confirmed and information sent to patient(s) rickie.    Surgery date/time: 6/28/24 at 11:30am  Arrival time: 9:30am  PAT: not needed for local anesthesia  Post op: 7/10/24 at 8:45am

## 2024-04-22 ENCOUNTER — OFFICE VISIT (OUTPATIENT)
Dept: PRIMARY CARE CLINIC | Age: 64
End: 2024-04-22
Payer: MEDICAID

## 2024-04-22 VITALS
OXYGEN SATURATION: 94 % | DIASTOLIC BLOOD PRESSURE: 60 MMHG | WEIGHT: 168.6 LBS | BODY MASS INDEX: 29.88 KG/M2 | HEART RATE: 91 BPM | SYSTOLIC BLOOD PRESSURE: 98 MMHG | HEIGHT: 63 IN

## 2024-04-22 DIAGNOSIS — H00.015 HORDEOLUM EXTERNUM OF LEFT LOWER EYELID: ICD-10-CM

## 2024-04-22 DIAGNOSIS — M25.511 ACUTE PAIN OF RIGHT SHOULDER: Primary | ICD-10-CM

## 2024-04-22 PROCEDURE — G8428 CUR MEDS NOT DOCUMENT: HCPCS | Performed by: STUDENT IN AN ORGANIZED HEALTH CARE EDUCATION/TRAINING PROGRAM

## 2024-04-22 PROCEDURE — 4004F PT TOBACCO SCREEN RCVD TLK: CPT | Performed by: STUDENT IN AN ORGANIZED HEALTH CARE EDUCATION/TRAINING PROGRAM

## 2024-04-22 PROCEDURE — 3017F COLORECTAL CA SCREEN DOC REV: CPT | Performed by: STUDENT IN AN ORGANIZED HEALTH CARE EDUCATION/TRAINING PROGRAM

## 2024-04-22 PROCEDURE — 99213 OFFICE O/P EST LOW 20 MIN: CPT | Performed by: STUDENT IN AN ORGANIZED HEALTH CARE EDUCATION/TRAINING PROGRAM

## 2024-04-22 PROCEDURE — G8417 CALC BMI ABV UP PARAM F/U: HCPCS | Performed by: STUDENT IN AN ORGANIZED HEALTH CARE EDUCATION/TRAINING PROGRAM

## 2024-04-22 ASSESSMENT — ENCOUNTER SYMPTOMS
NAUSEA: 0
VOMITING: 0
ABDOMINAL PAIN: 0
SHORTNESS OF BREATH: 0

## 2024-04-22 NOTE — PROGRESS NOTES
[facility]  [facility]  [address]  [place] [address]  Dept: [phone]    Stephanie Vega is a 64 y.o. female established patient, who presents acutely for her complaints as noted below:    Chief Complaint   Patient presents with    Eye Problem     Possible stye in left eye. Patient states she's had this for a bout 1 month. The area tends to swell and irritate her eye.    Shoulder Pain     Right sided shoulder pain, started about 2 months ago. Patient states she does not recall an injury. Patient states she had not tried any OTC meds since she takes norco for her back.       HPI:     Right shoulder pain: 2 months ago. Denies any precipitating injury. Also has decreased range of motion.     Lesion on the left lower eye lid. Has used lubricating drops.    Reviewed prior notes from PCP.  Reviewed previous labs.    LDL Calculated (mg/dL)   Date Value   10/21/2019 120   2018 131       (goal LDL is <100)   AST (U/L)   Date Value   2023 14     ALT (U/L)   Date Value   2023 9     BUN (mg/dL)   Date Value   2023 14     TSH (uIU/mL)   Date Value   2022 2.11     BP Readings from Last 3 Encounters:   24 98/60   24 138/82   24 124/82          (goal 120/80)    Past Medical History:   Diagnosis Date    Acid reflux     Arthritis     neck, back, knees    Asthma     Asthma     COPD (chronic obstructive pulmonary disease) ([facility]) 10/20/2015    Emphysema     Headache(784.0)     Heart abnormality     minute hole in heart per pt found in  after TIA    Osteoarthritis     Osteoporosis     Seborrheic keratoses 2019    TIA (transient ischemic attack) 2003    x3      Past Surgical History:   Procedure Laterality Date    ACROMIOPLASTY Left 2016    SHOULDER ARTHROCOPY LABRAL DEBRIDEMENT      SECTION, LOW TRANSVERSE      COLONOSCOPY      DILATION AND CURETTAGE OF UTERUS      ENDOSCOPY, COLON, DIAGNOSTIC      KNEE

## 2024-04-24 ENCOUNTER — ANESTHESIA EVENT (OUTPATIENT)
Dept: OPERATING ROOM | Age: 64
End: 2024-04-24
Payer: MEDICAID

## 2024-04-25 ENCOUNTER — HOSPITAL ENCOUNTER (OUTPATIENT)
Age: 64
Setting detail: OUTPATIENT SURGERY
Discharge: HOME | End: 2024-04-25
Attending: ORTHOPAEDIC SURGERY | Admitting: ORTHOPAEDIC SURGERY
Payer: MEDICAID

## 2024-04-25 ENCOUNTER — ANESTHESIA (OUTPATIENT)
Dept: OPERATING ROOM | Age: 64
End: 2024-04-25
Payer: MEDICAID

## 2024-04-25 VITALS
DIASTOLIC BLOOD PRESSURE: 80 MMHG | OXYGEN SATURATION: 97 % | SYSTOLIC BLOOD PRESSURE: 142 MMHG | HEART RATE: 87 BPM | HEIGHT: 63 IN | BODY MASS INDEX: 30.3 KG/M2 | RESPIRATION RATE: 18 BRPM | TEMPERATURE: 98.5 F | WEIGHT: 171 LBS

## 2024-04-25 DIAGNOSIS — S83.241D ACUTE MEDIAL MENISCUS TEAR OF RIGHT KNEE, SUBSEQUENT ENCOUNTER: Primary | ICD-10-CM

## 2024-04-25 LAB
INR PPP: 1
PROTHROMBIN TIME: 10.3 SEC (ref 9.4–12.6)

## 2024-04-25 PROCEDURE — 2580000003 HC RX 258: Performed by: ANESTHESIOLOGY

## 2024-04-25 PROCEDURE — 7100000001 HC PACU RECOVERY - ADDTL 15 MIN: Performed by: ORTHOPAEDIC SURGERY

## 2024-04-25 PROCEDURE — 36415 COLL VENOUS BLD VENIPUNCTURE: CPT

## 2024-04-25 PROCEDURE — 3600000005 HC SURGERY LEVEL 5 BASE: Performed by: ORTHOPAEDIC SURGERY

## 2024-04-25 PROCEDURE — 6360000002 HC RX W HCPCS

## 2024-04-25 PROCEDURE — 7100000000 HC PACU RECOVERY - FIRST 15 MIN: Performed by: ORTHOPAEDIC SURGERY

## 2024-04-25 PROCEDURE — 7100000010 HC PHASE II RECOVERY - FIRST 15 MIN: Performed by: ORTHOPAEDIC SURGERY

## 2024-04-25 PROCEDURE — 7100000011 HC PHASE II RECOVERY - ADDTL 15 MIN: Performed by: ORTHOPAEDIC SURGERY

## 2024-04-25 PROCEDURE — 2709999900 HC NON-CHARGEABLE SUPPLY: Performed by: ORTHOPAEDIC SURGERY

## 2024-04-25 PROCEDURE — 29881 ARTHRS KNE SRG MNISECTMY M/L: CPT | Performed by: ORTHOPAEDIC SURGERY

## 2024-04-25 PROCEDURE — 3600000015 HC SURGERY LEVEL 5 ADDTL 15MIN: Performed by: ORTHOPAEDIC SURGERY

## 2024-04-25 PROCEDURE — 3700000001 HC ADD 15 MINUTES (ANESTHESIA): Performed by: ORTHOPAEDIC SURGERY

## 2024-04-25 PROCEDURE — 3700000000 HC ANESTHESIA ATTENDED CARE: Performed by: ORTHOPAEDIC SURGERY

## 2024-04-25 PROCEDURE — 6370000000 HC RX 637 (ALT 250 FOR IP)

## 2024-04-25 PROCEDURE — 6360000002 HC RX W HCPCS: Performed by: NURSE ANESTHETIST, CERTIFIED REGISTERED

## 2024-04-25 PROCEDURE — 85610 PROTHROMBIN TIME: CPT

## 2024-04-25 PROCEDURE — 6360000002 HC RX W HCPCS: Performed by: ORTHOPAEDIC SURGERY

## 2024-04-25 PROCEDURE — 2500000003 HC RX 250 WO HCPCS: Performed by: NURSE ANESTHETIST, CERTIFIED REGISTERED

## 2024-04-25 RX ORDER — NALOXONE HYDROCHLORIDE 0.4 MG/ML
INJECTION, SOLUTION INTRAMUSCULAR; INTRAVENOUS; SUBCUTANEOUS PRN
Status: DISCONTINUED | OUTPATIENT
Start: 2024-04-25 | End: 2024-04-25 | Stop reason: HOSPADM

## 2024-04-25 RX ORDER — MEPERIDINE HYDROCHLORIDE 50 MG/ML
12.5 INJECTION INTRAMUSCULAR; INTRAVENOUS; SUBCUTANEOUS ONCE
Status: DISCONTINUED | OUTPATIENT
Start: 2024-04-25 | End: 2024-04-25 | Stop reason: HOSPADM

## 2024-04-25 RX ORDER — MORPHINE SULFATE 2 MG/ML
1 INJECTION, SOLUTION INTRAMUSCULAR; INTRAVENOUS EVERY 5 MIN PRN
Status: DISCONTINUED | OUTPATIENT
Start: 2024-04-25 | End: 2024-04-25 | Stop reason: HOSPADM

## 2024-04-25 RX ORDER — MIDAZOLAM HYDROCHLORIDE 2 MG/2ML
2 INJECTION, SOLUTION INTRAMUSCULAR; INTRAVENOUS
Status: DISCONTINUED | OUTPATIENT
Start: 2024-04-25 | End: 2024-04-25 | Stop reason: HOSPADM

## 2024-04-25 RX ORDER — SODIUM CHLORIDE 9 MG/ML
INJECTION, SOLUTION INTRAVENOUS PRN
Status: DISCONTINUED | OUTPATIENT
Start: 2024-04-25 | End: 2024-04-25 | Stop reason: HOSPADM

## 2024-04-25 RX ORDER — LABETALOL HYDROCHLORIDE 5 MG/ML
10 INJECTION, SOLUTION INTRAVENOUS
Status: DISCONTINUED | OUTPATIENT
Start: 2024-04-25 | End: 2024-04-25 | Stop reason: HOSPADM

## 2024-04-25 RX ORDER — DIPHENHYDRAMINE HYDROCHLORIDE 50 MG/ML
12.5 INJECTION INTRAMUSCULAR; INTRAVENOUS
Status: DISCONTINUED | OUTPATIENT
Start: 2024-04-25 | End: 2024-04-25 | Stop reason: HOSPADM

## 2024-04-25 RX ORDER — HYDROCODONE BITARTRATE AND ACETAMINOPHEN 5; 325 MG/1; MG/1
TABLET ORAL
Status: COMPLETED
Start: 2024-04-25 | End: 2024-04-25

## 2024-04-25 RX ORDER — SODIUM CHLORIDE 0.9 % (FLUSH) 0.9 %
5-40 SYRINGE (ML) INJECTION PRN
Status: DISCONTINUED | OUTPATIENT
Start: 2024-04-25 | End: 2024-04-25 | Stop reason: HOSPADM

## 2024-04-25 RX ORDER — SODIUM CHLORIDE 0.9 % (FLUSH) 0.9 %
5-40 SYRINGE (ML) INJECTION EVERY 12 HOURS SCHEDULED
Status: DISCONTINUED | OUTPATIENT
Start: 2024-04-25 | End: 2024-04-25 | Stop reason: HOSPADM

## 2024-04-25 RX ORDER — ROPIVACAINE HYDROCHLORIDE 5 MG/ML
INJECTION, SOLUTION EPIDURAL; INFILTRATION; PERINEURAL PRN
Status: DISCONTINUED | OUTPATIENT
Start: 2024-04-25 | End: 2024-04-25 | Stop reason: ALTCHOICE

## 2024-04-25 RX ORDER — DEXAMETHASONE SODIUM PHOSPHATE 10 MG/ML
INJECTION, SOLUTION INTRAMUSCULAR; INTRAVENOUS PRN
Status: DISCONTINUED | OUTPATIENT
Start: 2024-04-25 | End: 2024-04-25 | Stop reason: SDUPTHER

## 2024-04-25 RX ORDER — ROPIVACAINE HYDROCHLORIDE 5 MG/ML
INJECTION, SOLUTION EPIDURAL; INFILTRATION; PERINEURAL
Status: DISCONTINUED
Start: 2024-04-25 | End: 2024-04-25 | Stop reason: HOSPADM

## 2024-04-25 RX ORDER — SODIUM CHLORIDE, SODIUM LACTATE, POTASSIUM CHLORIDE, CALCIUM CHLORIDE 600; 310; 30; 20 MG/100ML; MG/100ML; MG/100ML; MG/100ML
INJECTION, SOLUTION INTRAVENOUS CONTINUOUS
Status: DISCONTINUED | OUTPATIENT
Start: 2024-04-25 | End: 2024-04-25 | Stop reason: HOSPADM

## 2024-04-25 RX ORDER — OXYCODONE HYDROCHLORIDE 5 MG/1
5 TABLET ORAL PRN
Status: DISCONTINUED | OUTPATIENT
Start: 2024-04-25 | End: 2024-04-25 | Stop reason: HOSPADM

## 2024-04-25 RX ORDER — LIDOCAINE HYDROCHLORIDE 10 MG/ML
1 INJECTION, SOLUTION EPIDURAL; INFILTRATION; INTRACAUDAL; PERINEURAL
Status: DISCONTINUED | OUTPATIENT
Start: 2024-04-25 | End: 2024-04-25 | Stop reason: HOSPADM

## 2024-04-25 RX ORDER — ONDANSETRON 2 MG/ML
4 INJECTION INTRAMUSCULAR; INTRAVENOUS
Status: DISCONTINUED | OUTPATIENT
Start: 2024-04-25 | End: 2024-04-25 | Stop reason: HOSPADM

## 2024-04-25 RX ORDER — HYDRALAZINE HYDROCHLORIDE 20 MG/ML
10 INJECTION INTRAMUSCULAR; INTRAVENOUS
Status: DISCONTINUED | OUTPATIENT
Start: 2024-04-25 | End: 2024-04-25 | Stop reason: HOSPADM

## 2024-04-25 RX ORDER — FENTANYL CITRATE 50 UG/ML
INJECTION, SOLUTION INTRAMUSCULAR; INTRAVENOUS PRN
Status: DISCONTINUED | OUTPATIENT
Start: 2024-04-25 | End: 2024-04-25 | Stop reason: SDUPTHER

## 2024-04-25 RX ORDER — WARFARIN SODIUM 6 MG/1
6 TABLET ORAL DAILY
COMMUNITY

## 2024-04-25 RX ORDER — LIDOCAINE HYDROCHLORIDE 10 MG/ML
INJECTION, SOLUTION INFILTRATION; PERINEURAL PRN
Status: DISCONTINUED | OUTPATIENT
Start: 2024-04-25 | End: 2024-04-25 | Stop reason: SDUPTHER

## 2024-04-25 RX ORDER — PROPOFOL 10 MG/ML
INJECTION, EMULSION INTRAVENOUS PRN
Status: DISCONTINUED | OUTPATIENT
Start: 2024-04-25 | End: 2024-04-25 | Stop reason: SDUPTHER

## 2024-04-25 RX ORDER — HYDROCODONE BITARTRATE AND ACETAMINOPHEN 5; 325 MG/1; MG/1
1 TABLET ORAL EVERY 6 HOURS PRN
Qty: 20 TABLET | Refills: 0 | Status: SHIPPED | OUTPATIENT
Start: 2024-04-25 | End: 2024-04-30

## 2024-04-25 RX ORDER — HYDROCODONE BITARTRATE AND ACETAMINOPHEN 5; 325 MG/1; MG/1
1 TABLET ORAL ONCE
Status: COMPLETED | OUTPATIENT
Start: 2024-04-25 | End: 2024-04-25

## 2024-04-25 RX ORDER — METOCLOPRAMIDE HYDROCHLORIDE 5 MG/ML
10 INJECTION INTRAMUSCULAR; INTRAVENOUS
Status: DISCONTINUED | OUTPATIENT
Start: 2024-04-25 | End: 2024-04-25 | Stop reason: HOSPADM

## 2024-04-25 RX ORDER — OXYCODONE HYDROCHLORIDE 5 MG/1
10 TABLET ORAL PRN
Status: DISCONTINUED | OUTPATIENT
Start: 2024-04-25 | End: 2024-04-25 | Stop reason: HOSPADM

## 2024-04-25 RX ORDER — ONDANSETRON 2 MG/ML
INJECTION INTRAMUSCULAR; INTRAVENOUS PRN
Status: DISCONTINUED | OUTPATIENT
Start: 2024-04-25 | End: 2024-04-25 | Stop reason: SDUPTHER

## 2024-04-25 RX ADMIN — HYDROMORPHONE HYDROCHLORIDE 0.5 MG: 1 INJECTION, SOLUTION INTRAMUSCULAR; INTRAVENOUS; SUBCUTANEOUS at 16:59

## 2024-04-25 RX ADMIN — DEXAMETHASONE SODIUM PHOSPHATE 10 MG: 10 INJECTION, SOLUTION INTRAMUSCULAR; INTRAVENOUS at 16:02

## 2024-04-25 RX ADMIN — ONDANSETRON 4 MG: 2 INJECTION INTRAMUSCULAR; INTRAVENOUS at 16:02

## 2024-04-25 RX ADMIN — FENTANYL CITRATE 25 MCG: 50 INJECTION, SOLUTION INTRAMUSCULAR; INTRAVENOUS at 16:25

## 2024-04-25 RX ADMIN — PROPOFOL 200 MG: 10 INJECTION, EMULSION INTRAVENOUS at 15:54

## 2024-04-25 RX ADMIN — HYDROCODONE BITARTRATE AND ACETAMINOPHEN 1 TABLET: 5; 325 TABLET ORAL at 17:07

## 2024-04-25 RX ADMIN — FENTANYL CITRATE 25 MCG: 50 INJECTION, SOLUTION INTRAMUSCULAR; INTRAVENOUS at 15:54

## 2024-04-25 RX ADMIN — FENTANYL CITRATE 25 MCG: 50 INJECTION, SOLUTION INTRAMUSCULAR; INTRAVENOUS at 16:03

## 2024-04-25 RX ADMIN — SODIUM CHLORIDE, POTASSIUM CHLORIDE, SODIUM LACTATE AND CALCIUM CHLORIDE: 600; 310; 30; 20 INJECTION, SOLUTION INTRAVENOUS at 15:51

## 2024-04-25 RX ADMIN — Medication 0.5 MG: at 16:59

## 2024-04-25 RX ADMIN — FENTANYL CITRATE 25 MCG: 50 INJECTION, SOLUTION INTRAMUSCULAR; INTRAVENOUS at 16:15

## 2024-04-25 RX ADMIN — LIDOCAINE HYDROCHLORIDE 40 MG: 10 INJECTION, SOLUTION INFILTRATION; PERINEURAL at 15:54

## 2024-04-25 ASSESSMENT — PAIN DESCRIPTION - LOCATION
LOCATION: KNEE

## 2024-04-25 ASSESSMENT — PAIN DESCRIPTION - DESCRIPTORS
DESCRIPTORS: STABBING;SORE
DESCRIPTORS: ACHING

## 2024-04-25 ASSESSMENT — PAIN DESCRIPTION - ORIENTATION
ORIENTATION: RIGHT

## 2024-04-25 ASSESSMENT — PAIN DESCRIPTION - PAIN TYPE
TYPE: SURGICAL PAIN

## 2024-04-25 ASSESSMENT — PAIN SCALES - GENERAL
PAINLEVEL_OUTOF10: 10
PAINLEVEL_OUTOF10: 6
PAINLEVEL_OUTOF10: 10
PAINLEVEL_OUTOF10: 6
PAINLEVEL_OUTOF10: 10

## 2024-04-25 ASSESSMENT — COPD QUESTIONNAIRES: CAT_SEVERITY: NO INTERVAL CHANGE

## 2024-04-25 ASSESSMENT — PAIN - FUNCTIONAL ASSESSMENT
PAIN_FUNCTIONAL_ASSESSMENT: NONE - DENIES PAIN
PAIN_FUNCTIONAL_ASSESSMENT: 0-10

## 2024-04-25 ASSESSMENT — LIFESTYLE VARIABLES: SMOKING_STATUS: 1

## 2024-04-25 NOTE — H&P
ORTHOPEDIC PATIENT EVALUATION      HPI / Chief Complaint  Stephanie Vega is a 64 y.o. female who presents for recurrent medial meniscus tear of her right knee.  Patient initially had an meniscectomy done back in  initially did well however she had some recently and has some recurrent effusions and had an MRI done in  which consistent with a recurrent tear of the medial meniscus.  She could persist with joint line tenderness and intermittent catching locking and a positive Huynh's and felt to benefit from arthroscopic evaluation and treatment.    Past Medical History  April  has a past medical history of Acid reflux, Arthritis, Asthma, Asthma, COPD (chronic obstructive pulmonary disease) (HCC), Emphysema, Headache(784.0), Heart abnormality, Osteoarthritis, Osteoporosis, Seborrheic keratoses, and TIA (transient ischemic attack).    Past Surgical History  April  has a past surgical history that includes  section, low transverse; Dilation and curettage of uterus; Tonsillectomy and adenoidectomy; Tubal ligation; other surgical history; Acromioplasty (Left, 2016); shoulder surgery; Knee arthroscopy (Right); Colonoscopy; Knee arthroscopy (Right, 2022); Endoscopy, colon, diagnostic; and Pain management procedure.    Current Medications  Current Facility-Administered Medications   Medication Dose Route Frequency Provider Last Rate Last Admin    lidocaine PF 1 % injection 1 mL  1 mL IntraDERmal Once PRN Moshe Bowden MD        lactated ringers IV soln infusion   IntraVENous Continuous Moshe Bowden MD        sodium chloride flush 0.9 % injection 5-40 mL  5-40 mL IntraVENous 2 times per day Moshe Bowden MD        sodium chloride flush 0.9 % injection 5-40 mL  5-40 mL IntraVENous PRN Moshe Bowden MD        0.9 % sodium chloride infusion   IntraVENous PRN Moshe Bowden MD        ROPivacaine (NAROPIN) 0.5% injection                Allergies  Allergies have been

## 2024-04-25 NOTE — ANESTHESIA POSTPROCEDURE EVALUATION
Department of Anesthesiology  Postprocedure Note    Patient: Stephanie Vega  MRN: [id number]  YOB: 1960  Date of evaluation: 4/25/2024    Procedure Summary       Date: 04/25/24 Room / Location: [address]    Anesthesia Start: 1551 Anesthesia Stop: 1644    Procedure: RIGHT KNEE ARTHROSCOPIC PARTIAL MEDIAL MENISCECTOMY (Right: Knee) Diagnosis:       Other tear of medial meniscus of right knee, unspecified whether old or current tear, initial encounter      (Other tear of medial meniscus of right knee, unspecified whether old or current tear, initial encounter [S83.241A])    Surgeons: Sctot Brown MD Responsible Provider: Shannon Galvez MD    Anesthesia Type: General ASA Status: 3            Anesthesia Type: General    Beth Phase I: Beth Score: 10    Beth Phase II:      Anesthesia Post Evaluation    Patient location during evaluation: PACU  Patient participation: complete - patient participated  Level of consciousness: awake and alert  Airway patency: patent  Nausea & Vomiting: no nausea and no vomiting  Cardiovascular status: blood pressure returned to baseline  Respiratory status: acceptable and room air  Hydration status: euvolemic  Pain management: adequate and satisfactory to patient    No notable events documented.

## 2024-04-25 NOTE — ANESTHESIA PRE PROCEDURE
Department of Anesthesiology  Preprocedure Note       Name:  Stephanie Vega   Age:  64 y.o.  :  1960                                          MRN:  [id number]         Date:  2024      Surgeon: Surgeon(s):  Scott Brown MD    Procedure: Procedure(s):  RIGHT KNEE ARTHROSCOPIC PARTIAL MEDIAL MENISCECTOMY    Medications prior to admission:   Prior to Admission medications    Medication Sig Start Date End Date Taking? Authorizing Provider   HYDROcodone-acetaminophen (NORCO) 7.5-325 MG per tablet Take 1.5 tablets by mouth every 6 hours as needed for Pain for up to 30 days. Max Daily Amount: 6 tablets 24  Randi Toledo MD   ADVAIR DISKUS 500-50 MCG/ACT AEPB diskus inhaler INHALE 1 PUFF INTO THE LUNGS IN THE MORNING AND 1 PUFF IN THE EVENING. **RINSE MOUTH AFTER EACH USE** 3/25/24   Randi Toledo MD   meloxicam (MOBIC) 15 MG tablet Take 1 tablet by mouth daily 24   Scott Brown MD   ondansetron (ZOFRAN) 4 MG tablet TAKE ONE TABLET BY MOUTH THREE TIMES A DAY AS NEEDED FOR NAUSEA OR VOMITING 23   Provider, MD Deepti   warfarin (COUMADIN) 1 MG tablet Take 0.5 tablets by mouth daily With the 6 mg to equal 6.5 mg 7 times a week.  Patient not taking: Reported on 2024   Randi Toledo MD   warfarin (COUMADIN) 4 MG tablet Take 1.5 tablets by mouth daily Take with a 0.5 mg to equal 6.5 mg warfarin daily total dose  Patient not taking: Reported on 2024   Randi Toledo MD   divalproex (DEPAKOTE) 250 MG DR tablet TAKE ONE TABLET BY MOUTH TWICE A DAY 23   Randi Toledo MD   DULoxetine (CYMBALTA) 60 MG extended release capsule TAKE 1 CAPSULE BY MOUTH DAILY 23   Randi Toledo MD   albuterol-ipratropium (COMBIVENT RESPIMAT)  MCG/ACT AERS inhaler INHALE ONE PUFF INTO THE LUNGS FOUR TIMES DAILY IN THE MORNING NOON IN THE EVENING AND BEFORE BEDTIME (MAY INCREASE TO EVERY 4 HOURS AS NEEDED) 23   Randi Toledo MD   omeprazole

## 2024-04-25 NOTE — DISCHARGE INSTRUCTIONS
Knee Arthroscopy: What to Expect at Home  Your Recovery     Arthroscopy is a way to find problems and do surgery inside a joint without making a large cut (incision). Your doctor put a lighted tube with a tiny camera--called an arthroscope, or scope--and surgical tools through small incisions in your knee.  You will feel tired for several days. Your knee will be swollen. And you may notice that your skin is a different color near the cuts (incisions). The swelling is normal and will start to go away in a few days. Keeping your leg higher than your heart will help with swelling and pain.  You will probably need about 6 weeks to recover. If your doctor repaired damaged tissue, recovery will take longer. You may have to limit your activity until your knee strength and movement are back to normal. You may also be in a physical rehabilitation (rehab) program.  You may be able to return to a desk job or your normal routine in a few days. But if you do physical labor, it may be a few weeks to a few months before you can go back to work.  This care sheet gives you a general idea about how long it will take for you to recover. But each person recovers at a different pace. Follow the steps below to get better as quickly as possible.  How can you care for yourself at home?  Activity    Rest when you feel tired. Getting enough sleep will help you recover. Use pillows to raise your ankle and leg above the level of your heart.     Try to walk each day, after your doctor has said you can. Start by walking a little more than you did the day before. Bit by bit, increase the amount you walk. Walking boosts blood flow and helps prevent pneumonia and constipation.     You may have a brace or crutches or both.     Your doctor will tell you how often and how much you can move your leg and knee.     If you have a desk job, you may be able to return to work a few days after the surgery. If you lift things or stand or walk a lot at  your incision comes open.     Bright red blood has soaked through the bandage over your incision.     You have signs of infection, such as:  Increased pain, swelling, warmth, or redness.  Red streaks leading from the incisions.  Pus draining from the incisions.  A fever.   Watch closely for any changes in your health, and be sure to contact your doctor if:    You do not have a bowel movement after taking a laxative.   Where can you learn more?  Go to https://[email or web address].net/patientEd and enter I338 to learn more about \"Knee Arthroscopy: What to Expect at Home.\"  Current as of: July 17, 2023               Content Version: 14.0  © 5972-9657 [employer].   Care instructions adapted under license by [employer]. If you have questions about a medical condition or this instruction, always ask your healthcare professional. [employer] disclaims any warranty or liability for your use of this information.

## 2024-04-25 NOTE — OP NOTE
Operative Note      Patient: Stephanie Vega  YOB: 1960  MRN: [id number]    Date of Procedure: 4/25/2024    Preoperative diagnosis: Recurrent medial meniscus tear right knee    Post-Op Diagnosis: Same       Procedure(s):  RIGHT KNEE ARTHROSCOPIC PARTIAL MEDIAL MENISCECTOMY    Surgeon(s):  Scott Brown MD    Assistant:   Resident: Carlos Alberto Mckeon DO    Anesthesia: General    Estimated Blood Loss (mL): Minimal    Complications: None    Specimens:   * No specimens in log *    Implants:  * No implants in log *      Drains: * No LDAs found *    Findings:  Infection Present At Time Of Surgery (PATOS) (choose all levels that have infection present):  No infection present  Other Findings: Recurrent flap type tear involving the body and posterior horn of the medial meniscus with grade III chondromalacia medial femoral condyle    Detailed Description of Procedure:     Patient is a 64 y.o. year old female who presents with a history of pain, locking, and giving away sensations of their right knee.  Patient has a history of a partial meniscectomy performed back in 2022 but is had recurrent injury and new symptoms compatible meniscus tear and recurrent effusions.  Physical examination was positive for Raissa's examination. MRI was consistent with a tear of the posterior horn body medial meniscus. Having failed conservative treatment, it was advised that arthroscopic examination and treatment of their knee would be beneficial and consent was obtained with risk and benefits explained to the patient. The patient was taken tot he operative room and general anesthesia was administered. A tourniquet was placed to the operatives lower extremity and then placed in the leg lipscomb. The leg was exsanguinated and the tourniquet inflated to the 300mmHg. The leg was the prepped and draped in the usual sterile fashion. Time-out was called to verify laterality.     Medial and lateral portals were made and the scope placed in  the lateral portal. The patella femoral joint was examined and noted to be relatively unremarkable. The scope was then passes down the medial gutter into the medial compartment. A probe was used to assess the medial meniscus and a flap tear was identified in the posterior horn and body of the medial meniscus. A partial medial menisectomy was carried ou with basket forceps and then smoothed out with a shaver. Repeat probing of the meniscus found it to be stable. There was grade III chondromalacia of the medial femoral condyle but nothing quite intervention    The arthroscope was then passed into the notch of the knee. The ACL was found not to have any significant damage or laxity.     The scope was then passed in the lateral compartment. Thorough probing of the lateral meniscus and the articular cartilage did not demonstrate any significant finding that requires surgical intervention    The scope was then passed into the suprapatellar area and the shaver was used to remove any further soft tissue debris. The scope was removed and the knee evacuated of fluid and injected with 20cc of 0.5% ropivacaine. The sterile bulky dressing was applied and the leg then wrapped with a large 6-inch ace bandage from toes to the mid-thigh. The tourniquet was then deflated at less than 30 minutes. The patient was awaken and taken to the PACU in good condition.     Electronically signed by Scott Brown MD on 4/25/2024 at 4:42 PM

## 2024-04-28 ENCOUNTER — HOSPITAL ENCOUNTER (EMERGENCY)
Age: 64
Discharge: HOME | End: 2024-04-28
Attending: EMERGENCY MEDICINE
Payer: MEDICAID

## 2024-04-28 VITALS
HEIGHT: 63 IN | RESPIRATION RATE: 17 BRPM | TEMPERATURE: 98.7 F | BODY MASS INDEX: 29.59 KG/M2 | OXYGEN SATURATION: 95 % | SYSTOLIC BLOOD PRESSURE: 142 MMHG | WEIGHT: 167 LBS | DIASTOLIC BLOOD PRESSURE: 94 MMHG | HEART RATE: 92 BPM

## 2024-04-28 DIAGNOSIS — T14.8XXA SUTURED SKIN WOUND: Primary | ICD-10-CM

## 2024-04-28 DIAGNOSIS — T14.8XXA OPEN WOUND: ICD-10-CM

## 2024-04-28 PROCEDURE — 99282 EMERGENCY DEPT VISIT SF MDM: CPT

## 2024-04-28 RX ORDER — LIDOCAINE HYDROCHLORIDE 10 MG/ML
5 INJECTION, SOLUTION INFILTRATION; PERINEURAL ONCE
Status: DISCONTINUED | OUTPATIENT
Start: 2024-04-28 | End: 2024-04-28 | Stop reason: HOSPADM

## 2024-04-28 ASSESSMENT — PAIN DESCRIPTION - ORIENTATION: ORIENTATION: RIGHT

## 2024-04-28 ASSESSMENT — PAIN DESCRIPTION - DESCRIPTORS: DESCRIPTORS: ACHING;SORE

## 2024-04-28 ASSESSMENT — PAIN SCALES - GENERAL: PAINLEVEL_OUTOF10: 3

## 2024-04-28 ASSESSMENT — PAIN DESCRIPTION - LOCATION: LOCATION: LEG

## 2024-04-28 ASSESSMENT — PAIN DESCRIPTION - PAIN TYPE: TYPE: SURGICAL PAIN

## 2024-04-28 ASSESSMENT — PAIN - FUNCTIONAL ASSESSMENT: PAIN_FUNCTIONAL_ASSESSMENT: 0-10

## 2024-04-28 NOTE — ED TRIAGE NOTES
Mode of arrival (squad #, walk in, police, etc) : walk in        Chief complaint(s):  wound check        Arrival Note (brief scenario, treatment PTA, etc).: c/o bleeding to R knee, recent meniscus repair. Also states 1 suture \"popped.\" (+) BT. Scant bleeding noted on arrival.        C= \"Have you ever felt that you should Cut down on your drinking?\"  No  A= \"Have people Annoyed you by criticizing your drinking?\"  No  G= \"Have you ever felt bad or Guilty about your drinking?\"  No  E= \"Have you ever had a drink as an Eye-opener first thing in the morning to steady your nerves or to help a hangover?\"  No      Deferred []      Reason for deferring: N/A    *If yes to two or more: probable alcohol abuse.*

## 2024-04-29 ENCOUNTER — TELEPHONE (OUTPATIENT)
Dept: ORTHOPEDIC SURGERY | Age: 64
End: 2024-04-29

## 2024-04-29 NOTE — TELEPHONE ENCOUNTER
Pt had a scope and had bleeding after surgery.  Pt said she had some bleeding on Friday but when she said she was walking around on it on Saturday and it started gushing blood and it continued to gush blood, she called her PCP over the weekend and he said to go to the ER.  The ER doctor said the surgical site had never been completely closed and put in 2 additional stitches and was told to call us to let us know what had happened.

## 2024-05-01 DIAGNOSIS — M48.061 SPINAL STENOSIS OF LUMBAR REGION WITHOUT NEUROGENIC CLAUDICATION: ICD-10-CM

## 2024-05-01 DIAGNOSIS — M51.9 LUMBAR DISC DISEASE: ICD-10-CM

## 2024-05-01 RX ORDER — HYDROCODONE BITARTRATE AND ACETAMINOPHEN 7.5; 325 MG/1; MG/1
1.5 TABLET ORAL EVERY 6 HOURS PRN
Qty: 180 TABLET | Refills: 0 | Status: SHIPPED | OUTPATIENT
Start: 2024-05-01 | End: 2024-05-31

## 2024-05-04 DIAGNOSIS — Z86.73 HISTORY OF TRANSIENT ISCHEMIC ATTACK (TIA): ICD-10-CM

## 2024-05-04 DIAGNOSIS — G45.9 TIA (TRANSIENT ISCHEMIC ATTACK): ICD-10-CM

## 2024-05-06 RX ORDER — WARFARIN SODIUM 1 MG/1
TABLET ORAL
Qty: 30 TABLET | Refills: 10 | Status: SHIPPED | OUTPATIENT
Start: 2024-05-06

## 2024-05-08 ENCOUNTER — OFFICE VISIT (OUTPATIENT)
Dept: ORTHOPEDIC SURGERY | Age: 64
End: 2024-05-08

## 2024-05-08 DIAGNOSIS — Z98.890 S/P RIGHT KNEE ARTHROSCOPY: Primary | ICD-10-CM

## 2024-05-08 PROCEDURE — 99024 POSTOP FOLLOW-UP VISIT: CPT | Performed by: ORTHOPAEDIC SURGERY

## 2024-05-08 NOTE — PROGRESS NOTES
Chief Complaint   Patient presents with    Physical    Labs     Patient in office today for physical and labs; Pt states she was taken to [facility] on Tuesday for SOB and chest pain; was dx with anxiety attack. Xray and EKG was done came back normal.    1. Have you been to the ER, urgent care clinic since your last visit? Hospitalized since your last visit?see note    2. Have you seen or consulted any other health care providers outside of the [facility] since your last visit? Include any pap smears or colon screening. No    Health Maintenance Due   Topic Date Due    HPV AGE 9Y-34Y (2 of 3 - Female 3 Dose Series) 12/09/2011    DTaP/Tdap/Td series (1 - Tdap) 06/14/2016    PAP AKA CERVICAL CYTOLOGY  06/14/2016    INFLUENZA AGE 9 TO ADULT  08/01/2016     Cp and SOB has been progressively worsening over the last few weeks. Has a lot going on. Pt was just fired from her job, feeling overwhelmed. Has not previously had a problem with anxiety. Denies any family history of anxiety. Has not seen her cardiologist in some time. Denies receiving any medications for her nerves. Has not established with an OBGYN. Not interested in flu shot. Denies any other concerns at this time. Chief Complaint   Patient presents with   Natali Walton     she is a 24y.o. year old female who presents for evalution. Reviewed PmHx, RxHx, FmHx, SocHx, AllgHx and updated and dated in the chart.     Review of Systems - negative except as listed above in the HPI    Objective:     Vitals:    02/10/17 1522   BP: 119/84   Pulse: (!) 106   Resp: 18   Temp: 99 °F (37.2 °C)   TempSrc: Oral   SpO2: 99%   Weight: 122 lb (55.3 kg)   Height: 5' 1\" (1.549 m)     Physical Examination: General appearance - alert, well appearing, and in no distress  Mental status - depressed mood, anxious  Eyes - pupils equal and reactive, extraocular eye movements intact  Ears - bilateral TM's and external ear canals Patient returns today status post right knee arthroscopy with partial (medial/lateral) meniscectomy. Patient has no major complaints other than expected tightness/swelling with ROM. Sharp/stabbing pain has improved.     On exam, portal sites are without redness or drainage. No calf tenderness; negative Anjana's sign. Motion is 0-100 degrees. No significant effusion.     Assessment  Status post right knee arthroscopy    Plan  Patient given exercises to perform. Patient given activities/ motions to complete. Continue activities at home. Return to work. RTO PRN. Call with any future problems.    normal  Nose - normal and patent, no erythema, discharge or polyps and normal nontender sinuses  Mouth - mucous membranes moist, pharynx normal without lesions  Neck - supple, no significant adenopathy, carotids upstroke normal bilaterally, no bruits, thyroid exam: thyroid is normal in size without nodules or tenderness  Chest - clear to auscultation, no wheezes, rales or rhonchi, symmetric air entry  Heart - normal rate, regular rhythm, normal S1, S2, no murmurs  Extremities - peripheral pulses normal, no ankle edema, no clubbing or cyanosis  Skin - normal coloration and turgor, no rashes, no suspicious skin lesions noted    Assessment/ Plan:   Joao Rendon was seen today for physical and labs. Diagnoses and all orders for this visit:    Annual physical exam  -     LIPID PANEL  -     METABOLIC PANEL, COMPREHENSIVE  -     CBC WITH AUTOMATED DIFF  Will notify results and deviate plan based on findings. S/P MVR (mitral valve repair)  Enc pt to follow up with cardiology for ongoing management. MEDHAT (generalized anxiety disorder)  -     escitalopram oxalate (LEXAPRO) 10 mg tablet; Take 1 Tab by mouth daily. -     clonazePAM (KLONOPIN) 0.5 mg tablet; Take 1 Tab by mouth daily as needed. Start lexapro daily. PRN klonopin for acute anxiety sx only. Reinforced SEs/ADRs of medications and how to take responsibly. Follow up if sx persist or worsen or if medication SEs intolerable. Screening for thyroid disorder  -     TSH 3RD GENERATION  Screening, asx. Follow-up Disposition:  Return in about 1 month (around 3/10/2017) for follow up. I have discussed the diagnosis with the patient and the intended plan as seen in the above orders. The patient has received an after-visit summary and questions were answered concerning future plans.      Medication Side Effects and Warnings were discussed with patient: yes  Patient Labs were reviewed and or requested: yes  Patient Past Records were reviewed and or requested yes  Patient / Caregiver Understanding of treatment plan was verbalized during office visit MURIEL Rodriguez    Patient Instructions   Benzodiazepines: Potential side effects of benzodiazepine medications include, but are not limited to, the possibility of \"paradoxical agitation\" with irritability, aggressiveness or stimulated behavior; clumsiness, slurring of speech, dulled facies, psychomotor impairment, anterograde amnesia, impaired awareness of degree of drug effect, visual and hearing sensitivity impairment, other psychiatric/behavioral disturbances, impacts operating certain machinery or engaging in certain activities or employment, anxiety, insomnia, anorexia, tremor, nausea, vomiting, diarrhea and potential to develop tolerance, dependence, addiction and death from overdose. Use caution while taking benzodiazepines. There is a potential to overdose on this medication when too many pills are taken at one time. Do not mix alcohol with this medication because it can worsen side effects and be very dangerous. Escitalopram (By mouth)   Escitalopram (um-muh-BFD-oh-pram)  Treats depression and generalized anxiety disorder (MEDHAT). Brand Name(s):Lexapro   There may be other brand names for this medicine. When This Medicine Should Not Be Used: This medicine is not right for everyone. Do not use it if you had an allergic reaction to escitalopram or citalopram.  How to Use This Medicine:   Liquid, Tablet  · Take this medicine as directed. You may need to take it for a month or more before you feel better. Your dose may need to be changed to find out what works best for you. · Measure the oral liquid medicine with a marked measuring spoon, oral syringe, or medicine cup. · This medicine should come with a Medication Guide. Ask your pharmacist for a copy if you do not have one. · Missed dose: Take a dose as soon as you remember.  If it is almost time for your next dose, wait until then and take a regular dose. Do not take extra medicine to make up for a missed dose. · Store the medicine in a closed container at room temperature, away from heat, moisture, and direct light. Drugs and Foods to Avoid:   Ask your doctor or pharmacist before using any other medicine, including over-the-counter medicines, vitamins, and herbal products. · Do not use this medicine together with pimozide. Do not use this medicine and an MAO inhibitor (MAOI) within 14 days of each other. · Some medicines can affect how escitalopram works. Tell your doctor if you are using the following:   ¨ Buspirone, carbamazepine, cimetidine, fentanyl, lithium, Jeana's wort, tramadol, or tryptophan supplements  ¨ An NSAID pain or arthritis medicine (such as aspirin, diclofenac, ibuprofen, naproxen), triptan medicine to treat migraine headaches, a blood thinner (such as warfarin), or a diuretic (water pill)  · Do not drink alcohol while you are using this medicine. Warnings While Using This Medicine:   · Tell your doctor if you are pregnant or breastfeeding, or if you have kidney disease, liver disease, bleeding problems, glaucoma, heart disease, or a seizure disorder. · For some children, teenagers, and young adults, this medicine may increase mental or emotional problems. This may lead to thoughts of suicide and violence. Talk with your doctor right away if you have any thoughts or behavior changes that concern you. Tell your doctor if you or anyone in your family has a history of bipolar disorder or suicide attempts. · This medicine may cause the following problems:   ¨ Serotonin syndrome (more likely when taken with certain medicines)  ¨ Low sodium levels  ¨ Increased risk of bleeding problems  · This medicine may make you dizzy or drowsy. Do not drive or do anything that could be dangerous until you know how this medicine affects you.   · Your doctor may want to monitor your child's weight and height, because this medicine may cause decreased appetite and weight loss in children. · Do not stop using this medicine suddenly. Your doctor will need to slowly decrease your dose before you stop it completely. · Your doctor will check your progress and the effects of this medicine at regular visits. Keep all appointments. · Keep all medicine out of the reach of children. Never share your medicine with anyone. Possible Side Effects While Using This Medicine:   Call your doctor right away if you notice any of these side effects:  · Allergic reaction: Itching or hives, swelling in your face or hands, swelling or tingling in your mouth or throat, chest tightness, trouble breathing  · Anxiety, restlessness, fever, sweating, muscle spasms, nausea, vomiting, diarrhea, seeing or hearing things that are not there  · Confusion, weakness, and muscle twitching  · Fast, pounding, or uneven heartbeat  · Feeling more excited or energetic than usual, racing thoughts, trouble sleeping  · Eye pain, vision changes, seeing halos around lights  · Seizures  · Thoughts of hurting yourself or others, unusual behavior  · Unusual bleeding or bruising  If you notice these less serious side effects, talk with your doctor:   · Dizziness, drowsiness, or sleepiness  · Dry mouth  · Headache  · Nausea, constipation, diarrhea  · Sexual problems  If you notice other side effects that you think are caused by this medicine, tell your doctor. Call your doctor for medical advice about side effects. You may report side effects to FDA at 6-620-FDA-0621  © 2016 [address] is for End User's use only and may not be sold, redistributed or otherwise used for commercial purposes. The above information is an  only. It is not intended as medical advice for individual conditions or treatments. Talk to your doctor, nurse or pharmacist before following any medical regimen to see if it is safe and effective for you.

## 2024-05-20 ENCOUNTER — TELEPHONE (OUTPATIENT)
Dept: SURGERY | Age: 64
End: 2024-05-20

## 2024-05-30 DIAGNOSIS — M48.061 SPINAL STENOSIS OF LUMBAR REGION WITHOUT NEUROGENIC CLAUDICATION: ICD-10-CM

## 2024-05-30 DIAGNOSIS — M51.9 LUMBAR DISC DISEASE: ICD-10-CM

## 2024-05-30 RX ORDER — HYDROCODONE BITARTRATE AND ACETAMINOPHEN 7.5; 325 MG/1; MG/1
1.5 TABLET ORAL EVERY 6 HOURS PRN
Qty: 180 TABLET | Refills: 0 | Status: SHIPPED | OUTPATIENT
Start: 2024-05-30 | End: 2024-06-29

## 2024-06-06 ENCOUNTER — OFFICE VISIT (OUTPATIENT)
Dept: PRIMARY CARE CLINIC | Age: 64
End: 2024-06-06
Payer: MEDICAID

## 2024-06-06 VITALS
HEIGHT: 63 IN | SYSTOLIC BLOOD PRESSURE: 100 MMHG | OXYGEN SATURATION: 97 % | DIASTOLIC BLOOD PRESSURE: 64 MMHG | HEART RATE: 86 BPM | WEIGHT: 166.2 LBS | BODY MASS INDEX: 29.45 KG/M2

## 2024-06-06 DIAGNOSIS — M51.9 LUMBAR DISC DISEASE: ICD-10-CM

## 2024-06-06 DIAGNOSIS — Z79.899 MEDICATION MANAGEMENT: ICD-10-CM

## 2024-06-06 DIAGNOSIS — F41.9 ANXIETY: ICD-10-CM

## 2024-06-06 DIAGNOSIS — Z79.01 LONG TERM (CURRENT) USE OF ANTICOAGULANTS: Primary | ICD-10-CM

## 2024-06-06 DIAGNOSIS — M25.511 ACUTE PAIN OF RIGHT SHOULDER: ICD-10-CM

## 2024-06-06 DIAGNOSIS — Z13.220 SCREENING CHOLESTEROL LEVEL: ICD-10-CM

## 2024-06-06 DIAGNOSIS — Z86.73 HISTORY OF TRANSIENT ISCHEMIC ATTACK (TIA): ICD-10-CM

## 2024-06-06 DIAGNOSIS — Z12.31 BREAST CANCER SCREENING BY MAMMOGRAM: ICD-10-CM

## 2024-06-06 DIAGNOSIS — M48.061 SPINAL STENOSIS OF LUMBAR REGION WITHOUT NEUROGENIC CLAUDICATION: ICD-10-CM

## 2024-06-06 DIAGNOSIS — F51.01 PRIMARY INSOMNIA: ICD-10-CM

## 2024-06-06 LAB
ALCOHOL URINE: NORMAL
AMPHETAMINE SCREEN, URINE: NEGATIVE
BARBITURATE SCREEN, URINE: NEGATIVE
BENZODIAZEPINE SCREEN, URINE: NEGATIVE
BUPRENORPHINE URINE: NEGATIVE
COCAINE METABOLITE SCREEN URINE: NEGATIVE
FENTANYL SCREEN, URINE: NORMAL
GABAPENTIN SCREEN, URINE: NORMAL
INTERNATIONAL NORMALIZATION RATIO, POC: 3.5
MDMA URINE: NEGATIVE
METHADONE SCREEN, URINE: NEGATIVE
METHAMPHETAMINE, URINE: NEGATIVE
OPIATE SCREEN URINE: POSITIVE
OXYCODONE SCREEN URINE: NEGATIVE
PHENCYCLIDINE SCREEN URINE: NEGATIVE
PROPOXYPHENE SCREEN, URINE: NEGATIVE
SYNTHETIC CANNABINOIDS(K2) SCREEN, URINE: NORMAL
THC SCREEN, URINE: NEGATIVE
TRAMADOL SCREEN URINE: NORMAL
TRICYCLIC ANTIDEPRESSANTS, UR: NEGATIVE

## 2024-06-06 PROCEDURE — 99214 OFFICE O/P EST MOD 30 MIN: CPT | Performed by: FAMILY MEDICINE

## 2024-06-06 PROCEDURE — 4004F PT TOBACCO SCREEN RCVD TLK: CPT | Performed by: FAMILY MEDICINE

## 2024-06-06 PROCEDURE — G8417 CALC BMI ABV UP PARAM F/U: HCPCS | Performed by: FAMILY MEDICINE

## 2024-06-06 PROCEDURE — 85610 PROTHROMBIN TIME: CPT | Performed by: FAMILY MEDICINE

## 2024-06-06 PROCEDURE — 80305 DRUG TEST PRSMV DIR OPT OBS: CPT | Performed by: FAMILY MEDICINE

## 2024-06-06 PROCEDURE — G8427 DOCREV CUR MEDS BY ELIG CLIN: HCPCS | Performed by: FAMILY MEDICINE

## 2024-06-06 PROCEDURE — 3017F COLORECTAL CA SCREEN DOC REV: CPT | Performed by: FAMILY MEDICINE

## 2024-06-06 RX ORDER — DIPHENHYDRAMINE HCL 25 MG
25 CAPSULE ORAL NIGHTLY PRN
Refills: 2 | COMMUNITY
Start: 2024-06-06 | End: 2024-07-06

## 2024-06-06 RX ORDER — WARFARIN SODIUM 6 MG/1
6 TABLET ORAL
Qty: 30 TABLET | Refills: 2 | Status: SHIPPED | OUTPATIENT
Start: 2024-06-06 | End: 2024-09-19

## 2024-06-06 NOTE — PROGRESS NOTES
(DEPAKOTE) 250 MG DR tablet TAKE ONE TABLET BY MOUTH TWICE A DAY 60 tablet 10    DULoxetine (CYMBALTA) 60 MG extended release capsule TAKE 1 CAPSULE BY MOUTH DAILY 30 capsule 10    omeprazole (PRILOSEC) 20 MG delayed release capsule TAKE 1 CAPSULE BY MOUTH DAILY 30 capsule 10    busPIRone (BUSPAR) 15 MG tablet TAKE ONE TABLET BY MOUTH TWICE A DAY 60 tablet 10    SYMBICORT 160-4.5 MCG/ACT AERO INHALE 2 PUFFS INTO THE LUNGS 2 TIMES DAILY 1 each 10     Current Facility-Administered Medications   Medication Dose Route Frequency Provider Last Rate Last Admin    methylPREDNISolone acetate (DEPO-MEDROL) injection 80 mg  80 mg Intra-artICUlar Once Jesus Moreno,         bupivacaine (MARCAINE) 0.25 % injection 5 mg  2 mL Intra-artICUlar Once Jesus Moreno, DO         Allergies   Allergen Reactions    Egg-Derived Products Other (See Comments)     Severe abdominal pains    Bupropion Nausea Only    Ceftin [Cefuroxime Axetil] Nausea Only     Nausea and upset stomach       Health Maintenance   Topic Date Due    Respiratory Syncytial Virus (RSV) Pregnant or age 60 yrs+ (1 - 1-dose 60+ series) Never done    COVID-19 Vaccine (4 - 2023-24 season) 09/01/2023    Lipids  10/21/2024    DTaP/Tdap/Td vaccine (2 - Td or Tdap) 12/11/2024    Depression Monitoring  02/26/2025    Low dose CT lung screening &/or counseling  04/04/2025    Breast cancer screen  08/30/2025    Colorectal Cancer Screen  12/29/2026    Cervical cancer screen  03/07/2029    Flu vaccine  Completed    Shingles vaccine  Completed    Pneumococcal 0-64 years Vaccine  Completed    Hepatitis C screen  Completed    HIV screen  Completed    Hepatitis A vaccine  Aged Out    Hepatitis B vaccine  Aged Out    Hib vaccine  Aged Out    Polio vaccine  Aged Out    Meningococcal (ACWY) vaccine  Aged Out       Subjective:      Review of Systems   Constitutional:  Positive for fatigue.   Respiratory:          Asthma under fair control  Still smoking , not interested in quiting yet

## 2024-06-06 NOTE — PATIENT INSTRUCTIONS

## 2024-06-25 DIAGNOSIS — J44.9 CHRONIC OBSTRUCTIVE PULMONARY DISEASE, UNSPECIFIED COPD TYPE (HCC): ICD-10-CM

## 2024-06-25 RX ORDER — BUSPIRONE HYDROCHLORIDE 15 MG/1
TABLET ORAL
Qty: 60 TABLET | Refills: 0 | Status: SHIPPED | OUTPATIENT
Start: 2024-06-25

## 2024-06-25 RX ORDER — BUDESONIDE AND FORMOTEROL FUMARATE DIHYDRATE 160; 4.5 UG/1; UG/1
AEROSOL RESPIRATORY (INHALATION)
Qty: 10.2 G | Refills: 0 | Status: SHIPPED | OUTPATIENT
Start: 2024-06-25

## 2024-07-01 DIAGNOSIS — M51.9 LUMBAR DISC DISEASE: ICD-10-CM

## 2024-07-01 DIAGNOSIS — M48.061 SPINAL STENOSIS OF LUMBAR REGION WITHOUT NEUROGENIC CLAUDICATION: ICD-10-CM

## 2024-07-02 ENCOUNTER — HOSPITAL ENCOUNTER (OUTPATIENT)
Dept: PHYSICAL THERAPY | Age: 64
Setting detail: THERAPIES SERIES
Discharge: HOME | End: 2024-07-02
Payer: MEDICAID

## 2024-07-02 PROCEDURE — 97162 PT EVAL MOD COMPLEX 30 MIN: CPT

## 2024-07-02 RX ORDER — HYDROCODONE BITARTRATE AND ACETAMINOPHEN 7.5; 325 MG/1; MG/1
1.5 TABLET ORAL EVERY 6 HOURS PRN
Qty: 180 TABLET | Refills: 0 | Status: SHIPPED | OUTPATIENT
Start: 2024-07-02 | End: 2024-08-01

## 2024-07-02 NOTE — CONSULTS
[] [facility]  Outpatient Rehabilitation &  Therapy  [address]  [phone]  [phone] [] [facility]  Outpatient Rehabilitation &  Therapy  [address]  P: [phone]  F: [phone] [] [facility]  Outpatient Rehabilitation &  Therapy  [address]  P: [phone]  F: [phone] [] [facility]  Outpatient Rehabilitation &  Therapy  [address]  [phone]  [phone] [] [facility]  Outpatient Rehabilitation &  Therapy  [address]   P: [phone]  F: [phone]  [] [facility]  Outpatient Rehabilitation &  Therapy  [address]  P: [phone]  F: [phone] [] [facility]  Outpatient Rehabilitation &  Therapy  [address]  P: [phone]  F: [phone] [] [facility]  Outpatient Rehabilitation &  Therapy  [address]  P: [phone]  F: [phone] [] [facility]  Outpatient Rehabilitation &  Therapy  [address]  P: [phone]  F: [phone]  [x] [facility] Outpatient Rehabilitation &  Therapy  [address]  P: [phone]  F: [phone]     Physical Therapy General Evaluation    Date:  2024  Patient: April Silvia  : 1960  MRN: [id number]  Physician: Randi Toledo MD      Insurance: [employer];  30 visits per year, No Hard Max;  AUTH Required after Eval  Medical Diagnosis:   M48.061 (ICD-10-CM) - Spinal stenosis of lumbar region without neurogenic claudication   M51.9 (ICD-10-CM) - Lumbar disc disease   M25.511 (ICD-10-CM) - Acute pain of right shoulder       Rehab Codes: M25.611, M54.50, M25.651, M25.652  Onset Date: Low back: ;  Right shoulder 2024                                  Next 's appt: 24    Subjective:  Pt c/o

## 2024-07-10 NOTE — FLOWSHEET NOTE
[facility]   Outpatient Rehabilitation & Therapy  [address]  P: [phone]   F: [phone]    Physical Therapy Daily Treatment Note      Date:  7/10/2024  Patient Name:  Stephanie Vega    :  1960  MRN: [id number]  Physician: Randi Toledo MD                                Insurance: [employer];  30 visits per year, No Hard Max;  AUTH Required after Eval  Medical Diagnosis:   M48.061 (ICD-10-CM) - Spinal stenosis of lumbar region without neurogenic claudication   M51.9 (ICD-10-CM) - Lumbar disc disease   M25.511 (ICD-10-CM) - Acute pain of right shoulder                             Rehab Codes: M25.611, M54.50, M25.651, M25.652  Onset Date: Low back: ;  Right shoulder 2024                                  Next 's appt: 24  Visit# / total visits:   Cancels/No Shows: 0/0    Subjective:  Patient reporting to therapy stating she was pretty sore in the low back post eval.  Patient reporting she has not been compliant with HEP due to increased pain from eval.      Pain:  [x] Yes  [] No Location: low back Pain Rating: (0-10 scale) 9/10  Pain altered Tx:  [] No  [] Yes  Action:  Comments:    Objective:  Modalities:   Precautions:  Exercises:  Exercise Reps/ Time Weight/ Level Completed Comments   PRONE          Prone 3'        NAYA          Prone hip Ext          Planks                                SEATED          Shrugs/scap retractions 10 x                                                    SUPINE          Bridge 10x2 3\" x     Richard Modified Leonides 3x30\"   x     Richard Piriformis Stretch 3x30\"   x     Richard Ham stretch 3x30\"   x     Shld Flex with cane 10x2   x     Pelvic tilts  15x 5\"   x Emphasize to tolerance just before pain; education provided    active hamstring stretch 10x 3\"   x      Scapular protraction (B) 10x2   x      LTR     x  Trialled could not tolerate to R side.          SIDE LYING          Shld ER 10x         Shld Hor Abd

## 2024-07-11 ENCOUNTER — HOSPITAL ENCOUNTER (OUTPATIENT)
Dept: PHYSICAL THERAPY | Age: 64
Setting detail: THERAPIES SERIES
Discharge: HOME | End: 2024-07-11
Payer: MEDICAID

## 2024-07-11 PROCEDURE — 97110 THERAPEUTIC EXERCISES: CPT

## 2024-07-14 DIAGNOSIS — J44.9 CHRONIC OBSTRUCTIVE PULMONARY DISEASE, UNSPECIFIED COPD TYPE (HCC): ICD-10-CM

## 2024-07-15 RX ORDER — BUDESONIDE AND FORMOTEROL FUMARATE DIHYDRATE 160; 4.5 UG/1; UG/1
AEROSOL RESPIRATORY (INHALATION)
Qty: 2 EACH | Refills: 10 | Status: SHIPPED | OUTPATIENT
Start: 2024-07-15

## 2024-07-16 ENCOUNTER — HOSPITAL ENCOUNTER (OUTPATIENT)
Dept: PHYSICAL THERAPY | Age: 64
Setting detail: THERAPIES SERIES
Discharge: HOME | End: 2024-07-16
Payer: MEDICAID

## 2024-07-16 ENCOUNTER — TELEPHONE (OUTPATIENT)
Dept: PRIMARY CARE CLINIC | Age: 64
End: 2024-07-16

## 2024-07-16 NOTE — TELEPHONE ENCOUNTER
Patient states she can't work since surgery last thursday , has not gotten any sleep and her back is worst then before surgery states she can not walk standing straight. Had to cancel physical therapy

## 2024-07-16 NOTE — FLOWSHEET NOTE
[facility]   Outpatient Rehabilitation & Therapy  [address]  P: [phone]   F: [phone]     Physical Therapy Cancel/No Show note    Date: 2024  Patient: Stephanie Vega  : 1960  MRN: [id number]    Visit Count:   Cancels/No Shows to date:     For today's appointment patient:    [x]  Cancelled    [] Rescheduled appointment    [] No-show     Reason given by patient:    []  Patient ill    []  Conflicting appointment    [] No transportation      [] Conflict with work    [] No reason given    [] Weather related    [] COVID-19    [x] Other:      Comments:  Too much pain from last session      [] Next appointment was confirmed    Electronically signed by: Mercy Banks PTA

## 2024-07-16 NOTE — TELEPHONE ENCOUNTER
Patient states she did not have surgery last week, her most recent procedure was done 04/28 and it was a knee scope.    Patient states she started going to physical therapy for her back pain about 2 weeks ago and since then she's had more back pain and hip pain which sh didn't have prior to physical therapy.    Patient confirms that the pain is making it hard for her to sleep and complete daily activities.

## 2024-07-16 NOTE — TELEPHONE ENCOUNTER
Not sure what surgery she had.  Let her know Dr. Toledo is out of the office until next week.  Find out what surgery- more info please.

## 2024-07-18 ENCOUNTER — HOSPITAL ENCOUNTER (OUTPATIENT)
Dept: PHYSICAL THERAPY | Age: 64
Setting detail: THERAPIES SERIES
End: 2024-07-18
Payer: MEDICAID

## 2024-07-19 ENCOUNTER — OFFICE VISIT (OUTPATIENT)
Dept: PRIMARY CARE CLINIC | Age: 64
End: 2024-07-19
Payer: MEDICAID

## 2024-07-19 VITALS
HEIGHT: 63 IN | HEART RATE: 88 BPM | BODY MASS INDEX: 29.2 KG/M2 | OXYGEN SATURATION: 96 % | DIASTOLIC BLOOD PRESSURE: 80 MMHG | SYSTOLIC BLOOD PRESSURE: 134 MMHG | WEIGHT: 164.8 LBS

## 2024-07-19 DIAGNOSIS — M54.50 LOW BACK PAIN AT MULTIPLE SITES: Primary | ICD-10-CM

## 2024-07-19 DIAGNOSIS — M51.9 LUMBAR DISC DISEASE: ICD-10-CM

## 2024-07-19 PROCEDURE — 99214 OFFICE O/P EST MOD 30 MIN: CPT | Performed by: STUDENT IN AN ORGANIZED HEALTH CARE EDUCATION/TRAINING PROGRAM

## 2024-07-19 PROCEDURE — 4004F PT TOBACCO SCREEN RCVD TLK: CPT | Performed by: STUDENT IN AN ORGANIZED HEALTH CARE EDUCATION/TRAINING PROGRAM

## 2024-07-19 PROCEDURE — G8417 CALC BMI ABV UP PARAM F/U: HCPCS | Performed by: STUDENT IN AN ORGANIZED HEALTH CARE EDUCATION/TRAINING PROGRAM

## 2024-07-19 PROCEDURE — G8427 DOCREV CUR MEDS BY ELIG CLIN: HCPCS | Performed by: STUDENT IN AN ORGANIZED HEALTH CARE EDUCATION/TRAINING PROGRAM

## 2024-07-19 PROCEDURE — 3017F COLORECTAL CA SCREEN DOC REV: CPT | Performed by: STUDENT IN AN ORGANIZED HEALTH CARE EDUCATION/TRAINING PROGRAM

## 2024-07-19 RX ORDER — OMEPRAZOLE 20 MG/1
20 CAPSULE, DELAYED RELEASE ORAL DAILY
Qty: 30 CAPSULE | Refills: 1 | Status: SHIPPED | OUTPATIENT
Start: 2024-07-19

## 2024-07-19 RX ORDER — TIZANIDINE 2 MG/1
2 TABLET ORAL NIGHTLY PRN
Qty: 30 TABLET | Refills: 0 | Status: SHIPPED | OUTPATIENT
Start: 2024-07-19

## 2024-07-19 ASSESSMENT — ENCOUNTER SYMPTOMS
SHORTNESS OF BREATH: 0
BACK PAIN: 1
NAUSEA: 0
VOMITING: 0
ABDOMINAL PAIN: 0

## 2024-07-22 ENCOUNTER — HOSPITAL ENCOUNTER (OUTPATIENT)
Dept: PHYSICAL THERAPY | Age: 64
Setting detail: THERAPIES SERIES
Discharge: HOME | End: 2024-07-22
Payer: MEDICAID

## 2024-07-22 NOTE — FLOWSHEET NOTE
[facility]   Outpatient Rehabilitation & Therapy  [address]  P: [phone]   F: [phone]     Physical Therapy Cancel/No Show note    Date: 2024  Patient: Stephanie Vega  : 1960  MRN: [id number]    Visit Count:   Cancels/No Shows to date:     For today's appointment patient:    [x]  Cancelled    [] Rescheduled appointment    [] No-show     Reason given by patient:    []  Patient ill    []  Conflicting appointment    [] No transportation      [] Conflict with work    [x] No reason given    [] Weather related    [] COVID-19    [] Other:      Comments:       [] Next appointment was confirmed    Electronically signed by: Jorje Dominguez PTA

## 2024-07-24 DIAGNOSIS — M51.9 LUMBAR DISC DISEASE: ICD-10-CM

## 2024-07-24 DIAGNOSIS — M54.50 LOW BACK PAIN AT MULTIPLE SITES: ICD-10-CM

## 2024-07-24 RX ORDER — TIZANIDINE 2 MG/1
2 TABLET ORAL EVERY 8 HOURS PRN
Qty: 30 TABLET | Refills: 0
Start: 2024-07-24 | End: 2024-07-24

## 2024-07-25 ENCOUNTER — HOSPITAL ENCOUNTER (OUTPATIENT)
Dept: PHYSICAL THERAPY | Age: 64
Setting detail: THERAPIES SERIES
Discharge: HOME | End: 2024-07-25
Payer: MEDICAID

## 2024-07-25 PROCEDURE — 97110 THERAPEUTIC EXERCISES: CPT

## 2024-07-25 NOTE — FLOWSHEET NOTE
[facility]   Outpatient Rehabilitation & Therapy  [address]  P: [phone]   F: [phone]    Physical Therapy Daily Treatment Note      Date:  2024  Patient Name:  Stephanie Vega    :  1960  MRN: [id number]  Physician: Randi Toledo MD                                Insurance: [employer];  30 visits per year, No Hard Max;  AUTH Required after Eval: 24 Approved  96 units 7/3/24--9/3/24  of US, Manual, Neuro Re-ed, and There Exc  Medical Diagnosis:   M48.061 (ICD-10-CM) - Spinal stenosis of lumbar region without neurogenic claudication   M51.9 (ICD-10-CM) - Lumbar disc disease   M25.511 (ICD-10-CM) - Acute pain of right shoulder                             Rehab Codes: M25.611, M54.50, M25.651, M25.652  Onset Date: Low back: ;  Right shoulder 2024                                  Next 's appt: 24  Visit# / total visits: 3/24  Cancels/No Shows: 2/0    Subjective:  Patient reporting she had not been in because of increased pain in her shoulder and back post last session.  Patient reporting she couldn't sleep or work for several days after.  Patient reporting she called the doctor post last session due to high pain and states they want to decrease exercise intensity and added muscle relaxer 3x/day.    Pain:  [x] Yes  [] No Location: low back Pain Rating: (0-10 scale) 5/10- pain pill and muscle relaxer before   Pain altered Tx:  [] No  [] Yes  Action:  Comments:    Objective:  Modalities:   Precautions:  Exercises: MHP use for supine exercises  Exercise Reps/ Time Weight/ Level Completed Comments   PRONE          Prone 3'        NAYA          Prone hip Ext          Planks                                SEATED          Shrugs/scap retractions 10 x                                                    SUPINE          Bridge 10x2 3\"      Richard Modified Leonides 3x30\"   x  while stretching LLE, pt noted increased pain in R side of back

## 2024-07-26 ENCOUNTER — TELEPHONE (OUTPATIENT)
Dept: PRIMARY CARE CLINIC | Age: 64
End: 2024-07-26

## 2024-07-26 DIAGNOSIS — M51.9 LUMBAR DISC DISEASE: ICD-10-CM

## 2024-07-26 DIAGNOSIS — M54.50 LOW BACK PAIN AT MULTIPLE SITES: ICD-10-CM

## 2024-07-26 NOTE — TELEPHONE ENCOUNTER
Patient states the tizanidine is only working 4 hours. Asking for low dose pain meds something other than hydrocodone.

## 2024-07-26 NOTE — TELEPHONE ENCOUNTER
Can increase tizanidine to 4 mg every 8 hrs.   I am not sure what she means by low dose pain meds. She is on norco.   She can't take tylenol because it's in the norco.

## 2024-07-29 RX ORDER — ONDANSETRON 4 MG/1
TABLET, FILM COATED ORAL
Qty: 20 TABLET | Refills: 0 | Status: SHIPPED | OUTPATIENT
Start: 2024-07-29

## 2024-07-31 DIAGNOSIS — M51.9 LUMBAR DISC DISEASE: ICD-10-CM

## 2024-07-31 DIAGNOSIS — M48.061 SPINAL STENOSIS OF LUMBAR REGION WITHOUT NEUROGENIC CLAUDICATION: ICD-10-CM

## 2024-07-31 RX ORDER — HYDROCODONE BITARTRATE AND ACETAMINOPHEN 7.5; 325 MG/1; MG/1
1.5 TABLET ORAL EVERY 6 HOURS PRN
Qty: 180 TABLET | Refills: 0 | Status: SHIPPED | OUTPATIENT
Start: 2024-07-31 | End: 2024-08-30

## 2024-08-01 DIAGNOSIS — M51.9 LUMBAR DISC DISEASE: ICD-10-CM

## 2024-08-01 DIAGNOSIS — M54.50 LOW BACK PAIN AT MULTIPLE SITES: ICD-10-CM

## 2024-08-01 RX ORDER — TIZANIDINE 4 MG/1
4 TABLET ORAL 3 TIMES DAILY PRN
Qty: 90 TABLET | Refills: 3 | Status: SHIPPED | OUTPATIENT
Start: 2024-08-01

## 2024-08-05 ENCOUNTER — HOSPITAL ENCOUNTER (OUTPATIENT)
Dept: PHYSICAL THERAPY | Age: 64
Setting detail: THERAPIES SERIES
Discharge: HOME | End: 2024-08-05
Payer: MEDICAID

## 2024-08-05 PROCEDURE — 97110 THERAPEUTIC EXERCISES: CPT

## 2024-08-05 PROCEDURE — 97140 MANUAL THERAPY 1/> REGIONS: CPT

## 2024-08-05 NOTE — FLOWSHEET NOTE
[facility]   Outpatient Rehabilitation & Therapy  [address]  P: [phone]   F: [phone]    Physical Therapy Daily Treatment Note      Date:  2024  Patient Name:  Stephanie Vega    :  1960  MRN: [id number]  Physician: Randi Toledo MD                                Insurance: [employer];  30 visits per year, No Hard Max;  AUTH Required after Eval: 24 Approved  96 units 7/3/24--9/3/24  of US, Manual, Neuro Re-ed, and There Exc  Medical Diagnosis:   M48.061 (ICD-10-CM) - Spinal stenosis of lumbar region without neurogenic claudication   M51.9 (ICD-10-CM) - Lumbar disc disease   M25.511 (ICD-10-CM) - Acute pain of right shoulder                             Rehab Codes: M25.611, M54.50, M25.651, M25.652  Onset Date: Low back: ;  Right shoulder 2024                                  Next 's appt: 24  Visit# / total visits:   Cancels/No Shows: 2/0    Subjective:   before last, swam for 5 hours with no pain, but back was tight. Reported that she has tried a CBD ointment for back pain that helps for about an hour. Reported that she feels maintaining neutral posture is difficult. Reported that R shoulder pain remains unchanged.      Pain:  [x] Yes  [] No Location: low back, R shoulder Pain Rating: (0-10 scale) 7/10 back- pain pill and muscle relaxer before, R shoulder: 0/10 at rest, 9/10 with motion  Pain altered Tx:  [] No  [] Yes  Action:  Comments:    Objective:  Modalities:   Precautions:  Exercises: MHP use for supine exercises  Exercise Reps/ Time Weight/ Level Completed Comments   PRONE          Prone 3'        NAYA          Prone hip Ext          Planks                                SEATED          Shrugs/scap retractions 10 x                                                    SUPINE          Bridge 10x2 3\"      Richard Modified Leonides 3x30\"     while stretching LLE, pt noted increased pain in R side of back after

## 2024-08-07 ENCOUNTER — HOSPITAL ENCOUNTER (OUTPATIENT)
Dept: PHYSICAL THERAPY | Age: 64
Setting detail: THERAPIES SERIES
Discharge: HOME | End: 2024-08-07
Payer: MEDICAID

## 2024-08-07 PROCEDURE — 97110 THERAPEUTIC EXERCISES: CPT

## 2024-08-07 PROCEDURE — 97140 MANUAL THERAPY 1/> REGIONS: CPT

## 2024-08-07 NOTE — FLOWSHEET NOTE
Rotation with Anchored Resistance  - 1 x daily - 7 x weekly - 2 sets - 10 reps  - Shoulder Internal Rotation with Resistance  - 1 x daily - 7 x weekly - 2 sets - 10 reps    Comprehension of Education:  [x] Verbalizes understanding.  [] Demonstrates understanding.  [x] Needs review.  [] Demonstrates/verbalizes HEP/Ed previously given.     Plan: [x] Continue per plan of care.   [] Other:      Treatment Charges: Mins Units   []  Modalities     [x]  Ther Exercise 31 2   [x]  Manual Therapy 8 1   []  Ther Activities     []  Aquatics     []  Neuromuscular     [] Vasocompression     [] Gait Training     [] Dry needling        [] 1 or 2 muscles        [] 3 or more muscles     []  Other     Total Billable time 39 3   Total Units Used/Approval  12/96     Time In: 8:45 am  Time Out:  9:24 am    Electronically signed by:  Jorje Dominguez PTA

## 2024-08-08 ENCOUNTER — APPOINTMENT (OUTPATIENT)
Dept: PHYSICAL THERAPY | Age: 64
End: 2024-08-08
Payer: MEDICAID

## 2024-08-09 ENCOUNTER — HOSPITAL ENCOUNTER (OUTPATIENT)
Age: 64
Setting detail: OUTPATIENT SURGERY
Discharge: HOME | End: 2024-08-09
Attending: PLASTIC SURGERY | Admitting: PLASTIC SURGERY
Payer: MEDICAID

## 2024-08-09 VITALS
DIASTOLIC BLOOD PRESSURE: 72 MMHG | HEIGHT: 64 IN | RESPIRATION RATE: 16 BRPM | HEART RATE: 75 BPM | BODY MASS INDEX: 28.34 KG/M2 | WEIGHT: 166 LBS | TEMPERATURE: 97.2 F | SYSTOLIC BLOOD PRESSURE: 132 MMHG | OXYGEN SATURATION: 99 %

## 2024-08-09 DIAGNOSIS — D48.5 NEOPLASM OF UNCERTAIN BEHAVIOR OF SKIN: ICD-10-CM

## 2024-08-09 PROCEDURE — 88305 TISSUE EXAM BY PATHOLOGIST: CPT

## 2024-08-09 PROCEDURE — 3600000002 HC SURGERY LEVEL 2 BASE: Performed by: PLASTIC SURGERY

## 2024-08-09 PROCEDURE — 13101 CMPLX RPR TRUNK 2.6-7.5 CM: CPT | Performed by: PLASTIC SURGERY

## 2024-08-09 PROCEDURE — 2500000003 HC RX 250 WO HCPCS: Performed by: PLASTIC SURGERY

## 2024-08-09 PROCEDURE — 3600000012 HC SURGERY LEVEL 2 ADDTL 15MIN: Performed by: PLASTIC SURGERY

## 2024-08-09 PROCEDURE — 11404 EXC TR-EXT B9+MARG 3.1-4 CM: CPT | Performed by: PLASTIC SURGERY

## 2024-08-09 PROCEDURE — 2709999900 HC NON-CHARGEABLE SUPPLY: Performed by: PLASTIC SURGERY

## 2024-08-09 PROCEDURE — 7100000010 HC PHASE II RECOVERY - FIRST 15 MIN: Performed by: PLASTIC SURGERY

## 2024-08-09 RX ORDER — LIDOCAINE HYDROCHLORIDE AND EPINEPHRINE 10; 10 MG/ML; UG/ML
INJECTION, SOLUTION INFILTRATION; PERINEURAL PRN
Status: DISCONTINUED | OUTPATIENT
Start: 2024-08-09 | End: 2024-08-09 | Stop reason: ALTCHOICE

## 2024-08-09 RX ORDER — CEPHALEXIN 500 MG/1
500 CAPSULE ORAL 4 TIMES DAILY
Qty: 20 CAPSULE | Refills: 0 | Status: SHIPPED | OUTPATIENT
Start: 2024-08-09 | End: 2024-08-14

## 2024-08-09 ASSESSMENT — PAIN DESCRIPTION - DESCRIPTORS: DESCRIPTORS: ACHING

## 2024-08-09 ASSESSMENT — PAIN - FUNCTIONAL ASSESSMENT: PAIN_FUNCTIONAL_ASSESSMENT: 0-10

## 2024-08-09 NOTE — H&P
History and Physical Service   [facility]    HISTORY AND PHYSICAL EXAMINATION            Date of Evaluation: 8/9/2024  Patient name:  Stephanie Vega  MRN:   [id number]  YOB: 1960  PCP:    Randi Toledo MD    History Obtained From:     Patient, medical records    History of Present Illness:     This is Stephanie Vega a 64 y.o. female with PMH some of which includes acid reflux, asthma, COPD, pulmonary emphysema, remote TIA, depression, anxiety and OA managed by PCP and specialists who presents today for a LOCAL EXCISION LEFT THIGH SKIN LESION WITH COMPLEX CLOSURE POSSIBLE GRAFT, POSSIBLE FLAP by Lisa Brooks MD for Neoplasm of uncertain behavior of skin. Patient with a personal history or melanoma on her left face developed a lesion on the left upper thigh that is enlarging. Patient is followed by PCP for which she has frequent skin checks and excisions of suspicious  lesions removed. Patient stated \"they have been negative for cancer.\" She had a Plastic Surgery Consult by Dr Lisa Brooks on 4/17/24.(Refer to her note attached below) Dr Brooks recommended surgical intervention  Denies fever, chills, shortness of breath, cough, congestion, wheezing, chest pain, open sores or wounds    Remote TIA 2003 and is currently on longterm anticoagulant therapy Coumadin. \"My PCP Dr Toledo advised me to stop my coumadin.\" Last dose week ago. Denies headaches, dizziness, lightheadedness, numbness, pain, tingling extremities, no vision/speech changes, or unilateral extremity weakness       Past Medical History:     Past Medical History:   Diagnosis Date    Acid reflux     Arthritis     neck, back, knees    Asthma     Asthma     COPD (chronic obstructive pulmonary disease) (HCC) 10/20/2015    Emphysema     Headache(784.0)     Heart abnormality     minute hole in heart per pt found in 2003 after TIA    Osteoarthritis     Osteoporosis     Seborrheic keratoses 09/18/2019    TIA (transient  tablets 180 tablet 0    ADVAIR DISKUS 500-50 MCG/ACT AEPB diskus inhaler INHALE 1 PUFF INTO THE LUNGS IN THE MORNING AND 1 PUFF IN THE EVENING. **RINSE MOUTH AFTER EACH USE** 60 each 10    meloxicam (MOBIC) 15 MG tablet Take 1 tablet by mouth daily 30 tablet 3    ondansetron (ZOFRAN) 4 MG tablet TAKE ONE TABLET BY MOUTH THREE TIMES A DAY AS NEEDED FOR NAUSEA OR VOMITING        warfarin (COUMADIN) 1 MG tablet Take 0.5 tablets by mouth daily With the 6 mg to equal 6.5 mg 7 times a week. (Patient taking differently: Take 0.5 tablets by mouth daily  6 mg 7 times a week.) 30 tablet 3    warfarin (COUMADIN) 4 MG tablet Take 1.5 tablets by mouth daily Take with a 0.5 mg to equal 6.5 mg warfarin daily total dose (Patient taking differently: Take 1.5 tablets by mouth daily  6 mg warfarin daily total dose) 46 tablet 10    divalproex (DEPAKOTE) 250 MG DR tablet TAKE ONE TABLET BY MOUTH TWICE A DAY 60 tablet 10    DULoxetine (CYMBALTA) 60 MG extended release capsule TAKE 1 CAPSULE BY MOUTH DAILY 30 capsule 10    albuterol-ipratropium (COMBIVENT RESPIMAT)  MCG/ACT AERS inhaler INHALE ONE PUFF INTO THE LUNGS FOUR TIMES DAILY IN THE MORNING NOON IN THE EVENING AND BEFORE BEDTIME (MAY INCREASE TO EVERY 4 HOURS AS NEEDED) 4 g 5    omeprazole (PRILOSEC) 20 MG delayed release capsule TAKE 1 CAPSULE BY MOUTH DAILY 30 capsule 10    busPIRone (BUSPAR) 15 MG tablet TAKE ONE TABLET BY MOUTH TWICE A DAY 60 tablet 10    SYMBICORT 160-4.5 MCG/ACT AERO INHALE 2 PUFFS INTO THE LUNGS 2 TIMES DAILY 1 each 10                Current Facility-Administered Medications   Medication Dose Route Frequency Provider Last Rate Last Admin    methylPREDNISolone acetate (DEPO-MEDROL) injection 80 mg  80 mg Intra-artICUlar Once Jesus Moreno, DO        bupivacaine (MARCAINE) 0.25 % injection 5 mg  2 mL Intra-artICUlar Once Jesus Moreno, DO             Allergies:            Allergies   Allergen Reactions    Egg-Derived Products Other (See Comments)       should not be removed unless instructed by your plastic surgeon.  Successful post-operative function depends on both surgery and subsequent care.  Physical activity that increases your pulse or heart rate may cause bruising, swelling, fluid accumulation and the need for return to surgery.  It is wise to refrain from intimate physical activities after surgery until your physician states it is safe.  It is important that you participate in follow-up care, return for aftercare, and promote your recovery after surgery.       HEALTH INSURANCE  Most health insurance companies exclude coverage for cosmetic surgical operations or any complications that might occur from surgery.  Please carefully review your health insurance subscriber information pamphlet.  Most insurance plans exclude coverage for secondary or revisionary surgery.  Your type of surgery will most likely be covered by your insurance company, I went there is no guarantees or warrantees implied or otherwise.  The cost of surgery involves several charges for the services provided.  The total includes fees charged by your surgeon, the cost of surgical supplies, anesthesia, laboratory tests, and possible outpatient hospital charges, depending on where the surgery is performed.  Depending on whether the cost of surgery is covered by an insurance plan, you will be responsible for necessary co-payments, deductibles, and charges not covered.  The fees charged for this procedure do not include any potential future costs for additional procedures that you elect to have or require in order to revise, optimize, or complete your outcome.  Additional costs may occur should complications develop from the surgery.  Secondary surgery or hospital day-surgery charges involved with revision surgery will also be your responsibility.  II have also explained to the patient that we may obtain photographs.  These images or illustration along with my medical records created in my case

## 2024-08-09 NOTE — DISCHARGE INSTRUCTIONS
POST-OPERATIVE INSTRUCTIONS FOR SUTURE LINECARE   Incisions and suture lines are a necessary part of surgery.  These lines take many months to fully heal. Part of the healing process requires proper cleansing and care.  In addition, there are treatments that can help resulting scars to be flatter, finer, and less noticeable. There is no guarantee to what a scar will look like once it has fully healed, however the following instructions are important to a good outcome.  Do not smoke.  You have been advised to quit before surgery.  Do not start after surgery.  Smoking decreases the oxygen in your blood and greatly impacts your ability to heal.  Do not smoke until your incisions have fully healed.     You may shower 24 hours post your procedure unless instructed other wise.  You may remove the outer dressings to shower.  If you have Steri-Strips in place keep them intact.  If you have no visible sutures but only tape (steri-strips) over the incision, you do not need to do anything until the strips fall off.  You may use antibiotic ointment on the area after the steri-strips have fallen off.        If you have sutures, they will remain in the skin anywhere from 7-21 days, or longer depending on the area and your healing.  Please call for an appointment if one has not been scheduled.  You may notice a slight drainage of either blood or a clear type of liquid from the area, which is normal.  If the drainage has an odor to it call the office.   DIET: Resume your previous diet.   ACTIVITY: Avoid straining, strenuous exercise, or lifting over five pounds for 14 days, unless instructed otherwise.  Depending on they type of surgery you have had this may be as long as SIX WEEKS.   (Do not bend over for the first 24 hours after your surgery).  Direct trauma and physical stress may result in a separation of the suture line or wider scars.     MEDICATIONS: If you have been given an antibiotic, take until finished.    Please

## 2024-08-09 NOTE — OP NOTE
Operative Note      Patient: Stephanie Vega  YOB: 1960  MRN: [id number]    Date of Procedure: 8/9/2024    Pre-Op Diagnosis Codes:     * Neoplasm of uncertain behavior of skin [D48.5]    Post-Op Diagnosis: Same       Procedure(s):  EXCISION LEFT THIGH SKIN MEASURING 3.5 X 2.2 CM BY LESION WITH COMPLEX CLOSURE of the defect size measuring 3.7 x 2.4    Surgeon(s):  Lisa Brooks MD    Assistant:   * No surgical staff found *    Anesthesia: Local    Estimated Blood Loss (mL): Minimal    Complications: None    Specimens:   ID Type Source Tests Collected by Time Destination   A : LEFT UPPER THIGH LESION. SUTURE MARKS 12 O'CLOCK Tissue Leg SURGICAL PATHOLOGY Lisa Brooks MD 8/9/2024 1105        Implants:  * No implants in log *      Drains: * No LDAs found *          INDICATION FOR PROCEDURE:  The patient is 64 y.o. female .   All the risks, benefits, and alternative treatments were explained to the patient and an informed consent was obtained.    DESCRIPTION OF PROCEDURE:  The patient was marked in the holding area.   The patient was brought into the room, was placed on the table in the supine position.  All areas were prepped and draped in usual customary sterile manner.  A timeout was performed.  Everybody in the room was in agreement with the timeout.  Then local anesthetic was injected.  All areas were tested.  After it was determined there was adequate local anesthesia.  The attention was turned to left groin.   Then an incision was made over the area.  This was done using a #15 blade.  .  The lesion was completely excised.  Suture was placed at the 12 o'clock position.    Hemostasis was achieved.   Then undermining was performed medially, laterally, superiorly, and inferiorly to obtain tension-free closure.  Then using 4-0 Vicryl in an interrupted manner to deep tissue was closed.  Then using 3-0 strata fix  in a subcuticular manner the skin was closed.  Then Mastisol and Steri-Strips were placed.   Then 2 x 2 and Tegaderm was placed.  The patient tolerated procedure well.  The patient was transferred to recovery area in a stable condition.  Electronically signed by Lisa Brooks MD on 8/9/2024 at 12:25 PM

## 2024-08-13 ENCOUNTER — HOSPITAL ENCOUNTER (OUTPATIENT)
Dept: PHYSICAL THERAPY | Age: 64
Setting detail: THERAPIES SERIES
Discharge: HOME | End: 2024-08-13
Payer: MEDICAID

## 2024-08-13 PROCEDURE — 97110 THERAPEUTIC EXERCISES: CPT

## 2024-08-13 RX ORDER — BUSPIRONE HYDROCHLORIDE 15 MG/1
TABLET ORAL
Qty: 60 TABLET | Refills: 0 | Status: SHIPPED | OUTPATIENT
Start: 2024-08-13

## 2024-08-13 NOTE — FLOWSHEET NOTE
daily - 7 x weekly - 2 sets - 10 reps  - Standing Shoulder Row with Anchored Resistance  - 1 x daily - 7 x weekly - 2 sets - 10 reps  - Shoulder External Rotation with Anchored Resistance  - 1 x daily - 7 x weekly - 2 sets - 10 reps  - Shoulder Internal Rotation with Resistance  - 1 x daily - 7 x weekly - 2 sets - 10 reps    Comprehension of Education:  [x] Verbalizes understanding.  [] Demonstrates understanding.  [x] Needs review.  [] Demonstrates/verbalizes HEP/Ed previously given.     Plan: [x] Continue per plan of care.   [] Other:      Treatment Charges: Mins Units   []  Modalities     [x]  Ther Exercise 41 3   []  Manual Therapy     []  Ther Activities     []  Aquatics     []  Neuromuscular     [] Vasocompression     [] Gait Training     [] Dry needling        [] 1 or 2 muscles        [] 3 or more muscles     []  Other     Total Billable time 41 3   Total Units Used/Approval  15/96     Time In: 11:18 am  Time Out:  11:59 am    Electronically signed by:  FAISAL OLIVER PTA

## 2024-08-14 LAB — SURGICAL PATHOLOGY REPORT: NORMAL

## 2024-08-15 ENCOUNTER — HOSPITAL ENCOUNTER (OUTPATIENT)
Dept: PHYSICAL THERAPY | Age: 64
Setting detail: THERAPIES SERIES
Discharge: HOME | End: 2024-08-15
Payer: MEDICAID

## 2024-08-15 ENCOUNTER — TELEPHONE (OUTPATIENT)
Dept: PRIMARY CARE CLINIC | Age: 64
End: 2024-08-15

## 2024-08-15 DIAGNOSIS — M51.9 LUMBAR DISC DISEASE: Primary | ICD-10-CM

## 2024-08-15 DIAGNOSIS — M48.061 SPINAL STENOSIS OF LUMBAR REGION WITHOUT NEUROGENIC CLAUDICATION: ICD-10-CM

## 2024-08-15 DIAGNOSIS — M54.50 LOW BACK PAIN AT MULTIPLE SITES: ICD-10-CM

## 2024-08-15 PROCEDURE — 97110 THERAPEUTIC EXERCISES: CPT

## 2024-08-15 NOTE — TELEPHONE ENCOUNTER
2 suggestions.   Stop the tizanidine ( muscle relaxer) and try lyrica, which can also help with chronic pain, nerve pain, etc  2nd suggestion: see a physical medicine and rehab doctor about the chronic pain and see what suggestions they have,      normal...

## 2024-08-15 NOTE — FLOWSHEET NOTE
8/7:  - Child's Pose Stretch  - 1 x daily - 7 x weekly - 1 sets - 3 reps - 30\" hold  - Sidelying Shoulder External Rotation  - 1 x daily - 7 x weekly - 2 sets - 10 reps  - Sidelying Shoulder Horizontal Abduction  - 1 x daily - 7 x weekly - 2 sets - 10 reps  - Standing Shoulder Row with Anchored Resistance  - 1 x daily - 7 x weekly - 2 sets - 10 reps  - Shoulder External Rotation with Anchored Resistance  - 1 x daily - 7 x weekly - 2 sets - 10 reps  - Shoulder Internal Rotation with Resistance  - 1 x daily - 7 x weekly - 2 sets - 10 reps    Comprehension of Education:  [x] Verbalizes understanding.  [] Demonstrates understanding.  [x] Needs review.  [] Demonstrates/verbalizes HEP/Ed previously given.     Plan: [x] Continue per plan of care.   [] Other:      Treatment Charges: Mins Units   []  Modalities     [x]  Ther Exercise 54 4   []  Manual Therapy     []  Ther Activities     []  Aquatics     []  Neuromuscular     [] Vasocompression     [] Gait Training     [] Dry needling        [] 1 or 2 muscles        [] 3 or more muscles     []  Other     Total Billable time 54 4   Total Units Used/Approval  19/96     Time In: 1016 Time Out:  1110    Electronically signed by:  Tata Powell PTA

## 2024-08-15 NOTE — TELEPHONE ENCOUNTER
Patient called stating that the muscle relaxer medication isnt helping and physical therapy advised to see what other options there are besides pain management since she has been down that road.     Please advise.

## 2024-08-16 RX ORDER — PREGABALIN 50 MG/1
50 CAPSULE ORAL 3 TIMES DAILY
Qty: 90 CAPSULE | Refills: 1 | Status: SHIPPED | OUTPATIENT
Start: 2024-08-16 | End: 2024-10-15

## 2024-08-16 NOTE — TELEPHONE ENCOUNTER
Lyrica can sometimes cause fatigue so I will start with a lower and dosage.  Let me know how she feels on that

## 2024-08-19 ENCOUNTER — HOSPITAL ENCOUNTER (OUTPATIENT)
Dept: PHYSICAL THERAPY | Age: 64
Setting detail: THERAPIES SERIES
Discharge: HOME | End: 2024-08-19
Payer: MEDICAID

## 2024-08-19 PROCEDURE — 97110 THERAPEUTIC EXERCISES: CPT

## 2024-08-19 RX ORDER — ONDANSETRON 4 MG/1
TABLET, FILM COATED ORAL
Qty: 20 TABLET | Refills: 0 | Status: SHIPPED | OUTPATIENT
Start: 2024-08-19

## 2024-08-19 NOTE — PROGRESS NOTES
[facility]   Outpatient Rehabilitation & Therapy  [address]  P: [phone]   F: [phone]    Physical Therapy Daily Treatment Note/PROGRESS NOTE      Date:  2024  Patient Name:  Stephanie Vega    :  1960  MRN: [id number]  Physician: Randi Toledo MD                                Insurance: [employer];  30 visits per year, No Hard Max;  AUTH Required after Eval: 24 Approved  96 units 7/3/24--9/3/24  of US, Manual, Neuro Re-ed, and There Exc  Medical Diagnosis:   M48.061 (ICD-10-CM) - Spinal stenosis of lumbar region without neurogenic claudication   M51.9 (ICD-10-CM) - Lumbar disc disease   M25.511 (ICD-10-CM) - Acute pain of right shoulder                             Rehab Codes: M25.611, M54.50, M25.651, M25.652  Onset Date: Low back: ;  Right shoulder 2024                                  Next 's appt: 24  Visit# / total visits:   Cancels/No Shows: 2/0    Subjective:    Patient reports she still has severe pain in her back.  Also reports pain in right shoulder.  She reports yesterday while sitting in a chair she had extreme pain in her shoulder.  She reports she was just sitting and not even moving her arm when her pain increased.    Pain:  [x] Yes  [] No Location: low back, R shoulder Pain Rating: (0-10 scale) 8/10 back, R shoulder 0/10 at rest  Pain altered Tx:  [x] No  [] Yes  Action:  Comments:    Objective: Walked into clinic with straight cane and antalgia  Modalities:   Precautions:  NONE  Exercises: CP use for supine exercises  Exercise Reps/ Time Weight/ Level Completed Comments   PRONE          Prone 3'   x     NAYA  3'   x     Prone hip Ext  5 x each   x     Planks                                SEATED          Shrugs/scap retractions 10 x                              SUPINE          Bridge 10x2 3\" x  Partial Range   Richard Modified Leonides 3x30\"     while stretching LLE, pt noted increased pain in R side of

## 2024-08-20 DIAGNOSIS — Z79.01 LONG TERM (CURRENT) USE OF ANTICOAGULANTS: ICD-10-CM

## 2024-08-20 DIAGNOSIS — Z86.73 HISTORY OF TRANSIENT ISCHEMIC ATTACK (TIA): ICD-10-CM

## 2024-08-21 ENCOUNTER — OFFICE VISIT (OUTPATIENT)
Dept: SURGERY | Age: 64
End: 2024-08-21

## 2024-08-21 VITALS
SYSTOLIC BLOOD PRESSURE: 128 MMHG | WEIGHT: 166 LBS | HEIGHT: 64 IN | RESPIRATION RATE: 16 BRPM | BODY MASS INDEX: 28.34 KG/M2 | HEART RATE: 92 BPM | DIASTOLIC BLOOD PRESSURE: 75 MMHG

## 2024-08-21 DIAGNOSIS — Z98.890 POSTOPERATIVE STATE: Primary | ICD-10-CM

## 2024-08-21 PROCEDURE — 99024 POSTOP FOLLOW-UP VISIT: CPT | Performed by: PLASTIC SURGERY

## 2024-08-21 RX ORDER — WARFARIN SODIUM 6 MG/1
TABLET ORAL
Qty: 30 TABLET | Refills: 4 | Status: SHIPPED | OUTPATIENT
Start: 2024-08-21

## 2024-08-21 NOTE — PROGRESS NOTES
granular lesion that is 0.2 cm to the nearest 4-8:00 margin.  The specimen is inked as follows:  9-12-3:00 = blue and 3-6-9:00 =  black.  The specimen is sectioned from 3-9:00 and totally embedded in  3c, sequentially.  tm      Mary Matthews/mj:8/12/2024  Microscopic Description  Microscopic examination performed.      Processing Lab:  [address] [address]  Interpretation Performed at [address] [address]    SURGICAL PATHOLOGY CONSULTATION       Patient Name: GEM APRIL  [facility] Rec: [id number]  [facility]  [facility]  ANATOMIC PATHOLOGY  [address].  [place], [place] [address] [phone]  Fax: [phone]       ASSESSMENT   Diagnosis Orders   1. Postoperative state            PLAN  1.  Patient may cover the area if needed.  Patient is to follow-up in 2 months.  Patient is to call if there is any problems or issues prior to that time.    Plan discussed with patient.    Electronically signed by:  LV HEWITT M.D.   8/21/2024

## 2024-08-26 DIAGNOSIS — M51.9 LUMBAR DISC DISEASE: ICD-10-CM

## 2024-08-26 DIAGNOSIS — M48.061 SPINAL STENOSIS OF LUMBAR REGION WITHOUT NEUROGENIC CLAUDICATION: ICD-10-CM

## 2024-08-27 ENCOUNTER — HOSPITAL ENCOUNTER (OUTPATIENT)
Dept: PHYSICAL THERAPY | Age: 64
Setting detail: THERAPIES SERIES
Discharge: HOME | End: 2024-08-27
Payer: MEDICAID

## 2024-08-27 PROCEDURE — 97110 THERAPEUTIC EXERCISES: CPT

## 2024-08-27 PROCEDURE — 97140 MANUAL THERAPY 1/> REGIONS: CPT

## 2024-08-27 NOTE — FLOWSHEET NOTE
[facility]   Outpatient Rehabilitation & Therapy  [address]  P: [phone]   F: [phone]    Physical Therapy Daily Treatment Note      Date:  2024  Patient Name:  Stephanie Vega    :  1960  MRN: [id number]  Physician: Randi Toledo MD                                Insurance: [employer];  30 visits per year, No Hard Max;  AUTH Required after Eval: 24 Approved  96 units 7/3/24--9/3/24  of US, Manual, Neuro Re-ed, and There Exc  Medical Diagnosis:   M48.061 (ICD-10-CM) - Spinal stenosis of lumbar region without neurogenic claudication   M51.9 (ICD-10-CM) - Lumbar disc disease   M25.511 (ICD-10-CM) - Acute pain of right shoulder                             Rehab Codes: M25.611, M54.50, M25.651, M25.652  Onset Date: Low back: ;  Right shoulder 2024                                  Next 's appt: 24  Visit# / total visits:   Cancels/No Shows: 2/0    Subjective:    Patient reports today with no change in pain. Reported she has been adjusting a Lyrica regimen with PCP to manage pain.     Pain:  [x] Yes  [] No Location: low back, R shoulder Pain Rating: (0-10 scale) 8/10 back, R shoulder 0/10 at rest, 7/10 with motion in should  Pain altered Tx:  [x] No  [] Yes  Action:  Comments:    Objective: Walked into clinic with straight cane and antalgia  Modalities:   Precautions:  NONE  Exercises: CP use for supine exercises  Exercise Reps/ Time Weight/ Level Completed Comments   PRONE          Prone 3'   x     NAYA  3'   x     Prone hip Ext  10 x 2 each   x     Planks                     Y,T, Extension 10x2  x               SEATED          Shrugs/scap retractions 10 x                              SUPINE          Bridge 10x2 3\"   Partial Range   Richard Modified Leonides 3x30\"     while stretching LLE, pt noted increased pain in R side of back after 2nd/3rd reps.   Richard Single knee to chest 3x 30\"      Richard Piriformis Stretch 3x15\"    Supine  daily - 7 x weekly - 1 sets - 15 reps  - Seated Shoulder Shrugs  - 2 x daily - 7 x weekly - 1 sets - 15 reps  - Seated Scapular Retraction  - 2 x daily - 7 x weekly - 1 sets - 15 reps    7/11/24  Log  out    Added 8/7:  - Child's Pose Stretch  - 1 x daily - 7 x weekly - 1 sets - 3 reps - 30\" hold  - Sidelying Shoulder External Rotation  - 1 x daily - 7 x weekly - 2 sets - 10 reps  - Sidelying Shoulder Horizontal Abduction  - 1 x daily - 7 x weekly - 2 sets - 10 reps  - Standing Shoulder Row with Anchored Resistance  - 1 x daily - 7 x weekly - 2 sets - 10 reps  - Shoulder External Rotation with Anchored Resistance  - 1 x daily - 7 x weekly - 2 sets - 10 reps  - Shoulder Internal Rotation with Resistance  - 1 x daily - 7 x weekly - 2 sets - 10 reps    Access Code: [email or web address]  URL: [email or web address]  Date: 08/19/2024  Prepared by: Pernell Chow    Exercises  - Lying Prone  - 2 x daily - 7 x weekly - 1 sets - 1 reps - 5 minutes hold  - Prone on Elbows Stretch  - 2 x daily - 7 x weekly - 1 sets - 1 reps - 3 minutes hold  - Prone Hip Extension  - 2 x daily - 7 x weekly - 1 sets - 10 reps    Comprehension of Education:  [x] Verbalizes understanding.  [] Demonstrates understanding.  [x] Needs review.  [] Demonstrates/verbalizes HEP/Ed previously given.     Plan: [x] Continue per plan of care.   [] Other:      Treatment Charges: Mins Units   []  Modalities     [x]  Ther Exercise 33 2   [x]  Manual Therapy 8 1   []  Ther Activities     []  Aquatics     []  Neuromuscular     [] Vasocompression     [] Gait Training     [] Dry needling        [] 1 or 2 muscles        [] 3 or more muscles     []  Other     Total Billable time 41 3   Total Units Used/Approval  25/96     Time In: 10:35 AM Time Out: 11:16 am     Electronically signed by:  Jorje Dominguez PTA

## 2024-08-28 ENCOUNTER — OFFICE VISIT (OUTPATIENT)
Dept: ORTHOPEDIC SURGERY | Age: 64
End: 2024-08-28

## 2024-08-28 DIAGNOSIS — M25.561 RIGHT KNEE PAIN, UNSPECIFIED CHRONICITY: Primary | ICD-10-CM

## 2024-08-28 RX ORDER — LIDOCAINE HYDROCHLORIDE 10 MG/ML
2 INJECTION, SOLUTION INFILTRATION; PERINEURAL ONCE
Status: COMPLETED | OUTPATIENT
Start: 2024-08-28 | End: 2024-08-28

## 2024-08-28 RX ORDER — HYDROCODONE BITARTRATE AND ACETAMINOPHEN 7.5; 325 MG/1; MG/1
1.5 TABLET ORAL EVERY 6 HOURS PRN
Qty: 180 TABLET | Refills: 0 | Status: SHIPPED | OUTPATIENT
Start: 2024-08-28 | End: 2024-09-27

## 2024-08-28 RX ORDER — BETAMETHASONE SODIUM PHOSPHATE AND BETAMETHASONE ACETATE 3; 3 MG/ML; MG/ML
12 INJECTION, SUSPENSION INTRA-ARTICULAR; INTRALESIONAL; INTRAMUSCULAR; SOFT TISSUE ONCE
Status: COMPLETED | OUTPATIENT
Start: 2024-08-28 | End: 2024-08-28

## 2024-08-28 RX ORDER — BUPIVACAINE HYDROCHLORIDE 2.5 MG/ML
2 INJECTION, SOLUTION INFILTRATION; PERINEURAL ONCE
Status: COMPLETED | OUTPATIENT
Start: 2024-08-28 | End: 2024-08-28

## 2024-08-28 RX ADMIN — BUPIVACAINE HYDROCHLORIDE 5 MG: 2.5 INJECTION, SOLUTION INFILTRATION; PERINEURAL at 09:05

## 2024-08-28 RX ADMIN — BETAMETHASONE SODIUM PHOSPHATE AND BETAMETHASONE ACETATE 12 MG: 3; 3 INJECTION, SUSPENSION INTRA-ARTICULAR; INTRALESIONAL; INTRAMUSCULAR; SOFT TISSUE at 09:02

## 2024-08-28 RX ADMIN — LIDOCAINE HYDROCHLORIDE 2 ML: 10 INJECTION, SOLUTION INFILTRATION; PERINEURAL at 09:05

## 2024-08-28 NOTE — PROGRESS NOTES
Documented By  Lorri George    NDC: 63553-768-83    Lot#: [id number]    : Comeet    Patient Supplied?: No              lidocaine 1 % injection 2 mL       Admin Date  08/28/2024  09:05 Action  Given Dose  2 mL Route  Intra-artICUlar Site  Knee Right Documented By  George Blackmon    NDC: 7811-1170-46    Lot#: [id number]    : Dat[employer]    Patient Supplied?: No                Under sterile conditions the patient's left right knee was injected with lidocaine 2 cc bupivacaine 2 cc and Celestone 2 cc.  She tolerated procedure well    Will see how the patient does with this injection hopefully will give her lasting relief.               .      Provider Attestation:  I, Scott Brown, personally performed the services described in this documentation. All medical record entries made by the scribe were at my direction and in my presence. I have reviewed the chart and discharge instructions and agree that the records reflect my personal performance and is accurate and complete. Scott Brown MD. 08/28/24      Please note that this chart was generated using voice recognition Dragon dictation software.  Although every effort was made to ensure the accuracy of this automated transcription, some errors in transcription may have occurred.

## 2024-08-29 ENCOUNTER — HOSPITAL ENCOUNTER (OUTPATIENT)
Dept: PHYSICAL THERAPY | Age: 64
Setting detail: THERAPIES SERIES
Discharge: HOME | End: 2024-08-29
Payer: MEDICAID

## 2024-08-29 PROCEDURE — 97110 THERAPEUTIC EXERCISES: CPT

## 2024-08-29 NOTE — FLOWSHEET NOTE
[facility]   Outpatient Rehabilitation & Therapy  [address]  P: [phone]   F: [phone]    Physical Therapy Daily Treatment Note      Date:  2024  Patient Name:  Stephanie Vega    :  1960  MRN: [id number]  Physician: Randi Toledo MD                                Insurance: [employer];  30 visits per year, No Hard Max;  AUTH Required after Eval: 24 Approved  96 units 7/3/24--9/3/24  of US, Manual, Neuro Re-ed, and There Exc  Medical Diagnosis:   M48.061 (ICD-10-CM) - Spinal stenosis of lumbar region without neurogenic claudication   M51.9 (ICD-10-CM) - Lumbar disc disease   M25.511 (ICD-10-CM) - Acute pain of right shoulder                             Rehab Codes: M25.611, M54.50, M25.651, M25.652  Onset Date: Low back: ;  Right shoulder 2024                                  Next 's appt: 24  Visit# / total visits: 10/24  Cancels/No Shows: 2/0  Next Progress Note due by visit 18  Subjective:    Patient arrived and states she has been getting no more than 4 hours of sleep due to pain in back and shoulder. States she is unable to drive using her right arm today.     Pain:  [x] Yes  [] No Location: low back, R shoulder Pain Rating: (0-10 scale) 10/10 back, R shoulder 0/10 at rest, 10/10 with motion in should  Pain altered Tx:  [x] No  [] Yes  Action:      Objective: Walked into clinic with straight cane and antalgia  Modalities:   Precautions:  NONE  Exercises: CP use for supine exercises  Exercise Reps/ Time Weight/ Level Completed Comments   PRONE          Prone 3'   x     NAYA  3'   x     Prone hip Ext  10 x 2 each   x     Planks                     Y,T, Extension 20x2  x Inc reps                SEATED          Shrugs/scap retractions 10 x                              SUPINE          Bridge 10x2 3\"   Partial Range   Richard Modified Leonides 3x30\"     while stretching LLE, pt noted increased pain in R side of back after  10:45 am     Electronically signed by:  Dona Elaine PTA

## 2024-09-03 ENCOUNTER — HOSPITAL ENCOUNTER (OUTPATIENT)
Dept: PHYSICAL THERAPY | Age: 64
Setting detail: THERAPIES SERIES
Discharge: HOME | End: 2024-09-03
Payer: MEDICAID

## 2024-09-03 PROCEDURE — 97110 THERAPEUTIC EXERCISES: CPT

## 2024-09-03 NOTE — FLOWSHEET NOTE
[facility]   Outpatient Rehabilitation & Therapy  [address]  P: [phone]   F: [phone]    Physical Therapy Daily Treatment Note      Date:  9/3/2024  Patient Name:  Stephanie Vega    :  1960  MRN: [id number]  Physician: Randi Toledo MD                                Insurance: [employer];  30 visits per year, No Hard Max;  AUTH Required after Eval: 24 Approved  96 units 7/3/24--9/3/24  of US, Manual, Neuro Re-ed, and There Exc  Medical Diagnosis:   M48.061 (ICD-10-CM) - Spinal stenosis of lumbar region without neurogenic claudication   M51.9 (ICD-10-CM) - Lumbar disc disease   M25.511 (ICD-10-CM) - Acute pain of right shoulder   Rehab Codes: M25.611, M54.50, M25.651, M25.652  Onset Date: Low back: ;  Right shoulder 2024                                  Next 's appt: 24  Visit# / total visits:   Cancels/No Shows: 2/0  Next Progress Note due by visit 18  Subjective:    Pt reported pain is a 6/10 in R shoulder and 8/10 in lower back. Pt reported short term relief from steroid injection  lasted approximately 4 days. Today is last day of approved auth, pt plans on calling doctor to discuss therapy moving forward and further treatment options.     Pain:  [x] Yes  [] No Location: low back, R shoulder Pain Rating: (0-10 scale) 8/10 back, R shoulder 6/10   Pain altered Tx:  [x] No  [] Yes  Action:    Objective: Walked into clinic with straight cane and antalgia  Modalities:   Precautions:  NONE  Exercises: CP use for supine exercises  Exercise Reps/ Time Weight/ Level Completed Comments   PRONE          Prone 3'        NAYA  3'        Prone hip Ext  10 x 2 each        Planks                     Y,T, Extension 20x2  x Inc reps                SEATED          Shrugs/scap retractions 10 x   x                           SUPINE          Bridge 10x2 3\"   Partial Range   Richard Modified Leonides 3x30\"     while stretching LLE, pt noted

## 2024-09-04 ENCOUNTER — APPOINTMENT (OUTPATIENT)
Dept: PHYSICAL THERAPY | Age: 64
End: 2024-09-04
Payer: MEDICAID

## 2024-09-05 ENCOUNTER — OFFICE VISIT (OUTPATIENT)
Dept: PRIMARY CARE CLINIC | Age: 64
End: 2024-09-05
Payer: MEDICAID

## 2024-09-05 VITALS
HEART RATE: 86 BPM | BODY MASS INDEX: 28.24 KG/M2 | WEIGHT: 165.4 LBS | DIASTOLIC BLOOD PRESSURE: 80 MMHG | HEIGHT: 64 IN | OXYGEN SATURATION: 96 % | SYSTOLIC BLOOD PRESSURE: 120 MMHG

## 2024-09-05 DIAGNOSIS — F41.9 ANXIETY: ICD-10-CM

## 2024-09-05 DIAGNOSIS — Z79.899 MEDICATION MANAGEMENT: ICD-10-CM

## 2024-09-05 DIAGNOSIS — G25.0 BENIGN ESSENTIAL TREMOR: ICD-10-CM

## 2024-09-05 DIAGNOSIS — Z79.01 LONG TERM (CURRENT) USE OF ANTICOAGULANTS: ICD-10-CM

## 2024-09-05 DIAGNOSIS — Z86.73 HISTORY OF TRANSIENT ISCHEMIC ATTACK (TIA): ICD-10-CM

## 2024-09-05 DIAGNOSIS — H04.123 DRY EYES: ICD-10-CM

## 2024-09-05 DIAGNOSIS — M25.511 CHRONIC RIGHT SHOULDER PAIN: ICD-10-CM

## 2024-09-05 DIAGNOSIS — R41.3 MEMORY LOSS: ICD-10-CM

## 2024-09-05 DIAGNOSIS — M54.50 LOW BACK PAIN AT MULTIPLE SITES: ICD-10-CM

## 2024-09-05 DIAGNOSIS — M51.9 LUMBAR DISC DISEASE: Primary | ICD-10-CM

## 2024-09-05 DIAGNOSIS — G89.29 CHRONIC RIGHT SHOULDER PAIN: ICD-10-CM

## 2024-09-05 DIAGNOSIS — M48.061 SPINAL STENOSIS OF LUMBAR REGION WITHOUT NEUROGENIC CLAUDICATION: ICD-10-CM

## 2024-09-05 LAB — INTERNATIONAL NORMALIZATION RATIO, POC: 4.1

## 2024-09-05 PROCEDURE — 3017F COLORECTAL CA SCREEN DOC REV: CPT | Performed by: FAMILY MEDICINE

## 2024-09-05 PROCEDURE — 85610 PROTHROMBIN TIME: CPT | Performed by: FAMILY MEDICINE

## 2024-09-05 PROCEDURE — 99214 OFFICE O/P EST MOD 30 MIN: CPT | Performed by: FAMILY MEDICINE

## 2024-09-05 PROCEDURE — G8417 CALC BMI ABV UP PARAM F/U: HCPCS | Performed by: FAMILY MEDICINE

## 2024-09-05 PROCEDURE — 4004F PT TOBACCO SCREEN RCVD TLK: CPT | Performed by: FAMILY MEDICINE

## 2024-09-05 PROCEDURE — G8427 DOCREV CUR MEDS BY ELIG CLIN: HCPCS | Performed by: FAMILY MEDICINE

## 2024-09-05 RX ORDER — PREGABALIN 50 MG/1
50 CAPSULE ORAL
Qty: 30 CAPSULE | Refills: 2
Start: 2024-09-05 | End: 2024-12-04

## 2024-09-05 RX ORDER — MINERAL OIL AND WHITE PETROLATUM 30; 940 MG/G; MG/G
1 OINTMENT OPHTHALMIC 3 TIMES DAILY
Qty: 1 EACH | Refills: 3 | Status: SHIPPED | OUTPATIENT
Start: 2024-09-05

## 2024-09-05 RX ORDER — WARFARIN SODIUM 6 MG/1
TABLET ORAL
Qty: 24 TABLET | Refills: 2 | Status: SHIPPED | OUTPATIENT
Start: 2024-09-05 | End: 2024-12-04

## 2024-09-05 RX ORDER — PREGABALIN 25 MG/1
25 CAPSULE ORAL
Qty: 30 CAPSULE | Refills: 0
Start: 2024-09-05 | End: 2024-10-05

## 2024-09-05 NOTE — PROGRESS NOTES
to Provider:   Saurav Catherine MD     Requested Specialty:   Orthopedic Surgery     Number of Visits Requested:   1    POCT INR    POCT INR     This external order was created through the results console.     Orders Placed This Encounter   Medications    warfarin (COUMADIN) 6 MG tablet     Sig: Take 1 tablet by mouth Five times weekly AND 0.5 tablets Twice a Week.     Dispense:  24 tablet     Refill:  2    White Petrolatum-Mineral Oil (SYSTANE NIGHTTIME) OINT     Sig: Apply 0.5 inches to eye in the morning, at noon, and at bedtime     Dispense:  1 each     Refill:  3    pregabalin (LYRICA) 25 MG capsule     Sig: Take 1 capsule by mouth nightly as needed (back pain at night) for up to 30 days. Max Daily Amount: 25 mg     Dispense:  30 capsule     Refill:  0    pregabalin (LYRICA) 50 MG capsule     Sig: Take 1 capsule by mouth nightly for 90 days. Max Daily Amount: 50 mg     Dispense:  30 capsule     Refill:  2       Patient given educationalmaterials - see patient instructions.  Discussed use, benefit, and side effectsof prescribed medications.  All patient questions answered. Pt voiced understanding.Reviewed health maintenance.  Instructed to continue current medications, diet andexercise.  Patient agreed with treatment plan. Follow up as directed.     PDMP Monitoring:    Last PDMP Ricci as Reviewed:  Review User Review Instant Review Result   CHUCK CUEVA 9/5/2024  7:37 AM Reviewed PDMP [1]     Last Controlled Substance Monitoring Documentation      Flowsheet Row Consultation from 9/1/2021 in [facility]   Periodic Controlled Substance Monitoring No signs of potential drug abuse or diversion identified., Assessed functional status. filed at 09/01/2021 0604          Urine Drug Screenings (1 yr)       POCT Rapid Drug Screen  Collected: 6/6/2024 10:07 AM (Final result)              POCT Rapid Drug Screen  Collected: 9/6/2023  2:36 PM (Edited Result - FINAL)              POCT Rapid Drug Screen

## 2024-09-06 LAB
FOLATE: 5.8 NG/ML (ref 4.8–24.2)
TSH SERPL DL<=0.05 MIU/L-ACNC: 1.81 UIU/ML (ref 0.27–4.2)
VITAMIN B-12: 590 PG/ML (ref 232–1245)

## 2024-09-07 DIAGNOSIS — G45.9 TIA (TRANSIENT ISCHEMIC ATTACK): ICD-10-CM

## 2024-09-07 DIAGNOSIS — Z86.73 HISTORY OF TRANSIENT ISCHEMIC ATTACK (TIA): ICD-10-CM

## 2024-09-09 RX ORDER — WARFARIN SODIUM 4 MG/1
6 TABLET ORAL DAILY
Qty: 46 TABLET | Refills: 2 | OUTPATIENT
Start: 2024-09-09

## 2024-09-09 RX ORDER — BUSPIRONE HYDROCHLORIDE 15 MG/1
TABLET ORAL
Qty: 60 TABLET | Refills: 10 | Status: SHIPPED | OUTPATIENT
Start: 2024-09-09

## 2024-09-10 ENCOUNTER — APPOINTMENT (OUTPATIENT)
Dept: PHYSICAL THERAPY | Age: 64
End: 2024-09-10
Payer: MEDICAID

## 2024-09-11 DIAGNOSIS — R11.0 NAUSEA: ICD-10-CM

## 2024-09-12 ENCOUNTER — APPOINTMENT (OUTPATIENT)
Dept: PHYSICAL THERAPY | Age: 64
End: 2024-09-12
Payer: MEDICAID

## 2024-09-12 RX ORDER — PROMETHAZINE HYDROCHLORIDE 25 MG/1
25 TABLET ORAL 3 TIMES DAILY PRN
Qty: 21 TABLET | Refills: 0 | Status: SHIPPED | OUTPATIENT
Start: 2024-09-12 | End: 2024-09-19

## 2024-09-12 RX ORDER — ONDANSETRON 4 MG/1
TABLET, FILM COATED ORAL
Qty: 20 TABLET | Refills: 0 | Status: SHIPPED | OUTPATIENT
Start: 2024-09-12

## 2024-09-16 ENCOUNTER — OFFICE VISIT (OUTPATIENT)
Dept: ORTHOPEDIC SURGERY | Age: 64
End: 2024-09-16
Payer: MEDICAID

## 2024-09-16 VITALS — BODY MASS INDEX: 28.68 KG/M2 | RESPIRATION RATE: 14 BRPM | HEIGHT: 64 IN | WEIGHT: 168 LBS

## 2024-09-16 DIAGNOSIS — M75.81 RIGHT ROTATOR CUFF TENDINITIS: Primary | ICD-10-CM

## 2024-09-16 DIAGNOSIS — M75.21 BICEPS TENDINITIS OF RIGHT SHOULDER: ICD-10-CM

## 2024-09-16 DIAGNOSIS — M25.511 RIGHT SHOULDER PAIN, UNSPECIFIED CHRONICITY: ICD-10-CM

## 2024-09-16 PROCEDURE — G8417 CALC BMI ABV UP PARAM F/U: HCPCS | Performed by: ORTHOPAEDIC SURGERY

## 2024-09-16 PROCEDURE — 99213 OFFICE O/P EST LOW 20 MIN: CPT | Performed by: ORTHOPAEDIC SURGERY

## 2024-09-16 PROCEDURE — 4004F PT TOBACCO SCREEN RCVD TLK: CPT | Performed by: ORTHOPAEDIC SURGERY

## 2024-09-16 PROCEDURE — G8428 CUR MEDS NOT DOCUMENT: HCPCS | Performed by: ORTHOPAEDIC SURGERY

## 2024-09-16 PROCEDURE — 3017F COLORECTAL CA SCREEN DOC REV: CPT | Performed by: ORTHOPAEDIC SURGERY

## 2024-09-18 DIAGNOSIS — M48.061 SPINAL STENOSIS OF LUMBAR REGION WITHOUT NEUROGENIC CLAUDICATION: ICD-10-CM

## 2024-09-18 DIAGNOSIS — M51.9 LUMBAR DISC DISEASE: ICD-10-CM

## 2024-09-18 DIAGNOSIS — M54.50 LOW BACK PAIN AT MULTIPLE SITES: ICD-10-CM

## 2024-09-18 RX ORDER — PREGABALIN 25 MG/1
CAPSULE ORAL
Qty: 90 CAPSULE | Refills: 2 | OUTPATIENT
Start: 2024-09-18

## 2024-09-24 ENCOUNTER — HOSPITAL ENCOUNTER (OUTPATIENT)
Dept: WOMENS IMAGING | Age: 64
Discharge: HOME | End: 2024-09-26
Payer: MEDICAID

## 2024-09-24 VITALS — BODY MASS INDEX: 29.77 KG/M2 | WEIGHT: 168 LBS | HEIGHT: 63 IN

## 2024-09-24 DIAGNOSIS — Z12.31 BREAST CANCER SCREENING BY MAMMOGRAM: ICD-10-CM

## 2024-09-24 PROCEDURE — 77063 BREAST TOMOSYNTHESIS BI: CPT

## 2024-09-25 ENCOUNTER — HOSPITAL ENCOUNTER (OUTPATIENT)
Dept: MRI IMAGING | Age: 64
Discharge: HOME | End: 2024-09-27
Attending: ORTHOPAEDIC SURGERY
Payer: MEDICAID

## 2024-09-25 ENCOUNTER — HOSPITAL ENCOUNTER (OUTPATIENT)
Age: 64
Discharge: HOME | End: 2024-09-27
Payer: MEDICAID

## 2024-09-25 DIAGNOSIS — M75.81 RIGHT ROTATOR CUFF TENDINITIS: ICD-10-CM

## 2024-09-25 DIAGNOSIS — M75.21 BICEPS TENDINITIS OF RIGHT SHOULDER: ICD-10-CM

## 2024-09-25 PROCEDURE — 73221 MRI JOINT UPR EXTREM W/O DYE: CPT

## 2024-09-27 ENCOUNTER — TELEPHONE (OUTPATIENT)
Dept: PRIMARY CARE CLINIC | Age: 64
End: 2024-09-27

## 2024-09-27 NOTE — TELEPHONE ENCOUNTER
I can have her take lyrica 50 mg BID during the day and 50 mg at night. The next dose would be 75 mg TID  She should go back to Pain management also.

## 2024-09-27 NOTE — TELEPHONE ENCOUNTER
Patient states she is in a lot of back pain still and is asking what the next steps are? She has tried physical therapy, pain management, ablations, epidurals and nothing has worked for her. She states she does get relief at night when she takes the lyrica. Please advise

## 2024-09-30 DIAGNOSIS — Z86.73 HISTORY OF TRANSIENT ISCHEMIC ATTACK (TIA): ICD-10-CM

## 2024-09-30 DIAGNOSIS — G45.9 TIA (TRANSIENT ISCHEMIC ATTACK): ICD-10-CM

## 2024-09-30 RX ORDER — WARFARIN SODIUM 4 MG/1
6 TABLET ORAL DAILY
Qty: 46 TABLET | Refills: 10 | OUTPATIENT
Start: 2024-09-30

## 2024-10-04 DIAGNOSIS — G45.9 TIA (TRANSIENT ISCHEMIC ATTACK): ICD-10-CM

## 2024-10-04 DIAGNOSIS — Z86.73 HISTORY OF TRANSIENT ISCHEMIC ATTACK (TIA): ICD-10-CM

## 2024-10-04 RX ORDER — WARFARIN SODIUM 4 MG/1
6 TABLET ORAL DAILY
Qty: 46 TABLET | Refills: 2 | OUTPATIENT
Start: 2024-10-04

## 2024-10-07 ENCOUNTER — NURSE ONLY (OUTPATIENT)
Dept: PRIMARY CARE CLINIC | Age: 64
End: 2024-10-07
Payer: MEDICAID

## 2024-10-07 VITALS — OXYGEN SATURATION: 97 % | HEART RATE: 88 BPM | SYSTOLIC BLOOD PRESSURE: 124 MMHG | DIASTOLIC BLOOD PRESSURE: 80 MMHG

## 2024-10-07 DIAGNOSIS — Z79.01 LONG TERM (CURRENT) USE OF ANTICOAGULANTS: Primary | ICD-10-CM

## 2024-10-07 LAB — INTERNATIONAL NORMALIZATION RATIO, POC: 1.4

## 2024-10-07 PROCEDURE — 93793 ANTICOAG MGMT PT WARFARIN: CPT | Performed by: STUDENT IN AN ORGANIZED HEALTH CARE EDUCATION/TRAINING PROGRAM

## 2024-10-07 PROCEDURE — 85610 PROTHROMBIN TIME: CPT | Performed by: STUDENT IN AN ORGANIZED HEALTH CARE EDUCATION/TRAINING PROGRAM

## 2024-10-07 NOTE — PROGRESS NOTES
Patient here for INR. has not had signs of bleeding or excessive bruising.    Previous INR results:   Lab Results   Component Value Date    INR 1.4 10/07/2024    INR 4.1 09/05/2024    INR 3.5 06/06/2024    PROTIME 10.3 04/25/2024    PROTIME 9.6 10/17/2023    PROTIME 17.0 (H) 11/18/2022       Vitals:    10/07/24 0909   BP: 124/80   Pulse: 88   SpO2: 97%       Today's INR: 1.4     Case discussed with Dr. Rey.    Patient advised to continue same dose    Repeat next Protime 2  week(s).

## 2024-10-21 ENCOUNTER — NURSE ONLY (OUTPATIENT)
Dept: PRIMARY CARE CLINIC | Age: 64
End: 2024-10-21
Payer: MEDICAID

## 2024-10-21 ENCOUNTER — OFFICE VISIT (OUTPATIENT)
Dept: ORTHOPEDIC SURGERY | Age: 64
End: 2024-10-21
Payer: MEDICAID

## 2024-10-21 VITALS — BODY MASS INDEX: 30.13 KG/M2 | WEIGHT: 170.1 LBS

## 2024-10-21 DIAGNOSIS — K21.9 GASTROESOPHAGEAL REFLUX DISEASE, UNSPECIFIED WHETHER ESOPHAGITIS PRESENT: ICD-10-CM

## 2024-10-21 DIAGNOSIS — M75.21 BICEPS TENDINITIS OF RIGHT SHOULDER: Primary | ICD-10-CM

## 2024-10-21 DIAGNOSIS — Z79.01 LONG TERM (CURRENT) USE OF ANTICOAGULANTS: ICD-10-CM

## 2024-10-21 DIAGNOSIS — Z23 NEED FOR VACCINATION: Primary | ICD-10-CM

## 2024-10-21 LAB — INTERNATIONAL NORMALIZATION RATIO, POC: 1.7

## 2024-10-21 PROCEDURE — G8484 FLU IMMUNIZE NO ADMIN: HCPCS | Performed by: ORTHOPAEDIC SURGERY

## 2024-10-21 PROCEDURE — 99212 OFFICE O/P EST SF 10 MIN: CPT | Performed by: ORTHOPAEDIC SURGERY

## 2024-10-21 PROCEDURE — G8417 CALC BMI ABV UP PARAM F/U: HCPCS | Performed by: ORTHOPAEDIC SURGERY

## 2024-10-21 PROCEDURE — 90661 CCIIV3 VAC ABX FR 0.5 ML IM: CPT | Performed by: FAMILY MEDICINE

## 2024-10-21 PROCEDURE — 85610 PROTHROMBIN TIME: CPT | Performed by: STUDENT IN AN ORGANIZED HEALTH CARE EDUCATION/TRAINING PROGRAM

## 2024-10-21 PROCEDURE — 90471 IMMUNIZATION ADMIN: CPT | Performed by: FAMILY MEDICINE

## 2024-10-21 PROCEDURE — G8428 CUR MEDS NOT DOCUMENT: HCPCS | Performed by: ORTHOPAEDIC SURGERY

## 2024-10-21 PROCEDURE — 3017F COLORECTAL CA SCREEN DOC REV: CPT | Performed by: ORTHOPAEDIC SURGERY

## 2024-10-21 PROCEDURE — 4004F PT TOBACCO SCREEN RCVD TLK: CPT | Performed by: ORTHOPAEDIC SURGERY

## 2024-10-21 PROCEDURE — 93793 ANTICOAG MGMT PT WARFARIN: CPT | Performed by: STUDENT IN AN ORGANIZED HEALTH CARE EDUCATION/TRAINING PROGRAM

## 2024-10-21 RX ORDER — FAMOTIDINE 40 MG/1
40 TABLET, FILM COATED ORAL NIGHTLY
COMMUNITY
Start: 2024-10-14

## 2024-10-21 NOTE — PROGRESS NOTES
Patient here for INR. has not had signs of bleeding or excessive bruising.      Current Warfarin dose: 6 mg Monday - Friday, 3 mg Saturday - Sunday    Previous INR results:   Lab Results   Component Value Date    INR 1.7 10/21/2024    INR 1.4 10/07/2024    INR 4.1 09/05/2024    PROTIME 10.3 04/25/2024    PROTIME 9.6 10/17/2023    PROTIME 17.0 (H) 11/18/2022       There were no vitals filed for this visit.    Today's INR: 1.7    Case discussed with Dr. Rey.    Patient advised to continue same dose    Repeat next Protime 2  week(s).

## 2024-10-21 NOTE — PROGRESS NOTES
HPI: Ms. Vega is a 64-year-old lady here today to review the results of her right shoulder MRI study which was completed on 9/25/2024.  I did review the images independently with the patient today and it demonstrates some patchy increase in signal on T2 weighted images within the supraspinatus and infraspinatus tendons consistent with tendinosis versus tendinitis versus intrasubstance partial-thickness tearing.  Biceps tendon appears to be intact and well located within the bicipital groove I had a discussion with the patient today about her MRI results.  We discussed treatment options moving forward.  She has undergone treatment thus far with physical therapy and a cortisone injection into the shoulder.  At this time she is having a lot of pain primarily over the anterior shoulder along the biceps tendon in the bicipital groove.  Consequently I recommended an ultrasound-guided cortisone injection into the biceps tendon sheath.  If this provides substantial pain relief it does identify the biceps as the primary culprit with regards to her pain.  She was instructed to pay attention to how much pain relief she experiences regardless of how long the injection last.  A referral was made.  I will see her back in my clinic as needed but she was encouraged to return or call at anytime with persistent or worsening symptoms or with any questions or concerns.

## 2024-10-21 NOTE — PROGRESS NOTES
After obtaining consent, and per orders of Dr. Rey, injection of Influenza 6mo + given in left deltoid by Trini Wolff MA. Patient handled the injection well.

## 2024-10-23 ENCOUNTER — TELEPHONE (OUTPATIENT)
Dept: INTERVENTIONAL RADIOLOGY/VASCULAR | Age: 64
End: 2024-10-23

## 2024-10-23 ENCOUNTER — OFFICE VISIT (OUTPATIENT)
Dept: SURGERY | Age: 64
End: 2024-10-23
Payer: MEDICAID

## 2024-10-23 VITALS
RESPIRATION RATE: 16 BRPM | BODY MASS INDEX: 30.12 KG/M2 | DIASTOLIC BLOOD PRESSURE: 71 MMHG | WEIGHT: 170 LBS | HEIGHT: 63 IN | HEART RATE: 83 BPM | SYSTOLIC BLOOD PRESSURE: 115 MMHG

## 2024-10-23 DIAGNOSIS — Z87.2 HISTORY OF SEBORRHEIC KERATOSIS: Primary | ICD-10-CM

## 2024-10-23 DIAGNOSIS — R11.0 NAUSEA: ICD-10-CM

## 2024-10-23 PROCEDURE — G8417 CALC BMI ABV UP PARAM F/U: HCPCS | Performed by: PLASTIC SURGERY

## 2024-10-23 PROCEDURE — G8484 FLU IMMUNIZE NO ADMIN: HCPCS | Performed by: PLASTIC SURGERY

## 2024-10-23 PROCEDURE — 99212 OFFICE O/P EST SF 10 MIN: CPT | Performed by: PLASTIC SURGERY

## 2024-10-23 PROCEDURE — 4004F PT TOBACCO SCREEN RCVD TLK: CPT | Performed by: PLASTIC SURGERY

## 2024-10-23 PROCEDURE — 3017F COLORECTAL CA SCREEN DOC REV: CPT | Performed by: PLASTIC SURGERY

## 2024-10-23 PROCEDURE — G8427 DOCREV CUR MEDS BY ELIG CLIN: HCPCS | Performed by: PLASTIC SURGERY

## 2024-10-23 RX ORDER — PROMETHAZINE HYDROCHLORIDE 25 MG/1
25 TABLET ORAL 3 TIMES DAILY PRN
Qty: 21 TABLET | Refills: 10 | Status: SHIPPED | OUTPATIENT
Start: 2024-10-23 | End: 2025-01-08

## 2024-10-23 NOTE — TELEPHONE ENCOUNTER
April Silvia is calling to request a refill on the following medication(s):    Last Visit Date (If Applicable):  9/5/2024    Next Visit Date:    12/19/2024    Medication Request:  Requested Prescriptions     Pending Prescriptions Disp Refills    promethazine (PHENERGAN) 25 MG tablet [Pharmacy Med Name: PROMETHAZINE HCL 25 MG ORAL TABLET] 21 tablet 10     Sig: TAKE 1 TABLET BY MOUTH 3 TIMES DAILY AS NEEDED FOR NAUSEA

## 2024-10-23 NOTE — PROGRESS NOTES
[facility]  [address]  [facility] [address]       OFFICE POST-OP NOTE    Patient Name:  Mary Lou Rabago    :  1960    MRN:  [id number]  STATUS POST  Chief Complaint   Patient presents with    Post-Op Check     Seborrheic Keratosis left thigh DOS        SUBJECTIVE  Patient seen and examined.  Patient is status post excision of left thigh lesion measuring 3.5 x 2.2 cm with complex closure..    Her surgery was performed on 2024.  The pathology was consistent with a seborrheic keratosis.  This was discussed with the patient and copy was provided to the patient.    PHYSICAL EXAM  Vital Signs:  /71 (Site: Left Upper Arm, Position: Sitting, Cuff Size: Small Adult)   Pulse 83   Resp 16   Ht 1.6 m (5' 3\")   Wt 77.1 kg (170 lb)   BMI 30.11 kg/m²     Incisions:  Suture line clean dry and intact.  Patient is healing well.  Skin:  No evidence of infection.   Neurologic:  Alert & oriented x 3.    Pathology:  Component 24 1105   Surgical Pathology Report Path Number: [id number]    -- Diagnosis --  SKIN LEFT UPPER THIGH, EXCISION:  -SEBORRHEIC KERATOSIS.      Camden Javier M.D.  **Electronically Signed Out**             Clinical Information  Pre-Op Diagnosis:  NEOPLASM OF UNCERTAIN BEHAVIOR OF SKIN  Operative Findings:  LEFT UPPER THIGH LESION, SUTURE ARREOLA 12 O'CLOCK  Operation Performed:  EXCISION LEFT THIGH SKIN LESION WITH COMPLEX  CLOSURE POSSIBLE GRAFT, POSSIBLE FLAP  mj        Source of Specimen  A: LEFT UPPER THIGH LESION SUTURE MARKS 12 OCLOCK      Gross Description  \"MARY LOU RABAGO, LEFT UPPER THIGH LESION  SUTURE MARKS 12:00\"  Received in formalin is an oriented tan skin ellipse with an attached  suture designating 12:00 (1.2 cm 12-6:00 x 2.4 x 3-9:00, excised to a  depth of 0.4 cm.).  The skin surface shows a 1.8 x 0.9 cm raised,  gray, granular lesion that is 0.2 cm to the nearest

## 2024-10-25 ENCOUNTER — TELEPHONE (OUTPATIENT)
Dept: PRIMARY CARE CLINIC | Age: 64
End: 2024-10-25

## 2024-10-25 DIAGNOSIS — M48.061 SPINAL STENOSIS OF LUMBAR REGION WITHOUT NEUROGENIC CLAUDICATION: Primary | ICD-10-CM

## 2024-10-25 DIAGNOSIS — M51.9 LUMBAR DISC DISEASE: ICD-10-CM

## 2024-10-25 NOTE — TELEPHONE ENCOUNTER
Patient called to ask if she can bypass seeing the neurology and see a neurosurgeon instead for her back pain  Please advise

## 2024-10-27 DIAGNOSIS — F34.1 DYSTHYMIA: ICD-10-CM

## 2024-10-27 DIAGNOSIS — F43.9 STRESS AT HOME: ICD-10-CM

## 2024-10-27 DIAGNOSIS — F41.9 ANXIETY: ICD-10-CM

## 2024-10-27 DIAGNOSIS — F39 MOOD DISORDER (HCC): ICD-10-CM

## 2024-10-28 RX ORDER — DULOXETIN HYDROCHLORIDE 60 MG/1
60 CAPSULE, DELAYED RELEASE ORAL DAILY
Qty: 30 CAPSULE | Refills: 1 | Status: SHIPPED | OUTPATIENT
Start: 2024-10-28

## 2024-10-28 RX ORDER — DIVALPROEX SODIUM 250 MG/1
TABLET, DELAYED RELEASE ORAL
Qty: 60 TABLET | Refills: 1 | Status: SHIPPED | OUTPATIENT
Start: 2024-10-28

## 2024-10-30 ENCOUNTER — TELEPHONE (OUTPATIENT)
Dept: ORTHOPEDIC SURGERY | Age: 64
End: 2024-10-30

## 2024-10-30 ENCOUNTER — OFFICE VISIT (OUTPATIENT)
Dept: ORTHOPEDIC SURGERY | Age: 64
End: 2024-10-30
Payer: MEDICAID

## 2024-10-30 VITALS — WEIGHT: 170 LBS | RESPIRATION RATE: 14 BRPM | BODY MASS INDEX: 30.12 KG/M2 | HEIGHT: 63 IN

## 2024-10-30 DIAGNOSIS — M17.11 PRIMARY OSTEOARTHRITIS OF RIGHT KNEE: ICD-10-CM

## 2024-10-30 DIAGNOSIS — M25.561 CHRONIC PAIN OF RIGHT KNEE: Primary | ICD-10-CM

## 2024-10-30 DIAGNOSIS — G89.29 CHRONIC PAIN OF RIGHT KNEE: Primary | ICD-10-CM

## 2024-10-30 PROCEDURE — 4004F PT TOBACCO SCREEN RCVD TLK: CPT | Performed by: PHYSICIAN ASSISTANT

## 2024-10-30 PROCEDURE — G8428 CUR MEDS NOT DOCUMENT: HCPCS | Performed by: PHYSICIAN ASSISTANT

## 2024-10-30 PROCEDURE — G8484 FLU IMMUNIZE NO ADMIN: HCPCS | Performed by: PHYSICIAN ASSISTANT

## 2024-10-30 PROCEDURE — 3017F COLORECTAL CA SCREEN DOC REV: CPT | Performed by: PHYSICIAN ASSISTANT

## 2024-10-30 PROCEDURE — G8417 CALC BMI ABV UP PARAM F/U: HCPCS | Performed by: PHYSICIAN ASSISTANT

## 2024-10-30 PROCEDURE — 99213 OFFICE O/P EST LOW 20 MIN: CPT | Performed by: PHYSICIAN ASSISTANT

## 2024-10-30 NOTE — PROGRESS NOTES
[facility]                Israel Wright PA-C            [address], [address]               [place], [place] [address]           Dept Phone: [phone]           Dept Fax:  [phone] [address] [facility]                       [address]           [place], [place] [address]          Dept Phone: [phone]           Dept Fax:  [phone]      Chief Compliant:  Chief Complaint   Patient presents with    Knee Pain     Right Knee Pain        History of Present Illness:  This is a 64 y.o. female who presents to the clinic today for evaluation of had concerns including Knee Pain (Right Knee Pain).     Ms. Vega is a 64-year-old female who presents for evaluation of chronic right knee pain.  Patient with history of right knee arthroscopy partial medial meniscectomy on 4/25/2024.  Unfortunately patient has continued pain.  She was most recently seen by Dr. Brown on 8/28/2024 and underwent corticosteroid injection.  She reports the injection provided 100% relief of her pain for 4 days but unfortunately pain has gradually returned and feels very similar to what it did before surgery.    She localized pain most really to anterior medial knee especially aggravated by any twisting or turning.    Patient has tried extensive over-the-counter and prescription medication including Norco, Voltaren gel, CBD/THC rubs as well as various topical medication.  She also was started on meloxicam but was concerned about her INR so her PCP discontinued this given that she takes warfarin.    Patient denies any new injury or trauma.   patient does note intermittent knee swelling but denies any joint warmth, redness, fever or chills       Past History:    Current Outpatient Medications:     busPIRone (BUSPAR) 15 MG tablet, TAKE 1 TABLET BY MOUTH TWICE DAILY, Disp: 60 tablet, Rfl: 10    DULoxetine (CYMBALTA) 60 MG extended release capsule, TAKE ONE CAPSULE BY MOUTH EVERY DAY, Disp: 30

## 2024-11-01 NOTE — TELEPHONE ENCOUNTER
[facility] Benefits and Investigation Summary scanned into media.  Clinicals have been faxed to [facility] to initiate a prior authorization.

## 2024-11-04 ENCOUNTER — NURSE ONLY (OUTPATIENT)
Dept: PRIMARY CARE CLINIC | Age: 64
End: 2024-11-04
Payer: MEDICAID

## 2024-11-04 DIAGNOSIS — Z79.01 LONG TERM (CURRENT) USE OF ANTICOAGULANTS: ICD-10-CM

## 2024-11-04 DIAGNOSIS — Z86.73 HISTORY OF TRANSIENT ISCHEMIC ATTACK (TIA): Primary | ICD-10-CM

## 2024-11-04 LAB — INTERNATIONAL NORMALIZATION RATIO, POC: 2.1

## 2024-11-04 PROCEDURE — 85610 PROTHROMBIN TIME: CPT | Performed by: STUDENT IN AN ORGANIZED HEALTH CARE EDUCATION/TRAINING PROGRAM

## 2024-11-04 NOTE — PROGRESS NOTES
Patient here for INR. has not had signs of bleeding or excessive bruising.      Current Warfarin dose:  6mg m-fri 3mg sat sun            Previous INR results:    Lab Results   Component Value Date    INR 1.7 10/21/2024    INR 1.4 10/07/2024    INR 4.1 09/05/2024    PROTIME 10.3 04/25/2024    PROTIME 9.6 10/17/2023    PROTIME 17.0 (H) 11/18/2022       There were no vitals filed for this visit.    Today's INR:  2.1    Case discussed with  Dr Rey .     Patient advised to continue same dose    Repeat next Protime 1  month(s).

## 2024-11-11 ENCOUNTER — HOSPITAL ENCOUNTER (OUTPATIENT)
Dept: INTERVENTIONAL RADIOLOGY/VASCULAR | Age: 64
Discharge: HOME | End: 2024-11-13
Payer: MEDICAID

## 2024-11-11 DIAGNOSIS — M75.21 BICEPS TENDINITIS OF RIGHT SHOULDER: ICD-10-CM

## 2024-11-11 PROCEDURE — 20550 NJX 1 TENDON SHEATH/LIGAMENT: CPT

## 2024-11-11 PROCEDURE — 2500000003 HC RX 250 WO HCPCS: Performed by: ORTHOPAEDIC SURGERY

## 2024-11-11 PROCEDURE — 2709999900 IR ARTHR/ASP/INJ INT JT/BURSA W US

## 2024-11-11 PROCEDURE — 6360000002 HC RX W HCPCS: Performed by: ORTHOPAEDIC SURGERY

## 2024-11-11 RX ORDER — TRIAMCINOLONE ACETONIDE 40 MG/ML
40 INJECTION, SUSPENSION INTRA-ARTICULAR; INTRAMUSCULAR ONCE
Status: COMPLETED | OUTPATIENT
Start: 2024-11-11 | End: 2024-11-11

## 2024-11-11 RX ORDER — LIDOCAINE HYDROCHLORIDE 10 MG/ML
2 INJECTION, SOLUTION EPIDURAL; INFILTRATION; INTRACAUDAL; PERINEURAL ONCE
Status: COMPLETED | OUTPATIENT
Start: 2024-11-11 | End: 2024-11-11

## 2024-11-11 RX ADMIN — TRIAMCINOLONE ACETONIDE 40 MG: 40 INJECTION, SUSPENSION INTRA-ARTICULAR; INTRAMUSCULAR at 13:03

## 2024-11-11 RX ADMIN — LIDOCAINE HYDROCHLORIDE 2 ML: 10 INJECTION, SOLUTION EPIDURAL; INFILTRATION; INTRACAUDAL; PERINEURAL at 13:03

## 2024-11-11 NOTE — PROGRESS NOTES
Patient tolerated right biceps tendon sheath injection without distress. Dressing to site. Discharge instructions given, no questions at this time. Patient discharged home via private auto.

## 2024-11-12 ENCOUNTER — HOSPITAL ENCOUNTER (OUTPATIENT)
Dept: GENERAL RADIOLOGY | Age: 64
Discharge: HOME | End: 2024-11-14
Payer: MEDICAID

## 2024-11-12 ENCOUNTER — HOSPITAL ENCOUNTER (OUTPATIENT)
Age: 64
Discharge: HOME | End: 2024-11-14
Payer: MEDICAID

## 2024-11-12 ENCOUNTER — OFFICE VISIT (OUTPATIENT)
Dept: NEUROSURGERY | Age: 64
End: 2024-11-12
Payer: MEDICAID

## 2024-11-12 VITALS
HEIGHT: 62 IN | HEART RATE: 92 BPM | BODY MASS INDEX: 32.2 KG/M2 | SYSTOLIC BLOOD PRESSURE: 136 MMHG | WEIGHT: 175 LBS | DIASTOLIC BLOOD PRESSURE: 87 MMHG

## 2024-11-12 DIAGNOSIS — M47.816 LUMBAR SPONDYLOSIS: ICD-10-CM

## 2024-11-12 DIAGNOSIS — F17.200 CURRENT EVERY DAY SMOKER: ICD-10-CM

## 2024-11-12 DIAGNOSIS — M47.816 LUMBAR SPONDYLOSIS: Primary | ICD-10-CM

## 2024-11-12 DIAGNOSIS — M47.816 LUMBAR FACET ARTHROPATHY: ICD-10-CM

## 2024-11-12 PROCEDURE — 4004F PT TOBACCO SCREEN RCVD TLK: CPT | Performed by: NURSE PRACTITIONER

## 2024-11-12 PROCEDURE — 72110 X-RAY EXAM L-2 SPINE 4/>VWS: CPT

## 2024-11-12 PROCEDURE — G8427 DOCREV CUR MEDS BY ELIG CLIN: HCPCS | Performed by: NURSE PRACTITIONER

## 2024-11-12 PROCEDURE — G8484 FLU IMMUNIZE NO ADMIN: HCPCS | Performed by: NURSE PRACTITIONER

## 2024-11-12 PROCEDURE — 99204 OFFICE O/P NEW MOD 45 MIN: CPT | Performed by: NURSE PRACTITIONER

## 2024-11-12 PROCEDURE — 3017F COLORECTAL CA SCREEN DOC REV: CPT | Performed by: NURSE PRACTITIONER

## 2024-11-12 PROCEDURE — G8417 CALC BMI ABV UP PARAM F/U: HCPCS | Performed by: NURSE PRACTITIONER

## 2024-11-12 RX ORDER — HYDROCODONE BITARTRATE AND ACETAMINOPHEN 7.5; 325 MG/1; MG/1
1.5 TABLET ORAL EVERY 6 HOURS PRN
COMMUNITY

## 2024-11-12 NOTE — TELEPHONE ENCOUNTER
Left voicemail for patient to call office back to schedule an appointment for right knee durolane injection. When patient calls back, office should be contacted to confirm medication in stock.

## 2024-11-12 NOTE — TELEPHONE ENCOUNTER
After further review of benefits investigation, it appears that prior-auth is only required if it goes through patient's specialty pharmacy. Will attempt to contact insurance via phone prior to transferring to specialty pharmacy.

## 2024-11-12 NOTE — TELEPHONE ENCOUNTER
Contacted insurance company and spoke with Meesha who reports that Durolane () is a covered benefit with no pror auth required for medication or procedure. Call reference #: [id number]

## 2024-11-12 NOTE — PROGRESS NOTES
History     Tobacco Use    Smoking status: Every Day     Current packs/day: 0.50     Average packs/day: 0.5 packs/day for 50.9 years (25.4 ttl pk-yrs)     Types: Cigarettes     Start date: 1974    Smokeless tobacco: Never   Vaping Use    Vaping status: Every Day    Substances: Nicotine   Substance Use Topics    Alcohol use: Not Currently    Drug use: No       Allergies   Allergen Reactions    Egg-Derived Products Other (See Comments)     Severe abdominal pains    Bupropion Nausea Only    Ceftin [Cefuroxime Axetil] Nausea Only     Nausea and upset stomach    Bee Venom Swelling       Review of Systems  Constitutional: Negative for activity change and appetite change.   HENT: Negative for ear pain and facial swelling.    Eyes: Negative for discharge and itching.   Respiratory: Negative for choking and chest tightness.    Cardiovascular: Negative for chest pain and leg swelling.   Gastrointestinal: Negative for nausea and abdominal pain.   Endocrine: Negative for cold intolerance and heat intolerance.   Genitourinary: Negative for frequency and flank pain.   Musculoskeletal: Negative for myalgias and joint swelling.   Skin: Negative for rash and wound.   Allergic/Immunologic: Negative for environmental allergies and food allergies.   Hematological: Negative for adenopathy. Does not bruise/bleed easily.   Psychiatric/Behavioral: Negative for self-injury. The patient is not nervous/anxious.      Physical Exam:      /87 (Site: Left Upper Arm, Position: Sitting, Cuff Size: Medium Adult)   Pulse 92   Ht 1.575 m (5' 2\")   Wt 79.4 kg (175 lb)   BMI 32.01 kg/m²   Estimated body mass index is 32.01 kg/m² as calculated from the following:    Height as of this encounter: 1.575 m (5' 2\").    Weight as of this encounter: 79.4 kg (175 lb).    General:  Stephanie Vega is a 64 y.o. year old female who appears her stated age.   HEENT: Normocephalic atraumatic. Neck supple.  Chest: regular rate; pulses equal  Abdomen: Soft

## 2024-11-18 ENCOUNTER — HOSPITAL ENCOUNTER (OUTPATIENT)
Dept: MRI IMAGING | Age: 64
Discharge: HOME | End: 2024-11-20
Payer: MEDICAID

## 2024-11-18 DIAGNOSIS — M48.061 SPINAL STENOSIS OF LUMBAR REGION WITHOUT NEUROGENIC CLAUDICATION: ICD-10-CM

## 2024-11-18 DIAGNOSIS — M54.50 LOW BACK PAIN AT MULTIPLE SITES: ICD-10-CM

## 2024-11-18 DIAGNOSIS — M51.9 LUMBAR DISC DISEASE: ICD-10-CM

## 2024-11-18 DIAGNOSIS — R11.0 NAUSEA: ICD-10-CM

## 2024-11-18 DIAGNOSIS — M47.816 LUMBAR SPONDYLOSIS: ICD-10-CM

## 2024-11-18 PROCEDURE — 72148 MRI LUMBAR SPINE W/O DYE: CPT

## 2024-11-20 RX ORDER — PREGABALIN 25 MG/1
CAPSULE ORAL
Qty: 90 CAPSULE | Refills: 1 | Status: SHIPPED | OUTPATIENT
Start: 2024-11-20 | End: 2024-12-20

## 2024-11-20 RX ORDER — HYDROCODONE BITARTRATE AND ACETAMINOPHEN 7.5; 325 MG/1; MG/1
1.5 TABLET ORAL EVERY 6 HOURS PRN
Qty: 180 TABLET | Refills: 0 | Status: SHIPPED | OUTPATIENT
Start: 2024-11-20 | End: 2024-12-20

## 2024-11-20 RX ORDER — PROMETHAZINE HYDROCHLORIDE 25 MG/1
25 TABLET ORAL 3 TIMES DAILY PRN
Qty: 21 TABLET | Refills: 10 | Status: SHIPPED | OUTPATIENT
Start: 2024-11-20 | End: 2025-02-05

## 2024-11-27 ENCOUNTER — OFFICE VISIT (OUTPATIENT)
Dept: ORTHOPEDIC SURGERY | Age: 64
End: 2024-11-27
Payer: MEDICAID

## 2024-11-27 VITALS — BODY MASS INDEX: 32.2 KG/M2 | RESPIRATION RATE: 14 BRPM | WEIGHT: 175 LBS | HEIGHT: 62 IN

## 2024-11-27 DIAGNOSIS — M17.11 PRIMARY OSTEOARTHRITIS OF RIGHT KNEE: Primary | ICD-10-CM

## 2024-11-27 PROCEDURE — 20610 DRAIN/INJ JOINT/BURSA W/O US: CPT | Performed by: PHYSICIAN ASSISTANT

## 2024-11-27 RX ORDER — LIDOCAINE HYDROCHLORIDE 10 MG/ML
2 INJECTION, SOLUTION INFILTRATION; PERINEURAL ONCE
Status: COMPLETED | OUTPATIENT
Start: 2024-11-27 | End: 2024-11-27

## 2024-11-27 RX ORDER — BUPIVACAINE HYDROCHLORIDE 2.5 MG/ML
2 INJECTION, SOLUTION INFILTRATION; PERINEURAL ONCE
Status: COMPLETED | OUTPATIENT
Start: 2024-11-27 | End: 2024-11-27

## 2024-11-27 RX ADMIN — BUPIVACAINE HYDROCHLORIDE 5 MG: 2.5 INJECTION, SOLUTION INFILTRATION; PERINEURAL at 16:51

## 2024-11-27 RX ADMIN — LIDOCAINE HYDROCHLORIDE 2 ML: 10 INJECTION, SOLUTION INFILTRATION; PERINEURAL at 16:51

## 2024-12-04 ENCOUNTER — TELEPHONE (OUTPATIENT)
Dept: PRIMARY CARE CLINIC | Age: 64
End: 2024-12-04

## 2024-12-04 ENCOUNTER — NURSE ONLY (OUTPATIENT)
Dept: PRIMARY CARE CLINIC | Age: 64
End: 2024-12-04
Payer: MEDICAID

## 2024-12-04 VITALS
WEIGHT: 179 LBS | OXYGEN SATURATION: 95 % | BODY MASS INDEX: 31.71 KG/M2 | HEIGHT: 63 IN | DIASTOLIC BLOOD PRESSURE: 78 MMHG | SYSTOLIC BLOOD PRESSURE: 124 MMHG | HEART RATE: 93 BPM

## 2024-12-04 DIAGNOSIS — Z79.01 LONG TERM (CURRENT) USE OF ANTICOAGULANTS: Primary | ICD-10-CM

## 2024-12-04 DIAGNOSIS — G45.9 TIA (TRANSIENT ISCHEMIC ATTACK): ICD-10-CM

## 2024-12-04 LAB — INTERNATIONAL NORMALIZATION RATIO, POC: 1.5

## 2024-12-04 PROCEDURE — 85610 PROTHROMBIN TIME: CPT | Performed by: FAMILY MEDICINE

## 2024-12-04 NOTE — PROGRESS NOTES
Patient here for INR. has not had signs of bleeding or excessive bruising. Patient took most recent dose of Warfarin dose see med list  .      Current Warfarin dose: 6 mg M-S, 3 mg Sun    Previous INR results:   Lab Results   Component Value Date    INR 1.5 12/04/2024    INR 2.1 11/04/2024    INR 1.7 10/21/2024    PROTIME 10.3 04/25/2024    PROTIME 9.6 10/17/2023    PROTIME 17.0 (H) 11/18/2022       Vitals:    12/04/24 0859   BP: 124/78   Pulse: 93   SpO2: 95%       Today's INR: 1.5 mg   reviewed  Case discussed with Dr. Toledo.    Patient advised to adjust to 6 mg M - S, 3 mg Sun    Repeat next Protime 2  week(s).

## 2024-12-11 ENCOUNTER — OFFICE VISIT (OUTPATIENT)
Dept: NEUROSURGERY | Age: 64
End: 2024-12-11
Payer: MEDICAID

## 2024-12-11 VITALS
HEART RATE: 86 BPM | DIASTOLIC BLOOD PRESSURE: 76 MMHG | HEIGHT: 63 IN | SYSTOLIC BLOOD PRESSURE: 136 MMHG | BODY MASS INDEX: 32.11 KG/M2 | WEIGHT: 181.2 LBS

## 2024-12-11 DIAGNOSIS — M47.816 LUMBAR SPONDYLOSIS: Primary | ICD-10-CM

## 2024-12-11 DIAGNOSIS — M47.816 LUMBAR FACET ARTHROPATHY: ICD-10-CM

## 2024-12-11 DIAGNOSIS — M43.9 SPINAL CURVATURE: ICD-10-CM

## 2024-12-11 PROCEDURE — 99214 OFFICE O/P EST MOD 30 MIN: CPT | Performed by: NURSE PRACTITIONER

## 2024-12-11 PROCEDURE — G8484 FLU IMMUNIZE NO ADMIN: HCPCS | Performed by: NURSE PRACTITIONER

## 2024-12-11 PROCEDURE — G8427 DOCREV CUR MEDS BY ELIG CLIN: HCPCS | Performed by: NURSE PRACTITIONER

## 2024-12-11 PROCEDURE — 4004F PT TOBACCO SCREEN RCVD TLK: CPT | Performed by: NURSE PRACTITIONER

## 2024-12-11 PROCEDURE — 3017F COLORECTAL CA SCREEN DOC REV: CPT | Performed by: NURSE PRACTITIONER

## 2024-12-11 PROCEDURE — G8417 CALC BMI ABV UP PARAM F/U: HCPCS | Performed by: NURSE PRACTITIONER

## 2024-12-11 NOTE — PROGRESS NOTES
[facility], [facility] NEUROSURGERY [facility]  [address]  [address]  [address] - GROUND FLOOR, [address]  [facility] [address]  Dept: [phone]    Patient:  Stephanie Vega  YOB: 1960  Date: 12/11/24    The patient is a 64 y.o. female who presents today for consult of the following problems:     Chief Complaint   Patient presents with    Follow-up    Back Pain     Patient stated she is still having lower back pain.         HPI:     Stephanie Vega is a 64 y.o. female who presents for follow up of back pain.  Patient states that pain remains unchanged from her last visit here.  Reports that her pain remains exclusively axial in nature.  Denies any radiation to lower extremities, no associated numbness, tingling.  No saddle anesthesia, loss of bowel or bladder function.  Utilizes a cane for ambulation, this helps with stability.  Pain reaches 10/10, described as a constant, dull pain.  Interferes with all activities.  Only able to stand for limited periods of time, axial pain crescendos requiring her to sit down and rest.  Unable to stand long enough to cook a meal.  Was recently trying to do her [employer] decorations, this was very difficult and also significantly worsened her pain.    Has completed recent multimodal course of physical therapy, did not appreciate any improvement in symptoms.  Has undergone extensive pain management interventions in the past including epidural injections, diagnostic medial branch blocks with some temporary benefit, however no relief following ablation.    Utilizes Lyrica, Cymbalta, long-term opioid therapy.  Unable to take NSAIDs secondary to Coumadin.    Has previously followed with both [facility] as well as [facility].  Has not had recent follow-up as they no longer accept her insurance.    Physical therapy-[facility]: 7/2/2024-9/3/2024    History:     Past Medical History:

## 2024-12-13 DIAGNOSIS — F43.9 STRESS AT HOME: ICD-10-CM

## 2024-12-13 DIAGNOSIS — F41.9 ANXIETY: ICD-10-CM

## 2024-12-13 DIAGNOSIS — F39 MOOD DISORDER (HCC): ICD-10-CM

## 2024-12-13 DIAGNOSIS — F34.1 DYSTHYMIA: ICD-10-CM

## 2024-12-16 RX ORDER — DIVALPROEX SODIUM 250 MG/1
TABLET, DELAYED RELEASE ORAL
Qty: 60 TABLET | Refills: 0 | Status: SHIPPED | OUTPATIENT
Start: 2024-12-16

## 2024-12-16 RX ORDER — DULOXETIN HYDROCHLORIDE 60 MG/1
60 CAPSULE, DELAYED RELEASE ORAL DAILY
Qty: 30 CAPSULE | Refills: 0 | Status: SHIPPED | OUTPATIENT
Start: 2024-12-16

## 2024-12-19 ENCOUNTER — OFFICE VISIT (OUTPATIENT)
Dept: PRIMARY CARE CLINIC | Age: 64
End: 2024-12-19
Payer: MEDICAID

## 2024-12-19 VITALS
SYSTOLIC BLOOD PRESSURE: 110 MMHG | HEIGHT: 63 IN | DIASTOLIC BLOOD PRESSURE: 80 MMHG | HEART RATE: 87 BPM | BODY MASS INDEX: 32.5 KG/M2 | OXYGEN SATURATION: 99 % | WEIGHT: 183.4 LBS

## 2024-12-19 DIAGNOSIS — Z79.899 MEDICATION MANAGEMENT: ICD-10-CM

## 2024-12-19 DIAGNOSIS — Z79.01 LONG TERM (CURRENT) USE OF ANTICOAGULANTS: Primary | ICD-10-CM

## 2024-12-19 DIAGNOSIS — Z86.73 HISTORY OF TRANSIENT ISCHEMIC ATTACK (TIA): ICD-10-CM

## 2024-12-19 DIAGNOSIS — M48.061 SPINAL STENOSIS OF LUMBAR REGION WITHOUT NEUROGENIC CLAUDICATION: ICD-10-CM

## 2024-12-19 DIAGNOSIS — Z23 IMMUNIZATION DUE: ICD-10-CM

## 2024-12-19 LAB — INTERNATIONAL NORMALIZATION RATIO, POC: 1.4

## 2024-12-19 PROCEDURE — 85610 PROTHROMBIN TIME: CPT | Performed by: FAMILY MEDICINE

## 2024-12-19 PROCEDURE — 36415 COLL VENOUS BLD VENIPUNCTURE: CPT | Performed by: FAMILY MEDICINE

## 2024-12-19 RX ORDER — WARFARIN SODIUM 6 MG/1
6 TABLET ORAL DAILY
Qty: 30 TABLET | Refills: 2 | Status: SHIPPED | OUTPATIENT
Start: 2024-12-19 | End: 2025-03-19

## 2024-12-19 ASSESSMENT — ENCOUNTER SYMPTOMS: RESPIRATORY NEGATIVE: 1

## 2024-12-19 NOTE — PROGRESS NOTES
[facility]  [facility]  [address]  [facility] [address]  Dept: [phone]    Stephanie Vega is a 64 y.o. female Established patient, who presents today for her medical conditions/complaintsas noted below.      Chief Complaint   Patient presents with    Medication Check    Back Pain    Hypertension       HPI:     Anxiety : doing good.     Taking pain pills 1.5 tabs 3 x a day usually and sometimes less if she isn't working  Any house work kills her back.     Reviewed prior notes  neurosurgery  They recommended a spinal stimulator    Reviewed previous Labs and Imaging    No components found for: \"LDLCHOLESTEROL\", \"LDLCALC\"    (goal LDL is <100)   AST (U/L)   Date Value   2023 14     ALT (U/L)   Date Value   2023 9     BUN (mg/dL)   Date Value   2023 14     TSH (uIU/mL)   Date Value   2024 1.81     BP Readings from Last 3 Encounters:   24 110/80   24 136/76   24 124/78          (goal 120/80)    Past Medical History:   Diagnosis Date    Acid reflux     Arthritis     neck, back, knees    Asthma     Asthma     Cancer (HCC)     skin cancer    COPD (chronic obstructive pulmonary disease) ([facility]) 10/20/2015    Emphysema     Headache(784.0)     Heart abnormality     minute hole in heart per pt found in  after TIA    Osteoarthritis     Osteoporosis     Seborrheic keratoses 2019    TIA (transient ischemic attack) 2003    x3      Past Surgical History:   Procedure Laterality Date    ACROMIOPLASTY Left 2016    SHOULDER ARTHROCOPY LABRAL DEBRIDEMENT      SECTION, LOW TRANSVERSE      COLONOSCOPY      DILATION AND CURETTAGE OF UTERUS      ENDOSCOPY, COLON, DIAGNOSTIC      KNEE ARTHROSCOPY Right     KNEE ARTHROSCOPY Right 2022    KNEE ARTHROSCOPIC PARTIAL MEDIAL MENISCECTOMY performed by Scott Brown MD at [facility] OR    KNEE ARTHROSCOPY Right 2024    RIGHT KNEE ARTHROSCOPIC PARTIAL MEDIAL MENISCECTOMY

## 2024-12-27 DIAGNOSIS — M51.9 LUMBAR DISC DISEASE: ICD-10-CM

## 2024-12-27 DIAGNOSIS — M54.50 LOW BACK PAIN AT MULTIPLE SITES: ICD-10-CM

## 2024-12-27 DIAGNOSIS — M48.061 SPINAL STENOSIS OF LUMBAR REGION WITHOUT NEUROGENIC CLAUDICATION: ICD-10-CM

## 2024-12-27 RX ORDER — HYDROCODONE BITARTRATE AND ACETAMINOPHEN 7.5; 325 MG/1; MG/1
1.5 TABLET ORAL EVERY 6 HOURS PRN
Qty: 180 TABLET | Refills: 0 | Status: SHIPPED | OUTPATIENT
Start: 2024-12-27 | End: 2025-01-26

## 2025-01-06 ENCOUNTER — NURSE ONLY (OUTPATIENT)
Dept: PRIMARY CARE CLINIC | Age: 65
End: 2025-01-06
Payer: MEDICAID

## 2025-01-06 VITALS — BODY MASS INDEX: 33.31 KG/M2 | HEIGHT: 62 IN | HEART RATE: 91 BPM | OXYGEN SATURATION: 95 % | WEIGHT: 181 LBS

## 2025-01-06 DIAGNOSIS — Z79.01 LONG TERM (CURRENT) USE OF ANTICOAGULANTS: Primary | ICD-10-CM

## 2025-01-06 LAB — INTERNATIONAL NORMALIZATION RATIO, POC: 2.4

## 2025-01-06 PROCEDURE — 93793 ANTICOAG MGMT PT WARFARIN: CPT | Performed by: STUDENT IN AN ORGANIZED HEALTH CARE EDUCATION/TRAINING PROGRAM

## 2025-01-06 PROCEDURE — 36415 COLL VENOUS BLD VENIPUNCTURE: CPT | Performed by: STUDENT IN AN ORGANIZED HEALTH CARE EDUCATION/TRAINING PROGRAM

## 2025-01-06 PROCEDURE — 85610 PROTHROMBIN TIME: CPT | Performed by: STUDENT IN AN ORGANIZED HEALTH CARE EDUCATION/TRAINING PROGRAM

## 2025-01-06 ASSESSMENT — PATIENT HEALTH QUESTIONNAIRE - PHQ9
7. TROUBLE CONCENTRATING ON THINGS, SUCH AS READING THE NEWSPAPER OR WATCHING TELEVISION: NOT AT ALL
SUM OF ALL RESPONSES TO PHQ QUESTIONS 1-9: 0
2. FEELING DOWN, DEPRESSED OR HOPELESS: NOT AT ALL
SUM OF ALL RESPONSES TO PHQ QUESTIONS 1-9: 0
SUM OF ALL RESPONSES TO PHQ9 QUESTIONS 1 & 2: 0
5. POOR APPETITE OR OVEREATING: NOT AT ALL
SUM OF ALL RESPONSES TO PHQ QUESTIONS 1-9: 0
10. IF YOU CHECKED OFF ANY PROBLEMS, HOW DIFFICULT HAVE THESE PROBLEMS MADE IT FOR YOU TO DO YOUR WORK, TAKE CARE OF THINGS AT HOME, OR GET ALONG WITH OTHER PEOPLE: NOT DIFFICULT AT ALL
3. TROUBLE FALLING OR STAYING ASLEEP: NOT AT ALL
6. FEELING BAD ABOUT YOURSELF - OR THAT YOU ARE A FAILURE OR HAVE LET YOURSELF OR YOUR FAMILY DOWN: NOT AT ALL
DEPRESSION UNABLE TO ASSESS: FUNCTIONAL CAPACITY MOTIVATION LIMITS ACCURACY
8. MOVING OR SPEAKING SO SLOWLY THAT OTHER PEOPLE COULD HAVE NOTICED. OR THE OPPOSITE, BEING SO FIGETY OR RESTLESS THAT YOU HAVE BEEN MOVING AROUND A LOT MORE THAN USUAL: NOT AT ALL
4. FEELING TIRED OR HAVING LITTLE ENERGY: NOT AT ALL
SUM OF ALL RESPONSES TO PHQ QUESTIONS 1-9: 0
9. THOUGHTS THAT YOU WOULD BE BETTER OFF DEAD, OR OF HURTING YOURSELF: NOT AT ALL
1. LITTLE INTEREST OR PLEASURE IN DOING THINGS: NOT AT ALL

## 2025-01-06 NOTE — PROGRESS NOTES
Patient here for INR. has had signs of bleeding or excessive bruising. Patient took most recent dose of Warfarin dose 1/5/2025 at night.      Current Warfarin dose: 6 mg    Previous INR results:   Lab Results   Component Value Date    INR 1.4 12/19/2024    INR 1.5 12/04/2024    INR 2.1 11/04/2024    PROTIME 10.3 04/25/2024    PROTIME 9.6 10/17/2023    PROTIME 17.0 (H) 11/18/2022       Vitals:    01/06/25 1332   Pulse: 91   SpO2: 95%       Today's INR: 2.4    Case discussed with Dr. Rey.    Patient advised to continue same dose    Repeat next Protime 4  week(s).

## 2025-01-08 DIAGNOSIS — M48.061 SPINAL STENOSIS OF LUMBAR REGION WITHOUT NEUROGENIC CLAUDICATION: ICD-10-CM

## 2025-01-08 DIAGNOSIS — M54.50 LOW BACK PAIN AT MULTIPLE SITES: ICD-10-CM

## 2025-01-08 DIAGNOSIS — M51.9 LUMBAR DISC DISEASE: ICD-10-CM

## 2025-01-09 RX ORDER — PREGABALIN 25 MG/1
CAPSULE ORAL
Qty: 90 CAPSULE | Refills: 2 | Status: SHIPPED | OUTPATIENT
Start: 2025-01-09 | End: 2025-02-08

## 2025-01-09 NOTE — TELEPHONE ENCOUNTER
LAST VISIT:   12/19/2024     Future Appointments   Date Time Provider Department Center   1/15/2025 11:30 AM Kodak Sanchez DO [facility] [place]   2/3/2025  9:30 AM SCHEDULE, [facility]   3/17/2025 10:00 AM Homa Coon DO [facility]   3/20/2025  9:20 AM Randi Toledo MD [facility]       Pharmacy verified? Yes    [facility] - [place], [place] - [address] - P [phone] - F [phone]  [address]  [place] [address]  Phone: [phone] Fax: [phone]

## 2025-01-12 DIAGNOSIS — F41.9 ANXIETY: ICD-10-CM

## 2025-01-12 DIAGNOSIS — F39 MOOD DISORDER (HCC): ICD-10-CM

## 2025-01-12 DIAGNOSIS — F43.9 STRESS AT HOME: ICD-10-CM

## 2025-01-12 DIAGNOSIS — F34.1 DYSTHYMIA: ICD-10-CM

## 2025-01-13 RX ORDER — DIVALPROEX SODIUM 250 MG/1
TABLET, DELAYED RELEASE ORAL
Qty: 60 TABLET | Refills: 5 | Status: SHIPPED | OUTPATIENT
Start: 2025-01-13

## 2025-01-13 RX ORDER — DULOXETIN HYDROCHLORIDE 60 MG/1
60 CAPSULE, DELAYED RELEASE ORAL DAILY
Qty: 30 CAPSULE | Refills: 5 | Status: SHIPPED | OUTPATIENT
Start: 2025-01-13

## 2025-01-15 ENCOUNTER — HOSPITAL ENCOUNTER (OUTPATIENT)
Dept: PAIN MANAGEMENT | Age: 65
Discharge: HOME | End: 2025-01-15
Payer: MEDICAID

## 2025-01-15 VITALS — BODY MASS INDEX: 33.31 KG/M2 | HEIGHT: 62 IN | WEIGHT: 181 LBS

## 2025-01-15 DIAGNOSIS — Z79.01 LONG TERM (CURRENT) USE OF ANTICOAGULANTS: ICD-10-CM

## 2025-01-15 DIAGNOSIS — M54.16 LUMBAR RADICULOPATHY, RIGHT: ICD-10-CM

## 2025-01-15 DIAGNOSIS — M47.814 THORACIC SPONDYLOSIS: Primary | ICD-10-CM

## 2025-01-15 DIAGNOSIS — M47.817 LUMBOSACRAL SPONDYLOSIS WITHOUT MYELOPATHY: ICD-10-CM

## 2025-01-15 PROCEDURE — 99203 OFFICE O/P NEW LOW 30 MIN: CPT

## 2025-01-15 PROCEDURE — 99204 OFFICE O/P NEW MOD 45 MIN: CPT | Performed by: STUDENT IN AN ORGANIZED HEALTH CARE EDUCATION/TRAINING PROGRAM

## 2025-01-15 ASSESSMENT — PAIN SCALES - GENERAL: PAINLEVEL_OUTOF10: 9

## 2025-01-15 NOTE — PROGRESS NOTES
Chronic Pain Clinic Note     Encounter Date: 1/15/2025     SUBJECTIVE:  Chief Complaint   Patient presents with    New Patient     Back pain       History of Present Illness:   Stephanie Vega is a 64 y.o. female who presents with back pain    Medication Refill: n/a    Current Complaints of Pain:   Location: back   Radiation: no  Severity:  Moderate  Pain Numerical Score - 10 today    Average: 10     Highest: 10  Lowest: 3  Character/Quality: Complains of pain that is aching  Timing: Intermittent  Associated symptoms: none  Numbness: no  Weakness: no  Exacerbating factors: ADLs   Alleviating factors: nothing   Length of time pain has been present: Started about   Inciting event/injury: no  Bowel/Bladder incontinence: no  Falls: none in the past month   Physical Therapy: [facility] 2024    History of Interventions:   Surgery: No previous lumbar/cervical surgeries  Injections: LESI 2021, RFA     Imaging:    MRI Lumbar 2024    Past Medical History:   Diagnosis Date    Acid reflux     Arthritis     neck, back, knees    Asthma     Asthma     Cancer ([facility])     skin cancer    COPD (chronic obstructive pulmonary disease) ([facility]) 10/20/2015    Emphysema     Headache(784.0)     Heart abnormality     minute hole in heart per pt found in  after TIA    Osteoarthritis     Osteoporosis     Seborrheic keratoses 2019    TIA (transient ischemic attack) 2003    x3       Past Surgical History:   Procedure Laterality Date    ACROMIOPLASTY Left 2016    SHOULDER ARTHROCOPY LABRAL DEBRIDEMENT      SECTION, LOW TRANSVERSE      COLONOSCOPY      DILATION AND CURETTAGE OF UTERUS      ENDOSCOPY, COLON, DIAGNOSTIC      KNEE ARTHROSCOPY Right     KNEE ARTHROSCOPY Right 2022    KNEE ARTHROSCOPIC PARTIAL MEDIAL MENISCECTOMY performed by Scott Brown MD at [facility] OR    KNEE ARTHROSCOPY Right 2024    RIGHT KNEE ARTHROSCOPIC PARTIAL MEDIAL MENISCECTOMY performed by Scott Brown MD at [facility]

## 2025-01-27 ENCOUNTER — HOSPITAL ENCOUNTER (OUTPATIENT)
Dept: MRI IMAGING | Age: 65
Discharge: HOME | End: 2025-01-29
Attending: STUDENT IN AN ORGANIZED HEALTH CARE EDUCATION/TRAINING PROGRAM
Payer: MEDICAID

## 2025-01-27 DIAGNOSIS — M47.814 THORACIC SPONDYLOSIS: ICD-10-CM

## 2025-01-27 PROCEDURE — 72146 MRI CHEST SPINE W/O DYE: CPT

## 2025-01-28 ENCOUNTER — TELEPHONE (OUTPATIENT)
Dept: PAIN MANAGEMENT | Age: 65
End: 2025-01-28

## 2025-01-28 NOTE — TELEPHONE ENCOUNTER
I returned patient's call; spoke with her.  Patient states [employer] does not accept her insurance and neither does [employer].  I told the patient I would contact Dr. MARIA D Olvera's office to see if they accept her insurance.  Patient has an OV on 1/30 with this office which she will keep.      Message left with Dr. Olvera's office.

## 2025-01-29 DIAGNOSIS — M54.50 LOW BACK PAIN AT MULTIPLE SITES: ICD-10-CM

## 2025-01-29 DIAGNOSIS — M51.9 LUMBAR DISC DISEASE: ICD-10-CM

## 2025-01-29 DIAGNOSIS — M48.061 SPINAL STENOSIS OF LUMBAR REGION WITHOUT NEUROGENIC CLAUDICATION: ICD-10-CM

## 2025-01-29 RX ORDER — HYDROCODONE BITARTRATE AND ACETAMINOPHEN 7.5; 325 MG/1; MG/1
1.5 TABLET ORAL EVERY 6 HOURS PRN
Qty: 180 TABLET | Refills: 0 | Status: SHIPPED | OUTPATIENT
Start: 2025-01-29 | End: 2025-02-28

## 2025-01-30 ENCOUNTER — HOSPITAL ENCOUNTER (OUTPATIENT)
Dept: PAIN MANAGEMENT | Age: 65
Discharge: HOME | End: 2025-01-30
Payer: MEDICAID

## 2025-01-30 ENCOUNTER — HOSPITAL ENCOUNTER (OUTPATIENT)
Age: 65
Discharge: HOME | End: 2025-01-30
Payer: MEDICAID

## 2025-01-30 VITALS — WEIGHT: 180 LBS | BODY MASS INDEX: 31.89 KG/M2 | HEIGHT: 63 IN

## 2025-01-30 DIAGNOSIS — M41.25 OTHER IDIOPATHIC SCOLIOSIS, THORACOLUMBAR REGION: ICD-10-CM

## 2025-01-30 DIAGNOSIS — Z79.01 LONG TERM (CURRENT) USE OF ANTICOAGULANTS: ICD-10-CM

## 2025-01-30 DIAGNOSIS — M47.817 LUMBOSACRAL SPONDYLOSIS WITHOUT MYELOPATHY: Primary | ICD-10-CM

## 2025-01-30 DIAGNOSIS — Z86.73 HISTORY OF TRANSIENT ISCHEMIC ATTACK (TIA): ICD-10-CM

## 2025-01-30 DIAGNOSIS — M48.061 SPINAL STENOSIS OF LUMBAR REGION WITHOUT NEUROGENIC CLAUDICATION: ICD-10-CM

## 2025-01-30 DIAGNOSIS — Z79.899 MEDICATION MANAGEMENT: ICD-10-CM

## 2025-01-30 LAB
ALBUMIN SERPL-MCNC: 3.8 G/DL (ref 3.5–5.2)
ALP SERPL-CCNC: 91 U/L (ref 35–104)
ALT SERPL-CCNC: 10 U/L (ref 10–35)
ANION GAP SERPL CALCULATED.3IONS-SCNC: 8 MMOL/L (ref 9–16)
AST SERPL-CCNC: 16 U/L (ref 10–35)
BASOPHILS # BLD: 0 K/UL (ref 0–0.2)
BASOPHILS NFR BLD: 0 % (ref 0–2)
BILIRUB SERPL-MCNC: <0.2 MG/DL (ref 0–1.2)
BUN SERPL-MCNC: 13 MG/DL (ref 8–23)
CALCIUM SERPL-MCNC: 8.9 MG/DL (ref 8.6–10.4)
CHLORIDE SERPL-SCNC: 103 MMOL/L (ref 98–107)
CHOLEST SERPL-MCNC: 202 MG/DL (ref 0–199)
CHOLESTEROL/HDL RATIO: 3.2
CO2 SERPL-SCNC: 28 MMOL/L (ref 20–31)
CREAT SERPL-MCNC: 0.9 MG/DL (ref 0.7–1.2)
EOSINOPHIL # BLD: 0.2 K/UL (ref 0–0.4)
EOSINOPHILS RELATIVE PERCENT: 2 % (ref 0–4)
ERYTHROCYTE [DISTWIDTH] IN BLOOD BY AUTOMATED COUNT: 15.5 % (ref 11.5–14.9)
GFR, ESTIMATED: 71 ML/MIN/1.73M2
GLUCOSE P FAST SERPL-MCNC: 92 MG/DL (ref 74–99)
HCT VFR BLD AUTO: 39.6 % (ref 36–46)
HDLC SERPL-MCNC: 64 MG/DL
HGB BLD-MCNC: 12.8 G/DL (ref 12–16)
LDLC SERPL CALC-MCNC: 119 MG/DL (ref 0–100)
LYMPHOCYTES NFR BLD: 3 K/UL (ref 1–4.8)
LYMPHOCYTES RELATIVE PERCENT: 39 % (ref 24–44)
MCH RBC QN AUTO: 29.5 PG (ref 26–34)
MCHC RBC AUTO-ENTMCNC: 32.4 G/DL (ref 31–37)
MCV RBC AUTO: 91.1 FL (ref 80–100)
MONOCYTES NFR BLD: 0.8 K/UL (ref 0.1–1.3)
MONOCYTES NFR BLD: 10 % (ref 1–7)
NEUTROPHILS NFR BLD: 49 % (ref 36–66)
NEUTS SEG NFR BLD: 3.7 K/UL (ref 1.3–9.1)
PLATELET # BLD AUTO: 276 K/UL (ref 150–450)
PMV BLD AUTO: 7.7 FL (ref 6–12)
POTASSIUM SERPL-SCNC: 4.8 MMOL/L (ref 3.7–5.3)
PROT SERPL-MCNC: 6.4 G/DL (ref 6.6–8.7)
RBC # BLD AUTO: 4.34 M/UL (ref 4–5.2)
SODIUM SERPL-SCNC: 139 MMOL/L (ref 136–145)
TRIGL SERPL-MCNC: 97 MG/DL (ref 0–149)
WBC OTHER # BLD: 7.7 K/UL (ref 3.5–11)

## 2025-01-30 PROCEDURE — 80061 LIPID PANEL: CPT

## 2025-01-30 PROCEDURE — 80053 COMPREHEN METABOLIC PANEL: CPT

## 2025-01-30 PROCEDURE — 36415 COLL VENOUS BLD VENIPUNCTURE: CPT

## 2025-01-30 PROCEDURE — 85025 COMPLETE CBC W/AUTO DIFF WBC: CPT

## 2025-01-30 PROCEDURE — 99213 OFFICE O/P EST LOW 20 MIN: CPT

## 2025-01-30 PROCEDURE — 99215 OFFICE O/P EST HI 40 MIN: CPT | Performed by: ANESTHESIOLOGY

## 2025-01-30 RX ORDER — FLUTICASONE PROPIONATE AND SALMETEROL 50; 500 UG/1; UG/1
POWDER RESPIRATORY (INHALATION)
Qty: 60 EACH | Refills: 10 | Status: SHIPPED | OUTPATIENT
Start: 2025-01-30

## 2025-01-30 ASSESSMENT — ENCOUNTER SYMPTOMS
RESPIRATORY NEGATIVE: 1
EYES NEGATIVE: 1
BACK PAIN: 1

## 2025-01-30 NOTE — H&P (VIEW-ONLY)
WITHOUT CONTRAST, 11/18/2024 7:14 pm       L1-L2: Disc desiccation is noted with no significant disc height loss.  Minimal posterior disc bulge is seen without associated central canal  compromise.  Foraminal disc protrusion encroaches upon and moderately narrows  the left neural foramen.  Right neural foramen is patent.     L2-L3: Disc desiccation is noted with mild disc height loss in association  with posterior disc bulge which indents the ventral thecal sac without  causing central canal compromise.  Foraminal disc protrusion mildly narrows  the left neural foramen.  Right neural foramen is patent.  Mild bilateral  facet hypertrophic degenerative changes are seen.     L3-L4: Disc desiccation is noted without appreciable disc height loss.  Posterior disc bulge contours the ventral thecal sac narrowing the midline AP  thecal sac diameter to 10 mm consistent with borderline central canal  compromise.  Neural foramina are patent.  Mild bilateral facet hypertrophic  degenerative changes are seen.     L4-L5: Disc desiccation is noted with mild disc height loss in association  with posterior disc bulge without associated central canal compromise.  Neural foramina are patent.  Moderate bilateral facet hypertrophic  degenerative changes are identified.     L5-S1: Disc desiccation is noted with mild disc height loss in association  with minimal posterior disc bulge without associated central canal  compromise.  Foraminal disc protrusion mildly narrows the right neural  foramen.  Left neural foramen is patent.  Severe right facet hypertrophic  degenerative changes are seen.     IMPRESSION:  L3/L4 borderline central canal compromise secondary to encroachment by  posterior diffuse disc bulge.     L1/L2 moderate left, L2/L3 mild left, L5/S1 mild right neural foraminal  narrowing, as discussed above.     Multilevel mild to severe facet degenerative arthropathy, at levels as  discussed above.     Lumbar spine

## 2025-01-30 NOTE — PROGRESS NOTES
The patient is a 64 y.o.Non- / non  female.    Chief Complaint   Patient presents with    Back Pain     Discuss MRI        Back Pain  This is a chronic problem. The current episode started more than 1 year ago. The problem occurs constantly. The problem has been gradually worsening since onset. The pain is present in the lumbar spine. The quality of the pain is described as aching. The pain does not radiate. The pain is at a severity of 8/10. The pain is moderate. The symptoms are aggravated by bending, sitting, standing and position. Pertinent negatives include no fever, headaches, leg pain, numbness, tingling or weakness. She has tried analgesics for the symptoms. The treatment provided mild relief.     MRI Thoracic spine 25     Patient is here today for: back pain  Pain level: 8  Character: aching  Radiating: no  Weakness or numbness: no  Aggravating Factors: sitting, bending, standing, walking  Alleviating Factors: pain medication  Constant or intermitting: constant  Bladder/bowel loss: no        Past Medical History:   Diagnosis Date    Acid reflux     Arthritis     neck, back, knees    Asthma     Asthma     Cancer ([facility])     skin cancer    COPD (chronic obstructive pulmonary disease) ([facility]) 10/20/2015    Emphysema     Headache(784.0)     Heart abnormality     minute hole in heart per pt found in  after TIA    Osteoarthritis     Osteoporosis     Seborrheic keratoses 2019    TIA (transient ischemic attack) 2003    x3        Past Surgical History:   Procedure Laterality Date    ACROMIOPLASTY Left 2016    SHOULDER ARTHROCOPY LABRAL DEBRIDEMENT      SECTION, LOW TRANSVERSE      COLONOSCOPY      DILATION AND CURETTAGE OF UTERUS      ENDOSCOPY, COLON, DIAGNOSTIC      KNEE ARTHROSCOPY Right     KNEE ARTHROSCOPY Right 2022    KNEE ARTHROSCOPIC PARTIAL MEDIAL MENISCECTOMY performed by Scott Brown MD at [facility] OR    KNEE ARTHROSCOPY Right 2024    RIGHT KNEE

## 2025-02-03 ENCOUNTER — NURSE ONLY (OUTPATIENT)
Dept: PRIMARY CARE CLINIC | Age: 65
End: 2025-02-03
Payer: MEDICAID

## 2025-02-03 ENCOUNTER — TELEPHONE (OUTPATIENT)
Dept: PRIMARY CARE CLINIC | Age: 65
End: 2025-02-03

## 2025-02-03 ENCOUNTER — OFFICE VISIT (OUTPATIENT)
Dept: ORTHOPEDIC SURGERY | Age: 65
End: 2025-02-03
Payer: MEDICAID

## 2025-02-03 VITALS — BODY MASS INDEX: 32.96 KG/M2 | WEIGHT: 186 LBS | HEIGHT: 63 IN | RESPIRATION RATE: 16 BRPM

## 2025-02-03 VITALS — HEART RATE: 95 BPM | SYSTOLIC BLOOD PRESSURE: 120 MMHG | DIASTOLIC BLOOD PRESSURE: 78 MMHG | OXYGEN SATURATION: 95 %

## 2025-02-03 DIAGNOSIS — G89.29 CHRONIC PAIN OF RIGHT KNEE: ICD-10-CM

## 2025-02-03 DIAGNOSIS — M17.11 PRIMARY OSTEOARTHRITIS OF RIGHT KNEE: Primary | ICD-10-CM

## 2025-02-03 DIAGNOSIS — M25.561 CHRONIC PAIN OF RIGHT KNEE: ICD-10-CM

## 2025-02-03 DIAGNOSIS — Z79.01 ANTICOAGULANT LONG-TERM USE: Primary | ICD-10-CM

## 2025-02-03 DIAGNOSIS — R11.0 NAUSEA: ICD-10-CM

## 2025-02-03 LAB — INTERNATIONAL NORMALIZATION RATIO, POC: 1.4

## 2025-02-03 PROCEDURE — 4004F PT TOBACCO SCREEN RCVD TLK: CPT | Performed by: PHYSICIAN ASSISTANT

## 2025-02-03 PROCEDURE — 36415 COLL VENOUS BLD VENIPUNCTURE: CPT | Performed by: STUDENT IN AN ORGANIZED HEALTH CARE EDUCATION/TRAINING PROGRAM

## 2025-02-03 PROCEDURE — 85610 PROTHROMBIN TIME: CPT | Performed by: STUDENT IN AN ORGANIZED HEALTH CARE EDUCATION/TRAINING PROGRAM

## 2025-02-03 PROCEDURE — G8427 DOCREV CUR MEDS BY ELIG CLIN: HCPCS | Performed by: PHYSICIAN ASSISTANT

## 2025-02-03 PROCEDURE — 99406 BEHAV CHNG SMOKING 3-10 MIN: CPT | Performed by: PHYSICIAN ASSISTANT

## 2025-02-03 PROCEDURE — 3017F COLORECTAL CA SCREEN DOC REV: CPT | Performed by: PHYSICIAN ASSISTANT

## 2025-02-03 PROCEDURE — G8417 CALC BMI ABV UP PARAM F/U: HCPCS | Performed by: PHYSICIAN ASSISTANT

## 2025-02-03 PROCEDURE — 99213 OFFICE O/P EST LOW 20 MIN: CPT | Performed by: PHYSICIAN ASSISTANT

## 2025-02-03 PROCEDURE — 93793 ANTICOAG MGMT PT WARFARIN: CPT | Performed by: STUDENT IN AN ORGANIZED HEALTH CARE EDUCATION/TRAINING PROGRAM

## 2025-02-03 ASSESSMENT — ENCOUNTER SYMPTOMS
COLOR CHANGE: 0
VOMITING: 0
SHORTNESS OF BREATH: 0
COUGH: 0

## 2025-02-03 NOTE — PROGRESS NOTES
Patient here for INR. has not had signs of bleeding or excessive bruising.      Current Warfarin dose: 6 mg 7 days per week    Previous INR results:   Lab Results   Component Value Date    INR 1.4 02/03/2025    INR 2.4 01/06/2025    INR 1.4 12/19/2024    PROTIME 10.3 04/25/2024    PROTIME 9.6 10/17/2023    PROTIME 17.0 (H) 11/18/2022       Vitals:    02/03/25 0942   BP: 120/78   Pulse: 95   SpO2: 95%       Today's INR: 1.4    Case discussed with Dr. Rey.    Patient advised to continue same dose    Repeat next Protime 4  week(s).

## 2025-02-03 NOTE — TELEPHONE ENCOUNTER
Patient states she has a spinal ablation coming up soon, her surgeon recommended not stopping the coumadin for this procedure however PCP told patient to stop the medication 6 days prior. Patient is not sure which directions to follow.    Patient is also asking for a refill of phenergan with an increased supply as she takes this medication at least once, sometimes twice daily. Order pended for review.

## 2025-02-03 NOTE — PROGRESS NOTES
[facility], [facility]  [address] [place], [place] [id number]  Dept: [phone]  Dept Fax: [phone]        Ambulatory Follow Up      Subjective:   Stephanie Vega is a 64 y.o. year old female who presents to our office today for routine followup regarding her   1. Primary osteoarthritis of right knee    2. Chronic pain of right knee        Chief Complaint   Patient presents with    Knee Pain     R knee       History of Present Illness  The patient is a 64-year-old female who presents today for continued right knee pain.    She had a right knee arthroscopy with partial medial meniscectomy in 04/2024 with Dr. Brown and most recently had a right knee corticosteroid injection in 08/2024 with Dr. Brown which gave her very minimal improvement in her knee pain. She then had a right knee Synvisc-One injection on 11/27/2024 with Selvin Wright PA-C and notes only very short-term relief of her pain with that injection.     She continues to have pain and pressure with walking and stairs cause dramatic increase in pain. She experiences significant discomfort in her right knee, particularly when walking.     She takes Norco as needed, typically in the morning, for her back pain as prescribed by her PCP. She has undergone several back procedures at [facility] and has been experiencing back pain since 2012. She is scheduled for a nerve ablation procedure at [facility], as previous attempts at [facility] were unsuccessful. If this procedure also fails, a spinal stimulator will be considered.    She is on long-term Coumadin for a TIA she had at age 43.    SOCIAL HISTORY  The patient admits to smoking daily.    MEDICATIONS  [place], Coumadin      Review of Systems   Constitutional:  Negative for activity change and fever.   HENT:  Negative for sneezing.    Respiratory:  Negative for cough and shortness of breath.    Cardiovascular:

## 2025-02-03 NOTE — TELEPHONE ENCOUNTER
Patient is asking if you will take her on as a new patient when Dr. Toledo retires since you see her son.

## 2025-02-05 RX ORDER — PROMETHAZINE HYDROCHLORIDE 25 MG/1
25 TABLET ORAL 3 TIMES DAILY PRN
Qty: 135 TABLET | Refills: 0 | Status: SHIPPED | OUTPATIENT
Start: 2025-02-05 | End: 2025-05-06

## 2025-02-06 NOTE — TELEPHONE ENCOUNTER
If the second procedure does not involve any injection or needle into the spine then she can continue her warfarin

## 2025-02-06 NOTE — TELEPHONE ENCOUNTER
Spoke with April, she understands to stop Warfarin 4 days prior for her ablation this month- she states her surgeon will perform ablation stimulation if ablation does not take.      Stimulation she will not have to stop Warfarin. - Per Surgeon    There is not date for this procedure (stimulation)    Patient wants to clarify she can keep taking warfarin for the second procedure if it is needed

## 2025-02-10 ENCOUNTER — OFFICE VISIT (OUTPATIENT)
Dept: ORTHOPEDIC SURGERY | Age: 65
End: 2025-02-10
Payer: MEDICAID

## 2025-02-10 VITALS — WEIGHT: 186 LBS | RESPIRATION RATE: 14 BRPM | BODY MASS INDEX: 32.96 KG/M2 | HEIGHT: 63 IN

## 2025-02-10 DIAGNOSIS — M75.21 BICEPS TENDONITIS ON RIGHT: Primary | ICD-10-CM

## 2025-02-10 PROCEDURE — 4004F PT TOBACCO SCREEN RCVD TLK: CPT | Performed by: ORTHOPAEDIC SURGERY

## 2025-02-10 PROCEDURE — G8417 CALC BMI ABV UP PARAM F/U: HCPCS | Performed by: ORTHOPAEDIC SURGERY

## 2025-02-10 PROCEDURE — 3017F COLORECTAL CA SCREEN DOC REV: CPT | Performed by: ORTHOPAEDIC SURGERY

## 2025-02-10 PROCEDURE — G8428 CUR MEDS NOT DOCUMENT: HCPCS | Performed by: ORTHOPAEDIC SURGERY

## 2025-02-10 PROCEDURE — 99212 OFFICE O/P EST SF 10 MIN: CPT | Performed by: ORTHOPAEDIC SURGERY

## 2025-02-10 NOTE — PROGRESS NOTES
HPI: Ms. Vega is a 64-year-old lady have seen in the past for right shoulder pain.  During her last visit I did refer her for an ultrasound-guided cortisone injection to the biceps tendon sheath that this seemed like her pain was bicipital.  She states that following the injection on 11/11/2024 she had 100% pain relief and only recently started having some return of pain in the same location.  She returns today requesting a repeat cortisone injection.  On exam she continues to have pain primarily localized to the anterior aspect of shoulder along the biceps tendon within the bicipital groove.  Consequently a referral was made for second injection.  I explained that if her pain were to come back after this I would recommend considering surgical intervention to provide a long term solution.

## 2025-02-11 ENCOUNTER — TELEPHONE (OUTPATIENT)
Dept: INTERVENTIONAL RADIOLOGY/VASCULAR | Age: 65
End: 2025-02-11

## 2025-02-13 ENCOUNTER — HOSPITAL ENCOUNTER (OUTPATIENT)
Dept: INTERVENTIONAL RADIOLOGY/VASCULAR | Age: 65
Discharge: HOME | End: 2025-02-15
Payer: MEDICAID

## 2025-02-13 DIAGNOSIS — M75.21 BICEPS TENDONITIS ON RIGHT: ICD-10-CM

## 2025-02-13 PROCEDURE — 2709999900 IR ARTHR/ASP/INJ INT JT/BURSA W US

## 2025-02-13 PROCEDURE — 20550 NJX 1 TENDON SHEATH/LIGAMENT: CPT

## 2025-02-13 PROCEDURE — 6360000002 HC RX W HCPCS: Performed by: RADIOLOGY

## 2025-02-13 RX ORDER — TRIAMCINOLONE ACETONIDE 40 MG/ML
INJECTION, SUSPENSION INTRA-ARTICULAR; INTRAMUSCULAR PRN
Status: COMPLETED | OUTPATIENT
Start: 2025-02-13 | End: 2025-02-13

## 2025-02-13 RX ORDER — LIDOCAINE HYDROCHLORIDE 10 MG/ML
INJECTION, SOLUTION EPIDURAL; INFILTRATION; INTRACAUDAL; PERINEURAL PRN
Status: COMPLETED | OUTPATIENT
Start: 2025-02-13 | End: 2025-02-13

## 2025-02-13 RX ADMIN — LIDOCAINE HYDROCHLORIDE 2 ML: 10 INJECTION, SOLUTION EPIDURAL; INFILTRATION; INTRACAUDAL; PERINEURAL at 13:21

## 2025-02-13 RX ADMIN — TRIAMCINOLONE ACETONIDE 40 MG: 40 INJECTION, SUSPENSION INTRA-ARTICULAR; INTRAMUSCULAR at 13:21

## 2025-02-13 NOTE — OR NURSING
Rt bicep tendon sheath area, prepped draped. Numbed and accessed by Dr Angelo. Injected as per MAR. Needle removed and band aid applied Tolerated well Verbalizes understanding of discharge instructions

## 2025-02-13 NOTE — BRIEF OP NOTE
Brief Postoperative Note    April Vega  YOB: 1960  [id number]    Pre-operative Diagnosis: Right Biceps tendonitis    Post-operative Diagnosis: Same    Procedure: Ultrasound Guided Right Biceps tendon sheath injection    Anesthesia: Local    Surgeons/Assistants: Dr. Angelo    Estimated Blood Loss: minimal    Complications: None    Specimens: Was Not Obtained    Findings: Assuming successful right biceps tendon sheath steroid injection using ultrasound imaging. Dressing applied. Patient tolerated procedure well.    Electronically signed by Jess Monteiro PA-C on 2/13/2025 at 2:01 PM

## 2025-02-20 DIAGNOSIS — Z79.01 LONG TERM (CURRENT) USE OF ANTICOAGULANTS: ICD-10-CM

## 2025-02-20 DIAGNOSIS — Z86.73 HISTORY OF TRANSIENT ISCHEMIC ATTACK (TIA): ICD-10-CM

## 2025-02-21 RX ORDER — WARFARIN SODIUM 6 MG/1
6 TABLET ORAL DAILY
Qty: 30 TABLET | Refills: 2 | Status: SHIPPED | OUTPATIENT
Start: 2025-02-21

## 2025-02-21 NOTE — PROGRESS NOTES
Attempted to call patient. Unable to reach patient.    Yumi Jacome, RN, BSN  Cardiology RN Care Coordinator   [place]   Phone: [phone]  Fax: [phone] ([place]) [phone] ([place])     04/25/2024    RIGHT KNEE ARTHROSCOPIC PARTIAL MEDIAL MENISCECTOMY performed by Scott Brown MD at [facility] OR    LEG BIOPSY EXCISION Left 8/9/2024    EXCISION LEFT THIGH SKIN LESION WITH COMPLEX CLOSURE performed by Lisa Brooks MD at [facility] OR    OTHER SURGICAL HISTORY      uterine lining removed by laser ablation    PAIN MANAGEMENT PROCEDURE      ablation on back    TONSILLECTOMY AND ADENOIDECTOMY      TUBAL LIGATION       Family History   Problem Relation Age of Onset    Thyroid Disease Mother     Graves Disease Mother     Heart Disease Father     Asthma Father     Thyroid Disease Sister     Cancer Paternal Grandfather         throat    Asthma Son     Thyroid Disease Maternal Aunt     Thyroid Disease Niece         Review of Systems   Constitutional: Negative for fever, chills, sweats.   Eyes: Negative for changes in vision, or pain.   HENT: Negative for ear ache, epistaxis, or sore throat.  Respiratory/Cardio: Negative for Chest pain, palpitations, SOB, or cough.  Gastrointestinal: Negative for abdominal pain, N/V/D.   Genitourinary: Negative for dysuria, frequency, urgency, or hematuria.   Neurological: Negative for headache, numbness, or weakness.   Integumentary: Negative for rash, itching, laceration, or abrasion.   Musculoskeletal: Positive for Knee Pain (F/U: R Knee)       Physical Exam:  Constitutional: Patient is oriented to person, place, and time. Patient appears well-developed and well nourished.   HENT: Negative otherwise noted  Head: Normocephalic and Atraumatic  Nose: Normal  Eyes: Conjunctivae and EOM are normal  Neck: Normal range of motion Neck supple.    Respiratory/Cardio: Effort normal. No respiratory distress.  Musculoskeletal:    Right Knee:     Skin: warm and dry, no rash or erythema  Vasculature: 2+ pedal pulses bilaterally  Neuro: Sensation grossly intact to light touch diffusely  Alignment: Normal  Tenderness: Mild tenderness to medial joint line. No tenderness to

## 2025-02-26 ENCOUNTER — HOSPITAL ENCOUNTER (OUTPATIENT)
Dept: PAIN MANAGEMENT | Facility: CLINIC | Age: 65
Discharge: HOME | End: 2025-02-26
Payer: MEDICAID

## 2025-02-26 VITALS
HEIGHT: 63 IN | OXYGEN SATURATION: 95 % | HEART RATE: 83 BPM | RESPIRATION RATE: 12 BRPM | WEIGHT: 186 LBS | TEMPERATURE: 97.4 F | DIASTOLIC BLOOD PRESSURE: 71 MMHG | BODY MASS INDEX: 32.96 KG/M2 | SYSTOLIC BLOOD PRESSURE: 134 MMHG

## 2025-02-26 DIAGNOSIS — R52 PAIN MANAGEMENT: ICD-10-CM

## 2025-02-26 DIAGNOSIS — H04.123 DRY EYES: ICD-10-CM

## 2025-02-26 DIAGNOSIS — M47.817 LUMBOSACRAL SPONDYLOSIS WITHOUT MYELOPATHY: Primary | ICD-10-CM

## 2025-02-26 PROCEDURE — 64635 DESTROY LUMB/SAC FACET JNT: CPT

## 2025-02-26 PROCEDURE — 64635 DESTROY LUMB/SAC FACET JNT: CPT | Performed by: ANESTHESIOLOGY

## 2025-02-26 PROCEDURE — 64636 DESTROY L/S FACET JNT ADDL: CPT

## 2025-02-26 PROCEDURE — 99152 MOD SED SAME PHYS/QHP 5/>YRS: CPT | Performed by: ANESTHESIOLOGY

## 2025-02-26 PROCEDURE — 64636 DESTROY L/S FACET JNT ADDL: CPT | Performed by: ANESTHESIOLOGY

## 2025-02-26 PROCEDURE — 6360000002 HC RX W HCPCS: Performed by: ANESTHESIOLOGY

## 2025-02-26 RX ORDER — MIDAZOLAM HYDROCHLORIDE 2 MG/2ML
INJECTION, SOLUTION INTRAMUSCULAR; INTRAVENOUS
Status: COMPLETED | OUTPATIENT
Start: 2025-02-26 | End: 2025-02-26

## 2025-02-26 RX ORDER — LIDOCAINE HYDROCHLORIDE 10 MG/ML
INJECTION, SOLUTION EPIDURAL; INFILTRATION; INTRACAUDAL; PERINEURAL
Status: COMPLETED | OUTPATIENT
Start: 2025-02-26 | End: 2025-02-26

## 2025-02-26 RX ORDER — FENTANYL CITRATE 50 UG/ML
INJECTION, SOLUTION INTRAMUSCULAR; INTRAVENOUS
Status: COMPLETED | OUTPATIENT
Start: 2025-02-26 | End: 2025-02-26

## 2025-02-26 RX ORDER — LIDOCAINE HYDROCHLORIDE 40 MG/ML
INJECTION, SOLUTION RETROBULBAR
Status: COMPLETED | OUTPATIENT
Start: 2025-02-26 | End: 2025-02-26

## 2025-02-26 RX ADMIN — LIDOCAINE HYDROCHLORIDE 5 ML: 10 INJECTION, SOLUTION EPIDURAL; INFILTRATION; INTRACAUDAL at 09:45

## 2025-02-26 RX ADMIN — LIDOCAINE HYDROCHLORIDE 5 ML: 10 INJECTION, SOLUTION EPIDURAL; INFILTRATION; INTRACAUDAL at 09:40

## 2025-02-26 RX ADMIN — FENTANYL CITRATE 50 MCG: 50 INJECTION, SOLUTION INTRAMUSCULAR; INTRAVENOUS at 09:40

## 2025-02-26 RX ADMIN — LIDOCAINE HYDROCHLORIDE 5 ML: 40 SOLUTION RETROBULBAR; TOPICAL at 09:44

## 2025-02-26 RX ADMIN — MIDAZOLAM HYDROCHLORIDE 2 MG: 1 INJECTION, SOLUTION INTRAMUSCULAR; INTRAVENOUS at 09:40

## 2025-02-26 RX ADMIN — FENTANYL CITRATE 50 MCG: 50 INJECTION, SOLUTION INTRAMUSCULAR; INTRAVENOUS at 09:45

## 2025-02-26 ASSESSMENT — PAIN DESCRIPTION - DESCRIPTORS: DESCRIPTORS: ACHING

## 2025-02-26 ASSESSMENT — PAIN - FUNCTIONAL ASSESSMENT
PAIN_FUNCTIONAL_ASSESSMENT: 0-10
PAIN_FUNCTIONAL_ASSESSMENT: PREVENTS OR INTERFERES WITH ALL ACTIVE AND SOME PASSIVE ACTIVITIES

## 2025-02-26 NOTE — OP NOTE
Patient's vital signs and neurological status remained stable throughout the procedure and post procedural period.  The patient tolerated the procedure well and was discharged home in stable condition.    SEDATION NOTE:    ASA CLASSIFICATION  3  MP   CLASSIFICATION  3    Moderate intravenous conscious sedation was supervised by Dr. Mccabe  The patient was independently monitored by a Registered Nurse assigned to the Procedure Room  Monitoring included automated blood pressure, continuous EKG, Capnography and continuous pulse oximetry.   The detailed Conscious Record is permanently stored in the Hospital Information System.     The following is the conscious sedation record;  Start Time:  0938  End times:  0954  Duration:  16 minutes  MEDS GIVEN 2 MG VERSED  MCG FENTANYL

## 2025-02-26 NOTE — INTERVAL H&P NOTE
Update History & Physical    The patient's History and Physical of January 30, 2025 was reviewed with the patient and I examined the patient. There was no change. The surgical site was confirmed by the patient and me.     Plan: The risks, benefits, expected outcome, and alternative to the recommended procedure have been discussed with the patient. Patient understands and wants to proceed with the procedure.     Asa 3  Mp 3    Electronically signed by Israel Mccabe MD on 2/26/2025 at 9:23 AM

## 2025-02-26 NOTE — DISCHARGE INSTRUCTIONS
Discharge Instructions following Sedation or Anesthesia:  You have  received  a sedative/anesthetic therefore, you should not consume any alcoholic beverages for minimum of 12 hours.  Do not drive or operate machinery for 24 hours.  Do not sign legal documents for 24 hours.  Dizziness, drowsiness, and unsteadiness may occur.  Rest when need to.  Increase diet as tolerated.  Keep up on fluids if diet allows.      General Instructions:  Do not take a tub bath for 72 hours after procedure (this includes hot tubs and swimming pools).  You may shower, but avoid hot water to injection site.   Avoid strenuous activity TODAY especially if you experience dizziness.   Remove band-aid the next day.  Wash off any residual iodine   Do not use heat, heating pad, or any other heating device over the injection site for 3 days after the procedure.  If you experience pain after your procedure, you may continue with your current pain medication as prescribed.  (DO NOT INCREASE YOUR PAIN MEDICATION WITHOUT TALKING TO DOCTOR)  Soreness and pain at injection site is common, may use ice to reduce soreness.    Call [facility] at [phone] if you experience:   Fever, chills or temperature over 100    Vomiting, Headache, persistent stiff neck, nausea, blurred vision   Difficulty in urinating or unable to urinate with 8 hours   Increase in weakness, numbness or loss of function   Increased redness, swelling or drainage at the injection site

## 2025-03-03 ENCOUNTER — NURSE ONLY (OUTPATIENT)
Dept: PRIMARY CARE CLINIC | Age: 65
End: 2025-03-03
Payer: MEDICAID

## 2025-03-03 VITALS
DIASTOLIC BLOOD PRESSURE: 68 MMHG | SYSTOLIC BLOOD PRESSURE: 118 MMHG | WEIGHT: 191 LBS | BODY MASS INDEX: 33.84 KG/M2 | HEIGHT: 63 IN

## 2025-03-03 DIAGNOSIS — M48.061 SPINAL STENOSIS OF LUMBAR REGION WITHOUT NEUROGENIC CLAUDICATION: Primary | ICD-10-CM

## 2025-03-03 DIAGNOSIS — Z79.01 LONG TERM (CURRENT) USE OF ANTICOAGULANTS: Primary | ICD-10-CM

## 2025-03-03 DIAGNOSIS — Z86.73 HISTORY OF TRANSIENT ISCHEMIC ATTACK (TIA): ICD-10-CM

## 2025-03-03 LAB — INTERNATIONAL NORMALIZATION RATIO, POC: 1.2

## 2025-03-03 PROCEDURE — 85610 PROTHROMBIN TIME: CPT | Performed by: FAMILY MEDICINE

## 2025-03-03 PROCEDURE — 93793 ANTICOAG MGMT PT WARFARIN: CPT | Performed by: FAMILY MEDICINE

## 2025-03-03 PROCEDURE — 36415 COLL VENOUS BLD VENIPUNCTURE: CPT | Performed by: FAMILY MEDICINE

## 2025-03-03 RX ORDER — HYDROCODONE BITARTRATE AND ACETAMINOPHEN 7.5; 325 MG/1; MG/1
1 TABLET ORAL EVERY 6 HOURS PRN
COMMUNITY
End: 2025-03-03 | Stop reason: SDUPTHER

## 2025-03-03 NOTE — PROGRESS NOTES
Patient here for INR. has not had signs of bleeding or excessive bruising.      Current Warfarin dose: 6mg once daily    Previous INR results:   Lab Results   Component Value Date    INR 1.4 02/03/2025    INR 2.4 01/06/2025    INR 1.4 12/19/2024    PROTIME 10.3 04/25/2024    PROTIME 9.6 10/17/2023    PROTIME 17.0 (H) 11/18/2022       Vitals:    03/03/25 1355   BP: 118/68       Today's INR: 1.2    Case discussed with Dr. Espitia.    Patient advised to increase dosage to 12mg once weekly, starting tonight, all other days 6mg.    Repeat next Protime 1 month.

## 2025-03-04 DIAGNOSIS — R11.0 NAUSEA: ICD-10-CM

## 2025-03-04 RX ORDER — HYDROCODONE BITARTRATE AND ACETAMINOPHEN 7.5; 325 MG/1; MG/1
1.5 TABLET ORAL EVERY 6 HOURS PRN
Qty: 180 TABLET | Refills: 0 | Status: SHIPPED | OUTPATIENT
Start: 2025-03-04 | End: 2025-04-03

## 2025-03-04 RX ORDER — PROMETHAZINE HYDROCHLORIDE 25 MG/1
25 TABLET ORAL 3 TIMES DAILY PRN
Qty: 135 TABLET | Refills: 0 | Status: SHIPPED | OUTPATIENT
Start: 2025-03-04 | End: 2025-06-02

## 2025-03-20 ENCOUNTER — OFFICE VISIT (OUTPATIENT)
Dept: PRIMARY CARE CLINIC | Age: 65
End: 2025-03-20
Payer: MEDICARE

## 2025-03-20 VITALS
HEART RATE: 92 BPM | DIASTOLIC BLOOD PRESSURE: 70 MMHG | WEIGHT: 195.4 LBS | BODY MASS INDEX: 34.62 KG/M2 | OXYGEN SATURATION: 96 % | SYSTOLIC BLOOD PRESSURE: 118 MMHG | HEIGHT: 63 IN

## 2025-03-20 DIAGNOSIS — T78.40XD ALLERGY, SUBSEQUENT ENCOUNTER: ICD-10-CM

## 2025-03-20 DIAGNOSIS — R63.5 WEIGHT GAIN: ICD-10-CM

## 2025-03-20 DIAGNOSIS — R06.2 WHEEZING ON BOTH SIDES OF CHEST: ICD-10-CM

## 2025-03-20 DIAGNOSIS — J44.1 COPD EXACERBATION (HCC): ICD-10-CM

## 2025-03-20 DIAGNOSIS — J44.9 CHRONIC OBSTRUCTIVE PULMONARY DISEASE, UNSPECIFIED COPD TYPE (HCC): ICD-10-CM

## 2025-03-20 DIAGNOSIS — Z86.73 HISTORY OF TRANSIENT ISCHEMIC ATTACK (TIA): ICD-10-CM

## 2025-03-20 DIAGNOSIS — Z79.01 LONG TERM (CURRENT) USE OF ANTICOAGULANTS: ICD-10-CM

## 2025-03-20 DIAGNOSIS — Z79.899 MEDICATION MANAGEMENT: Primary | ICD-10-CM

## 2025-03-20 LAB
INTERNATIONAL NORMALIZATION RATIO, POC: 3
PROTHROMBIN TIME, POC: 35.8

## 2025-03-20 PROCEDURE — G8417 CALC BMI ABV UP PARAM F/U: HCPCS | Performed by: FAMILY MEDICINE

## 2025-03-20 PROCEDURE — 3023F SPIROM DOC REV: CPT | Performed by: FAMILY MEDICINE

## 2025-03-20 PROCEDURE — 4004F PT TOBACCO SCREEN RCVD TLK: CPT | Performed by: FAMILY MEDICINE

## 2025-03-20 PROCEDURE — G8427 DOCREV CUR MEDS BY ELIG CLIN: HCPCS | Performed by: FAMILY MEDICINE

## 2025-03-20 PROCEDURE — 3017F COLORECTAL CA SCREEN DOC REV: CPT | Performed by: FAMILY MEDICINE

## 2025-03-20 PROCEDURE — 99214 OFFICE O/P EST MOD 30 MIN: CPT | Performed by: FAMILY MEDICINE

## 2025-03-20 PROCEDURE — 85610 PROTHROMBIN TIME: CPT | Performed by: FAMILY MEDICINE

## 2025-03-20 RX ORDER — PREDNISONE 20 MG/1
20 TABLET ORAL 2 TIMES DAILY
Qty: 14 TABLET | Refills: 0 | Status: SHIPPED | OUTPATIENT
Start: 2025-03-20 | End: 2025-03-27

## 2025-03-20 RX ORDER — WARFARIN SODIUM 6 MG/1
TABLET ORAL
Qty: 30 TABLET | Refills: 2 | Status: SHIPPED | OUTPATIENT
Start: 2025-03-20 | End: 2025-06-18

## 2025-03-20 RX ORDER — LORATADINE 10 MG/1
10 TABLET ORAL DAILY
Qty: 30 TABLET | Refills: 3 | Status: SHIPPED | OUTPATIENT
Start: 2025-03-20 | End: 2025-07-18

## 2025-03-20 SDOH — ECONOMIC STABILITY: FOOD INSECURITY: WITHIN THE PAST 12 MONTHS, THE FOOD YOU BOUGHT JUST DIDN'T LAST AND YOU DIDN'T HAVE MONEY TO GET MORE.: NEVER TRUE

## 2025-03-20 SDOH — ECONOMIC STABILITY: FOOD INSECURITY: WITHIN THE PAST 12 MONTHS, YOU WORRIED THAT YOUR FOOD WOULD RUN OUT BEFORE YOU GOT MONEY TO BUY MORE.: SOMETIMES TRUE

## 2025-03-20 ASSESSMENT — ENCOUNTER SYMPTOMS: BACK PAIN: 1

## 2025-03-20 NOTE — PROGRESS NOTES
health maintenance.  Instructed to continue current medications, diet andexercise.  Patient agreed with treatment plan. Follow up as directed.     Electronicallysigned by Randi Toledo MD on 3/20/2025 at 9:48 AM

## 2025-03-25 ENCOUNTER — TELEPHONE (OUTPATIENT)
Dept: PRIMARY CARE CLINIC | Age: 65
End: 2025-03-25

## 2025-03-25 NOTE — TELEPHONE ENCOUNTER
Stephanie Vega was contacted by Stephanie Patel MA, a Community Health Navigator, regarding a Social Determinants of Health referral.     A message was left with the writer's contact information.    Follow-up attempt.

## 2025-03-26 NOTE — TELEPHONE ENCOUNTER
Stephanie Vega was contacted by a Community Health Navigator to discuss a referral for SDOH related needs.     Writer spoke with: Patient and explained the services and assistance that can be provided by a Community Health Navigator.     Patient agreeable to receiving resources and support from writer.     Intake Notes: Patient is in need of additional food assistance. Patient is receiving $23 a month in SNAP benefits.   Writer will send patient list of food pantries in the area along with list of [facility] to sign up for monthly food baskets. Patient has no further needs at this time.    Actions to be completed by writer: Close referral.    Actions to be completed by patient: Close referral.      Stephanie Patel MA

## 2025-03-27 ENCOUNTER — HOSPITAL ENCOUNTER (OUTPATIENT)
Dept: PAIN MANAGEMENT | Age: 65
Discharge: HOME | End: 2025-03-27
Payer: MEDICARE

## 2025-03-27 VITALS — WEIGHT: 195 LBS | BODY MASS INDEX: 35.88 KG/M2 | HEIGHT: 62 IN

## 2025-03-27 DIAGNOSIS — M41.25 OTHER IDIOPATHIC SCOLIOSIS, THORACOLUMBAR REGION: Primary | ICD-10-CM

## 2025-03-27 DIAGNOSIS — M47.817 LUMBOSACRAL SPONDYLOSIS WITHOUT MYELOPATHY: ICD-10-CM

## 2025-03-27 DIAGNOSIS — M48.061 SPINAL STENOSIS OF LUMBAR REGION WITHOUT NEUROGENIC CLAUDICATION: ICD-10-CM

## 2025-03-27 PROCEDURE — 99214 OFFICE O/P EST MOD 30 MIN: CPT | Performed by: ANESTHESIOLOGY

## 2025-03-27 PROCEDURE — 99213 OFFICE O/P EST LOW 20 MIN: CPT

## 2025-03-27 ASSESSMENT — ENCOUNTER SYMPTOMS
EYES NEGATIVE: 1
RESPIRATORY NEGATIVE: 1
BACK PAIN: 1

## 2025-03-27 NOTE — PROGRESS NOTES
Normal range of motion.  There is no deformity.    Neurological: Patient is alert and oriented to person, place and time.  Normal strength.  Patient has normal coordination.    Pupils:  Pupils are equal, round, and reactive to light.  Pupils are equal.   Skin:  Warm and dry.  No rash or cyanosis.   Range of motion limited on lumbar spine  Tenderness to palpation in lower lumbar area and paramidline region in midline  Gait antalgic  Motor strength 5/5 in both lower extremities      Assessment & Plan  1. Other idiopathic scoliosis, thoracolumbar region    2. Spinal stenosis of lumbar region without neurogenic claudication    3. Lumbosacral spondylosis without myelopathy        Orders Placed This Encounter   Procedures    PRQ IMPLTJ NSTIM ELECTRODE ARRAY EPIDURAL      No orders of the defined types were placed in this encounter.           Electronically signed by Israel Mccabe MD on 3/27/2025 at 11:05 AM

## 2025-03-27 NOTE — H&P (VIEW-ONLY)
The patient is a 64 y.o.Non- / non  female.    Chief Complaint   Patient presents with    Back Pain     RFA f/u        HPI    This is 64-year-old female with history of chronic low back pain located in the mid and lower back region  Onset of symptom many years ago  She have done multiple courses of physical therapy most recently this year  She have tried numerous medications including NSAIDs muscle relaxant and neuropathic pain medications  No focal weakness or bladder bowel incontinence  No previous lumbar spine surgical history  She had numerous spine intervention without much benefit  Patient had recent MRI lumbar spine that showed advanced degenerative changes as well as a scoliotic deformity  She has been recently evaluated by neurosurgery and was advised for neuromodulation trial  Pain is constant aching predominantly axial aggravates with minimal activity and interfere with quality of life  Oswestry index to moderate disability associated with pain    She have completed psychological evaluation and is considered a good candidate for neuromodulation trial  Procedure is discussed at length with patient  Information material is provided  Risk and benefit discussed    She wished to proceed with neuromodulation trial    She is on chronic anticoagulation with Coumadin for history of TIA    Will need to obtain clearance from prescribing physician to hold Coumadin 4 days before the procedure, 5 days during the procedure and 1 day after the procedure      Pain score Today:  8      No results found for: \"LABA1C\"       Past Medical History:   Diagnosis Date    Acid reflux     Arthritis     neck, back, knees    Asthma     Asthma     Cancer ([facility])     skin cancer    COPD (chronic obstructive pulmonary disease) ([facility]) 10/20/2015    Emphysema     Headache(784.0)     Heart abnormality     minute hole in heart per pt found in 2003 after TIA    Osteoarthritis     Osteoporosis     Seborrheic keratoses 09/18/2019

## 2025-04-01 ENCOUNTER — TELEPHONE (OUTPATIENT)
Dept: PAIN MANAGEMENT | Age: 65
End: 2025-04-01

## 2025-04-04 DIAGNOSIS — M54.50 LOW BACK PAIN AT MULTIPLE SITES: ICD-10-CM

## 2025-04-04 DIAGNOSIS — M48.061 SPINAL STENOSIS OF LUMBAR REGION WITHOUT NEUROGENIC CLAUDICATION: ICD-10-CM

## 2025-04-04 DIAGNOSIS — M51.9 LUMBAR DISC DISEASE: ICD-10-CM

## 2025-04-04 RX ORDER — PREGABALIN 25 MG/1
CAPSULE ORAL
Qty: 90 CAPSULE | Refills: 0 | Status: SHIPPED | OUTPATIENT
Start: 2025-04-04 | End: 2025-05-04

## 2025-04-04 RX ORDER — HYDROCODONE BITARTRATE AND ACETAMINOPHEN 7.5; 325 MG/1; MG/1
1.5 TABLET ORAL EVERY 6 HOURS PRN
Qty: 180 TABLET | Refills: 0 | Status: SHIPPED | OUTPATIENT
Start: 2025-04-04 | End: 2025-05-04

## 2025-04-07 ENCOUNTER — TELEPHONE (OUTPATIENT)
Dept: PRIMARY CARE CLINIC | Age: 65
End: 2025-04-07

## 2025-04-07 ENCOUNTER — RESULTS FOLLOW-UP (OUTPATIENT)
Dept: PRIMARY CARE CLINIC | Age: 65
End: 2025-04-07

## 2025-04-07 ENCOUNTER — CLINICAL SUPPORT (OUTPATIENT)
Dept: PRIMARY CARE CLINIC | Age: 65
End: 2025-04-07
Payer: MEDICARE

## 2025-04-07 VITALS
DIASTOLIC BLOOD PRESSURE: 90 MMHG | BODY MASS INDEX: 34.67 KG/M2 | OXYGEN SATURATION: 95 % | HEIGHT: 62 IN | SYSTOLIC BLOOD PRESSURE: 136 MMHG | HEART RATE: 97 BPM | WEIGHT: 188.4 LBS

## 2025-04-07 DIAGNOSIS — Z79.01 ANTICOAGULANT LONG-TERM USE: Primary | ICD-10-CM

## 2025-04-07 DIAGNOSIS — Z86.73 HISTORY OF TRANSIENT ISCHEMIC ATTACK (TIA): ICD-10-CM

## 2025-04-07 LAB
INTERNATIONAL NORMALIZATION RATIO, POC: 1.5
PROTHROMBIN TIME, POC: 18.3

## 2025-04-07 PROCEDURE — 85610 PROTHROMBIN TIME: CPT | Performed by: FAMILY MEDICINE

## 2025-04-07 PROCEDURE — 93793 ANTICOAG MGMT PT WARFARIN: CPT | Performed by: FAMILY MEDICINE

## 2025-04-07 NOTE — PROGRESS NOTES
Patient here for INR. has not had signs of bleeding or excessive bruising.    Current Warfarin dose: 6mg 6 days per week, 9mg one day per week.    Previous INR results:   Lab Results   Component Value Date    INR 3.0 03/20/2025    INR 1.2 03/03/2025    INR 1.4 02/03/2025    PROTIME 35.8 03/20/2025    PROTIME 10.3 04/25/2024    PROTIME 9.6 10/17/2023       There were no vitals filed for this visit.    Today's INR: 1.2. Per Dr. Carolina, change dosing to 6mg five days per week, 9mg twice weekly (Mondays and Fridays, recheck one week after leads are taken out from back surgery. Plan discussed with patient, patient verbalized understanding.   Plan to have INR check on 4/30/25.

## 2025-04-07 NOTE — TELEPHONE ENCOUNTER
Patient would like to know if there is any concern for her sleeping.    She states on Saturday she went to sleep around 12:30 pm and slept (off and on) until Sunday at 11:30 AM. She said she just felt like she couldn't get out of bed at the time.    Please advise.

## 2025-04-08 NOTE — TELEPHONE ENCOUNTER
She needs to get the thyroid ( TSH) lab work done  If that is normal then consider a sleep study.  If those severe fatigue symptoms continue be seen.

## 2025-04-16 ENCOUNTER — HOSPITAL ENCOUNTER (OUTPATIENT)
Dept: PAIN MANAGEMENT | Facility: CLINIC | Age: 65
Discharge: HOME | End: 2025-04-16
Payer: MEDICARE

## 2025-04-16 VITALS
BODY MASS INDEX: 34.6 KG/M2 | WEIGHT: 188 LBS | TEMPERATURE: 97.1 F | RESPIRATION RATE: 14 BRPM | HEIGHT: 62 IN | OXYGEN SATURATION: 93 % | HEART RATE: 105 BPM | DIASTOLIC BLOOD PRESSURE: 84 MMHG | SYSTOLIC BLOOD PRESSURE: 129 MMHG

## 2025-04-16 DIAGNOSIS — M47.817 LUMBOSACRAL SPONDYLOSIS WITHOUT MYELOPATHY: ICD-10-CM

## 2025-04-16 DIAGNOSIS — M41.25 OTHER IDIOPATHIC SCOLIOSIS, THORACOLUMBAR REGION: ICD-10-CM

## 2025-04-16 DIAGNOSIS — M54.16 LUMBAR RADICULOPATHY: Primary | ICD-10-CM

## 2025-04-16 DIAGNOSIS — M48.061 SPINAL STENOSIS OF LUMBAR REGION WITHOUT NEUROGENIC CLAUDICATION: ICD-10-CM

## 2025-04-16 DIAGNOSIS — R52 PAIN MANAGEMENT: ICD-10-CM

## 2025-04-16 PROCEDURE — 2720000010 HC SURG SUPPLY STERILE

## 2025-04-16 PROCEDURE — 63650 IMPLANT NEUROELECTRODES: CPT

## 2025-04-16 PROCEDURE — 63650 IMPLANT NEUROELECTRODES: CPT | Performed by: ANESTHESIOLOGY

## 2025-04-16 PROCEDURE — 2709999900 HC NON-CHARGEABLE SUPPLY

## 2025-04-16 PROCEDURE — 6360000002 HC RX W HCPCS: Performed by: ANESTHESIOLOGY

## 2025-04-16 PROCEDURE — 99152 MOD SED SAME PHYS/QHP 5/>YRS: CPT | Performed by: ANESTHESIOLOGY

## 2025-04-16 PROCEDURE — 2500000003 HC RX 250 WO HCPCS: Performed by: ANESTHESIOLOGY

## 2025-04-16 PROCEDURE — C1778 LEAD, NEUROSTIMULATOR: HCPCS

## 2025-04-16 RX ORDER — FENTANYL CITRATE 50 UG/ML
INJECTION, SOLUTION INTRAMUSCULAR; INTRAVENOUS
Status: COMPLETED | OUTPATIENT
Start: 2025-04-16 | End: 2025-04-16

## 2025-04-16 RX ORDER — BUPIVACAINE HYDROCHLORIDE AND EPINEPHRINE 5; 5 MG/ML; UG/ML
INJECTION, SOLUTION EPIDURAL; INTRACAUDAL; PERINEURAL
Status: COMPLETED | OUTPATIENT
Start: 2025-04-16 | End: 2025-04-16

## 2025-04-16 RX ORDER — MIDAZOLAM HYDROCHLORIDE 2 MG/2ML
INJECTION, SOLUTION INTRAMUSCULAR; INTRAVENOUS
Status: COMPLETED | OUTPATIENT
Start: 2025-04-16 | End: 2025-04-16

## 2025-04-16 RX ORDER — CLINDAMYCIN PHOSPHATE 900 MG/50ML
900 INJECTION, SOLUTION INTRAVENOUS ONCE
Status: COMPLETED | OUTPATIENT
Start: 2025-04-16 | End: 2025-04-16

## 2025-04-16 RX ORDER — CLINDAMYCIN PHOSPHATE 600 MG/50ML
600 INJECTION, SOLUTION INTRAVENOUS ONCE
Status: DISCONTINUED | OUTPATIENT
Start: 2025-04-16 | End: 2025-04-16

## 2025-04-16 RX ADMIN — FENTANYL CITRATE 50 MCG: 50 INJECTION, SOLUTION INTRAMUSCULAR; INTRAVENOUS at 15:32

## 2025-04-16 RX ADMIN — CLINDAMYCIN PHOSPHATE 900 MG: 900 INJECTION, SOLUTION INTRAVENOUS at 15:25

## 2025-04-16 RX ADMIN — MIDAZOLAM HYDROCHLORIDE 1 MG: 1 INJECTION, SOLUTION INTRAMUSCULAR; INTRAVENOUS at 15:25

## 2025-04-16 RX ADMIN — MIDAZOLAM HYDROCHLORIDE 1 MG: 1 INJECTION, SOLUTION INTRAMUSCULAR; INTRAVENOUS at 15:32

## 2025-04-16 RX ADMIN — FENTANYL CITRATE 50 MCG: 50 INJECTION, SOLUTION INTRAMUSCULAR; INTRAVENOUS at 15:25

## 2025-04-16 RX ADMIN — BUPIVACAINE HYDROCHLORIDE AND EPINEPHRINE 16 ML: 5; .0091 INJECTION, SOLUTION EPIDURAL; INTRACAUDAL; PERINEURAL at 15:26

## 2025-04-16 ASSESSMENT — PAIN DESCRIPTION - DESCRIPTORS: DESCRIPTORS: ACHING;THROBBING

## 2025-04-16 ASSESSMENT — PAIN - FUNCTIONAL ASSESSMENT
PAIN_FUNCTIONAL_ASSESSMENT: NONE - DENIES PAIN
PAIN_FUNCTIONAL_ASSESSMENT: PREVENTS OR INTERFERES WITH ALL ACTIVE AND SOME PASSIVE ACTIVITIES

## 2025-04-16 ASSESSMENT — PAIN DESCRIPTION - ORIENTATION: ORIENTATION: LOWER

## 2025-04-16 ASSESSMENT — PAIN DESCRIPTION - PAIN TYPE: TYPE: CHRONIC PAIN

## 2025-04-16 ASSESSMENT — PAIN DESCRIPTION - DIRECTION: RADIATING_TOWARDS: LEFT HIP

## 2025-04-16 ASSESSMENT — PAIN DESCRIPTION - LOCATION: LOCATION: BACK

## 2025-04-16 ASSESSMENT — PAIN SCALES - GENERAL: PAINLEVEL_OUTOF10: 7

## 2025-04-16 ASSESSMENT — PAIN DESCRIPTION - ONSET: ONSET: PROGRESSIVE

## 2025-04-16 ASSESSMENT — PAIN DESCRIPTION - FREQUENCY: FREQUENCY: CONTINUOUS

## 2025-04-16 NOTE — OP NOTE
PREOPERATIVE DIAGNOSES:   1. Lumbar radiculopathy    2. Spinal stenosis of lumbar region without neurogenic claudication    3. Other idiopathic scoliosis, thoracolumbar region    4. Lumbosacral spondylosis without myelopathy          POSTOPERATIVE DIAGNOSES:   1. Lumbar radiculopathy    2. Spinal stenosis of lumbar region without neurogenic claudication    3. Other idiopathic scoliosis, thoracolumbar region    4. Lumbosacral spondylosis without myelopathy          PROCEDURE PERFORMED:  1. Placement of Thoracic Dorsal epidural leads via percuatneous lumbar entry to   L1/2 level x 2   2. Complex programming of the stimulator device with Tonic paresthesia and Non paresthesia parameters.    ANESTHESIA:  IV sedation with versed and fentanyl    BLOOD LOSS:  Minimal    INTRAVENOUS FLUID:  Recorded in the anesthesia chart.    ANTIBIOTICS:  CLINDAMYCIN 900 MG IV    OPERATIVE NOTE:    The patient was seen in the preoperative area.  Chart was reviewed.  Informed  consent was obtained.  The patient was taken to the procedure room and was placed in prone position.  All pressure points were padded.  The area over the thoracic and lumbar spine was prepped with Betadine and DuraPrep. Complete sterile technique for prep and drape was utilized.    A timeout was completed prior to the start of the procedure.    Antibiotic was administered prior to the start of the procedure.    AP view was taken and L3 pedicles were marked on each side. Then, a 14-gauge Tuohy  epidural needle was advanced from right  pedicle in a paramedian fashion to cotact the lamina at L2 level.  Epidural space entry was at L1/2 Interspace.  Epidural space was identified with loss of resistance to air. Position was confirmed  in lateral view. Then, a 8 [employer] SCS leadwas advanced with live fluoroscopy in AP view to the superior endplate of T9 vertebral body.  Position was confirmed in lateral view to be in the dorsal epidural space.

## 2025-04-16 NOTE — INTERVAL H&P NOTE
Update History & Physical    The patient's History and Physical of March 27, 2025 was reviewed with the patient and I examined the patient. There was no change. The surgical site was confirmed by the patient and me.     Plan: The risks, benefits, expected outcome, and alternative to the recommended procedure have been discussed with the patient. Patient understands and wants to proceed with the procedure.     ASA 3  MP 3    Electronically signed by Israel Mccabe MD on 4/16/2025 at 3:00 PM

## 2025-04-16 NOTE — DISCHARGE INSTRUCTIONS
Discharge Instructions following Sedation or Anesthesia:  You have  received  a sedative/anesthetic therefore, you should not consume any alcoholic beverages for minimum of 12 hours.  Do not drive or operate machinery until after follow up with doctor.  Do not sign legal documents for 24 hours.  Dizziness, drowsiness, and unsteadiness may occur.  Rest when need to.  Increase diet as tolerated.  Keep up on fluids if diet allows.      General Instructions:  Avoid any twisting, lifting, bending or exaggerated spine movement  Keep the area of the procedure clean and dry  Do not shower or bathe, sponge baths only, until after follow up.   Wear abdominal binder as much as possible to support your spine and limit the movement of your spine (if you were provided with one)  Do not use heat, heating pad, or any other heating device over the injection site for 3 days after the procedure.  If you experience pain after your procedure, you may continue with your current pain medication as prescribed.  (DO NOT INCREASE YOUR PAIN MEDICATION WITHOUT TALKING TO DOCTOR)  Soreness and pain at injection site is common, may use ice to reduce soreness.    Call [facility] at [phone] if you experience:   Fever, chills or temperature over 100    Vomiting, Headache, persistent stiff neck, nausea, blurred vision   Difficulty in urinating or unable to urinate with 8 hours   Increase in weakness, numbness or loss of function   Increased redness, swelling or drainage at the injection site   Monitor for any signs of infection, such as, redness, swelling, green or yellow foul smelling drainage at injection site. Increased pain or fever.

## 2025-04-18 DIAGNOSIS — Z86.73 HISTORY OF TRANSIENT ISCHEMIC ATTACK (TIA): ICD-10-CM

## 2025-04-18 DIAGNOSIS — G45.9 TIA (TRANSIENT ISCHEMIC ATTACK): ICD-10-CM

## 2025-04-18 RX ORDER — WARFARIN SODIUM 1 MG/1
TABLET ORAL
Qty: 30 TABLET | Refills: 10 | OUTPATIENT
Start: 2025-04-18

## 2025-04-22 ENCOUNTER — OFFICE VISIT (OUTPATIENT)
Dept: PAIN MANAGEMENT | Age: 65
End: 2025-04-22

## 2025-04-22 VITALS — BODY MASS INDEX: 34.6 KG/M2 | WEIGHT: 188 LBS | HEIGHT: 62 IN

## 2025-04-22 DIAGNOSIS — L08.9 BACTERIAL SKIN INFECTION: ICD-10-CM

## 2025-04-22 DIAGNOSIS — M47.817 LUMBOSACRAL SPONDYLOSIS WITHOUT MYELOPATHY: Primary | ICD-10-CM

## 2025-04-22 DIAGNOSIS — B96.89 BACTERIAL SKIN INFECTION: ICD-10-CM

## 2025-04-22 PROCEDURE — 99024 POSTOP FOLLOW-UP VISIT: CPT | Performed by: ANESTHESIOLOGY

## 2025-04-22 RX ORDER — CEPHALEXIN 500 MG/1
500 CAPSULE ORAL 2 TIMES DAILY
Qty: 14 CAPSULE | Refills: 0 | Status: SHIPPED | OUTPATIENT
Start: 2025-04-22 | End: 2025-04-29

## 2025-04-22 ASSESSMENT — ENCOUNTER SYMPTOMS
RESPIRATORY NEGATIVE: 1
BACK PAIN: 1
GASTROINTESTINAL NEGATIVE: 1

## 2025-04-22 NOTE — PROGRESS NOTES
The patient is a 65 y.o.Non- / non  female.    Chief Complaint   Patient presents with    Back Pain     Lead pull        - This is a 65-year-old woman history of chronic predominantly axial lower back pain  Failed different modalities  Neurosurgery recommended neuromodulation trial  Today here for lead pull, completed 7-day trial of spinal cord stimulator with [employer]    Patient overall reports 30% improvement in pain and activity tolerance  She states her lifestyle involves a lot of physical activity and she do not feel this would be compatible with her lifestyle    Leads were pulled without any resistance, tip intact, left lead was dislocated and was just subcutaneous at this point  Needle entry site do have some erythema  Denies any fever or chills  Will start on oral antibiotic Keflex for 7 days    Patient advised to contact us if you develop any fever or discharge  Patient is here today for: Back pain- lead pull  Pain level: 10/10, today   Character: Aching  Radiating: No   Weakness or numbness: No  Aggravating Factors: Sitting, standing, bending, walking   Alleviating Factors: Rest with feet up   Constant or intermitting: Constant   Bladder/bowel loss: No          Past Medical History:   Diagnosis Date    Acid reflux     Arthritis     neck, back, knees    Asthma     Asthma     Cancer ([facility])     skin cancer    COPD (chronic obstructive pulmonary disease) ([facility]) 10/20/2015    Emphysema     Headache(784.0)     Heart abnormality     minute hole in heart per pt found in  after TIA    Osteoarthritis     Osteoporosis     Seborrheic keratoses 2019    TIA (transient ischemic attack) 2003    x3        Past Surgical History:   Procedure Laterality Date    ACROMIOPLASTY Left 2016    SHOULDER ARTHROCOPY LABRAL DEBRIDEMENT      SECTION, LOW TRANSVERSE      COLONOSCOPY      DILATION AND CURETTAGE OF UTERUS      ENDOSCOPY, COLON, DIAGNOSTIC      KNEE ARTHROSCOPY Right     KNEE

## 2025-04-30 ENCOUNTER — CLINICAL SUPPORT (OUTPATIENT)
Dept: PRIMARY CARE CLINIC | Age: 65
End: 2025-04-30

## 2025-04-30 NOTE — PROGRESS NOTES
Patient here for INR. has not had signs of bleeding or excessive bruising.    Current Warfarin dose: 9 mg M & F, 6 mg T, W, Th, S,S. Was office for 2 weeks. Restarted on Thursday 4/24/025    Previous INR results: 1.5  Lab Results   Component Value Date    INR 1.5 04/07/2025    INR 3.0 03/20/2025    INR 1.2 03/03/2025    PROTIME 18.3 04/07/2025    PROTIME 35.8 03/20/2025    PROTIME 10.3 04/25/2024       There were no vitals filed for this visit.    Today's INR: 3.0. Per Dr. Toledo, change dosing to M & F 7 mg, T,W,Th, S ,S 6 mg, recheck in 1 week(s). Plan discussed with patient, patient verbalized understanding.

## 2025-05-01 ENCOUNTER — HOSPITAL ENCOUNTER (OUTPATIENT)
Dept: PAIN MANAGEMENT | Age: 65
Discharge: HOME | End: 2025-05-01
Payer: MEDICARE

## 2025-05-01 VITALS — BODY MASS INDEX: 34.6 KG/M2 | HEIGHT: 62 IN | WEIGHT: 188 LBS

## 2025-05-01 DIAGNOSIS — M47.817 LUMBOSACRAL SPONDYLOSIS WITHOUT MYELOPATHY: ICD-10-CM

## 2025-05-01 DIAGNOSIS — Z79.01 LONG TERM (CURRENT) USE OF ANTICOAGULANTS: Primary | ICD-10-CM

## 2025-05-01 DIAGNOSIS — M41.25 OTHER IDIOPATHIC SCOLIOSIS, THORACOLUMBAR REGION: ICD-10-CM

## 2025-05-01 DIAGNOSIS — M25.511 CHRONIC RIGHT SHOULDER PAIN: ICD-10-CM

## 2025-05-01 DIAGNOSIS — M54.16 LUMBAR RADICULOPATHY: ICD-10-CM

## 2025-05-01 DIAGNOSIS — G89.29 CHRONIC RIGHT SHOULDER PAIN: ICD-10-CM

## 2025-05-01 PROCEDURE — 99213 OFFICE O/P EST LOW 20 MIN: CPT

## 2025-05-01 PROCEDURE — 99213 OFFICE O/P EST LOW 20 MIN: CPT | Performed by: ANESTHESIOLOGY

## 2025-05-01 ASSESSMENT — ENCOUNTER SYMPTOMS
EYES NEGATIVE: 1
RESPIRATORY NEGATIVE: 1
BACK PAIN: 1

## 2025-05-01 NOTE — PROGRESS NOTES
Pupils:  Pupils are equal, round, and reactive to light.  Pupils are equal.   Skin:  Warm and dry.  No rash or cyanosis.     Assessment & Plan  1. Long term (current) use of anticoagulants    2. Chronic right shoulder pain    3. Lumbosacral spondylosis without myelopathy    4. Lumbar radiculopathy    5. Other idiopathic scoliosis, thoracolumbar region        No orders of the defined types were placed in this encounter.     No orders of the defined types were placed in this encounter.           Electronically signed by Israel Mccabe MD on 5/1/2025 at 10:44 AM

## 2025-05-03 DIAGNOSIS — M51.9 LUMBAR DISC DISEASE: ICD-10-CM

## 2025-05-03 DIAGNOSIS — M54.50 LOW BACK PAIN AT MULTIPLE SITES: ICD-10-CM

## 2025-05-03 DIAGNOSIS — M48.061 SPINAL STENOSIS OF LUMBAR REGION WITHOUT NEUROGENIC CLAUDICATION: ICD-10-CM

## 2025-05-05 RX ORDER — PREGABALIN 25 MG/1
CAPSULE ORAL
Qty: 90 CAPSULE | Refills: 2 | Status: SHIPPED | OUTPATIENT
Start: 2025-05-05 | End: 2025-06-04

## 2025-05-07 ENCOUNTER — CLINICAL SUPPORT (OUTPATIENT)
Dept: PRIMARY CARE CLINIC | Age: 65
End: 2025-05-07
Payer: MEDICARE

## 2025-05-07 ENCOUNTER — HOSPITAL ENCOUNTER (OUTPATIENT)
Dept: GENERAL RADIOLOGY | Age: 65
Discharge: HOME | End: 2025-05-09
Payer: MEDICARE

## 2025-05-07 VITALS
HEART RATE: 94 BPM | BODY MASS INDEX: 35.7 KG/M2 | WEIGHT: 194 LBS | HEIGHT: 62 IN | OXYGEN SATURATION: 97 % | DIASTOLIC BLOOD PRESSURE: 78 MMHG | SYSTOLIC BLOOD PRESSURE: 120 MMHG

## 2025-05-07 DIAGNOSIS — Z86.73 HISTORY OF TRANSIENT ISCHEMIC ATTACK (TIA): ICD-10-CM

## 2025-05-07 DIAGNOSIS — Z79.01 LONG TERM (CURRENT) USE OF ANTICOAGULANTS: Primary | ICD-10-CM

## 2025-05-07 DIAGNOSIS — M47.816 LUMBAR FACET ARTHROPATHY: ICD-10-CM

## 2025-05-07 DIAGNOSIS — M47.816 LUMBAR SPONDYLOSIS: ICD-10-CM

## 2025-05-07 LAB — INTERNATIONAL NORMALIZATION RATIO, POC: 2.8

## 2025-05-07 PROCEDURE — 85610 PROTHROMBIN TIME: CPT | Performed by: FAMILY MEDICINE

## 2025-05-07 PROCEDURE — 36415 COLL VENOUS BLD VENIPUNCTURE: CPT | Performed by: FAMILY MEDICINE

## 2025-05-07 PROCEDURE — 72082 X-RAY EXAM ENTIRE SPI 2/3 VW: CPT

## 2025-05-07 RX ORDER — WARFARIN SODIUM 1 MG/1
TABLET ORAL
COMMUNITY
Start: 2025-04-18 | End: 2025-05-07 | Stop reason: DRUGHIGH

## 2025-05-07 NOTE — PROGRESS NOTES
Patient here for INR. has not had signs of bleeding or excessive bruising.      Current Warfarin dose:  Patient was taking incorrect dosage of 9 mg on Monday & Friday, 6 mg remaining days of the week            Previous INR results:    Lab Results   Component Value Date    INR 2.8 05/07/2025    INR 1.5 04/07/2025    INR 3.0 03/20/2025    PROTIME 18.3 04/07/2025    PROTIME 35.8 03/20/2025    PROTIME 10.3 04/25/2024       Vitals:    05/07/25 0957   BP: 120/78   Pulse: 94   SpO2: 97%       Today's INR:  2.8    Case discussed with Dr. Toledo.     Patient advised to adjust to Take 9 mg on Fridays, 6 mg the remaining days of the week.  reviewed  Repeat next Protime 2  - 3 weeks.

## 2025-05-12 ENCOUNTER — OFFICE VISIT (OUTPATIENT)
Dept: NEUROSURGERY | Age: 65
End: 2025-05-12
Payer: MEDICARE

## 2025-05-12 VITALS
DIASTOLIC BLOOD PRESSURE: 91 MMHG | HEART RATE: 85 BPM | HEIGHT: 62 IN | WEIGHT: 189 LBS | BODY MASS INDEX: 34.78 KG/M2 | OXYGEN SATURATION: 93 % | SYSTOLIC BLOOD PRESSURE: 124 MMHG

## 2025-05-12 DIAGNOSIS — M41.27 OTHER IDIOPATHIC SCOLIOSIS, LUMBOSACRAL REGION: Primary | ICD-10-CM

## 2025-05-12 PROCEDURE — 99213 OFFICE O/P EST LOW 20 MIN: CPT | Performed by: NEUROLOGICAL SURGERY

## 2025-05-12 PROCEDURE — 1123F ACP DISCUSS/DSCN MKR DOCD: CPT | Performed by: NEUROLOGICAL SURGERY

## 2025-05-12 RX ORDER — HYDROCODONE BITARTRATE AND ACETAMINOPHEN 7.5; 325 MG/1; MG/1
1 TABLET ORAL EVERY 6 HOURS PRN
COMMUNITY
End: 2025-05-14 | Stop reason: SDUPTHER

## 2025-05-12 NOTE — PROGRESS NOTES
[facility] [place]  [address]  [id number]  [id number]  [facility] [address]  Dept: [phone]    Patient:  Stephanie Vega  YOB: 1960  Date: 5/12/25    The patient is a 65 y.o. female who presents today for consult of the following problems:     Chief Complaint   Patient presents with    Lumbar spondylosis     Follow up with xray results              HPI:     Stephanie Vega is a 65 y.o. female on whom neurosurgical consultation was requested by Randi Toledo MD for management of significant midline thoracolumbar region and lumbar sacral region axial back pain that is nonradiating.  Has been dealing with this for a number of years at least a decade.  Is on a 7.5 mg hydrocodone with 1-1/2 pills being taken approximately 5 hours provided by pain management or PCP.  Has been through a myriad of interventions including epidural injections medial branch blocks likely significant relief.  States that she can stand for period of approximate 15 minutes after which she has to stop and sit down and get some relief and then can stand back up.  Cannot walk long distances and states that leaning on the surface which is a bench or walker does not actually help her pain but actually worsens it.  When she goes to the grocery stores outside of the house she tries to use motorized scooters when able in order to mitigate some of her symptoms.  Does utilize the cane in order to ambulate for approximately 6mo related more to R knee torn meniscus.    1/2PPD    Has been through PT and continue home exercise regimen.  Underwent ablations previously on both sides and did not have much relief. Failed scs trial.     History:     Past Medical History:   Diagnosis Date    Acid reflux     Arthritis     neck, back, knees    Asthma     Asthma     Cancer ([facility])     skin cancer    COPD (chronic obstructive pulmonary disease) ([facility])

## 2025-05-14 ENCOUNTER — TELEPHONE (OUTPATIENT)
Dept: PRIMARY CARE CLINIC | Age: 65
End: 2025-05-14

## 2025-05-14 DIAGNOSIS — G45.9 TIA (TRANSIENT ISCHEMIC ATTACK): ICD-10-CM

## 2025-05-14 DIAGNOSIS — M19.90 ARTHRITIS: Primary | ICD-10-CM

## 2025-05-14 DIAGNOSIS — Z86.73 HISTORY OF TRANSIENT ISCHEMIC ATTACK (TIA): ICD-10-CM

## 2025-05-14 DIAGNOSIS — M54.16 LUMBAR RADICULOPATHY: ICD-10-CM

## 2025-05-14 RX ORDER — HYDROCODONE BITARTRATE AND ACETAMINOPHEN 7.5; 325 MG/1; MG/1
1.5 TABLET ORAL EVERY 6 HOURS PRN
Qty: 180 TABLET | Refills: 0 | Status: SHIPPED | OUTPATIENT
Start: 2025-05-14 | End: 2025-06-13

## 2025-05-14 RX ORDER — HYDROCODONE BITARTRATE AND ACETAMINOPHEN 7.5; 325 MG/1; MG/1
1 TABLET ORAL EVERY 6 HOURS PRN
Refills: 0 | Status: CANCELLED | OUTPATIENT
Start: 2025-05-14

## 2025-05-14 RX ORDER — BUSPIRONE HYDROCHLORIDE 15 MG/1
15 TABLET ORAL 2 TIMES DAILY
Qty: 60 TABLET | Refills: 10 | Status: SHIPPED | OUTPATIENT
Start: 2025-05-14

## 2025-05-14 RX ORDER — WARFARIN SODIUM 1 MG/1
TABLET ORAL
Qty: 30 TABLET | Refills: 5 | OUTPATIENT
Start: 2025-05-14

## 2025-05-14 NOTE — TELEPHONE ENCOUNTER
Patient called requesting her Norco prescription of 7.5 mg.     Patient states she has a monthly supply of 180 tablets.     [facility]    Patient is asking if there is an alternative to the Norco because she is immune and it isn't helping her back pain. The neuro surgeon stated that there is nothing that can be done for her back. Should she get a second opinion, she doesn't want to live in this pain forever.    Please advise.

## 2025-05-14 NOTE — TELEPHONE ENCOUNTER
Warfarin dosage changed, marked as refused as it will not allow me to delete from encounter.    Buspar pended.     Please advise

## 2025-05-21 ENCOUNTER — CLINICAL SUPPORT (OUTPATIENT)
Dept: PRIMARY CARE CLINIC | Age: 65
End: 2025-05-21

## 2025-05-21 VITALS
HEART RATE: 88 BPM | WEIGHT: 192.2 LBS | SYSTOLIC BLOOD PRESSURE: 126 MMHG | DIASTOLIC BLOOD PRESSURE: 80 MMHG | BODY MASS INDEX: 35.37 KG/M2 | HEIGHT: 62 IN

## 2025-05-21 DIAGNOSIS — Z86.73 HISTORY OF TRANSIENT ISCHEMIC ATTACK (TIA): Primary | ICD-10-CM

## 2025-05-21 NOTE — PROGRESS NOTES
Patient here for INR. has not had signs of bleeding or excessive bruising.    Current Warfarin dose: 6mg Sunday, Monday, Tuesday, Wednesday, Thursday, and Saturday, 9mg on Fridays.    Previous INR results:   Lab Results   Component Value Date    INR 2.8 05/07/2025    INR 1.5 04/07/2025    INR 3.0 03/20/2025    PROTIME 18.3 04/07/2025    PROTIME 35.8 03/20/2025    PROTIME 10.3 04/25/2024       There were no vitals filed for this visit.    Today's INR: 2.7. Per Dr. Espitia, continue current dose, recheck in 2 week(s). Plan discussed with patient, patient verbalized understanding.

## 2025-05-23 ENCOUNTER — PATIENT MESSAGE (OUTPATIENT)
Dept: PRIMARY CARE CLINIC | Age: 65
End: 2025-05-23

## 2025-05-23 DIAGNOSIS — J44.1 COPD EXACERBATION (HCC): ICD-10-CM

## 2025-05-23 DIAGNOSIS — J44.9 CHRONIC OBSTRUCTIVE PULMONARY DISEASE, UNSPECIFIED COPD TYPE (HCC): Primary | ICD-10-CM

## 2025-05-29 DIAGNOSIS — F39 MOOD DISORDER: ICD-10-CM

## 2025-05-29 DIAGNOSIS — F43.9 STRESS AT HOME: ICD-10-CM

## 2025-05-29 DIAGNOSIS — R11.0 NAUSEA: ICD-10-CM

## 2025-05-29 DIAGNOSIS — F41.9 ANXIETY: ICD-10-CM

## 2025-05-29 DIAGNOSIS — F34.1 DYSTHYMIA: ICD-10-CM

## 2025-05-29 RX ORDER — DIVALPROEX SODIUM 250 MG/1
250 TABLET, DELAYED RELEASE ORAL 2 TIMES DAILY
Qty: 60 TABLET | Refills: 2 | Status: SHIPPED | OUTPATIENT
Start: 2025-05-29

## 2025-05-29 RX ORDER — ALBUTEROL SULFATE 90 UG/1
2 INHALANT RESPIRATORY (INHALATION) 4 TIMES DAILY PRN
Qty: 18 G | Refills: 0 | Status: SHIPPED | OUTPATIENT
Start: 2025-05-29

## 2025-05-29 RX ORDER — DULOXETIN HYDROCHLORIDE 60 MG/1
60 CAPSULE, DELAYED RELEASE ORAL DAILY
Qty: 30 CAPSULE | Refills: 2 | Status: SHIPPED | OUTPATIENT
Start: 2025-05-29

## 2025-05-29 RX ORDER — PROMETHAZINE HYDROCHLORIDE 25 MG/1
TABLET ORAL
Qty: 135 TABLET | Refills: 0 | Status: SHIPPED | OUTPATIENT
Start: 2025-05-29

## 2025-06-02 ENCOUNTER — OFFICE VISIT (OUTPATIENT)
Dept: ORTHOPEDIC SURGERY | Age: 65
End: 2025-06-02
Payer: MEDICARE

## 2025-06-02 ENCOUNTER — TELEPHONE (OUTPATIENT)
Dept: ORTHOPEDIC SURGERY | Age: 65
End: 2025-06-02

## 2025-06-02 VITALS — RESPIRATION RATE: 18 BRPM | BODY MASS INDEX: 34.78 KG/M2 | HEIGHT: 62 IN | WEIGHT: 189 LBS

## 2025-06-02 DIAGNOSIS — M17.11 PRIMARY OSTEOARTHRITIS OF RIGHT KNEE: Primary | ICD-10-CM

## 2025-06-02 PROCEDURE — 99213 OFFICE O/P EST LOW 20 MIN: CPT | Performed by: ORTHOPAEDIC SURGERY

## 2025-06-02 PROCEDURE — 1123F ACP DISCUSS/DSCN MKR DOCD: CPT | Performed by: ORTHOPAEDIC SURGERY

## 2025-06-02 NOTE — PROGRESS NOTES
[facility]                   Scott Brown M.D.            [address], [address]               [place], [place] [address]           Dept Phone: [phone]           Dept Fax:  [phone] [address]                       [address]           [place], [place] [address]          Dept Phone: [phone]           Dept Fax:  [phone]      Chief Compliant:  Chief Complaint   Patient presents with    Pain     RT KNEE        History of Present Illness:  This is a 65 y.o. female who presents to the clinic today for evaluation / follow up of right knee pain.  Patient has a history of arthroscopic partial medial meniscectomy of her right knee done back in 2024.  She was last seen by Selvin in November 2024.  She did receive a Durolane injection and she says she had at least 3 months of relief with this.  She is back here today as she is having increasing pain but wish to proceed with repeat injection.       Review of Systems   Constitutional: Negative for fever, chills, sweats.   Eyes: Negative for changes in vision, or pain.   HENT: Negative for ear ache, epistaxis, or sore throat.  Respiratory/Cardio: Negative for Chest pain, palpitations, SOB, or cough.  Gastrointestinal: Negative for abdominal pain, N/V/D.   Genitourinary: Negative for dysuria, frequency, urgency, or hematuria.   Neurological: Negative for headache, numbness, or weakness.   Integumentary: Negative for rash, itching, laceration, or abrasion.   Musculoskeletal: Positive for Pain (RT KNEE)       Physical Exam:  Constitutional: Patient is oriented to person, place, and time. Patient appears well-developed and well nourished.   HENT: Negative otherwise noted  Head: Normocephalic and Atraumatic  Nose: Normal  Eyes: Conjunctivae and EOM are normal  Neck: Normal range of motion Neck supple.    Respiratory/Cardio: Effort normal. No respiratory distress.  Musculoskeletal: Physical examination notes

## 2025-06-04 ENCOUNTER — CLINICAL SUPPORT (OUTPATIENT)
Dept: PRIMARY CARE CLINIC | Age: 65
End: 2025-06-04
Payer: MEDICARE

## 2025-06-04 VITALS — HEART RATE: 100 BPM | SYSTOLIC BLOOD PRESSURE: 116 MMHG | DIASTOLIC BLOOD PRESSURE: 78 MMHG | OXYGEN SATURATION: 93 %

## 2025-06-04 DIAGNOSIS — Z86.73 HISTORY OF TRANSIENT ISCHEMIC ATTACK (TIA): Primary | ICD-10-CM

## 2025-06-04 DIAGNOSIS — Z79.01 LONG TERM (CURRENT) USE OF ANTICOAGULANTS: ICD-10-CM

## 2025-06-04 LAB — INTERNATIONAL NORMALIZATION RATIO, POC: 4.6

## 2025-06-04 PROCEDURE — 93793 ANTICOAG MGMT PT WARFARIN: CPT | Performed by: FAMILY MEDICINE

## 2025-06-04 PROCEDURE — 85610 PROTHROMBIN TIME: CPT | Performed by: FAMILY MEDICINE

## 2025-06-04 PROCEDURE — 36415 COLL VENOUS BLD VENIPUNCTURE: CPT | Performed by: FAMILY MEDICINE

## 2025-06-04 RX ORDER — WARFARIN SODIUM 6 MG/1
6 TABLET ORAL DAILY
Qty: 30 TABLET | Refills: 2 | Status: SHIPPED | OUTPATIENT
Start: 2025-06-04 | End: 2025-09-02

## 2025-06-04 NOTE — PROGRESS NOTES
Patient here for INR. has not had signs of bleeding or excessive bruising.    Current Warfarin dose: 9 mg Fridays, 6 mg all other days.     Previous INR results:   Lab Results   Component Value Date    INR 4.6 06/04/2025    INR 2.8 05/07/2025    INR 1.5 04/07/2025    PROTIME 18.3 04/07/2025    PROTIME 35.8 03/20/2025    PROTIME 10.3 04/25/2024       Vitals:    06/04/25 1143   BP: 116/78   Pulse: 100   SpO2: 93%     Reviewed.  Today's INR: 4.6. Per Dr. Toledo, hold x3 days then take 6 mg daily recheck in 2 week(s). Plan discussed with patient, patient verbalized understanding.

## 2025-06-10 DIAGNOSIS — T78.40XD ALLERGY, SUBSEQUENT ENCOUNTER: ICD-10-CM

## 2025-06-10 RX ORDER — OMEPRAZOLE 20 MG/1
20 CAPSULE, DELAYED RELEASE ORAL DAILY
Qty: 30 CAPSULE | Refills: 0 | Status: SHIPPED | OUTPATIENT
Start: 2025-06-10

## 2025-06-10 RX ORDER — LORATADINE 10 MG/1
10 TABLET ORAL DAILY
Qty: 30 TABLET | Refills: 3 | Status: SHIPPED | OUTPATIENT
Start: 2025-06-10 | End: 2025-10-08

## 2025-06-13 DIAGNOSIS — J44.1 COPD EXACERBATION (HCC): ICD-10-CM

## 2025-06-13 DIAGNOSIS — J44.9 CHRONIC OBSTRUCTIVE PULMONARY DISEASE, UNSPECIFIED COPD TYPE (HCC): ICD-10-CM

## 2025-06-13 RX ORDER — ALBUTEROL SULFATE 90 UG/1
INHALANT RESPIRATORY (INHALATION)
Qty: 8.5 G | Refills: 10 | Status: SHIPPED | OUTPATIENT
Start: 2025-06-13

## 2025-06-16 ENCOUNTER — OFFICE VISIT (OUTPATIENT)
Dept: ORTHOPEDIC SURGERY | Age: 65
End: 2025-06-16
Payer: MEDICARE

## 2025-06-16 ENCOUNTER — PREP FOR PROCEDURE (OUTPATIENT)
Dept: ORTHOPEDIC SURGERY | Age: 65
End: 2025-06-16

## 2025-06-16 ENCOUNTER — TELEPHONE (OUTPATIENT)
Dept: PRIMARY CARE CLINIC | Age: 65
End: 2025-06-16

## 2025-06-16 VITALS — HEIGHT: 62 IN | RESPIRATION RATE: 14 BRPM | BODY MASS INDEX: 34.78 KG/M2 | WEIGHT: 189 LBS

## 2025-06-16 DIAGNOSIS — R93.6 ABNORMAL FINDINGS ON DIAGNOSTIC IMAGING OF LIMBS: ICD-10-CM

## 2025-06-16 DIAGNOSIS — M75.21 BICEPS TENDONITIS, RIGHT: ICD-10-CM

## 2025-06-16 DIAGNOSIS — M75.21 BICEPS TENDINITIS OF RIGHT SHOULDER: Primary | ICD-10-CM

## 2025-06-16 PROCEDURE — 99213 OFFICE O/P EST LOW 20 MIN: CPT | Performed by: ORTHOPAEDIC SURGERY

## 2025-06-16 PROCEDURE — 1123F ACP DISCUSS/DSCN MKR DOCD: CPT | Performed by: ORTHOPAEDIC SURGERY

## 2025-06-16 NOTE — TELEPHONE ENCOUNTER
Patient has surgery on 7/8/25 and asked when she should stop taking her Coumadin.     Please advise

## 2025-06-18 ENCOUNTER — CLINICAL SUPPORT (OUTPATIENT)
Dept: PRIMARY CARE CLINIC | Age: 65
End: 2025-06-18
Payer: MEDICARE

## 2025-06-18 VITALS
HEIGHT: 62 IN | BODY MASS INDEX: 34.57 KG/M2 | SYSTOLIC BLOOD PRESSURE: 110 MMHG | DIASTOLIC BLOOD PRESSURE: 70 MMHG | HEART RATE: 89 BPM | OXYGEN SATURATION: 98 %

## 2025-06-18 DIAGNOSIS — Z86.73 HISTORY OF TRANSIENT ISCHEMIC ATTACK (TIA): ICD-10-CM

## 2025-06-18 DIAGNOSIS — Z79.01 LONG TERM (CURRENT) USE OF ANTICOAGULANTS: Primary | ICD-10-CM

## 2025-06-18 LAB — INTERNATIONAL NORMALIZATION RATIO, POC: 1.7

## 2025-06-18 PROCEDURE — 93793 ANTICOAG MGMT PT WARFARIN: CPT | Performed by: FAMILY MEDICINE

## 2025-06-18 PROCEDURE — 85610 PROTHROMBIN TIME: CPT | Performed by: FAMILY MEDICINE

## 2025-06-18 PROCEDURE — 36415 COLL VENOUS BLD VENIPUNCTURE: CPT | Performed by: FAMILY MEDICINE

## 2025-06-18 RX ORDER — WARFARIN SODIUM 6 MG/1
TABLET ORAL
Qty: 32 TABLET | Refills: 1 | Status: SHIPPED | OUTPATIENT
Start: 2025-06-18 | End: 2025-08-17

## 2025-06-18 NOTE — PROGRESS NOTES
HPI: Ms. Vega is a 65-year-old lady who has been seen for right shoulder pain.  She has been diagnosed with biceps tendinitis and has had 2 ultrasound-guided biceps tendon sheath injections the most recent of which was administered on 2/13/2025.  As before she experienced 100% pain relief.  This time around it lasted approximately 2-1/2 months.  She returns today with recurrent pain over the anterior aspect of her shoulder.  We had a discussion about treatment options moving forward.  At this time I did recommend proceeding surgically with an arthroscopic biceps tenodesis versus tenotomy.  We discussed the details of each procedure, risks and benefits of surgery, expected outcome and postoperative recovery course.  Risks as discussed included but are not limited to risk of infection, wound healing problems, persistent pain, neurovascular injury, stiffness, development of Terrell arm (in the case of a tenotomy), cramping, and anesthesia.  She demonstrated good understanding of our discussion and at this time would like to proceed with a tenotomy.  All questions were answered.  We will schedule her for surgery at her convenience and facilitate her getting appropriate preoperative medical clearance.

## 2025-06-18 NOTE — PROGRESS NOTES
Patient here for INR. has not had signs of bleeding or excessive bruising.      Current Warfarin dose:  6mg daily             Previous INR results:    Lab Results   Component Value Date    INR 4.6 06/04/2025    INR 2.8 05/07/2025    INR 1.5 04/07/2025    PROTIME 18.3 04/07/2025    PROTIME 35.8 03/20/2025    PROTIME 10.3 04/25/2024       Vitals:    06/18/25 0901   BP: 110/70   Pulse: 89   SpO2: 98%       Today's INR:  1.7    Case discussed with Dr. Toledo.     Patient advised to adjust to 6mg Monday through Saturday and 9mg Sunday     Repeat next Protime 2  week(s).

## 2025-06-20 ENCOUNTER — TELEPHONE (OUTPATIENT)
Dept: PRIMARY CARE CLINIC | Age: 65
End: 2025-06-20

## 2025-06-20 NOTE — TELEPHONE ENCOUNTER
Patient is cleared for surgery on 07/08/25    Patient should stop warfarin 5 days prior to surgery     Form is scanned in the media - faxed failed twice

## 2025-06-25 ENCOUNTER — HOSPITAL ENCOUNTER (OUTPATIENT)
Age: 65
Discharge: HOME | End: 2025-06-29
Payer: MEDICARE

## 2025-06-25 ENCOUNTER — OFFICE VISIT (OUTPATIENT)
Dept: ORTHOPEDIC SURGERY | Age: 65
End: 2025-06-25
Payer: MEDICARE

## 2025-06-25 VITALS — HEIGHT: 62 IN | RESPIRATION RATE: 18 BRPM | BODY MASS INDEX: 34.78 KG/M2 | WEIGHT: 189 LBS

## 2025-06-25 VITALS
OXYGEN SATURATION: 96 % | HEIGHT: 63 IN | BODY MASS INDEX: 32.96 KG/M2 | TEMPERATURE: 97.3 F | RESPIRATION RATE: 18 BRPM | HEART RATE: 113 BPM | WEIGHT: 186 LBS | DIASTOLIC BLOOD PRESSURE: 82 MMHG | SYSTOLIC BLOOD PRESSURE: 129 MMHG

## 2025-06-25 DIAGNOSIS — M17.11 PRIMARY OSTEOARTHRITIS OF RIGHT KNEE: Primary | ICD-10-CM

## 2025-06-25 DIAGNOSIS — R93.6 ABNORMAL FINDINGS ON DIAGNOSTIC IMAGING OF LIMBS: ICD-10-CM

## 2025-06-25 DIAGNOSIS — M75.21 BICEPS TENDINITIS OF RIGHT SHOULDER: ICD-10-CM

## 2025-06-25 LAB
ANION GAP SERPL CALCULATED.3IONS-SCNC: 13 MMOL/L (ref 9–16)
BUN SERPL-MCNC: 10 MG/DL (ref 8–23)
CALCIUM SERPL-MCNC: 9.1 MG/DL (ref 8.6–10.4)
CHLORIDE SERPL-SCNC: 103 MMOL/L (ref 98–107)
CO2 SERPL-SCNC: 24 MMOL/L (ref 20–31)
CREAT SERPL-MCNC: 1.1 MG/DL (ref 0.6–0.9)
ERYTHROCYTE [DISTWIDTH] IN BLOOD BY AUTOMATED COUNT: 15.6 % (ref 11.8–14.4)
GFR, ESTIMATED: 56 ML/MIN/1.73M2
GLUCOSE SERPL-MCNC: 71 MG/DL (ref 74–99)
HCT VFR BLD AUTO: 39.7 % (ref 36.3–47.1)
HGB BLD-MCNC: 12.8 G/DL (ref 11.9–15.1)
MCH RBC QN AUTO: 30 PG (ref 25.2–33.5)
MCHC RBC AUTO-ENTMCNC: 32.2 G/DL (ref 28.4–34.8)
MCV RBC AUTO: 93 FL (ref 82.6–102.9)
NRBC BLD-RTO: 0 PER 100 WBC
PLATELET # BLD AUTO: 287 K/UL (ref 138–453)
PMV BLD AUTO: 9.8 FL (ref 8.1–13.5)
POTASSIUM SERPL-SCNC: 4.3 MMOL/L (ref 3.7–5.3)
RBC # BLD AUTO: 4.27 M/UL (ref 3.95–5.11)
SODIUM SERPL-SCNC: 140 MMOL/L (ref 136–145)
WBC OTHER # BLD: 9.7 K/UL (ref 3.5–11.3)

## 2025-06-25 PROCEDURE — 80048 BASIC METABOLIC PNL TOTAL CA: CPT

## 2025-06-25 PROCEDURE — 1123F ACP DISCUSS/DSCN MKR DOCD: CPT | Performed by: ORTHOPAEDIC SURGERY

## 2025-06-25 PROCEDURE — 99213 OFFICE O/P EST LOW 20 MIN: CPT | Performed by: ORTHOPAEDIC SURGERY

## 2025-06-25 PROCEDURE — 93005 ELECTROCARDIOGRAM TRACING: CPT | Performed by: ANESTHESIOLOGY

## 2025-06-25 PROCEDURE — 36415 COLL VENOUS BLD VENIPUNCTURE: CPT

## 2025-06-25 PROCEDURE — 20610 DRAIN/INJ JOINT/BURSA W/O US: CPT | Performed by: ORTHOPAEDIC SURGERY

## 2025-06-25 PROCEDURE — 85027 COMPLETE CBC AUTOMATED: CPT

## 2025-06-25 RX ORDER — BUPIVACAINE HYDROCHLORIDE 2.5 MG/ML
2 INJECTION, SOLUTION INFILTRATION; PERINEURAL ONCE
Status: COMPLETED | OUTPATIENT
Start: 2025-06-25 | End: 2025-06-25

## 2025-06-25 RX ORDER — LIDOCAINE HYDROCHLORIDE 10 MG/ML
2 INJECTION, SOLUTION INFILTRATION; PERINEURAL ONCE
Status: COMPLETED | OUTPATIENT
Start: 2025-06-25 | End: 2025-06-25

## 2025-06-25 RX ADMIN — BUPIVACAINE HYDROCHLORIDE 5 MG: 2.5 INJECTION, SOLUTION INFILTRATION; PERINEURAL at 11:05

## 2025-06-25 RX ADMIN — LIDOCAINE HYDROCHLORIDE 2 ML: 10 INJECTION, SOLUTION INFILTRATION; PERINEURAL at 11:04

## 2025-06-25 ASSESSMENT — PAIN DESCRIPTION - ORIENTATION: ORIENTATION: RIGHT

## 2025-06-25 ASSESSMENT — PAIN SCALES - GENERAL: PAINLEVEL_OUTOF10: 7

## 2025-06-25 ASSESSMENT — PAIN DESCRIPTION - LOCATION: LOCATION: SHOULDER

## 2025-06-25 NOTE — DISCHARGE INSTRUCTIONS
DAY OF SURGERY/PROCEDURE  GUIDELINES    As a patient at the [facility], you can expect quality medical and nursing care that is centered on your individual needs. It is our goal to make your surgical experience as comfortable and excellent as possible.  ________________________________________________________________________    The following instructions are general guidelines, if any information on this sheet is different from what your doctor has instructed you to do, please follow your doctor's instructions.    Please arrive on 7/8 @ 800 am       Enter through entrance C. Check in at registration     Upon arrival you will be taken to the pre-operative area to get ready for surgery, your family will stay in the waiting room and visit with you once you are ready for surgery. Due to special limitations please limit visitation to 1-2 members of your family at a time. When it is time for surgery your family will return to the waiting room.    Nothing to eat, drink, smoke, suck or chew after midnight (no water, gum, mints, cigarettes, cigars, pipes, snuff, chewing tobacco, etc.) or your surgery may be canceled.     Take a shower or bath on the morning of your surgery/procedure (Hibiclens if directed) Do not apply any lotions.    Brush your teeth, but do not swallow any water    IN CASE OF ILLNESS - If you have a cold or flu symptoms (high fever, runny nose, sore throat, cough, etc.) rash, nausea, vomiting, loose stools, and/or recent contact with someone who has a contagious disease (chick pox, measles, etc.) please call your doctor before coming to the surgery center    Take a small sip of water with omeprazole and inhalers    If applicable bring your:  Inhaler (s)  Hearing aid(s)  Eyeglasses and Case (If you wear contacts they have to be removed before surgery, bring case and solution)    DO NOT take anticoagulants (blood thinners, aspirin or aspirin-containing products) as instructed by your  physician.    Leave all jewelry at home and wear loose, comfortable clothing that is easy to put on and take off.     If you will be returning home the same day as your surgery, you will need to have a responsible adult (18 years of age or older) present to drive you home. You will need someone to stay at the hospital during your surgery and with you at home for the first 24 hours following your surgery. This is due to the anesthesia and the medication given to you during surgery and recovery.

## 2025-06-25 NOTE — PROGRESS NOTES
[facility]                   Scott Brown M.D.            [address], [address]               [place], [place] [address]           Dept Phone: [phone]           Dept Fax:  [phone] [address] [facility]                       [address]           [place], [place] [address]          Dept Phone: [phone]           Dept Fax:  [phone]      Chief Compliant:  Chief Complaint   Patient presents with    Pain     RT KNEE        History of Present Illness:  This is a 65 y.o. female who presents to the clinic today for evaluation / follow up of right knee pain.  Patient is here today for her injection of her right knee with Durolane.       Review of Systems   Constitutional: Negative for fever, chills, sweats.   Eyes: Negative for changes in vision, or pain.   HENT: Negative for ear ache, epistaxis, or sore throat.  Respiratory/Cardio: Negative for Chest pain, palpitations, SOB, or cough.  Gastrointestinal: Negative for abdominal pain, N/V/D.   Genitourinary: Negative for dysuria, frequency, urgency, or hematuria.   Neurological: Negative for headache, numbness, or weakness.   Integumentary: Negative for rash, itching, laceration, or abrasion.   Musculoskeletal: Positive for Pain (RT KNEE)       Physical Exam:  Constitutional: Patient is oriented to person, place, and time. Patient appears well-developed and well nourished.   HENT: Negative otherwise noted  Head: Normocephalic and Atraumatic  Nose: Normal  Eyes: Conjunctivae and EOM are normal  Neck: Normal range of motion Neck supple.    Respiratory/Cardio: Effort normal. No respiratory distress.  Musculoskeletal: Please see previous dictation  Neurological: Patient is alert and oriented to person, place, and time. Normal strength. No sensory deficit.  Skin: Skin is warm and dry  Psychiatric: Behavior is normal. Thought content normal.  Nursing note and vitals reviewed.     Labs and Imaging:     XR taken

## 2025-06-27 ENCOUNTER — OFFICE VISIT (OUTPATIENT)
Dept: PRIMARY CARE CLINIC | Age: 65
End: 2025-06-27
Payer: MEDICARE

## 2025-06-27 VITALS
WEIGHT: 184 LBS | OXYGEN SATURATION: 95 % | BODY MASS INDEX: 32.6 KG/M2 | DIASTOLIC BLOOD PRESSURE: 70 MMHG | HEART RATE: 85 BPM | HEIGHT: 63 IN | SYSTOLIC BLOOD PRESSURE: 102 MMHG

## 2025-06-27 DIAGNOSIS — Z87.891 PERSONAL HISTORY OF TOBACCO USE: ICD-10-CM

## 2025-06-27 DIAGNOSIS — Z79.01 LONG TERM (CURRENT) USE OF ANTICOAGULANTS: Primary | ICD-10-CM

## 2025-06-27 DIAGNOSIS — M51.9 LUMBAR DISC DISEASE: ICD-10-CM

## 2025-06-27 DIAGNOSIS — Z79.899 MEDICATION MANAGEMENT: ICD-10-CM

## 2025-06-27 DIAGNOSIS — Z86.73 HISTORY OF TRANSIENT ISCHEMIC ATTACK (TIA): ICD-10-CM

## 2025-06-27 DIAGNOSIS — M48.061 SPINAL STENOSIS OF LUMBAR REGION WITHOUT NEUROGENIC CLAUDICATION: ICD-10-CM

## 2025-06-27 DIAGNOSIS — F17.200 SMOKER: ICD-10-CM

## 2025-06-27 DIAGNOSIS — M54.50 LOW BACK PAIN AT MULTIPLE SITES: ICD-10-CM

## 2025-06-27 DIAGNOSIS — M54.16 LUMBAR RADICULOPATHY: ICD-10-CM

## 2025-06-27 LAB
ALCOHOL URINE: NORMAL
AMPHETAMINE SCREEN URINE: NORMAL
BARBITURATE SCREEN URINE: NEGATIVE
BENZODIAZEPINE SCREEN, URINE: NEGATIVE
BUPRENORPHINE URINE: NEGATIVE
COCAINE METABOLITE SCREEN URINE: NEGATIVE
EKG ATRIAL RATE: 102 BPM
EKG P AXIS: 64 DEGREES
EKG P-R INTERVAL: 148 MS
EKG Q-T INTERVAL: 360 MS
EKG QRS DURATION: 90 MS
EKG QTC CALCULATION (BAZETT): 469 MS
EKG R AXIS: 27 DEGREES
EKG T AXIS: 56 DEGREES
EKG VENTRICULAR RATE: 102 BPM
FENTANYL SCREEN, URINE: NEGATIVE
GABAPENTIN SCREEN, URINE: NORMAL
MDMA, URINE: NEGATIVE
METHADONE SCREEN, URINE: NEGATIVE
METHAMPHETAMINE, URINE: NEGATIVE
OPIATE SCREEN URINE: NEGATIVE
OXYCODONE SCREEN URINE: NEGATIVE
PHENCYCLIDINE SCREEN URINE: NEGATIVE
PROPOXYPHENE SCREEN, URINE: NEGATIVE
SYNTHETIC CANNABINOIDS(K2) SCREEN, URINE: NORMAL
THC SCREEN, URINE: NEGATIVE
TRAMADOL SCREEN URINE: NORMAL
TRICYCLIC ANTIDEPRESSANTS, UR: NEGATIVE

## 2025-06-27 PROCEDURE — 99214 OFFICE O/P EST MOD 30 MIN: CPT | Performed by: FAMILY MEDICINE

## 2025-06-27 PROCEDURE — 1123F ACP DISCUSS/DSCN MKR DOCD: CPT | Performed by: FAMILY MEDICINE

## 2025-06-27 PROCEDURE — 93010 ELECTROCARDIOGRAM REPORT: CPT | Performed by: INTERNAL MEDICINE

## 2025-06-27 PROCEDURE — 80305 DRUG TEST PRSMV DIR OPT OBS: CPT | Performed by: FAMILY MEDICINE

## 2025-06-27 RX ORDER — WARFARIN SODIUM 6 MG/1
TABLET ORAL
Qty: 32 TABLET | Refills: 1 | Status: SHIPPED | OUTPATIENT
Start: 2025-06-27 | End: 2025-08-26

## 2025-06-27 RX ORDER — PREGABALIN 25 MG/1
CAPSULE ORAL
Qty: 90 CAPSULE | Refills: 1 | Status: SHIPPED | OUTPATIENT
Start: 2025-06-27 | End: 2025-08-26

## 2025-06-27 ASSESSMENT — ENCOUNTER SYMPTOMS
BACK PAIN: 1
SHORTNESS OF BREATH: 1

## 2025-06-27 NOTE — PROGRESS NOTES
and Affect: Mood normal.         Behavior: Behavior normal.         Thought Content: Thought content normal.         Assessment:       Diagnosis Orders   1. Long term (current) use of anticoagulants  warfarin (COUMADIN) 6 MG tablet      2. Lumbar disc disease  POCT Rapid Drug Screen    pregabalin (LYRICA) 25 MG capsule      3. Low back pain at multiple sites  POCT Rapid Drug Screen    pregabalin (LYRICA) 25 MG capsule      4. Medication management  POCT Rapid Drug Screen      5. Smoker  CT LUNG CANCER SCREENING (INITIAL/ANNUAL)      6. Personal history of tobacco use  CT LUNG CANCER SCREENING (INITIAL/ANNUAL)      7. History of transient ischemic attack (TIA)  warfarin (COUMADIN) 6 MG tablet      8. Lumbar radiculopathy  pregabalin (LYRICA) 25 MG capsule      9. Spinal stenosis of lumbar region without neurogenic claudication  pregabalin (LYRICA) 25 MG capsule           Plan:   Assessment & Plan   Return in about 3 months (around 9/27/2025).  Low medical risk for surgery    Orders Placed This Encounter   Procedures    CT LUNG CANCER SCREENING (INITIAL/ANNUAL)     Age: 65 y.o.   Smoking History:   Social History    Tobacco Use      Smoking status: Every Day        Packs/day: 0.50        Years: 0.5 packs/day for 51.5 years (25.7 ttl pk-yrs)        Types: Cigarettes        Start date: 1974      Smokeless tobacco: Never    Vaping Use      Vaping status: Every Day    Alcohol use: Never    Drug use: No    Pack years: 0  Last CT lung screen: 4/4/2024     Standing Status:   Future     Expected Date:   6/27/2025     Expiration Date:   6/27/2026     Is there documentation of shared decision making?:   Yes     Does the patient show any signs or symptoms of lung cancer?:   No     Is this the first (baseline) CT or an annual exam?:   Annual [2]     Is this a low dose CT or a routine CT?:   Low Dose CT [1]     Smoking Status?:   Every Day [1]     Pack Years:   26    POCT Rapid Drug Screen     Orders Placed This Encounter

## 2025-06-30 ENCOUNTER — CLINICAL SUPPORT (OUTPATIENT)
Dept: PRIMARY CARE CLINIC | Age: 65
End: 2025-06-30
Payer: MEDICARE

## 2025-06-30 VITALS — HEART RATE: 92 BPM | DIASTOLIC BLOOD PRESSURE: 88 MMHG | OXYGEN SATURATION: 94 % | SYSTOLIC BLOOD PRESSURE: 136 MMHG

## 2025-06-30 DIAGNOSIS — Z86.73 HISTORY OF TRANSIENT ISCHEMIC ATTACK (TIA): Primary | ICD-10-CM

## 2025-06-30 DIAGNOSIS — Z79.01 LONG TERM (CURRENT) USE OF ANTICOAGULANTS: ICD-10-CM

## 2025-06-30 LAB — INTERNATIONAL NORMALIZATION RATIO, POC: 1.9

## 2025-06-30 PROCEDURE — 85610 PROTHROMBIN TIME: CPT | Performed by: FAMILY MEDICINE

## 2025-06-30 PROCEDURE — 36415 COLL VENOUS BLD VENIPUNCTURE: CPT | Performed by: FAMILY MEDICINE

## 2025-06-30 PROCEDURE — 93793 ANTICOAG MGMT PT WARFARIN: CPT | Performed by: FAMILY MEDICINE

## 2025-06-30 NOTE — PROGRESS NOTES
Patient here for INR. has not had signs of bleeding or excessive bruising.    Current Warfarin dose: 9 mg Sunday, 6 mg all other days.     Previous INR results:   Lab Results   Component Value Date    INR 1.9 06/30/2025    INR 1.7 06/18/2025    INR 4.6 06/04/2025    PROTIME 18.3 04/07/2025    PROTIME 35.8 03/20/2025    PROTIME 10.3 04/25/2024       Vitals:    06/30/25 0911   BP: 136/88   Pulse: 92   SpO2: 94%       Today's INR: 1.9. Per Dr. Carolina, continue current dose, recheck in 4 week(s). Plan discussed with patient, patient verbalized understanding.

## 2025-07-03 ENCOUNTER — OFFICE VISIT (OUTPATIENT)
Dept: ORTHOPEDIC SURGERY | Age: 65
End: 2025-07-03

## 2025-07-03 VITALS — RESPIRATION RATE: 14 BRPM | HEIGHT: 63 IN | WEIGHT: 184 LBS | BODY MASS INDEX: 32.6 KG/M2

## 2025-07-03 DIAGNOSIS — M75.21 BICEPS TENDINITIS OF RIGHT SHOULDER: Primary | ICD-10-CM

## 2025-07-03 RX ORDER — ONDANSETRON 4 MG/1
4 TABLET, FILM COATED ORAL DAILY PRN
Qty: 20 TABLET | Refills: 0 | Status: SHIPPED | OUTPATIENT
Start: 2025-07-03 | End: 2025-07-23

## 2025-07-03 RX ORDER — HYDROCODONE BITARTRATE AND ACETAMINOPHEN 5; 325 MG/1; MG/1
1 TABLET ORAL EVERY 4 HOURS PRN
Qty: 42 TABLET | Refills: 0 | Status: SHIPPED | OUTPATIENT
Start: 2025-07-03 | End: 2025-07-10

## 2025-07-05 RX ORDER — SODIUM CHLORIDE 0.9 % (FLUSH) 0.9 %
5-40 SYRINGE (ML) INJECTION PRN
OUTPATIENT
Start: 2025-07-05

## 2025-07-05 RX ORDER — ACETAMINOPHEN 325 MG/1
1000 TABLET ORAL ONCE
OUTPATIENT
Start: 2025-07-05 | End: 2025-07-05

## 2025-07-05 RX ORDER — SODIUM CHLORIDE 0.9 % (FLUSH) 0.9 %
5-40 SYRINGE (ML) INJECTION EVERY 12 HOURS SCHEDULED
OUTPATIENT
Start: 2025-07-05

## 2025-07-05 RX ORDER — SODIUM CHLORIDE 9 MG/ML
INJECTION, SOLUTION INTRAVENOUS PRN
OUTPATIENT
Start: 2025-07-05

## 2025-07-07 ENCOUNTER — ANESTHESIA EVENT (OUTPATIENT)
Dept: OPERATING ROOM | Age: 65
End: 2025-07-07
Payer: MEDICARE

## 2025-07-07 DIAGNOSIS — M75.21 BICEPS TENDINITIS OF RIGHT SHOULDER: ICD-10-CM

## 2025-07-08 ENCOUNTER — ANESTHESIA (OUTPATIENT)
Dept: OPERATING ROOM | Age: 65
End: 2025-07-08
Payer: MEDICARE

## 2025-07-08 ENCOUNTER — HOSPITAL ENCOUNTER (OUTPATIENT)
Age: 65
Setting detail: OUTPATIENT SURGERY
Discharge: HOME | End: 2025-07-08
Attending: ORTHOPAEDIC SURGERY | Admitting: ORTHOPAEDIC SURGERY
Payer: MEDICARE

## 2025-07-08 VITALS
RESPIRATION RATE: 12 BRPM | TEMPERATURE: 96.8 F | SYSTOLIC BLOOD PRESSURE: 142 MMHG | HEART RATE: 78 BPM | OXYGEN SATURATION: 95 % | DIASTOLIC BLOOD PRESSURE: 91 MMHG

## 2025-07-08 DIAGNOSIS — M75.21 BICEPS TENDONITIS, RIGHT: Primary | ICD-10-CM

## 2025-07-08 LAB
INR PPP: 1.1 (ref 0.9–1.2)
PROTHROMBIN TIME: 14.3 SEC (ref 11.8–14.6)

## 2025-07-08 PROCEDURE — 6360000002 HC RX W HCPCS: Performed by: ORTHOPAEDIC SURGERY

## 2025-07-08 PROCEDURE — 3600000004 HC SURGERY LEVEL 4 BASE: Performed by: ORTHOPAEDIC SURGERY

## 2025-07-08 PROCEDURE — 2580000003 HC RX 258: Performed by: SPECIALIST

## 2025-07-08 PROCEDURE — 3700000000 HC ANESTHESIA ATTENDED CARE: Performed by: ORTHOPAEDIC SURGERY

## 2025-07-08 PROCEDURE — 6360000002 HC RX W HCPCS: Performed by: SPECIALIST

## 2025-07-08 PROCEDURE — 7100000001 HC PACU RECOVERY - ADDTL 15 MIN: Performed by: ORTHOPAEDIC SURGERY

## 2025-07-08 PROCEDURE — 6370000000 HC RX 637 (ALT 250 FOR IP)

## 2025-07-08 PROCEDURE — 2709999900 HC NON-CHARGEABLE SUPPLY: Performed by: ORTHOPAEDIC SURGERY

## 2025-07-08 PROCEDURE — 7100000000 HC PACU RECOVERY - FIRST 15 MIN: Performed by: ORTHOPAEDIC SURGERY

## 2025-07-08 PROCEDURE — 6370000000 HC RX 637 (ALT 250 FOR IP): Performed by: SPECIALIST

## 2025-07-08 PROCEDURE — 6360000002 HC RX W HCPCS: Performed by: STUDENT IN AN ORGANIZED HEALTH CARE EDUCATION/TRAINING PROGRAM

## 2025-07-08 PROCEDURE — 7100000011 HC PHASE II RECOVERY - ADDTL 15 MIN: Performed by: ORTHOPAEDIC SURGERY

## 2025-07-08 PROCEDURE — 3700000001 HC ADD 15 MINUTES (ANESTHESIA): Performed by: ORTHOPAEDIC SURGERY

## 2025-07-08 PROCEDURE — 64415 NJX AA&/STRD BRCH PLXS IMG: CPT | Performed by: STUDENT IN AN ORGANIZED HEALTH CARE EDUCATION/TRAINING PROGRAM

## 2025-07-08 PROCEDURE — 2500000003 HC RX 250 WO HCPCS: Performed by: ORTHOPAEDIC SURGERY

## 2025-07-08 PROCEDURE — 2720000010 HC SURG SUPPLY STERILE: Performed by: ORTHOPAEDIC SURGERY

## 2025-07-08 PROCEDURE — 2500000003 HC RX 250 WO HCPCS: Performed by: SPECIALIST

## 2025-07-08 PROCEDURE — 3600000014 HC SURGERY LEVEL 4 ADDTL 15MIN: Performed by: ORTHOPAEDIC SURGERY

## 2025-07-08 PROCEDURE — 7100000010 HC PHASE II RECOVERY - FIRST 15 MIN: Performed by: ORTHOPAEDIC SURGERY

## 2025-07-08 PROCEDURE — 6360000002 HC RX W HCPCS

## 2025-07-08 PROCEDURE — 85610 PROTHROMBIN TIME: CPT

## 2025-07-08 RX ORDER — SODIUM CHLORIDE, SODIUM LACTATE, POTASSIUM CHLORIDE, CALCIUM CHLORIDE 600; 310; 30; 20 MG/100ML; MG/100ML; MG/100ML; MG/100ML
INJECTION, SOLUTION INTRAVENOUS
Status: DISCONTINUED | OUTPATIENT
Start: 2025-07-08 | End: 2025-07-08 | Stop reason: SDUPTHER

## 2025-07-08 RX ORDER — ROCURONIUM BROMIDE 10 MG/ML
INJECTION, SOLUTION INTRAVENOUS
Status: DISCONTINUED | OUTPATIENT
Start: 2025-07-08 | End: 2025-07-08 | Stop reason: SDUPTHER

## 2025-07-08 RX ORDER — MIDAZOLAM HYDROCHLORIDE 1 MG/ML
INJECTION, SOLUTION INTRAMUSCULAR; INTRAVENOUS
Status: COMPLETED
Start: 2025-07-08 | End: 2025-07-08

## 2025-07-08 RX ORDER — SODIUM CHLORIDE 0.9 % (FLUSH) 0.9 %
5-40 SYRINGE (ML) INJECTION PRN
Status: DISCONTINUED | OUTPATIENT
Start: 2025-07-08 | End: 2025-07-08 | Stop reason: HOSPADM

## 2025-07-08 RX ORDER — FENTANYL CITRATE 50 UG/ML
100 INJECTION, SOLUTION INTRAMUSCULAR; INTRAVENOUS
Status: COMPLETED | OUTPATIENT
Start: 2025-07-08 | End: 2025-07-08

## 2025-07-08 RX ORDER — DEXAMETHASONE SODIUM PHOSPHATE 10 MG/ML
10 INJECTION, SOLUTION INTRAMUSCULAR; INTRAVENOUS ONCE
Status: DISCONTINUED | OUTPATIENT
Start: 2025-07-08 | End: 2025-07-08 | Stop reason: HOSPADM

## 2025-07-08 RX ORDER — SODIUM CHLORIDE 0.9 % (FLUSH) 0.9 %
5-40 SYRINGE (ML) INJECTION EVERY 12 HOURS SCHEDULED
Status: DISCONTINUED | OUTPATIENT
Start: 2025-07-08 | End: 2025-07-08 | Stop reason: HOSPADM

## 2025-07-08 RX ORDER — HYDRALAZINE HYDROCHLORIDE 20 MG/ML
10 INJECTION INTRAMUSCULAR; INTRAVENOUS
Status: DISCONTINUED | OUTPATIENT
Start: 2025-07-08 | End: 2025-07-08 | Stop reason: HOSPADM

## 2025-07-08 RX ORDER — LIDOCAINE HYDROCHLORIDE 10 MG/ML
INJECTION, SOLUTION EPIDURAL; INFILTRATION; INTRACAUDAL; PERINEURAL
Status: DISCONTINUED | OUTPATIENT
Start: 2025-07-08 | End: 2025-07-08 | Stop reason: SDUPTHER

## 2025-07-08 RX ORDER — BUPIVACAINE HYDROCHLORIDE 5 MG/ML
INJECTION, SOLUTION EPIDURAL; INTRACAUDAL; PERINEURAL
Status: COMPLETED | OUTPATIENT
Start: 2025-07-08 | End: 2025-07-08

## 2025-07-08 RX ORDER — IPRATROPIUM BROMIDE AND ALBUTEROL SULFATE 2.5; .5 MG/3ML; MG/3ML
1 SOLUTION RESPIRATORY (INHALATION) ONCE
Status: COMPLETED | OUTPATIENT
Start: 2025-07-08 | End: 2025-07-08

## 2025-07-08 RX ORDER — EPINEPHRINE 1 MG/ML
INJECTION, SOLUTION INTRAMUSCULAR; SUBCUTANEOUS
Status: DISCONTINUED
Start: 2025-07-08 | End: 2025-07-08 | Stop reason: HOSPADM

## 2025-07-08 RX ORDER — DEXAMETHASONE SODIUM PHOSPHATE 4 MG/ML
INJECTION, SOLUTION INTRA-ARTICULAR; INTRALESIONAL; INTRAMUSCULAR; INTRAVENOUS; SOFT TISSUE
Status: DISCONTINUED | OUTPATIENT
Start: 2025-07-08 | End: 2025-07-08 | Stop reason: SDUPTHER

## 2025-07-08 RX ORDER — ACETAMINOPHEN 500 MG
1000 TABLET ORAL ONCE
Status: DISCONTINUED | OUTPATIENT
Start: 2025-07-08 | End: 2025-07-08 | Stop reason: HOSPADM

## 2025-07-08 RX ORDER — HYDROCODONE BITARTRATE AND ACETAMINOPHEN 5; 325 MG/1; MG/1
1 TABLET ORAL EVERY 4 HOURS PRN
Qty: 42 TABLET | Refills: 0 | Status: ON HOLD | OUTPATIENT
Start: 2025-07-08 | End: 2025-07-08 | Stop reason: HOSPADM

## 2025-07-08 RX ORDER — HYDROCODONE BITARTRATE AND ACETAMINOPHEN 7.5; 325 MG/1; MG/1
1 TABLET ORAL EVERY 4 HOURS PRN
Qty: 42 TABLET | Refills: 0 | Status: SHIPPED | OUTPATIENT
Start: 2025-07-08 | End: 2025-07-15

## 2025-07-08 RX ORDER — IPRATROPIUM BROMIDE AND ALBUTEROL SULFATE 2.5; .5 MG/3ML; MG/3ML
SOLUTION RESPIRATORY (INHALATION)
Status: COMPLETED
Start: 2025-07-08 | End: 2025-07-08

## 2025-07-08 RX ORDER — SODIUM CHLORIDE 9 MG/ML
INJECTION, SOLUTION INTRAVENOUS PRN
Status: DISCONTINUED | OUTPATIENT
Start: 2025-07-08 | End: 2025-07-08 | Stop reason: HOSPADM

## 2025-07-08 RX ORDER — SCOPOLAMINE 1 MG/3D
1 PATCH, EXTENDED RELEASE TRANSDERMAL
Status: DISCONTINUED | OUTPATIENT
Start: 2025-07-08 | End: 2025-07-08 | Stop reason: HOSPADM

## 2025-07-08 RX ORDER — SCOPOLAMINE 1 MG/3D
PATCH, EXTENDED RELEASE TRANSDERMAL
Status: DISCONTINUED
Start: 2025-07-08 | End: 2025-07-08 | Stop reason: HOSPADM

## 2025-07-08 RX ORDER — PROPOFOL 10 MG/ML
INJECTION, EMULSION INTRAVENOUS
Status: DISCONTINUED | OUTPATIENT
Start: 2025-07-08 | End: 2025-07-08 | Stop reason: SDUPTHER

## 2025-07-08 RX ORDER — FENTANYL CITRATE 50 UG/ML
INJECTION, SOLUTION INTRAMUSCULAR; INTRAVENOUS
Status: COMPLETED
Start: 2025-07-08 | End: 2025-07-08

## 2025-07-08 RX ORDER — MIDAZOLAM HYDROCHLORIDE 2 MG/2ML
2 INJECTION, SOLUTION INTRAMUSCULAR; INTRAVENOUS
Status: COMPLETED | OUTPATIENT
Start: 2025-07-08 | End: 2025-07-08

## 2025-07-08 RX ORDER — ACETAMINOPHEN 500 MG
TABLET ORAL
Status: DISCONTINUED
Start: 2025-07-08 | End: 2025-07-08 | Stop reason: HOSPADM

## 2025-07-08 RX ORDER — ALBUTEROL SULFATE 90 UG/1
INHALANT RESPIRATORY (INHALATION)
Status: DISCONTINUED | OUTPATIENT
Start: 2025-07-08 | End: 2025-07-08 | Stop reason: SDUPTHER

## 2025-07-08 RX ORDER — PROCHLORPERAZINE EDISYLATE 5 MG/ML
5 INJECTION INTRAMUSCULAR; INTRAVENOUS
Status: DISCONTINUED | OUTPATIENT
Start: 2025-07-08 | End: 2025-07-08 | Stop reason: HOSPADM

## 2025-07-08 RX ORDER — LABETALOL HYDROCHLORIDE 5 MG/ML
10 INJECTION, SOLUTION INTRAVENOUS
Status: DISCONTINUED | OUTPATIENT
Start: 2025-07-08 | End: 2025-07-08 | Stop reason: HOSPADM

## 2025-07-08 RX ADMIN — Medication 2000 MG: at 11:36

## 2025-07-08 RX ADMIN — IPRATROPIUM BROMIDE AND ALBUTEROL SULFATE 1 DOSE: .5; 2.5 SOLUTION RESPIRATORY (INHALATION) at 09:04

## 2025-07-08 RX ADMIN — BUPIVACAINE HYDROCHLORIDE 20 ML: 5 INJECTION, SOLUTION EPIDURAL; INTRACAUDAL; PERINEURAL at 09:31

## 2025-07-08 RX ADMIN — ROCURONIUM BROMIDE 50 MG: 10 INJECTION, SOLUTION INTRAVENOUS at 11:25

## 2025-07-08 RX ADMIN — FENTANYL CITRATE 100 MCG: 50 INJECTION INTRAMUSCULAR; INTRAVENOUS at 09:30

## 2025-07-08 RX ADMIN — LIDOCAINE HYDROCHLORIDE 50 MG: 10 INJECTION, SOLUTION EPIDURAL; INFILTRATION; INTRACAUDAL; PERINEURAL at 11:25

## 2025-07-08 RX ADMIN — DEXAMETHASONE SODIUM PHOSPHATE 8 MG: 4 INJECTION, SOLUTION INTRAMUSCULAR; INTRAVENOUS at 11:32

## 2025-07-08 RX ADMIN — SODIUM CHLORIDE, POTASSIUM CHLORIDE, SODIUM LACTATE AND CALCIUM CHLORIDE: 600; 310; 30; 20 INJECTION, SOLUTION INTRAVENOUS at 11:20

## 2025-07-08 RX ADMIN — BUPIVACAINE 10 ML: 13.3 INJECTION, SUSPENSION, LIPOSOMAL INFILTRATION at 09:31

## 2025-07-08 RX ADMIN — PROPOFOL 150 MG: 10 INJECTION, EMULSION INTRAVENOUS at 11:25

## 2025-07-08 RX ADMIN — FENTANYL CITRATE 100 MCG: 50 INJECTION, SOLUTION INTRAMUSCULAR; INTRAVENOUS at 09:30

## 2025-07-08 RX ADMIN — ALBUTEROL SULFATE 5 PUFF: 90 AEROSOL, METERED RESPIRATORY (INHALATION) at 11:32

## 2025-07-08 RX ADMIN — IPRATROPIUM BROMIDE AND ALBUTEROL SULFATE 1 DOSE: 2.5; .5 SOLUTION RESPIRATORY (INHALATION) at 09:04

## 2025-07-08 RX ADMIN — MIDAZOLAM HYDROCHLORIDE 2 MG: 1 INJECTION, SOLUTION INTRAMUSCULAR; INTRAVENOUS at 09:30

## 2025-07-08 RX ADMIN — SUGAMMADEX 200 MG: 100 INJECTION, SOLUTION INTRAVENOUS at 12:04

## 2025-07-08 RX ADMIN — MIDAZOLAM HYDROCHLORIDE 2 MG: 2 INJECTION, SOLUTION INTRAMUSCULAR; INTRAVENOUS at 09:30

## 2025-07-08 ASSESSMENT — LIFESTYLE VARIABLES: SMOKING_STATUS: 1

## 2025-07-08 ASSESSMENT — COPD QUESTIONNAIRES: CAT_SEVERITY: MODERATE

## 2025-07-08 NOTE — ANESTHESIA POSTPROCEDURE EVALUATION
Department of Anesthesiology  Postprocedure Note    Patient: Stephanie Vega  MRN: [id number]  YOB: 1960  Date of evaluation: 7/8/2025    Procedure Summary       Date: 07/08/25 Room / Location: [address]    Anesthesia Start: 1120 Anesthesia Stop: 1215    Procedure: RIGHT SHOULDER ARTHROSCOPIC BICEPS TENOTOMY WITH PRE OP INTERSCALENE BLOCK (Right: Shoulder) Diagnosis:       Biceps tendonitis, right      (Biceps tendonitis, right [M75.21])    Surgeons: Saurav Catherine MD Responsible Provider: Galina Rodrigues MD    Anesthesia Type: general, regional ASA Status: 3            Anesthesia Type: No value filed.    Beth Phase I: Beth Score: 9    Beth Phase II: Beth Score: 10    Anesthesia Post Evaluation    Patient location during evaluation: PACU  Patient participation: complete - patient participated  Level of consciousness: awake and awake and alert  Pain score: 0  Nausea & Vomiting: no nausea and no vomiting  Cardiovascular status: hemodynamically stable and blood pressure returned to baseline  Respiratory status: acceptable  Hydration status: euvolemic  Multimodal analgesia pain management approach  Pain management: adequate    No notable events documented.

## 2025-07-08 NOTE — ANESTHESIA PROCEDURE NOTES
Peripheral Block    Patient location during procedure: pre-op  Reason for block: post-op pain management and at surgeon's request  Start time: 7/8/2025 9:31 AM  End time: 7/8/2025 9:34 AM  Staffing  Performed: anesthesiologist   Anesthesiologist: Galina Rodrigues MD  Performed by: Galina Rodrigues MD  Authorized by: Galina Rodrigues MD    Preanesthetic Checklist  Completed: patient identified, IV checked, site marked, risks and benefits discussed, surgical/procedural consents, equipment checked, pre-op evaluation, timeout performed, anesthesia consent given, oxygen available, monitors applied/VS acknowledged, fire risk safety assessment completed and verbalized and blood product R/B/A discussed and consented  Peripheral Block   Patient position: supine  Prep: ChloraPrep  Provider prep: sterile gloves and mask  Patient monitoring: continuous pulse ox, continuous capnometry, frequent blood pressure checks, IV access, oxygen, responsive to questions and cardiac monitor  Block type: Brachial plexus  Interscalene  Laterality: right  Injection technique: single-shot  Guidance: paresthesia technique and ultrasound guided    Needle   Needle type: insulated echogenic nerve stimulator needle   Needle gauge: 21 G  Needle localization: anatomical landmarks, ultrasound guidance and paresthesias  Needle length: 5 cm  Assessment   Injection assessment: negative aspiration for heme, no paresthesia on injection, local visualized surrounding nerve on ultrasound and no intravascular symptoms  Paresthesia pain: none  Slow fractionated injection: yes  Hemodynamics: stable  Outcomes: uncomplicated and patient tolerated procedure well    Medications Administered  BUPivacaine (MARCAINE) PF injection 0.5% - Perineural   20 mL - 7/8/2025 9:31:00 AM  BUPivacaine liposome (EXPAREL) injection 1.3% - Perineural   10 mL - 7/8/2025 9:31:00 AM

## 2025-07-08 NOTE — H&P
Update History & Physical    The patient's History and Physical of July 3, 2025 was reviewed with the patient and I examined the patient. There was no change. The surgical site was confirmed by the patient and me.       Plan: The risks, benefits, expected outcome, and alternative to the recommended procedure have been discussed with the patient. Patient understands and wants to proceed with the procedure.     Electronically signed by Saurav Catherine MD on 7/8/2025 at 8:45 AM

## 2025-07-08 NOTE — BRIEF OP NOTE
Brief Postoperative Note      Patient: Stephanie Vega  YOB: 1960  MRN: [id number]    Date of Procedure: 7/8/2025    Pre-Op Diagnosis Codes:      * Biceps tendonitis, right [M75.21]    Post-Op Diagnosis: Same       Procedure(s):  RIGHT SHOULDER ARTHROSCOPIC BICEPS TENOTOMY WITH PRE OP INTERSCALENE BLOCK    Surgeon(s):  Saurav Catherine MD    Assistant:  Physician Assistant: Louie Gimenez PA-C    Anesthesia: General    Estimated Blood Loss (mL): Minimal    Complications: None    Specimens:   * No specimens in log *    Implants:  * No implants in log *      Drains: * No LDAs found *    Findings:  Infection Present At Time Of Surgery (PATOS) (choose all levels that have infection present):  No infection present  Other Findings: See operative report    Electronically signed by Saurav Catherine MD on 7/8/2025 at 12:05 PM

## 2025-07-08 NOTE — ANESTHESIA PRE PROCEDURE
right M75.21       Past Medical History:        Diagnosis Date    Acid reflux     Arthritis     neck, back, knees    Asthma     Asthma     Cancer (HCC)     skin cancer    COPD (chronic obstructive pulmonary disease) (HCC) 10/20/2015    Emphysema     Headache(784.0)     Heart abnormality     minute hole in heart per pt found in  after TIA    Osteoarthritis     Osteoporosis     Seborrheic keratoses 2019    TIA (transient ischemic attack) 2003    x3       Past Surgical History:        Procedure Laterality Date    ACROMIOPLASTY Left 2016    SHOULDER ARTHROCOPY LABRAL DEBRIDEMENT      SECTION, LOW TRANSVERSE      COLONOSCOPY      DILATION AND CURETTAGE OF UTERUS      ENDOSCOPY, COLON, DIAGNOSTIC      KNEE ARTHROSCOPY Right     KNEE ARTHROSCOPY Right 2022    KNEE ARTHROSCOPIC PARTIAL MEDIAL MENISCECTOMY performed by Scott Brown MD at [facility] OR    KNEE ARTHROSCOPY Right 2024    RIGHT KNEE ARTHROSCOPIC PARTIAL MEDIAL MENISCECTOMY performed by Scott Brown MD at [facility] OR    LEG BIOPSY EXCISION Left 2024    EXCISION LEFT THIGH SKIN LESION WITH COMPLEX CLOSURE performed by Lisa Brooks MD at [facility] OR    OTHER SURGICAL HISTORY      uterine lining removed by laser ablation    PAIN MANAGEMENT PROCEDURE      ablation on back    TONSILLECTOMY AND ADENOIDECTOMY      TUBAL LIGATION         Social History:    Social History     Tobacco Use    Smoking status: Every Day     Current packs/day: 0.50     Average packs/day: 0.5 packs/day for 51.5 years (25.8 ttl pk-yrs)     Types: Cigarettes     Start date:     Smokeless tobacco: Never   Substance Use Topics    Alcohol use: Never                                Ready to quit: Not Answered  Counseling given: Not Answered      Vital Signs (Current):   Vitals:    25 0836   BP: (!) 135/91   Pulse: 79   Resp: 12   Temp: 97 °F (36.1 °C)                                              BP Readings from Last 3 Encounters:   25

## 2025-07-08 NOTE — DISCHARGE INSTRUCTIONS
Saurav Catherine MD  [facility]  Specializing in Shoulder and Elbow Surgery      POSTOPERATIVE INSTRUCTIONS    Procedure: Right Shoulder Arthroscopy with Biceps Tenotomy    DIET  Begin with clear liquids and light foods (jellos, soups, etc.).  Progress to your normal diet if you are not nauseated.    WOUND CARE  Maintain your operative dressing, keeping it clean and dry.  It is normal for the shoulder to bleed and swell following surgery - if blood soaks the bandage, do not become alarmed - reinforce with additional dressing.  Remove surgical dressing on the second post-operative day - apply clean dressing (gauze and tape) and change daily.  To avoid infection, keep surgical incisions clean and dry - you may shower by covering the surgical site with Saran Wrap or Press and Seal before showering. Afterwards, take everything off and apply clean gauze dressings - NO immersion of operative arm (i.e. bath). Alternatively, after you’ve changed your dressing for the first time you can place waterproof band aids over your incisions to take a shower and then a apply a clean new dressing afterwards    MEDICATIONS  Your nerve block should wear off in 8-12 hours. Start taking your prescribed pain medication before it does.  Most patients will require some narcotic pain medication for a short period of time - this can be taken as per directions on the bottle.  Common side effects of the pain medication are nausea, drowsiness, and constipation - to decrease the side effects, take medication with food - if constipation occurs, consider taking an over-the-counter laxative.  If you are having problems with nausea and vomiting, take your prescribed anti-nausea medication if provided, otherwise contact the office to possibly have your medication changed ([phone]).  Do not drive a car or operate machinery while taking the narcotic medication.  If you are not allergic, Ibuprofen 200-600mg (i.e.

## 2025-07-09 NOTE — OP NOTE
OPERATIVE REPORT    Date of Surgery: 7/8/24    Pre-operative Diagnosis: Right shoulder biceps tendinitis    Post-operative Diagnosis: Right shoulder biceps tendinitis     Procedure: Right shoulder arthroscopic biceps tenotomy    Surgeon(s): Saurav Catherine MD    Assistant(s): No resident present. Louie Gimenez PA-C, medically necessary for patient positioning, surgical site exposure, wound closure, and efficient completion of case.      Anesthesia: General With right interscalene nerve block    Fluids: See anesthesia record    Urine output: See anesthesia record    Estimated blood loss: Minimal    Findings: Intact chondral surfaces, intact labrum, intact rotator cuff    Specimen: None    Implants: None    Surgical Indications:  Stephanie Vega is a 65 y.o. old female who presented with right shoulder pain consistent with a biceps tendinitis. Having failed attempts at conservative management and/or electing to forgo continued attempts at conservative management and following a discussion with the patient regarding both non-operative and operative treatment options, they consented to proceed with right shoulder arthroscopic biceps tenotomy. she came to this decision after demonstrating an understanding of our discussion regarding details of the procedure, risks and benefits, expected outcome, and postoperative course.    Operative technique:     Following appropriate identification of the patient and her operative extremity, consent was reviewed with the patient and her operative extremity was signed. The anesthesia service administered a right interscalene nerve block and she was wheeled to the operating room where she finished a course of pre-operative antibiotic prophylaxis by way of 2 g of IV Ancef. The anesthesia service a general anesthetic and secured her airway using an endotracheal tube. All bony prominences were appropriately padded and the patient was secured to the operative table in a beach chair position. The

## 2025-07-14 ENCOUNTER — TELEPHONE (OUTPATIENT)
Dept: PRIMARY CARE CLINIC | Age: 65
End: 2025-07-14

## 2025-07-14 NOTE — TELEPHONE ENCOUNTER
Patient called in stating she needs her Norco 7.5-325mg re-filled. Advised patient MALI Catherine is the prescriber of this medication 7 needs to reach out to their office.

## 2025-07-21 ENCOUNTER — OFFICE VISIT (OUTPATIENT)
Dept: ORTHOPEDIC SURGERY | Age: 65
End: 2025-07-21

## 2025-07-21 ENCOUNTER — TELEPHONE (OUTPATIENT)
Dept: PRIMARY CARE CLINIC | Age: 65
End: 2025-07-21

## 2025-07-21 VITALS — WEIGHT: 184 LBS | HEIGHT: 63 IN | RESPIRATION RATE: 14 BRPM | BODY MASS INDEX: 32.6 KG/M2

## 2025-07-21 DIAGNOSIS — M75.21 BICEPS TENDONITIS, RIGHT: Primary | ICD-10-CM

## 2025-07-21 DIAGNOSIS — Z98.890 STATUS POST ARTHROSCOPY OF RIGHT SHOULDER: ICD-10-CM

## 2025-07-21 DIAGNOSIS — M48.061 SPINAL STENOSIS OF LUMBAR REGION WITHOUT NEUROGENIC CLAUDICATION: ICD-10-CM

## 2025-07-21 PROCEDURE — 99024 POSTOP FOLLOW-UP VISIT: CPT | Performed by: ORTHOPAEDIC SURGERY

## 2025-07-21 RX ORDER — HYDROCODONE BITARTRATE AND ACETAMINOPHEN 7.5; 325 MG/1; MG/1
1.5 TABLET ORAL EVERY 6 HOURS PRN
Qty: 180 TABLET | Refills: 0 | Status: SHIPPED | OUTPATIENT
Start: 2025-07-21 | End: 2025-08-20

## 2025-07-21 NOTE — TELEPHONE ENCOUNTER
Patient called in requesting a refill of Norco 7.5-325mg    She stated that Dr. Catherine signed off on it at her visit today - per the office visit note \"At this time she can resume pain management for her chronic back pain through her primary care provider's office.\"    Pharmacy confirmed. Prescription not pended as it was prescribed through Dr. Catherine.    Please advise.

## 2025-07-21 NOTE — PROGRESS NOTES
Procedure: Right shoulder arthroscopic biceps tenotomy  Date of procedure: 7/8/2025    HPI:  Stephanie Vega is a 65 y.o. female who is approximately 2 weeks status post aforementioned procedure.  Indicates that she is doing relatively well.  Her pain is rated as a 1/10.  She denies having any fevers, chills, sweats or any constitutional symptoms.  She has been compliant with her pendulums and sling immobilization.    Physical examination:  Evaluation of patient's right shoulder and upper extremity demonstrates her incisions to be clean, dry, intact.  There is no warmth, erythema, wound dehiscence or drainage.  Sensation is grossly intact light touch in all dermatomes and she has a 2+ radial pulse with brisk capillary refill in her fingers.    Impression and plan:  Stephanie Vega is a 65 y.o. female who is 2 weeks status post an arthroscopic right shoulder biceps tenotomy.  She is doing relatively well at this time.  We reviewed her arthroscopic images. Her sutures were taken out and Steri-Strips and clean dressings applied.  She may now get her incisions wet in the shower.  She is to wean out of her sling at this time and may start using the arm for light activities of daily living.  We'll get her started in formal physical therapy; a prescription was provided.  She is off of her pain medication prescribed for her shoulder due to the fact that her shoulder is feeling great.  At this time she can resume pain management for her chronic back pain through her primary care provider's office.  I'll see her back for reevaluation in 4 weeks but she was encouraged to return or call earlier with questions and/or concerns.

## 2025-07-23 ENCOUNTER — HOSPITAL ENCOUNTER (OUTPATIENT)
Dept: PHYSICAL THERAPY | Age: 65
Setting detail: THERAPIES SERIES
Discharge: HOME | End: 2025-07-23
Attending: ORTHOPAEDIC SURGERY
Payer: MEDICARE

## 2025-07-23 PROCEDURE — 97161 PT EVAL LOW COMPLEX 20 MIN: CPT

## 2025-07-30 ENCOUNTER — OFFICE VISIT (OUTPATIENT)
Dept: PRIMARY CARE CLINIC | Age: 65
End: 2025-07-30
Payer: MEDICARE

## 2025-07-30 VITALS
BODY MASS INDEX: 32.14 KG/M2 | SYSTOLIC BLOOD PRESSURE: 126 MMHG | HEART RATE: 87 BPM | HEIGHT: 63 IN | OXYGEN SATURATION: 95 % | WEIGHT: 181.4 LBS | DIASTOLIC BLOOD PRESSURE: 70 MMHG

## 2025-07-30 DIAGNOSIS — Z00.00 WELCOME TO MEDICARE PREVENTIVE VISIT: ICD-10-CM

## 2025-07-30 DIAGNOSIS — Z87.891 PERSONAL HISTORY OF TOBACCO USE: Primary | ICD-10-CM

## 2025-07-30 DIAGNOSIS — G45.9 TIA (TRANSIENT ISCHEMIC ATTACK): ICD-10-CM

## 2025-07-30 PROBLEM — L57.0 ACTINIC KERATOSES: Status: RESOLVED | Noted: 2024-02-26 | Resolved: 2025-07-30

## 2025-07-30 PROBLEM — Z79.899 MEDICATION MANAGEMENT: Status: RESOLVED | Noted: 2024-09-05 | Resolved: 2025-07-30

## 2025-07-30 PROBLEM — L82.1 SEBORRHEIC KERATOSES: Status: RESOLVED | Noted: 2019-09-18 | Resolved: 2025-07-30

## 2025-07-30 PROBLEM — U07.1 COVID: Chronic | Status: RESOLVED | Noted: 2022-09-01 | Resolved: 2025-07-30

## 2025-07-30 PROBLEM — M75.21 BICEPS TENDONITIS, RIGHT: Status: RESOLVED | Noted: 2025-06-16 | Resolved: 2025-07-30

## 2025-07-30 PROBLEM — M77.8 THUMB TENDONITIS: Status: RESOLVED | Noted: 2020-04-13 | Resolved: 2025-07-30

## 2025-07-30 PROBLEM — R93.7 ABNORMAL MRI, LUMBAR SPINE: Status: RESOLVED | Noted: 2020-04-13 | Resolved: 2025-07-30

## 2025-07-30 PROBLEM — D22.5 MELANOCYTIC NEVUS OF TRUNK: Status: RESOLVED | Noted: 2019-09-18 | Resolved: 2025-07-30

## 2025-07-30 PROBLEM — M47.816 ARTHRITIS OF LUMBAR SPINE: Status: RESOLVED | Noted: 2020-04-13 | Resolved: 2025-07-30

## 2025-07-30 PROBLEM — D48.5 NEOPLASM OF UNCERTAIN BEHAVIOR OF SKIN: Status: RESOLVED | Noted: 2019-09-18 | Resolved: 2025-07-30

## 2025-07-30 PROBLEM — F39 MOOD DISORDER: Status: RESOLVED | Noted: 2021-01-25 | Resolved: 2025-07-30

## 2025-07-30 LAB — INTERNATIONAL NORMALIZATION RATIO, POC: 3

## 2025-07-30 PROCEDURE — 93000 ELECTROCARDIOGRAM COMPLETE: CPT | Performed by: FAMILY MEDICINE

## 2025-07-30 PROCEDURE — 36415 COLL VENOUS BLD VENIPUNCTURE: CPT | Performed by: FAMILY MEDICINE

## 2025-07-30 PROCEDURE — G0296 VISIT TO DETERM LDCT ELIG: HCPCS | Performed by: FAMILY MEDICINE

## 2025-07-30 PROCEDURE — G0402 INITIAL PREVENTIVE EXAM: HCPCS | Performed by: FAMILY MEDICINE

## 2025-07-30 PROCEDURE — 1123F ACP DISCUSS/DSCN MKR DOCD: CPT | Performed by: FAMILY MEDICINE

## 2025-07-30 PROCEDURE — 85610 PROTHROMBIN TIME: CPT | Performed by: FAMILY MEDICINE

## 2025-07-30 SDOH — ECONOMIC STABILITY: FOOD INSECURITY: WITHIN THE PAST 12 MONTHS, THE FOOD YOU BOUGHT JUST DIDN'T LAST AND YOU DIDN'T HAVE MONEY TO GET MORE.: NEVER TRUE

## 2025-07-30 SDOH — ECONOMIC STABILITY: FOOD INSECURITY: WITHIN THE PAST 12 MONTHS, YOU WORRIED THAT YOUR FOOD WOULD RUN OUT BEFORE YOU GOT MONEY TO BUY MORE.: NEVER TRUE

## 2025-07-30 ASSESSMENT — PATIENT HEALTH QUESTIONNAIRE - PHQ9
9. THOUGHTS THAT YOU WOULD BE BETTER OFF DEAD, OR OF HURTING YOURSELF: NOT AT ALL
1. LITTLE INTEREST OR PLEASURE IN DOING THINGS: NOT AT ALL
3. TROUBLE FALLING OR STAYING ASLEEP: NOT AT ALL
SUM OF ALL RESPONSES TO PHQ QUESTIONS 1-9: 0
4. FEELING TIRED OR HAVING LITTLE ENERGY: NOT AT ALL
SUM OF ALL RESPONSES TO PHQ QUESTIONS 1-9: 0
2. FEELING DOWN, DEPRESSED OR HOPELESS: NOT AT ALL
SUM OF ALL RESPONSES TO PHQ QUESTIONS 1-9: 0
5. POOR APPETITE OR OVEREATING: NOT AT ALL
6. FEELING BAD ABOUT YOURSELF - OR THAT YOU ARE A FAILURE OR HAVE LET YOURSELF OR YOUR FAMILY DOWN: NOT AT ALL
SUM OF ALL RESPONSES TO PHQ QUESTIONS 1-9: 0
8. MOVING OR SPEAKING SO SLOWLY THAT OTHER PEOPLE COULD HAVE NOTICED. OR THE OPPOSITE, BEING SO FIGETY OR RESTLESS THAT YOU HAVE BEEN MOVING AROUND A LOT MORE THAN USUAL: NOT AT ALL
7. TROUBLE CONCENTRATING ON THINGS, SUCH AS READING THE NEWSPAPER OR WATCHING TELEVISION: NOT AT ALL
10. IF YOU CHECKED OFF ANY PROBLEMS, HOW DIFFICULT HAVE THESE PROBLEMS MADE IT FOR YOU TO DO YOUR WORK, TAKE CARE OF THINGS AT HOME, OR GET ALONG WITH OTHER PEOPLE: NOT DIFFICULT AT ALL

## 2025-07-30 ASSESSMENT — LIFESTYLE VARIABLES
HOW MANY STANDARD DRINKS CONTAINING ALCOHOL DO YOU HAVE ON A TYPICAL DAY: PATIENT DOES NOT DRINK
HOW OFTEN DO YOU HAVE A DRINK CONTAINING ALCOHOL: NEVER

## 2025-07-30 NOTE — PROGRESS NOTES
Medicare Annual Wellness Visit    April Silvia is here for Medicare AWV (Welcome to medicare), New Patient (NTP from [place]), and Office Visit for Anticoagulation Management    Assessment & Plan   Personal history of tobacco use  -     MS VISIT TO DISCUSS LUNG CA SCREEN W LDCT  -     CT Lung Screen (Initial/Annual/Baseline); Future  Welcome to Medicare preventive visit  -     External Referral To Pain Clinic  -     EKG 12 Lead       Return in about 3 months (around 10/30/2025).     Subjective   The following acute and/or chronic problems were also addressed today:  Lumbar spondylosis     Patient's complete Health Risk Assessment and screening values have been reviewed and are found in Flowsheets. The following problems were reviewed today and where indicated follow up appointments were made and/or referrals ordered.    Positive Risk Factor Screenings with Interventions:    Fall Risk:  Do you feel unsteady or are you worried about falling? : (!) yes  2 or more falls in past year?: no  Fall with injury in past year?: no  Interventions:    Reviewed medications, home hazards, visual acuity, and co-morbidities that can increase risk for falls        Controlled Medication Review:      Today's Pain Level: No data recorded     Opioid Risk: (High risk score >=55) Opioid risk score: 63      Last PDMP Ricci as Reviewed:  Review User Review Instant Review Result   TEJASBELEM KATY 7/30/2025  9:31 AM @   Reviewed PDMP [1]     Last Controlled Substance Monitoring Documentation      Flowsheet Row Consultation from 9/1/2021 in [facility]   Periodic Controlled Substance Monitoring No signs of potential drug abuse or diversion identified., Assessed functional status. filed at 09/01/2021 0604             General HRA Questions:  Select all that apply: (!) New or Increased Pain  Interventions - Pain:  Patient declined any further interventions or treatment        Abnormal BMI (obese):  Body mass index is 32.14 kg/m². (!)

## 2025-08-01 ENCOUNTER — HOSPITAL ENCOUNTER (OUTPATIENT)
Dept: CT IMAGING | Age: 65
Discharge: HOME | End: 2025-08-01
Payer: MEDICARE

## 2025-08-01 VITALS — BODY MASS INDEX: 30.9 KG/M2 | WEIGHT: 181 LBS | HEIGHT: 64 IN

## 2025-08-01 DIAGNOSIS — Z87.891 PERSONAL HISTORY OF TOBACCO USE: ICD-10-CM

## 2025-08-01 DIAGNOSIS — F17.200 SMOKER: ICD-10-CM

## 2025-08-01 PROCEDURE — 71271 CT THORAX LUNG CANCER SCR C-: CPT

## 2025-08-04 RX ORDER — OMEPRAZOLE 20 MG/1
20 CAPSULE, DELAYED RELEASE ORAL DAILY
Qty: 30 CAPSULE | Refills: 0 | Status: SHIPPED | OUTPATIENT
Start: 2025-08-04 | End: 2025-08-06

## 2025-08-06 ENCOUNTER — TELEPHONE (OUTPATIENT)
Dept: PRIMARY CARE CLINIC | Age: 65
End: 2025-08-06

## 2025-08-06 RX ORDER — OMEPRAZOLE 20 MG/1
40 CAPSULE, DELAYED RELEASE ORAL DAILY
Qty: 180 CAPSULE | Refills: 3 | Status: SHIPPED | OUTPATIENT
Start: 2025-08-06

## 2025-08-12 ENCOUNTER — TELEPHONE (OUTPATIENT)
Dept: PRIMARY CARE CLINIC | Age: 65
End: 2025-08-12

## 2025-08-13 ENCOUNTER — HOSPITAL ENCOUNTER (OUTPATIENT)
Dept: PHYSICAL THERAPY | Age: 65
Setting detail: THERAPIES SERIES
Discharge: HOME | End: 2025-08-13
Attending: ORTHOPAEDIC SURGERY
Payer: MEDICARE

## 2025-08-13 PROCEDURE — 97110 THERAPEUTIC EXERCISES: CPT

## 2025-08-15 ENCOUNTER — HOSPITAL ENCOUNTER (OUTPATIENT)
Dept: PHYSICAL THERAPY | Age: 65
Setting detail: THERAPIES SERIES
Discharge: HOME | End: 2025-08-15
Attending: ORTHOPAEDIC SURGERY
Payer: MEDICARE

## 2025-08-15 PROCEDURE — 97110 THERAPEUTIC EXERCISES: CPT

## 2025-08-20 ENCOUNTER — HOSPITAL ENCOUNTER (OUTPATIENT)
Dept: PHYSICAL THERAPY | Age: 65
Setting detail: THERAPIES SERIES
Discharge: HOME | End: 2025-08-20
Attending: ORTHOPAEDIC SURGERY
Payer: MEDICARE

## 2025-08-20 PROCEDURE — 97110 THERAPEUTIC EXERCISES: CPT

## 2025-08-22 DIAGNOSIS — F34.1 DYSTHYMIA: ICD-10-CM

## 2025-08-22 DIAGNOSIS — M48.061 SPINAL STENOSIS OF LUMBAR REGION WITHOUT NEUROGENIC CLAUDICATION: ICD-10-CM

## 2025-08-22 DIAGNOSIS — F41.9 ANXIETY: ICD-10-CM

## 2025-08-22 DIAGNOSIS — R11.0 NAUSEA: ICD-10-CM

## 2025-08-22 DIAGNOSIS — F43.9 STRESS AT HOME: ICD-10-CM

## 2025-08-22 DIAGNOSIS — F39 MOOD DISORDER: ICD-10-CM

## 2025-08-22 RX ORDER — HYDROCODONE BITARTRATE AND ACETAMINOPHEN 7.5; 325 MG/1; MG/1
1.5 TABLET ORAL EVERY 6 HOURS PRN
Qty: 180 TABLET | Refills: 0 | Status: SHIPPED | OUTPATIENT
Start: 2025-08-22 | End: 2025-09-21

## 2025-08-22 RX ORDER — PROMETHAZINE HYDROCHLORIDE 25 MG/1
25 TABLET ORAL EVERY 8 HOURS PRN
Qty: 135 TABLET | Refills: 0 | Status: SHIPPED | OUTPATIENT
Start: 2025-08-22

## 2025-08-22 RX ORDER — DULOXETIN HYDROCHLORIDE 60 MG/1
60 CAPSULE, DELAYED RELEASE ORAL DAILY
Qty: 30 CAPSULE | Refills: 2 | Status: SHIPPED | OUTPATIENT
Start: 2025-08-22

## 2025-08-22 RX ORDER — DIVALPROEX SODIUM 250 MG/1
250 TABLET, DELAYED RELEASE ORAL 2 TIMES DAILY
Qty: 60 TABLET | Refills: 2 | Status: SHIPPED | OUTPATIENT
Start: 2025-08-22

## 2025-08-25 ENCOUNTER — HOSPITAL ENCOUNTER (OUTPATIENT)
Dept: PHYSICAL THERAPY | Age: 65
Setting detail: THERAPIES SERIES
Discharge: HOME | End: 2025-08-25
Attending: ORTHOPAEDIC SURGERY
Payer: MEDICARE

## 2025-08-25 PROCEDURE — 97110 THERAPEUTIC EXERCISES: CPT

## 2025-08-27 ENCOUNTER — HOSPITAL ENCOUNTER (OUTPATIENT)
Dept: PHYSICAL THERAPY | Age: 65
Setting detail: THERAPIES SERIES
Discharge: HOME | End: 2025-08-27
Attending: ORTHOPAEDIC SURGERY
Payer: MEDICARE

## 2025-08-27 PROCEDURE — 97110 THERAPEUTIC EXERCISES: CPT

## 2025-08-29 ENCOUNTER — TELEPHONE (OUTPATIENT)
Dept: ORTHOPEDIC SURGERY | Age: 65
End: 2025-08-29

## 2025-09-01 DIAGNOSIS — E66.9 OBESITY (BMI 30-39.9): ICD-10-CM

## 2025-09-02 ENCOUNTER — HOSPITAL ENCOUNTER (OUTPATIENT)
Dept: PHYSICAL THERAPY | Age: 65
Setting detail: THERAPIES SERIES
Discharge: HOME | End: 2025-09-02
Attending: ORTHOPAEDIC SURGERY
Payer: MEDICARE

## 2025-09-02 PROCEDURE — 97110 THERAPEUTIC EXERCISES: CPT

## 2025-09-02 RX ORDER — PHENTERMINE HYDROCHLORIDE 37.5 MG/1
37.5 TABLET ORAL
Qty: 30 TABLET | Refills: 0 | Status: SHIPPED | OUTPATIENT
Start: 2025-09-02 | End: 2025-10-02

## 2025-09-04 ENCOUNTER — HOSPITAL ENCOUNTER (OUTPATIENT)
Dept: PHYSICAL THERAPY | Age: 65
Setting detail: THERAPIES SERIES
Discharge: HOME | End: 2025-09-04
Attending: ORTHOPAEDIC SURGERY
Payer: MEDICARE

## 2025-09-04 PROCEDURE — 97110 THERAPEUTIC EXERCISES: CPT

## (undated) DEVICE — ASPIRATOR FLR L72IN SUCT TB W/ 1 DETACH FISH STK PUSH HNDL

## (undated) DEVICE — SUTURE PDS II SZ 4-0 L18IN ABSRB UD L19MM PS-2 3/8 CIR PRIM Z496G

## (undated) DEVICE — GLOVE ORANGE PI 8   MSG9080

## (undated) DEVICE — APPLICATOR MEDICATED 26 CC SOLUTION HI LT ORNG CHLORAPREP

## (undated) DEVICE — SUTURE PROL SZ 3-0 L18IN NONABSORBABLE BLU L24MM FS-1 3/8 8684G

## (undated) DEVICE — DRESSING,GAUZE,XEROFORM,CURAD,1"X8",ST: Brand: CURAD

## (undated) DEVICE — BLANKET WRM W40.2XL55.9IN IORT LO BODY + MISTRAL AIR

## (undated) DEVICE — GLOVE ORTHO 8   MSG9480

## (undated) DEVICE — GLOVE ORANGE PI 7   MSG9070

## (undated) DEVICE — ZIMMER® STERILE DISPOSABLE TOURNIQUET CUFF WITH PLC, DUAL PORT, SINGLE BLADDER, 30 IN. (76 CM)

## (undated) DEVICE — STOCKINETTE,IMPERVIOUS,12X48,STERILE: Brand: MEDLINE

## (undated) DEVICE — KIT CHAIR TRIMANO FOAM W/ SUPP ARM DRP ERGONOMICALLY DESIGNED

## (undated) DEVICE — DRESSING TRNSPAR W5XL4.5IN FLM SHT SEMIPERMEABLE WIND

## (undated) DEVICE — SHEET, ORTHO, SPLIT, STERILE: Brand: MEDLINE

## (undated) DEVICE — GLOVE SURG SZ 75 L12IN THK75MIL DK GRN LTX FREE

## (undated) DEVICE — DRAPE,ARTHRO,W/POUCH,STERILE: Brand: MEDLINE

## (undated) DEVICE — STRAP,POSITIONING,KNEE/BODY,FOAM,4X60": Brand: MEDLINE

## (undated) DEVICE — SOLUTION IRRIG 3000ML LAC RINGERS ARTHROMTC PLAS CONT

## (undated) DEVICE — TUBING PMP L16FT MAIN DISP FOR AR-6400 AR-6475

## (undated) DEVICE — PACK ARTHRO W PCH

## (undated) DEVICE — MHPB ARTHROSCOPY PACK: Brand: MEDLINE INDUSTRIES, INC.

## (undated) DEVICE — TUBING PMP L16FT MAIN DISP FOR AR-6400 AR-6475 Â€“ ORDER MULTIPLES OF 10 EACH

## (undated) DEVICE — BLADE ES ELASTOMERIC COAT INSUL DURABLE BEND UPTO 90DEG

## (undated) DEVICE — BLADE,CARBON-STEEL,15,STRL,DISPOSABLE,TB: Brand: MEDLINE

## (undated) DEVICE — MERCY HEALTH ST CHARLES: Brand: MEDLINE INDUSTRIES, INC.

## (undated) DEVICE — PROTECTOR ULN NRV PUR FOAM HK LOOP STRP ANATOMICALLY

## (undated) DEVICE — SINGLE PORT MANIFOLD: Brand: NEPTUNE 2

## (undated) DEVICE — MASTISOL ADHESIVE LIQ 2/3ML

## (undated) DEVICE — BNDG,ELSTC,MATRIX,STRL,6"X5YD,LF,HOOK&LP: Brand: MEDLINE

## (undated) DEVICE — SUTURE ETHLN SZ 3-0 L18IN NONABSORBABLE BLK FS-1 L24MM 3/8 663H

## (undated) DEVICE — [TOMCAT CUTTER, ARTHROSCOPIC SHAVER BLADE,  DO NOT RESTERILIZE,  DO NOT USE IF PACKAGE IS DAMAGED,  KEEP DRY,  KEEP AWAY FROM SUNLIGHT]: Brand: FORMULA

## (undated) DEVICE — GOWN,AURORA,NONREINFORCED,LARGE: Brand: MEDLINE

## (undated) DEVICE — STRIP,CLOSURE,WOUND,MEDI-STRIP,1/2X4: Brand: MEDLINE

## (undated) DEVICE — SUTURE VICRYL + SZ 3-0 L27IN ABSRB UD L26MM SH 1/2 CIR VCP416H

## (undated) DEVICE — SOLUTION IRRIGATION LR 3000 ML USP TITAN XL CONTAINER 4/CA

## (undated) DEVICE — Z DISCONTINUED USE 2218423 SUTURE ETHILON SZ 3-0 L18IN NONABSORBABLE BLK FS-1 L24MM 3/8 663H

## (undated) DEVICE — PROBE ABLAT XL 90DEG ASPIR BPLR RF 1 PC ELECTRD ERGO HNDL

## (undated) DEVICE — BLADE SHV L13CM DIA4MM EXCALIBUR AGG COOLCUT

## (undated) DEVICE — 1010 S-DRAPE TOWEL DRAPE 10/BX: Brand: STERI-DRAPE™

## (undated) DEVICE — BANDAGE COMPR M W6INXL10YD WHT BGE VELC E MTRX HK AND LOOP

## (undated) DEVICE — Device

## (undated) DEVICE — STERILE POLYISOPRENE POWDER-FREE SURGICAL GLOVES WITH EMOLLIENT COATING: Brand: PROTEXIS

## (undated) DEVICE — BANDAGE,ELASTIC,ESMARK,STERILE,6"X9',LF: Brand: MEDLINE

## (undated) DEVICE — SOLUTION IV IRRIG LACTATED RINGERS 3000ML 2B7487

## (undated) DEVICE — POUCH FLD 36X29IN SHLDR HVY LO GLARE MATTE FINISH FLM 2 SUCT

## (undated) DEVICE — PADDING UNDERCAST W6INXL4YD WYTEX 6 PER BG

## (undated) DEVICE — BLADE SHV L13CM DIA4MM BNE CUT AGG DEB COOLCUT

## (undated) DEVICE — DISPOSABLE TOURNIQUET CUFF SINGLE BLADDER, SINGLE PORT AND QUICK CONNECT CONNECTOR: Brand: COLOR CUFF

## (undated) DEVICE — GLOVE SURG SZ 8 L12IN THK75MIL DK GRN LTX FREE

## (undated) DEVICE — GLOVE ORANGE PI 8 1/2   MSG9085

## (undated) DEVICE — 3M™ STERI-DRAPE™ U-DRAPE 1015: Brand: STERI-DRAPE™

## (undated) DEVICE — SOLUTION IRRIG 1000ML 0.9% SOD CHL USP POUR PLAS BTL

## (undated) DEVICE — 4-PORT MANIFOLD: Brand: NEPTUNE 2

## (undated) DEVICE — GLOVE SURG SZ 85 L12IN THK75MIL DK GRN LTX FREE

## (undated) DEVICE — SYRINGE MED 50ML LUERLOCK TIP

## (undated) DEVICE — 3M™ STERI-DRAPE™ INSTRUMENT POUCH 1018L: Brand: STERI-DRAPE™

## (undated) DEVICE — PADDING CAST W6INXL4YD POLY POR SPUN DACRON SYN VERSATILE

## (undated) DEVICE — STAZ ENT: Brand: MEDLINE INDUSTRIES, INC.